# Patient Record
Sex: FEMALE | Race: WHITE | NOT HISPANIC OR LATINO | URBAN - METROPOLITAN AREA
[De-identification: names, ages, dates, MRNs, and addresses within clinical notes are randomized per-mention and may not be internally consistent; named-entity substitution may affect disease eponyms.]

---

## 2020-03-15 ENCOUNTER — INPATIENT (INPATIENT)
Facility: HOSPITAL | Age: 80
LOS: 9 days | Discharge: HOME HEALTH | DRG: 177 | End: 2020-03-25
Attending: HOSPITALIST | Admitting: HOSPITALIST
Payer: MEDICARE

## 2020-03-15 VITALS
DIASTOLIC BLOOD PRESSURE: 109 MMHG | TEMPERATURE: 98 F | HEIGHT: 61 IN | HEART RATE: 127 BPM | OXYGEN SATURATION: 95 % | RESPIRATION RATE: 19 BRPM | WEIGHT: 177.03 LBS | SYSTOLIC BLOOD PRESSURE: 134 MMHG

## 2020-03-15 DIAGNOSIS — Z11.59 ENCOUNTER FOR SCREENING FOR OTHER VIRAL DISEASES: ICD-10-CM

## 2020-03-15 DIAGNOSIS — J69.0 PNEUMONITIS DUE TO INHALATION OF FOOD AND VOMIT: ICD-10-CM

## 2020-03-15 DIAGNOSIS — I24.8 OTHER FORMS OF ACUTE ISCHEMIC HEART DISEASE: ICD-10-CM

## 2020-03-15 DIAGNOSIS — Z98.83 FILTERING (VITREOUS) BLEB AFTER GLAUCOMA SURGERY STATUS: ICD-10-CM

## 2020-03-15 DIAGNOSIS — J15.212 PNEUMONIA DUE TO METHICILLIN RESISTANT STAPHYLOCOCCUS AUREUS: ICD-10-CM

## 2020-03-15 DIAGNOSIS — Z98.83 FILTERING (VITREOUS) BLEB AFTER GLAUCOMA SURGERY STATUS: Chronic | ICD-10-CM

## 2020-03-15 LAB
ADD ON TEST-SPECIMEN IN LAB: SIGNIFICANT CHANGE UP
ALBUMIN SERPL ELPH-MCNC: 2.5 G/DL — LOW (ref 3.3–5)
ALP SERPL-CCNC: 228 U/L — HIGH (ref 40–120)
ALT FLD-CCNC: 29 U/L — SIGNIFICANT CHANGE UP (ref 12–78)
ANION GAP SERPL CALC-SCNC: 14 MMOL/L — SIGNIFICANT CHANGE UP (ref 5–17)
AST SERPL-CCNC: 23 U/L — SIGNIFICANT CHANGE UP (ref 15–37)
BASOPHILS # BLD AUTO: 0 K/UL — SIGNIFICANT CHANGE UP (ref 0–0.2)
BASOPHILS NFR BLD AUTO: 0 % — SIGNIFICANT CHANGE UP (ref 0–2)
BILIRUB SERPL-MCNC: 0.4 MG/DL — SIGNIFICANT CHANGE UP (ref 0.2–1.2)
BUN SERPL-MCNC: 22 MG/DL — SIGNIFICANT CHANGE UP (ref 7–23)
CALCIUM SERPL-MCNC: 8.5 MG/DL — SIGNIFICANT CHANGE UP (ref 8.5–10.1)
CHLORIDE SERPL-SCNC: 110 MMOL/L — HIGH (ref 96–108)
CO2 SERPL-SCNC: 15 MMOL/L — LOW (ref 22–31)
CREAT SERPL-MCNC: 2.87 MG/DL — HIGH (ref 0.5–1.3)
EOSINOPHIL # BLD AUTO: 7.51 K/UL — HIGH (ref 0–0.5)
EOSINOPHIL NFR BLD AUTO: 24 % — HIGH (ref 0–6)
GLUCOSE SERPL-MCNC: 153 MG/DL — HIGH (ref 70–99)
HCT VFR BLD CALC: 42.8 % — SIGNIFICANT CHANGE UP (ref 34.5–45)
HGB BLD-MCNC: 14 G/DL — SIGNIFICANT CHANGE UP (ref 11.5–15.5)
LACTATE SERPL-SCNC: 1.8 MMOL/L — SIGNIFICANT CHANGE UP (ref 0.7–2)
LYMPHOCYTES # BLD AUTO: 0.63 K/UL — LOW (ref 1–3.3)
LYMPHOCYTES # BLD AUTO: 2 % — LOW (ref 13–44)
MAGNESIUM SERPL-MCNC: 2.2 MG/DL — SIGNIFICANT CHANGE UP (ref 1.6–2.6)
MCHC RBC-ENTMCNC: 29.1 PG — SIGNIFICANT CHANGE UP (ref 27–34)
MCHC RBC-ENTMCNC: 32.7 GM/DL — SIGNIFICANT CHANGE UP (ref 32–36)
MCV RBC AUTO: 89 FL — SIGNIFICANT CHANGE UP (ref 80–100)
MONOCYTES # BLD AUTO: 0.31 K/UL — SIGNIFICANT CHANGE UP (ref 0–0.9)
MONOCYTES NFR BLD AUTO: 1 % — LOW (ref 2–14)
NEUTROPHILS # BLD AUTO: 22.83 K/UL — HIGH (ref 1.8–7.4)
NEUTROPHILS NFR BLD AUTO: 70 % — SIGNIFICANT CHANGE UP (ref 43–77)
NRBC # BLD: SIGNIFICANT CHANGE UP /100 WBCS (ref 0–0)
NT-PROBNP SERPL-SCNC: 4755 PG/ML — HIGH (ref 0–450)
PLATELET # BLD AUTO: 281 K/UL — SIGNIFICANT CHANGE UP (ref 150–400)
POTASSIUM SERPL-MCNC: 3.4 MMOL/L — LOW (ref 3.5–5.3)
POTASSIUM SERPL-SCNC: 3.4 MMOL/L — LOW (ref 3.5–5.3)
PROT SERPL-MCNC: 7.7 GM/DL — SIGNIFICANT CHANGE UP (ref 6–8.3)
RBC # BLD: 4.81 M/UL — SIGNIFICANT CHANGE UP (ref 3.8–5.2)
RBC # FLD: 13.7 % — SIGNIFICANT CHANGE UP (ref 10.3–14.5)
SODIUM SERPL-SCNC: 139 MMOL/L — SIGNIFICANT CHANGE UP (ref 135–145)
TROPONIN I SERPL-MCNC: 0.3 NG/ML — HIGH (ref 0.01–0.04)
TROPONIN I SERPL-MCNC: 0.46 NG/ML — HIGH (ref 0.01–0.04)
WBC # BLD: 31.28 K/UL — HIGH (ref 3.8–10.5)
WBC # FLD AUTO: 31.28 K/UL — HIGH (ref 3.8–10.5)

## 2020-03-15 PROCEDURE — 87798 DETECT AGENT NOS DNA AMP: CPT

## 2020-03-15 PROCEDURE — 87086 URINE CULTURE/COLONY COUNT: CPT

## 2020-03-15 PROCEDURE — 82977 ASSAY OF GGT: CPT

## 2020-03-15 PROCEDURE — 93010 ELECTROCARDIOGRAM REPORT: CPT

## 2020-03-15 PROCEDURE — 70450 CT HEAD/BRAIN W/O DYE: CPT

## 2020-03-15 PROCEDURE — 87070 CULTURE OTHR SPECIMN AEROBIC: CPT

## 2020-03-15 PROCEDURE — 99223 1ST HOSP IP/OBS HIGH 75: CPT

## 2020-03-15 PROCEDURE — 84134 ASSAY OF PREALBUMIN: CPT

## 2020-03-15 PROCEDURE — 80048 BASIC METABOLIC PNL TOTAL CA: CPT

## 2020-03-15 PROCEDURE — 97162 PT EVAL MOD COMPLEX 30 MIN: CPT | Mod: GP

## 2020-03-15 PROCEDURE — U0001: CPT

## 2020-03-15 PROCEDURE — 82378 CARCINOEMBRYONIC ANTIGEN: CPT

## 2020-03-15 PROCEDURE — 93306 TTE W/DOPPLER COMPLETE: CPT

## 2020-03-15 PROCEDURE — 94640 AIRWAY INHALATION TREATMENT: CPT

## 2020-03-15 PROCEDURE — 85610 PROTHROMBIN TIME: CPT

## 2020-03-15 PROCEDURE — 84484 ASSAY OF TROPONIN QUANT: CPT

## 2020-03-15 PROCEDURE — 71250 CT THORAX DX C-: CPT | Mod: 26

## 2020-03-15 PROCEDURE — 80202 ASSAY OF VANCOMYCIN: CPT

## 2020-03-15 PROCEDURE — 85027 COMPLETE CBC AUTOMATED: CPT

## 2020-03-15 PROCEDURE — 84100 ASSAY OF PHOSPHORUS: CPT

## 2020-03-15 PROCEDURE — 83605 ASSAY OF LACTIC ACID: CPT

## 2020-03-15 PROCEDURE — 87040 BLOOD CULTURE FOR BACTERIA: CPT

## 2020-03-15 PROCEDURE — 80053 COMPREHEN METABOLIC PANEL: CPT

## 2020-03-15 PROCEDURE — 85730 THROMBOPLASTIN TIME PARTIAL: CPT

## 2020-03-15 PROCEDURE — 71045 X-RAY EXAM CHEST 1 VIEW: CPT

## 2020-03-15 PROCEDURE — 86301 IMMUNOASSAY TUMOR CA 19-9: CPT

## 2020-03-15 PROCEDURE — 84443 ASSAY THYROID STIM HORMONE: CPT

## 2020-03-15 PROCEDURE — 71045 X-RAY EXAM CHEST 1 VIEW: CPT | Mod: 26

## 2020-03-15 PROCEDURE — 85025 COMPLETE CBC W/AUTO DIFF WBC: CPT

## 2020-03-15 PROCEDURE — 36415 COLL VENOUS BLD VENIPUNCTURE: CPT

## 2020-03-15 PROCEDURE — 83735 ASSAY OF MAGNESIUM: CPT

## 2020-03-15 PROCEDURE — 87507 IADNA-DNA/RNA PROBE TQ 12-25: CPT

## 2020-03-15 PROCEDURE — 87581 M.PNEUMON DNA AMP PROBE: CPT

## 2020-03-15 PROCEDURE — 97116 GAIT TRAINING THERAPY: CPT | Mod: GP

## 2020-03-15 PROCEDURE — 80061 LIPID PANEL: CPT

## 2020-03-15 PROCEDURE — 87486 CHLMYD PNEUM DNA AMP PROBE: CPT

## 2020-03-15 PROCEDURE — 87186 SC STD MICRODIL/AGAR DIL: CPT

## 2020-03-15 PROCEDURE — 74176 CT ABD & PELVIS W/O CONTRAST: CPT

## 2020-03-15 PROCEDURE — 84145 PROCALCITONIN (PCT): CPT

## 2020-03-15 PROCEDURE — 71250 CT THORAX DX C-: CPT

## 2020-03-15 PROCEDURE — 87633 RESP VIRUS 12-25 TARGETS: CPT

## 2020-03-15 PROCEDURE — 97530 THERAPEUTIC ACTIVITIES: CPT | Mod: GP

## 2020-03-15 PROCEDURE — 81001 URINALYSIS AUTO W/SCOPE: CPT

## 2020-03-15 PROCEDURE — 87493 C DIFF AMPLIFIED PROBE: CPT

## 2020-03-15 RX ORDER — PIPERACILLIN AND TAZOBACTAM 4; .5 G/20ML; G/20ML
3.38 INJECTION, POWDER, LYOPHILIZED, FOR SOLUTION INTRAVENOUS ONCE
Refills: 0 | Status: COMPLETED | OUTPATIENT
Start: 2020-03-15 | End: 2020-03-15

## 2020-03-15 RX ORDER — ATORVASTATIN CALCIUM 80 MG/1
20 TABLET, FILM COATED ORAL AT BEDTIME
Refills: 0 | Status: DISCONTINUED | OUTPATIENT
Start: 2020-03-15 | End: 2020-03-16

## 2020-03-15 RX ORDER — VANCOMYCIN HCL 1 G
1000 VIAL (EA) INTRAVENOUS ONCE
Refills: 0 | Status: DISCONTINUED | OUTPATIENT
Start: 2020-03-15 | End: 2020-03-16

## 2020-03-15 RX ORDER — ALBUTEROL 90 UG/1
1 AEROSOL, METERED ORAL EVERY 4 HOURS
Refills: 0 | Status: COMPLETED | OUTPATIENT
Start: 2020-03-15 | End: 2021-02-11

## 2020-03-15 RX ORDER — ATENOLOL 25 MG/1
50 TABLET ORAL DAILY
Refills: 0 | Status: DISCONTINUED | OUTPATIENT
Start: 2020-03-15 | End: 2020-03-25

## 2020-03-15 RX ORDER — ASPIRIN/CALCIUM CARB/MAGNESIUM 324 MG
325 TABLET ORAL ONCE
Refills: 0 | Status: COMPLETED | OUTPATIENT
Start: 2020-03-15 | End: 2020-03-15

## 2020-03-15 RX ORDER — POTASSIUM CHLORIDE 20 MEQ
40 PACKET (EA) ORAL ONCE
Refills: 0 | Status: COMPLETED | OUTPATIENT
Start: 2020-03-15 | End: 2020-03-16

## 2020-03-15 RX ORDER — ENOXAPARIN SODIUM 100 MG/ML
80 INJECTION SUBCUTANEOUS ONCE
Refills: 0 | Status: DISCONTINUED | OUTPATIENT
Start: 2020-03-15 | End: 2020-03-15

## 2020-03-15 RX ORDER — SODIUM CHLORIDE 9 MG/ML
1500 INJECTION INTRAMUSCULAR; INTRAVENOUS; SUBCUTANEOUS ONCE
Refills: 0 | Status: COMPLETED | OUTPATIENT
Start: 2020-03-15 | End: 2020-03-15

## 2020-03-15 RX ORDER — LACTOBACILLUS ACIDOPHILUS 100MM CELL
1 CAPSULE ORAL
Refills: 0 | Status: DISCONTINUED | OUTPATIENT
Start: 2020-03-15 | End: 2020-03-25

## 2020-03-15 RX ORDER — ASPIRIN/CALCIUM CARB/MAGNESIUM 324 MG
81 TABLET ORAL DAILY
Refills: 0 | Status: DISCONTINUED | OUTPATIENT
Start: 2020-03-15 | End: 2020-03-25

## 2020-03-15 RX ADMIN — Medication 325 MILLIGRAM(S): at 21:26

## 2020-03-15 RX ADMIN — SODIUM CHLORIDE 1500 MILLILITER(S): 9 INJECTION INTRAMUSCULAR; INTRAVENOUS; SUBCUTANEOUS at 21:21

## 2020-03-15 RX ADMIN — SODIUM CHLORIDE 1000 MILLILITER(S): 9 INJECTION INTRAMUSCULAR; INTRAVENOUS; SUBCUTANEOUS at 21:20

## 2020-03-15 NOTE — ED ADULT NURSE NOTE - OBJECTIVE STATEMENT
patient axox3, c/o cough for 1 week and shortness of breath with hypoxia. patient was found to be in rapid afib at 210. given 20mg cardizem IVP and 150mL NS bolus by EMS and now sinus tach at 130s. patient states her niece bought her dayquil and nyquil for recent cough. patient denies n/v/d, fever. chills, chest pain.

## 2020-03-15 NOTE — CHART NOTE - NSCHARTNOTEFT_GEN_A_CORE
Pt awaiting coags for heparin gtt given findings of concern for metastatic ca and CHADSVASC 4, pt now in NSR at 87. Pt CT head and abd pelvis ordered to r/o mets. Pt has a family h/o pancreatic cancer and colon cancer in siblings. Findings explained to patient who agrees to pan scan, plan for heme onc consult, AC pending coags, cardio evaluation in AM, and holding ARB due to renal Insufficiency. Attempted to contact niece-proxy, left message for call back .

## 2020-03-15 NOTE — ED ADULT TRIAGE NOTE - CHIEF COMPLAINT QUOTE
Patient brought in by EMS for cough for 1 week and shortness of breath with hypoxia. patient was found to be in rapid afib at 210. given 20mg cardizem and 150ml NS bolus and now sinus tach at 130s. denies sick contacts.

## 2020-03-15 NOTE — ED PROVIDER NOTE - CARE PLAN
Principal Discharge DX:	Atrial fibrillation/flutter Principal Discharge DX:	Atrial fibrillation/flutter  Secondary Diagnosis:	Elevated troponin  Secondary Diagnosis:	ARF (acute renal failure)  Secondary Diagnosis:	Leukocytosis  Secondary Diagnosis:	CHF (congestive heart failure)

## 2020-03-15 NOTE — ED PROVIDER NOTE - ATTENDING CONTRIBUTION TO CARE
I, Kiley Chapman MD,  performed the initial face to face bedside interview with this patient regarding history of present illness, review of symptoms and relevant past medical, social and family history.  I completed an independent physical examination.  I was the initial provider who evaluated this patient. I have signed out the follow up of any pending tests (i.e. labs, radiological studies) to the ACP.  I have communicated the patient’s plan of care and disposition with the ACP.  The history, relevant review of systems, past medical and surgical history, medical decision making, and physical examination was documented by the scribe in my presence and I attest to the accuracy of the documentation.

## 2020-03-15 NOTE — ED PROVIDER NOTE - ENMT, MLM
Airway patent, Nasal mucosa clear. Mouth with normal mucosa. Throat is clear, has no vesicles, no oropharyngeal exudates and uvula is midline. No nasal secretions. No obvious coryza.

## 2020-03-15 NOTE — ED PROVIDER NOTE - NS_HEARTSCTROPONIN_ED_A_ED
Patient was called with provider's message. She agrees with this plan and already started taking the Abx.    1-3x Normal Limit

## 2020-03-15 NOTE — ED PROVIDER NOTE - PROGRESS NOTE DETAILS
PA note: 79 year old female with PMHx of HTN, Glaucoma presents to [facility] with palpitations and SOB for 2 days, taking PHENYLEPHRINE HYDROCHLORIDE (Dayquil/Nyquil) for URI/congestion for 1 week. No fever. No COVID exposure. No sick contacts. No recent travel. Patient converted to sinus rhythm after 20mg of Cardizem IV by EMS. Mild Hypoxia improved by O2 NC 2L. Will get basic labs, CXR, IVF for hydration. Reassess. ~GLENN Martinez PA note: Patient with +montana Troponin, elevated BNP at 4755. Patient received 150cc NS by EMS, 400cc NS in ED, dc'd IVF. Will admit. ~GLENN Martinez PA note: WBC 31.28. Creatinine 2.87. Rectal temp 98.0 F. Ordered Giana Young, CT chest, urinalysis. ~GLENN Martinez PA note: Case discussed with Dr. Kumar. Will admit patient. ~GLENN Martinez Mookie MOLINA for ED attending, Dr. Chapman : 79 YOF w/ PMHx of HTN, Glaucoma presents to the ED BIBA c/o cough x1 week and SOB w/ hypoxia. Pt found to have a-flutter with HR in 210s. Per family at bedside, +SOB. +Diffuse wheezing, +pallor. Denies CP, fever. Pt likely dehydrated. Pt was given 20mg Cardizem and 150mL NS bolus and now sinus tach at 130s. Denies any sick contacts or recent travel. NKDA.

## 2020-03-15 NOTE — ED PROVIDER NOTE - CLINICAL SUMMARY MEDICAL DECISION MAKING FREE TEXT BOX
PA note: 79 year old female with PMHx of HTN, Glaucoma presents to [facility] with palpitations and SOB for 2 days, taking PHENYLEPHRINE HYDROCHLORIDE (Dayquil/Nyquil) for URI/congestion for 1 week. No fever. No COVID exposure. No sick contacts. No recent travel. Patient converted to sinus rhythm after 20mg of Cardizem IV by EMS. Mild Hypoxia improved by O2 NC 2L. Will get basic labs, CXR, IVF for hydration. Reassess. ~GLENN Martinez PA note: 79 year old female with PMHx of HTN, Glaucoma presents to [facility] with palpitations and SOB for 2 days, taking PHENYLEPHRINE HYDROCHLORIDE (Dayquil/Nyquil) for URI/congestion for 1 week. No fever. No COVID exposure. No sick contacts. No recent travel. Patient converted to sinus rhythm after 20mg of Cardizem IV by EMS. Mild Hypoxia improved by O2 NC 2L. Patient with +montana Troponin, elevated BNP at 4755. Patient received 150cc NS by EMS, 400cc NS in ED, dc'd IVF. ADmitted to Dr. Qureshi. ~GLENN Martinez

## 2020-03-15 NOTE — ED PROVIDER NOTE - CONSTITUTIONAL, MLM
normal... PA note: Well appearing, Afebrile, awake, alert, oriented to person, place, time/situation and in no apparent distress.

## 2020-03-15 NOTE — ED PROVIDER NOTE - OBJECTIVE STATEMENT
PA note: Patient is a 79 year old female with PMHx of HTN, Glaucoma, who presents to ED c/o palpitations and SOB x 2 days. Patient has had a slight cough, URI symptoms x 1 week and has been taking a lot of Dayquil and Nyquil (both containing  PHENYLEPHRINE HYDROCHLORIDE). Patient started to notice some SOB and palpitations x 2 days. Patient's niece went to check on her this evening and patient did not appear well to her. Niece called EMS to have patient brought to ED> Patient was found to be in A-flutter with HR 210s, hypoxia with O2sat in 91-92 RA by EMS. Patient received    Patient brought in by EMS for cough for 1 week and shortness of breath with hypoxia. Patient was given 20mg of cardizem, 2L of O2 (O2sat improved to 96%), and 150ml NS bolus and now sinus tach at 130s. DENIES chest pressure, chest pain, fever, sick contacts, COVID exposure. No recent travel. Patient visited her sister at [facility] 1 week ago who DOES NOT have COVID-19. ~GLENN Martinez PA note: Patient is a 79 year old female with PMHx of HTN, Glaucoma, who presents to ED c/o palpitations and SOB x 2 days. Patient has had a slight cough, URI symptoms x 1 week and has been taking a lot of Dayquil and Nyquil (both containing  PHENYLEPHRINE HYDROCHLORIDE). Patient started to notice some SOB and palpitations x 2 days. Patient's niece went to check on her this evening and patient did not appear well to her. Niece called EMS to have patient brought to ED> Patient was found to be in A-flutter with HR 210s, hypoxia with O2sat in 91-92 RA by EMS. Patient received 20mg of cardizem, 2L of O2 (O2sat improved to 96%), and 150ml NS bolus and now sinus tach at 130s. Patient feels dehydrated. DENIES chest pressure, chest pain, fever, sick contacts, COVID exposure. No recent travel. Patient visited her sister at [facility] 1 week ago who DOES NOT have COVID-19. ~GLENN Martinez

## 2020-03-15 NOTE — ED ADULT NURSE NOTE - NSIMPLEMENTINTERV_GEN_ALL_ED
Implemented All Fall Risk Interventions:  [place]bs to call system. Call bell, personal items and telephone within reach. Instruct patient to call for assistance. Room bathroom lighting operational. Non-slip footwear when patient is off stretcher. Physically safe environment: no spills, clutter or unnecessary equipment. Stretcher in lowest position, wheels locked, appropriate side rails in place. Provide visual cue, wrist band, yellow gown, etc. Monitor gait and stability. Monitor for mental status changes and reorient to person, place, and time. Review medications for side effects contributing to fall risk. Reinforce activity limits and safety measures with patient and family.

## 2020-03-15 NOTE — ED ADULT NURSE NOTE - NS ED NURSE TRANSPORT WITH
oxygen/Cardiac Monitor/Defib/ACLS/Rescue Kit/O2/BVM IV pump/hepain drip done with CCU RN/oxygen/Cardiac Monitor/Defib/ACLS/Rescue Kit/O2/BVM

## 2020-03-15 NOTE — ED PROVIDER NOTE - CHPI ED SYMPTOMS NEG
no nausea/no diaphoresis/no back pain/no chest pain/no cough/no dizziness/no fever/no syncope/no vomiting/no chills

## 2020-03-16 DIAGNOSIS — I48.91 UNSPECIFIED ATRIAL FIBRILLATION: ICD-10-CM

## 2020-03-16 DIAGNOSIS — D72.829 ELEVATED WHITE BLOOD CELL COUNT, UNSPECIFIED: ICD-10-CM

## 2020-03-16 DIAGNOSIS — C78.00 SECONDARY MALIGNANT NEOPLASM OF UNSPECIFIED LUNG: ICD-10-CM

## 2020-03-16 DIAGNOSIS — R79.89 OTHER SPECIFIED ABNORMAL FINDINGS OF BLOOD CHEMISTRY: ICD-10-CM

## 2020-03-16 DIAGNOSIS — R09.89 OTHER SPECIFIED SYMPTOMS AND SIGNS INVOLVING THE CIRCULATORY AND RESPIRATORY SYSTEMS: ICD-10-CM

## 2020-03-16 LAB
ALBUMIN SERPL ELPH-MCNC: 2.3 G/DL — LOW (ref 3.3–5)
ALP SERPL-CCNC: 222 U/L — HIGH (ref 40–120)
ALT FLD-CCNC: 35 U/L — SIGNIFICANT CHANGE UP (ref 12–78)
ANION GAP SERPL CALC-SCNC: 9 MMOL/L — SIGNIFICANT CHANGE UP (ref 5–17)
APTT BLD: 174.3 SEC — CRITICAL HIGH (ref 27.5–36.3)
APTT BLD: 30.5 SEC — SIGNIFICANT CHANGE UP (ref 27.5–36.3)
APTT BLD: 81.7 SEC — HIGH (ref 27.5–36.3)
AST SERPL-CCNC: 38 U/L — HIGH (ref 15–37)
BILIRUB SERPL-MCNC: 0.4 MG/DL — SIGNIFICANT CHANGE UP (ref 0.2–1.2)
BUN SERPL-MCNC: 23 MG/DL — SIGNIFICANT CHANGE UP (ref 7–23)
CALCIUM SERPL-MCNC: 8 MG/DL — LOW (ref 8.5–10.1)
CANCER AG19-9 SERPL-ACNC: 25 U/ML — SIGNIFICANT CHANGE UP
CEA SERPL-MCNC: 2.2 NG/ML — SIGNIFICANT CHANGE UP (ref 0–3.8)
CHLORIDE SERPL-SCNC: 110 MMOL/L — HIGH (ref 96–108)
CHOLEST SERPL-MCNC: 153 MG/DL — SIGNIFICANT CHANGE UP (ref 10–199)
CO2 SERPL-SCNC: 20 MMOL/L — LOW (ref 22–31)
CREAT SERPL-MCNC: 2.33 MG/DL — HIGH (ref 0.5–1.3)
GLUCOSE SERPL-MCNC: 85 MG/DL — SIGNIFICANT CHANGE UP (ref 70–99)
HCT VFR BLD CALC: 32.4 % — LOW (ref 34.5–45)
HCT VFR BLD CALC: 36.5 % — SIGNIFICANT CHANGE UP (ref 34.5–45)
HDLC SERPL-MCNC: 40 MG/DL — LOW
HGB BLD-MCNC: 10.9 G/DL — LOW (ref 11.5–15.5)
HGB BLD-MCNC: 12.1 G/DL — SIGNIFICANT CHANGE UP (ref 11.5–15.5)
INR BLD: 1.3 RATIO — HIGH (ref 0.88–1.16)
LIPID PNL WITH DIRECT LDL SERPL: 94 MG/DL — SIGNIFICANT CHANGE UP
MAGNESIUM SERPL-MCNC: 2.3 MG/DL — SIGNIFICANT CHANGE UP (ref 1.6–2.6)
MCHC RBC-ENTMCNC: 29.2 PG — SIGNIFICANT CHANGE UP (ref 27–34)
MCHC RBC-ENTMCNC: 29.9 PG — SIGNIFICANT CHANGE UP (ref 27–34)
MCHC RBC-ENTMCNC: 33.2 GM/DL — SIGNIFICANT CHANGE UP (ref 32–36)
MCHC RBC-ENTMCNC: 33.6 GM/DL — SIGNIFICANT CHANGE UP (ref 32–36)
MCV RBC AUTO: 88 FL — SIGNIFICANT CHANGE UP (ref 80–100)
MCV RBC AUTO: 89 FL — SIGNIFICANT CHANGE UP (ref 80–100)
PHOSPHATE SERPL-MCNC: 3.9 MG/DL — SIGNIFICANT CHANGE UP (ref 2.5–4.5)
PLATELET # BLD AUTO: 206 K/UL — SIGNIFICANT CHANGE UP (ref 150–400)
PLATELET # BLD AUTO: 274 K/UL — SIGNIFICANT CHANGE UP (ref 150–400)
POTASSIUM SERPL-MCNC: 4.1 MMOL/L — SIGNIFICANT CHANGE UP (ref 3.5–5.3)
POTASSIUM SERPL-SCNC: 4.1 MMOL/L — SIGNIFICANT CHANGE UP (ref 3.5–5.3)
PREALB SERPL-MCNC: 9 MG/DL — LOW (ref 20–40)
PROT SERPL-MCNC: 7.3 GM/DL — SIGNIFICANT CHANGE UP (ref 6–8.3)
PROTHROM AB SERPL-ACNC: 14.6 SEC — HIGH (ref 10–12.9)
RAPID RVP RESULT: DETECTED
RBC # BLD: 3.64 M/UL — LOW (ref 3.8–5.2)
RBC # BLD: 4.15 M/UL — SIGNIFICANT CHANGE UP (ref 3.8–5.2)
RBC # FLD: 13.8 % — SIGNIFICANT CHANGE UP (ref 10.3–14.5)
RBC # FLD: 13.9 % — SIGNIFICANT CHANGE UP (ref 10.3–14.5)
RV+EV RNA SPEC QL NAA+PROBE: DETECTED
SODIUM SERPL-SCNC: 139 MMOL/L — SIGNIFICANT CHANGE UP (ref 135–145)
TOTAL CHOLESTEROL/HDL RATIO MEASUREMENT: 3.8 RATIO — SIGNIFICANT CHANGE UP (ref 3.3–7.1)
TRIGL SERPL-MCNC: 92 MG/DL — SIGNIFICANT CHANGE UP (ref 10–149)
TROPONIN I SERPL-MCNC: 0.41 NG/ML — HIGH (ref 0.01–0.04)
TSH SERPL-MCNC: 0.68 UU/ML — SIGNIFICANT CHANGE UP (ref 0.34–4.82)
TSH SERPL-MCNC: 0.78 UU/ML — SIGNIFICANT CHANGE UP (ref 0.34–4.82)
WBC # BLD: 23.61 K/UL — HIGH (ref 3.8–10.5)
WBC # BLD: 32.23 K/UL — HIGH (ref 3.8–10.5)
WBC # FLD AUTO: 23.61 K/UL — HIGH (ref 3.8–10.5)
WBC # FLD AUTO: 32.23 K/UL — HIGH (ref 3.8–10.5)

## 2020-03-16 PROCEDURE — 99233 SBSQ HOSP IP/OBS HIGH 50: CPT

## 2020-03-16 PROCEDURE — 70450 CT HEAD/BRAIN W/O DYE: CPT | Mod: 26

## 2020-03-16 PROCEDURE — 99223 1ST HOSP IP/OBS HIGH 75: CPT

## 2020-03-16 PROCEDURE — 93306 TTE W/DOPPLER COMPLETE: CPT | Mod: 26

## 2020-03-16 PROCEDURE — 74176 CT ABD & PELVIS W/O CONTRAST: CPT | Mod: 26

## 2020-03-16 RX ORDER — HEPARIN SODIUM 5000 [USP'U]/ML
3000 INJECTION INTRAVENOUS; SUBCUTANEOUS EVERY 6 HOURS
Refills: 0 | Status: DISCONTINUED | OUTPATIENT
Start: 2020-03-16 | End: 2020-03-17

## 2020-03-16 RX ORDER — SODIUM CHLORIDE 9 MG/ML
1000 INJECTION INTRAMUSCULAR; INTRAVENOUS; SUBCUTANEOUS
Refills: 0 | Status: DISCONTINUED | OUTPATIENT
Start: 2020-03-16 | End: 2020-03-19

## 2020-03-16 RX ORDER — HEPARIN SODIUM 5000 [USP'U]/ML
6500 INJECTION INTRAVENOUS; SUBCUTANEOUS ONCE
Refills: 0 | Status: COMPLETED | OUTPATIENT
Start: 2020-03-16 | End: 2020-03-16

## 2020-03-16 RX ORDER — PIPERACILLIN AND TAZOBACTAM 4; .5 G/20ML; G/20ML
3.38 INJECTION, POWDER, LYOPHILIZED, FOR SOLUTION INTRAVENOUS EVERY 12 HOURS
Refills: 0 | Status: DISCONTINUED | OUTPATIENT
Start: 2020-03-16 | End: 2020-03-18

## 2020-03-16 RX ORDER — ALBUTEROL 90 UG/1
2 AEROSOL, METERED ORAL EVERY 4 HOURS
Refills: 0 | Status: DISCONTINUED | OUTPATIENT
Start: 2020-03-16 | End: 2020-03-25

## 2020-03-16 RX ORDER — AZITHROMYCIN 500 MG/1
500 TABLET, FILM COATED ORAL EVERY 24 HOURS
Refills: 0 | Status: COMPLETED | OUTPATIENT
Start: 2020-03-17 | End: 2020-03-19

## 2020-03-16 RX ORDER — ATORVASTATIN CALCIUM 80 MG/1
20 TABLET, FILM COATED ORAL AT BEDTIME
Refills: 0 | Status: DISCONTINUED | OUTPATIENT
Start: 2020-03-16 | End: 2020-03-25

## 2020-03-16 RX ORDER — VANCOMYCIN HCL 1 G
1000 VIAL (EA) INTRAVENOUS ONCE
Refills: 0 | Status: COMPLETED | OUTPATIENT
Start: 2020-03-16 | End: 2020-03-16

## 2020-03-16 RX ORDER — HEPARIN SODIUM 5000 [USP'U]/ML
1100 INJECTION INTRAVENOUS; SUBCUTANEOUS
Qty: 25000 | Refills: 0 | Status: DISCONTINUED | OUTPATIENT
Start: 2020-03-16 | End: 2020-03-16

## 2020-03-16 RX ORDER — HEPARIN SODIUM 5000 [USP'U]/ML
6500 INJECTION INTRAVENOUS; SUBCUTANEOUS EVERY 6 HOURS
Refills: 0 | Status: DISCONTINUED | OUTPATIENT
Start: 2020-03-16 | End: 2020-03-17

## 2020-03-16 RX ORDER — AZITHROMYCIN 500 MG/1
TABLET, FILM COATED ORAL
Refills: 0 | Status: COMPLETED | OUTPATIENT
Start: 2020-03-16 | End: 2020-03-19

## 2020-03-16 RX ORDER — HEPARIN SODIUM 5000 [USP'U]/ML
3000 INJECTION INTRAVENOUS; SUBCUTANEOUS EVERY 6 HOURS
Refills: 0 | Status: DISCONTINUED | OUTPATIENT
Start: 2020-03-16 | End: 2020-03-16

## 2020-03-16 RX ORDER — HEPARIN SODIUM 5000 [USP'U]/ML
6500 INJECTION INTRAVENOUS; SUBCUTANEOUS EVERY 6 HOURS
Refills: 0 | Status: DISCONTINUED | OUTPATIENT
Start: 2020-03-16 | End: 2020-03-16

## 2020-03-16 RX ORDER — HEPARIN SODIUM 5000 [USP'U]/ML
INJECTION INTRAVENOUS; SUBCUTANEOUS
Qty: 25000 | Refills: 0 | Status: DISCONTINUED | OUTPATIENT
Start: 2020-03-16 | End: 2020-03-16

## 2020-03-16 RX ORDER — AZITHROMYCIN 500 MG/1
500 TABLET, FILM COATED ORAL ONCE
Refills: 0 | Status: COMPLETED | OUTPATIENT
Start: 2020-03-16 | End: 2020-03-16

## 2020-03-16 RX ORDER — HEPARIN SODIUM 5000 [USP'U]/ML
INJECTION INTRAVENOUS; SUBCUTANEOUS
Qty: 25000 | Refills: 0 | Status: DISCONTINUED | OUTPATIENT
Start: 2020-03-16 | End: 2020-03-17

## 2020-03-16 RX ORDER — ALBUTEROL 90 UG/1
1 AEROSOL, METERED ORAL EVERY 4 HOURS
Refills: 0 | Status: DISCONTINUED | OUTPATIENT
Start: 2020-03-16 | End: 2020-03-16

## 2020-03-16 RX ADMIN — HEPARIN SODIUM 950 UNIT(S)/HR: 5000 INJECTION INTRAVENOUS; SUBCUTANEOUS at 20:33

## 2020-03-16 RX ADMIN — ALBUTEROL 1 PUFF(S): 90 AEROSOL, METERED ORAL at 13:04

## 2020-03-16 RX ADMIN — Medication 1 TABLET(S): at 17:51

## 2020-03-16 RX ADMIN — Medication 250 MILLIGRAM(S): at 02:28

## 2020-03-16 RX ADMIN — ALBUTEROL 2 PUFF(S): 90 AEROSOL, METERED ORAL at 13:05

## 2020-03-16 RX ADMIN — PIPERACILLIN AND TAZOBACTAM 25 GRAM(S): 4; .5 INJECTION, POWDER, LYOPHILIZED, FOR SOLUTION INTRAVENOUS at 09:48

## 2020-03-16 RX ADMIN — ALBUTEROL 1 PUFF(S): 90 AEROSOL, METERED ORAL at 01:38

## 2020-03-16 RX ADMIN — ALBUTEROL 2 PUFF(S): 90 AEROSOL, METERED ORAL at 20:59

## 2020-03-16 RX ADMIN — Medication 81 MILLIGRAM(S): at 12:29

## 2020-03-16 RX ADMIN — HEPARIN SODIUM 1400 UNIT(S)/HR: 5000 INJECTION INTRAVENOUS; SUBCUTANEOUS at 03:12

## 2020-03-16 RX ADMIN — Medication 40 MILLIEQUIVALENT(S): at 04:42

## 2020-03-16 RX ADMIN — AZITHROMYCIN 255 MILLIGRAM(S): 500 TABLET, FILM COATED ORAL at 14:20

## 2020-03-16 RX ADMIN — ATENOLOL 50 MILLIGRAM(S): 25 TABLET ORAL at 06:02

## 2020-03-16 RX ADMIN — PIPERACILLIN AND TAZOBACTAM 25 GRAM(S): 4; .5 INJECTION, POWDER, LYOPHILIZED, FOR SOLUTION INTRAVENOUS at 17:51

## 2020-03-16 RX ADMIN — ATORVASTATIN CALCIUM 20 MILLIGRAM(S): 80 TABLET, FILM COATED ORAL at 21:00

## 2020-03-16 RX ADMIN — HEPARIN SODIUM 6500 UNIT(S): 5000 INJECTION INTRAVENOUS; SUBCUTANEOUS at 03:11

## 2020-03-16 RX ADMIN — ALBUTEROL 2 PUFF(S): 90 AEROSOL, METERED ORAL at 17:46

## 2020-03-16 RX ADMIN — Medication 1 TABLET(S): at 06:02

## 2020-03-16 RX ADMIN — SODIUM CHLORIDE 75 MILLILITER(S): 9 INJECTION INTRAMUSCULAR; INTRAVENOUS; SUBCUTANEOUS at 12:30

## 2020-03-16 RX ADMIN — PIPERACILLIN AND TAZOBACTAM 200 GRAM(S): 4; .5 INJECTION, POWDER, LYOPHILIZED, FOR SOLUTION INTRAVENOUS at 01:24

## 2020-03-16 RX ADMIN — ALBUTEROL 1 PUFF(S): 90 AEROSOL, METERED ORAL at 09:44

## 2020-03-16 RX ADMIN — HEPARIN SODIUM 1100 UNIT(S)/HR: 5000 INJECTION INTRAVENOUS; SUBCUTANEOUS at 11:26

## 2020-03-16 NOTE — PROGRESS NOTE ADULT - ASSESSMENT
80 yo female with a pmh/o Glaucoma, cataracts, OA, HTN, who presents to the ED with niece due to her niece noticing pt having cough x 1 week, wheezing, SOB, decreased appetite, and weakness.  In ambulance, patient found to have -- received 20 IV cardizem and converted to NSR.  In ER, pt found to have ARF, positive trop, new onset afib with RVR and rhinovirus +.  CT chest/ abd initially concern for ? metastatic cancer; however, final read with bilateral airspace disease and ELIZABETH concerning for PNA/ viral etiology and acute diverticulitis.      #Acute Hypoxic Respiratory Failure:    Related to rhinovirus/ ?PNA on CT chest.    Also cannot r/o COVID with presenting sx.    Check COVID PCR.    Cont Zosyn IV for possible PNA.  Add Azithro for atypicals.    Follow o2 sats.    Start albuterol inhaler q4 hours.    Initially hypoxic 90% on RA.    No hx of lung disease in past.      #Bilateral Airspace disease/ PNA:    As above.    R/o COVID.    Cont ABX zosyn/ azithro.      #Diverticulitis:    Patient notes intermittent abd pain over last week.    F/u GI eval.    CT limited as no contrast but concerning for sigmoid diverticulitis.    Cont Zosyn for now.    Regular diet as no significant pain.      #New onset AFIB:    Likely related to underlying pulm process.    S/p cardizem IV-- NSR.    Cont tele.    Cont heparin gtt for AC for now.    Cannot use lovenox with ARF.    CHADSVASC:  4-- meets criteria for AC.    Cont home atenolol.    Check ECHO.      #ARF:    Cr 2.9 on admission.  Suspect prerenal related to anorexia along with NSAIDS/ ARB at home.    Hold NSAIDS/ ARB.    Start NSS @ 75 for 24 hours.    Improved from 2.9 to 2.3.    No obstruction on CT.      #Positive TROP:    Suspect more demand ischemia related to infection/ afib.    Will need ischemic work up.    Cardio f/u.    Cont asa/ statin.      #HTN:    Hold ARB with ARF.  Cont BB.      #Code Status:  full.      D/w niece Emy Michael in detail.

## 2020-03-16 NOTE — ED ADULT NURSE REASSESSMENT NOTE - NS ED NURSE REASSESS COMMENT FT1
pt wheezing noted. Dr. May made aware. Ventolin HFA 1 puff given as per md order. pt tolerated well. vss. pt resting comfortably in bed with no acute distress noted. Will cont to monitor for safety and comfort.

## 2020-03-16 NOTE — H&P ADULT - ATTENDING COMMENTS
Pt is a full code. Advance care planning discussed with patient and proxy, niece. Patient states that although no legal paperwork is in place, her niece is her next of kin as she does not have children and would like her to make medical decisions on her behalf should she become unable.

## 2020-03-16 NOTE — CONSULT NOTE ADULT - ASSESSMENT
Pt is a 80 yo female admitted with:    Afib RVR, likely paroxysmal given history, complicated by chest pain and demand ischemia, in setting of phenylepherine use    Pericardial effusion, rapid ventricular rate.  Cardiology consultation and follow-up appreciated.    Possible metastatic cancer, with await cardiovascular stabilization and improvement of viral infection.      Possible community-acquired pneumonia superimposed on viral infection.    Renal insufficiency, hydration.

## 2020-03-16 NOTE — CONSULT NOTE ADULT - SUBJECTIVE AND OBJECTIVE BOX
Pt is a 80 yo female with a pmh/o Glaucoma, cataracts, OA, HTN, who presents to the ED with niece due to her niece noticing pt having cough x 1 week, decreased to no appetite, and weakness. Niece called EMS from pt home after visiting and EMS noted pt HR to be 210 in aflutter. Pt given IVF and 20mg cardizem en route and HR improved, pt now in NSR at 80's. Pt is a very poor historian however upon interview, admits to having a pre op assessment (cataract) in January where her RN had mentioned to her that her HR was irregular. Pt then admits to intermittent chest pressure which is centralized across chest and radiates down right arm to fingertips. Pt states this chest pressure and wheezing has been treated at home with ASA, advil, and yesterday started OTC cold medicine with pseudoephinephrine ingredient. Pt states pressure had awoken her from her sleep and is sure that she only has been taking two advil and one additional aspirin to her daily in order to control sx. Pt denies fever, however states that she did have chills. Endorses productive cough with off white sputum, and wheezing. Denies calf pain/swelling, smoking hx, urinary sx, abd pain, constipation, diarrhea, recent travel, fevers, rashes. Pt states she visited sister in the [facility] at which time sister also had a cough. Collateral hx obtained from niece listed in Emergency contacts who confirms above.     Pt found to be in new onset afib rvr, complicated by demand ischemia and renal failure, treated for PNA, sepsis in ED, later showing probable metastatic cancer on chest CT.    As per niece, pt sister had pancreatic ca. Brother had colon ca. Niece states she suspected underlying neoplastic process given pt lack of appetite and fatigue. States pt does not have legal proxy however pt is full code and pt states niece may act on her behalf. (16 Mar 2020 01:17)      PAST MEDICAL & SURGICAL HISTORY:  OA (osteoarthritis)  Glaucoma  HTN (hypertension)  Status post glaucoma surgery: and cataract      Allergies    No Known Allergies    Intolerances    SOCIAL HISTORY: neg x 3    FAMILY HISTORY:  FH: CAD (coronary artery disease): in mother and father, both   FH: pancreatic cancer: in sister,   FHx: colon cancer: in brother,       MEDICATIONS:  MEDICATIONS  (STANDING):  ALBUTerol    90 MICROgram(s) HFA Inhaler 1 Puff(s) Inhalation every 4 hours  aspirin enteric coated 81 milliGRAM(s) Oral daily  ATENolol  Tablet 50 milliGRAM(s) Oral daily  heparin  Infusion.  Unit(s)/Hr (14 mL/Hr) IV Continuous <Continuous>  lactobacillus acidophilus 1 Tablet(s) Oral two times a day  piperacillin/tazobactam IVPB.. 3.375 Gram(s) IV Intermittent every 12 hours    MEDICATIONS  (PRN):  heparin  Injectable 6500 Unit(s) IV Push every 6 hours PRN For aPTT less than 40  heparin  Injectable 3000 Unit(s) IV Push every 6 hours PRN For aPTT between 40 - 57      REVIEW OF SYSTEMS:    CONSTITUTIONAL: No weakness, fevers or chills  EYES/ENT: No visual changes;  No vertigo or throat pain   NECK: No pain or stiffness  RESPIRATORY: No cough, wheezing, hemoptysis; No shortness of breath  CARDIOVASCULAR: No chest pain or palpitations  GASTROINTESTINAL: No abdominal or epigastric pain. No nausea, vomiting, or hematemesis; No diarrhea or constipation. No melena or hematochezia.  GENITOURINARY: No dysuria, frequency or hematuria  NEUROLOGICAL: No numbness or weakness  SKIN: No itching, burning, rashes, or lesions   All other review of systems is negative unless indicated above    Vital Signs Last 24 Hrs  T(C): 36.7 (16 Mar 2020 05:30), Max: 37.3 (16 Mar 2020 01:23)  T(F): 98 (16 Mar 2020 05:30), Max: 99.1 (16 Mar 2020 01:23)  HR: 67 (16 Mar 2020 08:00) (67 - 127)  BP: 120/62 (16 Mar 2020 08:00) (115/58 - 136/60)  BP(mean): 72 (16 Mar 2020 08:00) (64 - 75)  RR: 29 (16 Mar 2020 08:00) (19 - 29)  SpO2: 100% (16 Mar 2020 08:00) (94% - 100%)    I&O's Summary      PHYSICAL EXAM:    Constitutional: NAD, awake and alert, well-developed  HEENT: PERR, EOMI,  No oral cyananosis.  Neck:  supple,  No JVD  Respiratory: Breath sounds are clear bilaterally, No wheezing, rales or rhonchi  Cardiovascular: S1 and S2, regular rate and rhythm, no Murmurs, gallops or rubs  Gastrointestinal: Bowel Sounds present, soft, nontender.   Extremities: No peripheral edema. No clubbing or cyanosis.  Vascular: 2+ peripheral pulses  Neurological: A/O x 3, no focal deficits  Musculoskeletal: no calf tenderness.  Skin: No rashes.      LABS: All Labs Reviewed:                        14.0   31.28 )-----------( 281      ( 15 Mar 2020 19:55 )             42.8     15 Mar 2020 19:55    139    |  110    |  22     ----------------------------<  153    3.4     |  15     |  2.87     Ca    8.5        15 Mar 2020 19:  Mg     2.2       15 Mar 2020 19:55    TPro  7.7    /  Alb  2.5    /  TBili  0.4    /  DBili  x      /  AST  23     /  ALT  29     /  AlkPhos  228    15 Mar 2020 19:55    PT/INR - ( 16 Mar 2020 01:03 )   PT: 14.6 sec;   INR: 1.30 ratio         PTT - ( 16 Mar 2020 01:03 )  PTT:30.5 sec  CARDIAC MARKERS ( 16 Mar 2020 01:03 )  0.407 ng/mL / x     / x     / x     / x      CARDIAC MARKERS ( 15 Mar 2020 22:28 )  0.459 ng/mL / x     / x     / x     / x      CARDIAC MARKERS ( 15 Mar 2020 19:55 )  0.299 ng/mL / x     / x     / x     / x          Blood Culture:   03-15 @ 19:55  Pro Bnp [id number]        RADIOLOGY/EKG: Pt is a 78 yo female with a pmh/o Glaucoma, cataracts, OA, HTN, who presents to the ED with niece due to her niece noticing pt having cough x 1 week, decreased to no appetite, and weakness. Niece called EMS from pt home after visiting and EMS noted pt HR to be 210 in aflutter. Pt given IVF and 20mg cardizem en route and HR improved, pt now in NSR at 80's. Pt is a very poor historian however upon interview, admits to having a pre op assessment (cataract) in January where her RN had mentioned to her that her HR was irregular. Pt then admits to intermittent chest pressure which is centralized across chest and radiates down right arm to fingertips. Pt states this chest pressure and wheezing has been treated at home with ASA, advil, and yesterday started OTC cold medicine with pseudoephinephrine ingredient. Pt states pressure had awoken her from her sleep and is sure that she only has been taking two advil and one additional aspirin to her daily in order to control sx. Pt denies fever, however states that she did have chills. Endorses productive cough with off white sputum, and wheezing. Denies calf pain/swelling, smoking hx, urinary sx, abd pain, constipation, diarrhea, recent travel, fevers, rashes. Pt states she visited sister in the [facility] at which time sister also had a cough. Collateral hx obtained from niece listed in Emergency contacts who confirms above.     Pt found to be in new onset afib rvr, complicated by demand ischemia and renal failure, treated for PNA, sepsis in ED, later showing probable metastatic cancer on chest CT.    As per niece, pt sister had pancreatic ca. Brother had colon ca. Niece states she suspected underlying neoplastic process given pt lack of appetite and fatigue. States pt does not have legal proxy however pt is full code and pt states niece may act on her behalf. (16 Mar 2020 01:17)    Cardiology consulted for afib w/ RVR.  Reviewed chart.  ECG w/ sinus tachycardia in 130s, no tracings documenting afib described - per EMR notes, afib terminated by time pt came to ED.   Pt reports dyspnea for days prior to admit, no palpitations, syncope, lightheadness or other cardiac symptoms.  Tele currently w/ NSR.    PAST MEDICAL & SURGICAL HISTORY:  OA (osteoarthritis)  Glaucoma  HTN (hypertension)  Status post glaucoma surgery: and cataract    Allergies  No Known Allergies  Intolerances    SOCIAL HISTORY: neg x 3    FAMILY HISTORY:  FH: CAD (coronary artery disease): in mother and father, both   FH: pancreatic cancer: in sister,   FHx: colon cancer: in brother,       MEDICATIONS:  MEDICATIONS  (STANDING):  ALBUTerol    90 MICROgram(s) HFA Inhaler 1 Puff(s) Inhalation every 4 hours  aspirin enteric coated 81 milliGRAM(s) Oral daily  ATENolol  Tablet 50 milliGRAM(s) Oral daily  heparin  Infusion.  Unit(s)/Hr (14 mL/Hr) IV Continuous <Continuous>  lactobacillus acidophilus 1 Tablet(s) Oral two times a day  piperacillin/tazobactam IVPB.. 3.375 Gram(s) IV Intermittent every 12 hours    MEDICATIONS  (PRN):  heparin  Injectable 6500 Unit(s) IV Push every 6 hours PRN For aPTT less than 40  heparin  Injectable 3000 Unit(s) IV Push every 6 hours PRN For aPTT between 40 - 57      REVIEW OF SYSTEMS:    CONSTITUTIONAL: No weakness, fevers or chills  EYES/ENT: No visual changes;  No vertigo or throat pain   NECK: No pain or stiffness  RESPIRATORY: No cough, wheezing, hemoptysis; + shortness of breath  CARDIOVASCULAR: No chest pain or palpitations  GASTROINTESTINAL: No abdominal or epigastric pain. No nausea, vomiting, or hematemesis; No diarrhea or constipation. No melena or hematochezia.  GENITOURINARY: No dysuria, frequency or hematuria  NEUROLOGICAL: No numbness or weakness  SKIN: No itching, burning, rashes, or lesions   All other review of systems is negative unless indicated above    Vital Signs Last 24 Hrs  T(C): 36.7 (16 Mar 2020 05:30), Max: 37.3 (16 Mar 2020 01:23)  T(F): 98 (16 Mar 2020 05:30), Max: 99.1 (16 Mar 2020 01:23)  HR: 67 (16 Mar 2020 08:00) (67 - 127)  BP: 120/62 (16 Mar 2020 08:00) (115/58 - 136/60)  BP(mean): 72 (16 Mar 2020 08:00) (64 - 75)  RR: 29 (16 Mar 2020 08:00) (19 - 29)  SpO2: 100% (16 Mar 2020 08:00) (94% - 100%)    I&O's Summary      PHYSICAL EXAM:    Constitutional: NAD, awake and alert,   HEENT: PERR, EOMI,  No oral cyananosis.  Neck:  supple,  No JVD  Respiratory: Breath sounds are clear bilaterally, No wheezing, rales or rhonchi  Cardiovascular: S1 and S2, regular rate and rhythm, no Murmurs, gallops or rubs  Gastrointestinal: Bowel Sounds present, soft, nontender.   Extremities: No peripheral edema. No clubbing or cyanosis.  Vascular: 2+ peripheral pulses  Neurological: A/O x 3, no focal deficits        LABS: All Labs Reviewed:                        14.0   31.28 )-----------( 281      ( 15 Mar 2020 19:55 )             42.8     15 Mar 2020 19:55    139    |  110    |  22     ----------------------------<  153    3.4     |  15     |  2.87     Ca    8.5        15 Mar 2020 19:55  Mg     2.2       15 Mar 2020 19:55    TPro  7.7    /  Alb  2.5    /  TBili  0.4    /  DBili  x      /  AST  23     /  ALT  29     /  AlkPhos  228    15 Mar 2020 19:55    PT/INR - ( 16 Mar 2020 01:03 )   PT: 14.6 sec;   INR: 1.30 ratio         PTT - ( 16 Mar 2020 01:03 )  PTT:30.5 sec  CARDIAC MARKERS ( 16 Mar 2020 01:03 )  0.407 ng/mL / x     / x     / x     / x      CARDIAC MARKERS ( 15 Mar 2020 22:28 )  0.459 ng/mL / x     / x     / x     / x      CARDIAC MARKERS ( 15 Mar 2020 19:55 )  0.299 ng/mL / x     / x     / x     / x          Blood Culture:   -15 @ 19:55  Pro Bnp [id number]    EKG: sinus tachycardia in 130s    Tele currently NSR.

## 2020-03-16 NOTE — H&P ADULT - NSICDXFAMILYHX_GEN_ALL_CORE_FT
FAMILY HISTORY:  FH: CAD (coronary artery disease), in mother and father, both   FH: pancreatic cancer, in sister,   FHx: colon cancer, in brother,

## 2020-03-16 NOTE — H&P ADULT - ASSESSMENT
Pt is a 78 yo female admitted with:    Afib RVR, likely paroxysmal given history, complicated by chest pain and demand ischemia, in setting of phenylepherine use  -CHADSVASC: 4  -s/p full dose ASA in ED, cont 81mg po qd  -EKG: Rapid afib RVR, now resolved on monitor  -Trend troponins  -TTE ordered  -Cardiology consult  -monitor K keep >4, Mg>2  -cont BB, ASA, hold ACE due to EILEEN, hold statin due to concern for liver ca  -now rate controled s/p cardizem IVP  -will start heparin gtt given CHADSVASC and finding of IMPROVE 3 and in setting of EILEEN  -f/u hba1c, lipid panel, coags    Elevated Pro-BnP  -s/s hypervolemia: none  -chest xray: no consolidation present, f/u official report  -hold diuresis given pt w/o s/s fluid overload and with significant renal insufficiency  -CT chest ordered-showed likely metastatic cancer with lymphangetic spread    Metastatic cancer  -hold heparin gtt until coags  -head and abd/pelv ct ordered  -findings d/w patient and niece  -pain control, pt currently comfortable  -heme/onc consult placed    URI concerning for CAP, sepsis  -unlikely PNA unless post obstructive in setting of numerous nodules  -albuterol HFA   -tylenol prn  -supportive care  -IVF for gentle hydration  -s/p vancomycin and zosyn in ED, will hold further abx given pt afebrile and with findings on chest CT  -f/u blood cx  -f/u urinalysis  -f/u lactate  -f/u RVP    hypokalemia  -repleted, f/u AM K  -keep >4, Mg >2    Renal Insufficiency  -Order given for 1500 cc bolus in ED  -strict i/o's  -likely due to NSAID use, hold further nephrotoxic medications-losartan    Hyperglycemia  -f/u HbA1c    Protein calorie malnutrition  -prealbumin ordered  -consider nutrition supplement, ensure given pt with poor po intake     Transaminitis  -Elevated Alk Phos  -f/u lipid panel  -likely due to neoplastic process

## 2020-03-16 NOTE — CONSULT NOTE ADULT - PROBLEM SELECTOR RECOMMENDATION 3
markedly elevated WBC count, afebrile.  Enterovirus/rhinovirus & RVP positive.  Consider ID consult for further evaluation.

## 2020-03-16 NOTE — CONSULT NOTE ADULT - PROBLEM SELECTOR RECOMMENDATION 9
Currently NSR.  No tracings in chart documenting afib.  No afib over past 24 hrs.  CHADS2-VASc=3 (>75, HTN).  Recommend heparin bridge to therapeutic INR - could switch to lovenox.  Check Echo for structural heart disease.  check TSH.  Cont. atenolol for rate control in case afib recurs.  Once echo completed & if unremarkable & on lovenox bridge to therapeutic INR OK to d/c from cardiology standpoint w/ followup in cardiology clinic.  Will need event monitor as OP to evaluate burden of afib.

## 2020-03-16 NOTE — CONSULT NOTE ADULT - PROBLEM SELECTOR RECOMMENDATION 2
likely demand ischemia in the setting of afib w/ RVR episode now resolved & renal insufficiency. likely demand ischemia in the setting of afib w/ RVR episode now resolved & renal insufficiency.  Will consider stress testing as OP.

## 2020-03-16 NOTE — H&P ADULT - HISTORY OF PRESENT ILLNESS
Pt is a 80 yo female with a pmh/o Glaucoma, cataracts, OA, HTN, who presents to the ED with niece due to her niece noticing pt having cough x 1 week, decreased to no appetite, and weakness. Niece called EMS from pt home after visiting and EMS noted pt HR to be 210 in aflutter. Pt given IVF and 20mg cardizem en route and HR improved, pt now in NSR at 80's. Pt is a very poor historian however upon interview, admits to having a pre op assessment (cataract) in January where her RN had mentioned to her that her HR was irregular. Pt then admits to intermittent chest pressure which is centralized across chest and radiates down right arm to fingertips. Pt states this chest pressure and wheezing has been treated at home with ASA, advil, and yesterday started OTC cold medicine with pseudoephinephrine ingredient. Pt states pressure had awoken her from her sleep and is sure that she only has been taking two advil and one additional aspirin to her daily in order to control sx. Pt denies fever, however states that she did have chills. Endorses productive cough with off white sputum, and wheezing. Denies calf pain/swelling, smoking hx, urinary sx, abd pain, constipation, diarrhea, recent travel, fevers, rashes. Pt states she visited sister in the [facility] at which time sister also had a cough. Collateral hx obtained from niece listed in Emergency contacts who confirms above.     Pt found to be in new onset afib rvr, complicated by demand ischemia and renal failure, treated for PNA, sepsis in ED, later showing probable metastatic cancer on chest CT.    As per niece, pt sister had pancreatic ca. Brother had colon ca. Niece states she suspected underlying neoplastic process given pt lack of appetite and fatigue. States pt does not have legal proxy however pt is full code and pt states niece may act on her behalf.

## 2020-03-16 NOTE — CONSULT NOTE ADULT - SUBJECTIVE AND OBJECTIVE BOX
Patient is a 79y old  Female who presents with a chief complaint of afib rvr, new onset (16 Mar 2020 08:41)      HPI:  Pt is a 78 yo female with a pmh/o Glaucoma, cataracts, OA, HTN, who presents to the ED with niece due to her niece noticing pt having cough x 1 week, decreased to no appetite, and weakness. Niece called EMS from pt home after visiting and EMS noted pt HR to be 210 in aflutter. Pt given IVF and 20mg cardizem en route and HR improved, pt now in NSR at 80's. Pt is a very poor historian however upon interview, admits to having a pre op assessment (cataract) in January where her RN had mentioned to her that her HR was irregular. Pt then admits to intermittent chest pressure which is centralized across chest and radiates down right arm to fingertips. Pt states this chest pressure and wheezing has been treated at home with ASA, advil, and yesterday started OTC cold medicine with pseudoephinephrine ingredient. Pt states pressure had awoken her from her sleep and is sure that she only has been taking two advil and one additional aspirin to her daily in order to control sx. Pt denies fever, however states that she did have chills. Endorses productive cough with off white sputum, and wheezing. Denies calf pain/swelling, smoking hx, urinary sx, abd pain, constipation, diarrhea, recent travel, fevers, rashes. Pt states she visited sister in the [facility] at which time sister also had a cough. Collateral hx obtained from niece listed in Emergency contacts who confirms above.     Pt found to be in new onset afib rvr, complicated by demand ischemia and renal failure, treated for PNA, sepsis in ED, later showing probable metastatic cancer on chest CT.    As per niece, pt sister had pancreatic ca. Brother had colon ca. Niece states she suspected underlying neoplastic process given pt lack of appetite and fatigue. States pt does not have legal proxy however pt is full code and pt states niece may act on her behalf. (16 Mar 2020 01:17)      Patient complains of fatigue, negative nausea or vomiting at this time.  Continues with some cough.  Breathing is a bit better.  Chest pressure has also improved since yesterday.      PAST MEDICAL & SURGICAL HISTORY:  OA (osteoarthritis)  Glaucoma  HTN (hypertension)  Status post glaucoma surgery: and cataract      MEDICATIONS  (STANDING):  ALBUTerol    90 MICROgram(s) HFA Inhaler 2 Puff(s) Inhalation every 4 hours  aspirin enteric coated 81 milliGRAM(s) Oral daily  ATENolol  Tablet 50 milliGRAM(s) Oral daily  azithromycin  IVPB      heparin  Infusion. 1100 Unit(s)/Hr (11 mL/Hr) IV Continuous <Continuous>  lactobacillus acidophilus 1 Tablet(s) Oral two times a day  piperacillin/tazobactam IVPB.. 3.375 Gram(s) IV Intermittent every 12 hours  sodium chloride 0.9%. 1000 milliLiter(s) (75 mL/Hr) IV Continuous <Continuous>    MEDICATIONS  (PRN):  heparin  Injectable 6500 Unit(s) IV Push every 6 hours PRN For aPTT less than 40  heparin  Injectable 3000 Unit(s) IV Push every 6 hours PRN For aPTT between 40 - 57      Allergies    No Known Allergies    Intolerances        SOCIAL HISTORY:neg drugs    FAMILY HISTORY:  FH: CAD (coronary artery disease): in mother and father, both   FH: pancreatic cancer: in sister,   FHx: colon cancer: in brother,       REVIEW OF SYSTEMS:    CONSTITUTIONAL: No weakness, fevers or chills  EYES/ENT: No visual changes;  No vertigo or throat pain   NECK: No pain or stiffness  RESPIRATORY: pos cough, wheezing, hemoptysis; No shortness of breath  CARDIOVASCULAR: No chest pain or palpitations  GENITOURINARY: No dysuria, frequency or hematuria  NEUROLOGICAL: No numbness or weakness  SKIN: No itching, burning, rashes, or lesions   All other review of systems is negative unless indicated above.    Vital Signs Last 24 Hrs  T(C): 36.7 (16 Mar 2020 09:41), Max: 37.3 (16 Mar 2020 01:23)  T(F): 98.1 (16 Mar 2020 09:41), Max: 99.1 (16 Mar 2020 01:23)  HR: 67 (16 Mar 2020 08:00) (67 - 127)  BP: 120/62 (16 Mar 2020 08:00) (115/58 - 136/60)  BP(mean): 72 (16 Mar 2020 08:00) (64 - 75)  RR: 29 (16 Mar 2020 08:00) (19 - 29)  SpO2: 100% (16 Mar 2020 08:00) (94% - 100%)    PHYSICAL EXAM:    Constitutional: NAD, well-developed  HEENT: EOMI, throat clear  Neck: No LAD, supple  Respiratory: CTA and P  Cardiovascular: S1 and S2, RRR, no M  Gastrointestinal: BS+, soft, NT/ND, neg HSM,  Extremities: No peripheral edema, neg clubing, cyanosis  Vascular: 2+ peripheral pulses  Neurological: A/O x 3, no focal deficits  Psychiatric: Normal mood, normal affect  Skin: No rashes    LABS:  CBC Full  -  ( 16 Mar 2020 08:59 )  WBC Count : 32.23 K/uL  RBC Count : 4.15 M/uL  Hemoglobin : 12.1 g/dL  Hematocrit : 36.5 %  Platelet Count - Automated : 274 K/uL  Mean Cell Volume : 88.0 fl  Mean Cell Hemoglobin : 29.2 pg  Mean Cell Hemoglobin Concentration : 33.2 gm/dL  Auto Neutrophil # : x  Auto Lymphocyte # : x  Auto Monocyte # : x  Auto Eosinophil # : x  Auto Basophil # : x  Auto Neutrophil % : x  Auto Lymphocyte % : x  Auto Monocyte % : x  Auto Eosinophil % : x  Auto Basophil % : x        139  |  110<H>  |  23  ----------------------------<  85  4.1   |  20<L>  |  2.33<H>    Ca    8.0<L>      16 Mar 2020 08:54  Phos  3.9       Mg     2.3         TPro  7.3  /  Alb  2.3<L>  /  TBili  0.4  /  DBili  x   /  AST  38<H>  /  ALT  35  /  AlkPhos  222<H>  -16    PT/INR - ( 16 Mar 2020 01:03 )   PT: 14.6 sec;   INR: 1.30 ratio         PTT - ( 16 Mar 2020 08:54 )  PTT:174.3 sec        RADIOLOGY & ADDITIONAL STUDIES:  < from: CT Abdomen and Pelvis No Cont (20 @ 01:01) >  EXAM:  CT ABDOMEN AND PELVIS                            PROCEDURE DATE:  2020          INTERPRETATION:  CLINICAL INFORMATION: Evaluate for metastatic disease    COMPARISON: None.    PROCEDURE:   CT of the Abdomen and Pelvis was performed without intravenous contrast.   Intravenous contrast: None.  Oral contrast: None.  Sagittal and coronal reformats were performed.    FINDINGS:    LOWER CHEST: Trace left pleural effusion and small bibasilar atelectasis. Trace pericardial fluid.    LIVER: Left hepatic lobe cyst as well as additional subcentimeter hypodensities, too small to characterize. Mild hepatomegaly.  BILE DUCTS: Normal caliber.  GALLBLADDER: Within normal limits.  SPLEEN: Within normal limits.  PANCREAS: Fatty with infiltration of the pancreas.  ADRENALS: Within normal limits.  KIDNEYS/URETERS: Within normal limits.    BLADDER: Underdistended.  REPRODUCTIVE ORGANS: Status post hysterectomy.    BOWEL: Extensive colonic diverticulosis with surrounding inflammatory change in the mid sigmoid colon with adjacent free fluid. No bowel obstruction. Appendix is normal.  PERITONEUM: No ascites.  VESSELS:  Within normal limits.  RETROPERITONEUM: No lymphadenopathy.    ABDOMINAL WALL: Within normal limits.  BONES: Degenerative changes of the spine. Small chronic focus within the right iliac bone.    IMPRESSION:  Findings concerning for diverticulitis of the mid sigmoid colon with adjacent inflammation/phlegmonous change. No gross perforation or abscess. Evaluation is somewhat limited without oral or intravenous contrast. Correlate clinically.     Additional findings as above.                TASIA JASMINE   This document has been electronically signed. Mar 16 2020  9:37AM        < end of copied text >

## 2020-03-17 DIAGNOSIS — N17.9 ACUTE KIDNEY FAILURE, UNSPECIFIED: ICD-10-CM

## 2020-03-17 DIAGNOSIS — B34.2 CORONAVIRUS INFECTION, UNSPECIFIED: ICD-10-CM

## 2020-03-17 LAB
ANION GAP SERPL CALC-SCNC: 8 MMOL/L — SIGNIFICANT CHANGE UP (ref 5–17)
APTT BLD: 50.6 SEC — HIGH (ref 27.5–36.3)
APTT BLD: 65.5 SEC — HIGH (ref 27.5–36.3)
APTT BLD: 73.5 SEC — HIGH (ref 27.5–36.3)
BUN SERPL-MCNC: 17 MG/DL — SIGNIFICANT CHANGE UP (ref 7–23)
CALCIUM SERPL-MCNC: 7.8 MG/DL — LOW (ref 8.5–10.1)
CHLORIDE SERPL-SCNC: 113 MMOL/L — HIGH (ref 96–108)
CO2 SERPL-SCNC: 19 MMOL/L — LOW (ref 22–31)
CREAT SERPL-MCNC: 1.34 MG/DL — HIGH (ref 0.5–1.3)
GLUCOSE SERPL-MCNC: 97 MG/DL — SIGNIFICANT CHANGE UP (ref 70–99)
HCT VFR BLD CALC: 31.3 % — LOW (ref 34.5–45)
HCT VFR BLD CALC: 33 % — LOW (ref 34.5–45)
HGB BLD-MCNC: 10.3 G/DL — LOW (ref 11.5–15.5)
HGB BLD-MCNC: 10.6 G/DL — LOW (ref 11.5–15.5)
MCHC RBC-ENTMCNC: 28.6 PG — SIGNIFICANT CHANGE UP (ref 27–34)
MCHC RBC-ENTMCNC: 29.3 PG — SIGNIFICANT CHANGE UP (ref 27–34)
MCHC RBC-ENTMCNC: 32.1 GM/DL — SIGNIFICANT CHANGE UP (ref 32–36)
MCHC RBC-ENTMCNC: 32.9 GM/DL — SIGNIFICANT CHANGE UP (ref 32–36)
MCV RBC AUTO: 88.9 FL — SIGNIFICANT CHANGE UP (ref 80–100)
MCV RBC AUTO: 89.2 FL — SIGNIFICANT CHANGE UP (ref 80–100)
PLATELET # BLD AUTO: 178 K/UL — SIGNIFICANT CHANGE UP (ref 150–400)
PLATELET # BLD AUTO: 203 K/UL — SIGNIFICANT CHANGE UP (ref 150–400)
POTASSIUM SERPL-MCNC: 3.9 MMOL/L — SIGNIFICANT CHANGE UP (ref 3.5–5.3)
POTASSIUM SERPL-SCNC: 3.9 MMOL/L — SIGNIFICANT CHANGE UP (ref 3.5–5.3)
RBC # BLD: 3.52 M/UL — LOW (ref 3.8–5.2)
RBC # BLD: 3.7 M/UL — LOW (ref 3.8–5.2)
RBC # FLD: 14.1 % — SIGNIFICANT CHANGE UP (ref 10.3–14.5)
RBC # FLD: 14.2 % — SIGNIFICANT CHANGE UP (ref 10.3–14.5)
SARS-COV-2 RNA SPEC QL NAA+PROBE: SIGNIFICANT CHANGE UP
SODIUM SERPL-SCNC: 140 MMOL/L — SIGNIFICANT CHANGE UP (ref 135–145)
WBC # BLD: 20.38 K/UL — HIGH (ref 3.8–10.5)
WBC # BLD: 21.95 K/UL — HIGH (ref 3.8–10.5)
WBC # FLD AUTO: 20.38 K/UL — HIGH (ref 3.8–10.5)
WBC # FLD AUTO: 21.95 K/UL — HIGH (ref 3.8–10.5)

## 2020-03-17 PROCEDURE — 99233 SBSQ HOSP IP/OBS HIGH 50: CPT

## 2020-03-17 RX ORDER — WARFARIN SODIUM 2.5 MG/1
5 TABLET ORAL ONCE
Refills: 0 | Status: COMPLETED | OUTPATIENT
Start: 2020-03-17 | End: 2020-03-17

## 2020-03-17 RX ORDER — ENOXAPARIN SODIUM 100 MG/ML
80 INJECTION SUBCUTANEOUS EVERY 12 HOURS
Refills: 0 | Status: DISCONTINUED | OUTPATIENT
Start: 2020-03-17 | End: 2020-03-18

## 2020-03-17 RX ADMIN — ATENOLOL 50 MILLIGRAM(S): 25 TABLET ORAL at 05:05

## 2020-03-17 RX ADMIN — Medication 81 MILLIGRAM(S): at 14:24

## 2020-03-17 RX ADMIN — HEPARIN SODIUM 1100 UNIT(S)/HR: 5000 INJECTION INTRAVENOUS; SUBCUTANEOUS at 14:26

## 2020-03-17 RX ADMIN — ALBUTEROL 2 PUFF(S): 90 AEROSOL, METERED ORAL at 19:38

## 2020-03-17 RX ADMIN — ALBUTEROL 2 PUFF(S): 90 AEROSOL, METERED ORAL at 04:52

## 2020-03-17 RX ADMIN — SODIUM CHLORIDE 75 MILLILITER(S): 9 INJECTION INTRAMUSCULAR; INTRAVENOUS; SUBCUTANEOUS at 02:16

## 2020-03-17 RX ADMIN — ALBUTEROL 2 PUFF(S): 90 AEROSOL, METERED ORAL at 01:12

## 2020-03-17 RX ADMIN — Medication 1 TABLET(S): at 05:05

## 2020-03-17 RX ADMIN — SODIUM CHLORIDE 75 MILLILITER(S): 9 INJECTION INTRAMUSCULAR; INTRAVENOUS; SUBCUTANEOUS at 02:45

## 2020-03-17 RX ADMIN — PIPERACILLIN AND TAZOBACTAM 25 GRAM(S): 4; .5 INJECTION, POWDER, LYOPHILIZED, FOR SOLUTION INTRAVENOUS at 05:05

## 2020-03-17 RX ADMIN — PIPERACILLIN AND TAZOBACTAM 25 GRAM(S): 4; .5 INJECTION, POWDER, LYOPHILIZED, FOR SOLUTION INTRAVENOUS at 19:13

## 2020-03-17 RX ADMIN — ATORVASTATIN CALCIUM 20 MILLIGRAM(S): 80 TABLET, FILM COATED ORAL at 21:29

## 2020-03-17 RX ADMIN — AZITHROMYCIN 255 MILLIGRAM(S): 500 TABLET, FILM COATED ORAL at 14:24

## 2020-03-17 RX ADMIN — Medication 1 TABLET(S): at 19:14

## 2020-03-17 RX ADMIN — HEPARIN SODIUM 1000 UNIT(S)/HR: 5000 INJECTION INTRAVENOUS; SUBCUTANEOUS at 21:30

## 2020-03-17 RX ADMIN — Medication 200 MILLIGRAM(S): at 04:07

## 2020-03-17 RX ADMIN — ALBUTEROL 2 PUFF(S): 90 AEROSOL, METERED ORAL at 16:37

## 2020-03-17 RX ADMIN — HEPARIN SODIUM 950 UNIT(S)/HR: 5000 INJECTION INTRAVENOUS; SUBCUTANEOUS at 03:15

## 2020-03-17 RX ADMIN — ENOXAPARIN SODIUM 80 MILLIGRAM(S): 100 INJECTION SUBCUTANEOUS at 19:15

## 2020-03-17 RX ADMIN — ALBUTEROL 2 PUFF(S): 90 AEROSOL, METERED ORAL at 08:18

## 2020-03-17 RX ADMIN — Medication 200 MILLIGRAM(S): at 19:14

## 2020-03-17 NOTE — PROGRESS NOTE ADULT - SUBJECTIVE AND OBJECTIVE BOX
80 y/o w/ Glaucoma, cataracts, OA, HTN, admitted for cough, wheezing, dyspnea 7 weakness, afib w/ RVR on presentation w/ termination to NSR prior to arrival in ED.  Admitted for afib w/ RVR, borderline trop elevation, abnormal CT (initially read metastatic cancer now suspicious for pneumonia/viral etiology, and ARF.      3/17/20: atypical chest discomfort overnight worse w/ coughing.  Mild dyspnea, no palpitations.  Tele w/ no afib overnight.    MEDICATIONS:    MEDICATIONS  (STANDING):  ALBUTerol    90 MICROgram(s) HFA Inhaler 2 Puff(s) Inhalation every 4 hours  aspirin enteric coated 81 milliGRAM(s) Oral daily  ATENolol  Tablet 50 milliGRAM(s) Oral daily  atorvastatin 20 milliGRAM(s) Oral at bedtime  azithromycin  IVPB 500 milliGRAM(s) IV Intermittent every 24 hours  azithromycin  IVPB      heparin  Infusion.  Unit(s)/Hr (9.5 mL/Hr) IV Continuous <Continuous>  lactobacillus acidophilus 1 Tablet(s) Oral two times a day  piperacillin/tazobactam IVPB.. 3.375 Gram(s) IV Intermittent every 12 hours  sodium chloride 0.9%. 1000 milliLiter(s) (75 mL/Hr) IV Continuous <Continuous>    MEDICATIONS  (PRN):  guaiFENesin   Syrup  (Sugar-Free) 200 milliGRAM(s) Oral every 6 hours PRN Cough  heparin  Injectable 6500 Unit(s) IV Push every 6 hours PRN For aPTT less than 40  heparin  Injectable 3000 Unit(s) IV Push every 6 hours PRN For aPTT between 40 - 57      Vital Signs Last 24 Hrs  T(C): 36.2 (17 Mar 2020 06:04), Max: 36.7 (16 Mar 2020 09:41)  T(F): 97.1 (17 Mar 2020 06:04), Max: 98.1 (16 Mar 2020 09:41)  HR: 71 (17 Mar 2020 08:26) (71 - 105)  BP: 143/62 (17 Mar 2020 05:00) (85/56 - 155/73)  BP(mean): 80 (17 Mar 2020 05:00) (48 - 94)  RR: 29 (17 Mar 2020 05:00) (23 - 32)  SpO2: 99% (17 Mar 2020 08:26) (85% - 100%)Daily     Daily Weight in k.4 (17 Mar 2020 06:04)I&O's Summary    16 Mar 2020 07:01  -  17 Mar 2020 07:00  --------------------------------------------------------  IN: 2452 mL / OUT: 300 mL / NET: 2152 mL        PHYSICAL EXAM:    Constitutional: NAD, awake and alert, well-developed  HEENT: PERR, EOMI,   Neck:  supple,  No JVD  Respiratory: wheezing bilaterally, no rales or rhonchi  Cardiovascular: S1 and S2, regular rate and rhythm, no Murmurs, gallops or rubs  Gastrointestinal: Bowel Sounds present, soft, nontender.   Extremities: No peripheral edema. No clubbing or cyanosis.  Vascular: 2+ peripheral pulses  Neurological: A/O x 3, no focal deficits      LABS: All Labs Reviewed:                        10.6   21.95 )-----------( 203      ( 17 Mar 2020 06:38 )             33.0                         10.3   20.38 )-----------( 178      ( 17 Mar 2020 02:43 )             31.3                         10.9   23.61 )-----------( 206      ( 16 Mar 2020 17:17 )             32.4     17 Mar 2020 06:38    140    |  113    |  17     ----------------------------<  97     3.9     |  19     |  1.34   16 Mar 2020 08:54    139    |  110    |  23     ----------------------------<  85     4.1     |  20     |  2.33   15 Mar 2020 19:55    139    |  110    |  22     ----------------------------<  153    3.4     |  15     |  2.87     Ca    7.8        17 Mar 2020 06:38  Ca    8.0        16 Mar 2020 08:54  Ca    8.5        15 Mar 2020 19:55  Phos  3.9       16 Mar 2020 08:54  Mg     2.3       16 Mar 2020 08:54  Mg     2.2       15 Mar 2020 19:55    TPro  7.3    /  Alb  2.3    /  TBili  0.4    /  DBili  x      /  AST  38     /  ALT  35     /  AlkPhos  222    16 Mar 2020 08:54  TPro  7.7    /  Alb  2.5    /  TBili  0.4    /  DBili  x      /  AST  23     /  ALT  29     /  AlkPhos  228    15 Mar 2020 19:55    PT/INR - ( 16 Mar 2020 01:03 )   PT: 14.6 sec;   INR: 1.30 ratio         PTT - ( 17 Mar 2020 02:43 )  PTT:65.5 sec  CARDIAC MARKERS ( 16 Mar 2020 01:03 )  0.407 ng/mL / x     / x     / x     / x      CARDIAC MARKERS ( 15 Mar 2020 22:28 )  0.459 ng/mL / x     / x     / x     / x      CARDIAC MARKERS ( 15 Mar 2020 19:55 )  0.299 ng/mL / x     / x     / x     / x        Blood Culture:   -15 @ 19:55  Pro Bnp [id number]    - @ 17:17  TSH: 0.68  - @ 08:54  TSH: 0.78      EKG: sinus tachy    Tele: NSR, no afib      ECHO:    < from: TTE Echo Complete w/o contrast w/ Doppler (20 @ 11:37) >   Impression     Summary     The left ventricle is normal in size, wall thickness, wall motion and   contractility.   Estimated left ventricular ejection fraction is 65 %.   Normal appearing left atrium.   Trace aortic regurgitation is present.   Mild to moderate tricuspid valve regurgitationis present.   Normal pulmonary artery systolic pressures.  (reviewed images, no pericardial effusion)     Signature     ----------------------------------------------------------------   Electronically signed by Randal Kraus MD(Interpreting   physician) on 2020 08:08 AM   ----------------------------------------------------------------    < end of copied text >

## 2020-03-17 NOTE — PROGRESS NOTE ADULT - ASSESSMENT
80 yo female with a pmh/o Glaucoma, cataracts, OA, HTN, who presents to the ED with niece due to her niece noticing pt having cough x 1 week, wheezing, SOB, decreased appetite, and weakness.  In ambulance, patient found to have -- received 20 IV cardizem and converted to NSR.  In ER, pt found to have ARF, positive trop, new onset afib with RVR and rhinovirus +.  CT chest/ abd initially concern for ? metastatic cancer; however, final read with bilateral airspace disease and ELIZABETH concerning for PNA/ viral etiology and acute diverticulitis.      #Acute Hypoxic Respiratory Failure/URI with rhinovirus/ pneumonia    Related to rhinovirus/ ?PNA on CT chest.    COVID negative   ID eval appreciated  IV abx per ID   Start albuterol inhaler q4 hours.    Initially hypoxic 90% on RA.    No hx of lung disease in past.      #Diverticulitis:    Patient notes intermittent abd pain over last week.    F/u GI eval.    CT limited as no contrast but concerning for sigmoid diverticulitis.    Regular diet as no significant pain.      #New onset AFIB- in NSR   Likely related to underlying pulm process.    In sinus x48h  Switch from IV Heparin to Lovenox BID  Cont Coumadin till therapeutic    #ARF:    Cr 2.9 on admission.  Suspect prerenal related to anorexia along with NSAIDS/ ARB at home.    Improved   No obstruction on CT.      #Positive TROP:    Suspect more demand ischemia related to infection/ afib.    Will need ischemic work up.    Cardio f/u.    Cont asa/ statin.      #HTN:    Hold ARB with ARF.  Cont BB.      #Code Status:  full.      #Dispo- cont IV abx. D/w DR Souza

## 2020-03-17 NOTE — CONSULT NOTE ADULT - SUBJECTIVE AND OBJECTIVE BOX
Patient is a 79y old  Female who presents with a chief complaint of afib rvr, new onset    HPI:  78 y/o female with h/o Glaucoma, cataracts, OA, HTN was admitted on 3/15 for cough x 1 week, decreased appetite and weakness. Niece called EMS and EMS noted pt HR to be 210 in aflutter. Pt given IVF and 20mg cardizem en route and HR improved and converted to NSR at 80's. Pt is a poor historian. Pt then admits to intermittent chest pressure which is centralized across chest and radiates down right arm to fingertips. Pt states this chest pressure and wheezing has been treated at home with ASA, advil, and cold medicine with pseudoephinephrine ingredient. Pt states pressure had awoken her from her sleep and is sure that she only has been taking two advil and one additional aspirin to her daily in order to control sx. Pt denies fever, but had chills. Endorses productive cough with off white sputum and wheezing. In ER she was noted with pulmonary infiltrate and received zosyn and azithromycin.       PMH: as above  PSH: as above  Meds: per reconciliation sheet, noted below  MEDICATIONS  (STANDING):  ALBUTerol    90 MICROgram(s) HFA Inhaler 2 Puff(s) Inhalation every 4 hours  aspirin enteric coated 81 milliGRAM(s) Oral daily  ATENolol  Tablet 50 milliGRAM(s) Oral daily  atorvastatin 20 milliGRAM(s) Oral at bedtime  azithromycin  IVPB 500 milliGRAM(s) IV Intermittent every 24 hours  azithromycin  IVPB      heparin  Infusion.  Unit(s)/Hr (9.5 mL/Hr) IV Continuous <Continuous>  lactobacillus acidophilus 1 Tablet(s) Oral two times a day  piperacillin/tazobactam IVPB.. 3.375 Gram(s) IV Intermittent every 12 hours  sodium chloride 0.9%. 1000 milliLiter(s) (75 mL/Hr) IV Continuous <Continuous>  warfarin 5 milliGRAM(s) Oral once    MEDICATIONS  (PRN):  guaiFENesin   Syrup  (Sugar-Free) 200 milliGRAM(s) Oral every 6 hours PRN Cough  heparin  Injectable 6500 Unit(s) IV Push every 6 hours PRN For aPTT less than 40  heparin  Injectable 3000 Unit(s) IV Push every 6 hours PRN For aPTT between 40 - 57    Allergies    No Known Allergies    Intolerances      Social: no smoking, no alcohol, no illegal drugs; no recent travel, no exposure to TB  FAMILY HISTORY:  FH: CAD (coronary artery disease): in mother and father, both   FH: pancreatic cancer: in sister,   FHx: colon cancer: in brother,     no history of premature cardiovascular disease in first degree relatives    ROS: the patient denies fever, no chills, no HA, no seizures, no dizziness, no sore throat, no nasal congestion, no blurry vision, no CP, no palpitations, no SOB, has cough, no abdominal pain, no diarrhea, no N/V, no dysuria, no leg pain, no claudication, no rash, no joint aches, no rectal pain or bleeding, no night sweats; has increased weakness  All other systems reviewed and are negative    Vital Signs Last 24 Hrs  T(C): 36.6 (17 Mar 2020 11:03), Max: 36.7 (16 Mar 2020 18:01)  T(F): 97.8 (17 Mar 2020 11:03), Max: 98.1 (16 Mar 2020 18:01)  HR: 70 (17 Mar 2020 09:00) (60 - 105)  BP: 130/116 (17 Mar 2020 09:00) (101/54 - 155/73)  BP(mean): 119 (17 Mar 2020 09:00) (48 - 119)  RR: 22 (17 Mar 2020 09:00) (22 - 32)  SpO2: 95% (17 Mar 2020 09:00) (95% - 100%)  Daily     Daily Weight in k.2 (17 Mar 2020 11:03)    PE:    Constitutional:  No acute distress  HEENT: NC/AT, EOMI, PERRLA, conjunctivae clear; ears and nose atraumatic; pharynx benign  Neck: supple; thyroid not palpable  Back: no tenderness  Respiratory: respiratory effort normal; crackles at bases  Cardiovascular: S1S2 regular, no murmurs  Abdomen: soft, not tender, not distended, positive BS; no liver or spleen organomegaly  Genitourinary: no suprapubic tenderness  Lymphatic: no LN palpable  Musculoskeletal: no muscle tenderness, no joint swelling or tenderness  Extremities: no pedal edema  Neurological/ Psychiatric: AxOx3, judgement and insight normal; moving all extremities  Skin: no rashes; no palpable lesions    Labs: all available labs reviewed                        10.6   21.95 )-----------( 203      ( 17 Mar 2020 06:38 )             33.0     03-17    140  |  113<H>  |  17  ----------------------------<  97  3.9   |  19<L>  |  1.34<H>    Ca    7.8<L>      17 Mar 2020 06:38  Phos  3.9     03-16  Mg     2.3     03-16    TPro  7.3  /  Alb  2.3<L>  /  TBili  0.4  /  DBili  x   /  AST  38<H>  /  ALT  35  /  AlkPhos  222<H>  03-16     LIVER FUNCTIONS - ( 16 Mar 2020 08:54 )  Alb: 2.3 g/dL / Pro: 7.3 gm/dL / ALK PHOS: 222 U/L / ALT: 35 U/L / AST: 38 U/L / GGT: 60 U/L         Culture - Blood (collected 15 Mar 2020 22:28)  Source: .Blood None  Preliminary Report (17 Mar 2020 09:02):    No growth to date.    Culture - Blood (collected 15 Mar 2020 22:28)  Source: .Blood None  Preliminary Report (17 Mar 2020 09:02):    No growth to date.    Rapid Respiratory Viral Panel (20 @ 01:17)    Rapid RVP Result: Detected    Entero/Rhinovirus (RapRVP): Detected    Radiology: all available radiological tests reviewed    < from: CT Chest No Cont (03.15.20 @ 22:05) >  Bilateral airspace disease with predominant subpleural and upper lobe involvement along with mediastinal lymphadenopathy. In view of clinical history, airspace infection is favored over the possibility of malignancy.    < end of copied text >      Advanced directives addressed: full resuscitation Patient is a 79y old  Female who presents with a chief complaint of afib rvr, new onset    HPI:  80 y/o female with h/o Glaucoma, cataracts, OA, HTN was admitted on 3/15 for cough x 1 week, decreased appetite and weakness. Niece called EMS and EMS noted pt HR to be 210 in aflutter. Pt given IVF and 20mg cardizem en route and HR improved and converted to NSR at 80's. Pt is a poor historian. Pt then admits to intermittent chest pressure which is centralized across chest and radiates down right arm to fingertips. Pt states this chest pressure and wheezing has been treated at home with ASA, advil, and cold medicine with pseudoephinephrine ingredient. Pt states pressure had awoken her from her sleep and is sure that she only has been taking two advil and one additional aspirin to her daily in order to control sx. Pt denies fever, but had chills. Endorses productive cough with off white sputum and wheezing. In ER she was noted with pulmonary infiltrate and received zosyn and azithromycin.   Noted with loose stools    PMH: as above  PSH: as above  Meds: per reconciliation sheet, noted below  MEDICATIONS  (STANDING):  ALBUTerol    90 MICROgram(s) HFA Inhaler 2 Puff(s) Inhalation every 4 hours  aspirin enteric coated 81 milliGRAM(s) Oral daily  ATENolol  Tablet 50 milliGRAM(s) Oral daily  atorvastatin 20 milliGRAM(s) Oral at bedtime  azithromycin  IVPB 500 milliGRAM(s) IV Intermittent every 24 hours  azithromycin  IVPB      heparin  Infusion.  Unit(s)/Hr (9.5 mL/Hr) IV Continuous <Continuous>  lactobacillus acidophilus 1 Tablet(s) Oral two times a day  piperacillin/tazobactam IVPB.. 3.375 Gram(s) IV Intermittent every 12 hours  sodium chloride 0.9%. 1000 milliLiter(s) (75 mL/Hr) IV Continuous <Continuous>  warfarin 5 milliGRAM(s) Oral once    MEDICATIONS  (PRN):  guaiFENesin   Syrup  (Sugar-Free) 200 milliGRAM(s) Oral every 6 hours PRN Cough  heparin  Injectable 6500 Unit(s) IV Push every 6 hours PRN For aPTT less than 40  heparin  Injectable 3000 Unit(s) IV Push every 6 hours PRN For aPTT between 40 - 57    Allergies    No Known Allergies    Intolerances      Social: no smoking, no alcohol, no illegal drugs; no recent travel, no exposure to TB  FAMILY HISTORY:  FH: CAD (coronary artery disease): in mother and father, both   FH: pancreatic cancer: in sister,   FHx: colon cancer: in brother,     no history of premature cardiovascular disease in first degree relatives    ROS: the patient denies fever, no chills, no HA, no seizures, no dizziness, no sore throat, no nasal congestion, no blurry vision, no CP, no palpitations, no SOB, has cough, no abdominal pain, has diarrhea, no N/V, no dysuria, no leg pain, no claudication, no rash, no joint aches, no rectal pain or bleeding, no night sweats; has increased weakness  All other systems reviewed and are negative    Vital Signs Last 24 Hrs  T(C): 36.6 (17 Mar 2020 11:03), Max: 36.7 (16 Mar 2020 18:01)  T(F): 97.8 (17 Mar 2020 11:03), Max: 98.1 (16 Mar 2020 18:01)  HR: 70 (17 Mar 2020 09:00) (60 - 105)  BP: 130/116 (17 Mar 2020 09:00) (101/54 - 155/73)  BP(mean): 119 (17 Mar 2020 09:00) (48 - 119)  RR: 22 (17 Mar 2020 09:00) (22 - 32)  SpO2: 95% (17 Mar 2020 09:00) (95% - 100%)  Daily     Daily Weight in k.2 (17 Mar 2020 11:03)    PE:    Constitutional:  No acute distress  HEENT: NC/AT, EOMI, PERRLA, conjunctivae clear; ears and nose atraumatic; pharynx benign  Neck: supple; thyroid not palpable  Back: no tenderness  Respiratory: respiratory effort normal; crackles at bases  Cardiovascular: S1S2 regular, no murmurs  Abdomen: soft, not tender, not distended, positive BS; no liver or spleen organomegaly  Genitourinary: no suprapubic tenderness  Lymphatic: no LN palpable  Musculoskeletal: no muscle tenderness, no joint swelling or tenderness  Extremities: no pedal edema  Neurological/ Psychiatric: AxOx3, judgement and insight normal; moving all extremities  Skin: no rashes; no palpable lesions    Labs: all available labs reviewed                        10.6   21.95 )-----------( 203      ( 17 Mar 2020 06:38 )             33.0     03-17    140  |  113<H>  |  17  ----------------------------<  97  3.9   |  19<L>  |  1.34<H>    Ca    7.8<L>      17 Mar 2020 06:38  Phos  3.9     03-16  Mg     2.3     -    TPro  7.3  /  Alb  2.3<L>  /  TBili  0.4  /  DBili  x   /  AST  38<H>  /  ALT  35  /  AlkPhos  222<H>  -16     LIVER FUNCTIONS - ( 16 Mar 2020 08:54 )  Alb: 2.3 g/dL / Pro: 7.3 gm/dL / ALK PHOS: 222 U/L / ALT: 35 U/L / AST: 38 U/L / GGT: 60 U/L         Culture - Blood (collected 15 Mar 2020 22:28)  Source: .Blood None  Preliminary Report (17 Mar 2020 09:02):    No growth to date.    Culture - Blood (collected 15 Mar 2020 22:28)  Source: .Blood None  Preliminary Report (17 Mar 2020 09:02):    No growth to date.    Rapid Respiratory Viral Panel (20 @ 01:17)    Rapid RVP Result: Detected    Entero/Rhinovirus (RapRVP): Detected    Radiology: all available radiological tests reviewed    < from: CT Chest No Cont (03.15.20 @ 22:05) >  Bilateral airspace disease with predominant subpleural and upper lobe involvement along with mediastinal lymphadenopathy. In view of clinical history, airspace infection is favored over the possibility of malignancy.    < end of copied text >      Advanced directives addressed: full resuscitation

## 2020-03-17 NOTE — CONSULT NOTE ADULT - ASSESSMENT
78 y/o female with h/o Glaucoma, cataracts, OA, HTN was admitted on 3/15 for cough x 1 week, decreased appetite and weakness. Niece called EMS and EMS noted pt HR to be 210 in aflutter. Pt given IVF and 20mg cardizem en route and HR improved and converted to NSR at 80's. Pt is a poor historian. Pt then admits to intermittent chest pressure which is centralized across chest and radiates down right arm to fingertips. Pt states this chest pressure and wheezing has been treated at home with ASA, advil, and cold medicine with pseudoephinephrine ingredient. Pt states pressure had awoken her from her sleep and is sure that she only has been taking two advil and one additional aspirin to her daily in order to control sx. Pt denies fever, but had chills. Endorses productive cough with off white sputum and wheezing. In ER she was noted with pulmonary infiltrate and received zosyn and azithromycin.     1. Cough. URI with rhinovirus. B/l pneumonia. Possible aspiration pneumonia. Possible COVID-19 infection. CRF stage 3.  -leukocytosis  -obtain BC x 2  -obtain COVID-19 PCR  -start zosyn 3.375 gm IV q8h and azithromycin 500 mg IV qd  -reason for abx use and side effects reviewed with patient; monitor BMP  -droplet isolation  -respiratory care  -old chart reviewed to assess prior cultures  -monitor temps  -f/u CBC  -supportive care  2. Other issues:   -care per medicine 80 y/o female with h/o Glaucoma, cataracts, OA, HTN was admitted on 3/15 for cough x 1 week, decreased appetite and weakness. Niece called EMS and EMS noted pt HR to be 210 in aflutter. Pt given IVF and 20mg cardizem en route and HR improved and converted to NSR at 80's. Pt is a poor historian. Pt then admits to intermittent chest pressure which is centralized across chest and radiates down right arm to fingertips. Pt states this chest pressure and wheezing has been treated at home with ASA, advil, and cold medicine with pseudoephinephrine ingredient. Pt states pressure had awoken her from her sleep and is sure that she only has been taking two advil and one additional aspirin to her daily in order to control sx. Pt denies fever, but had chills. Endorses productive cough with off white sputum and wheezing. In ER she was noted with pulmonary infiltrate and received zosyn and azithromycin.     1. Cough. URI with rhinovirus. B/l pneumonia. Possible aspiration pneumonia. Possible COVID-19 infection. CRF stage 3.  -leukocytosis  -diarrhea; possible CDAD; obtain CDT and GI PCR  -obtain BC x 2  -obtain COVID-19 PCR  -start zosyn 3.375 gm IV q8h and azithromycin 500 mg IV qd  -reason for abx use and side effects reviewed with patient; monitor BMP  -droplet and contact isolation  -respiratory care  -old chart reviewed to assess prior cultures  -monitor temps  -f/u CBC  -supportive care  2. Other issues:   -care per medicine

## 2020-03-17 NOTE — PROVIDER CONTACT NOTE (OTHER) - SITUATION
Notified Dr. López office regarding consult spoke with Wendy from answering service.
left consult with answering service

## 2020-03-17 NOTE — PROGRESS NOTE ADULT - SUBJECTIVE AND OBJECTIVE BOX
78 yo female with a pmh/o Glaucoma, cataracts, OA, HTN, who presents to the ED with niece due to her niece noticing pt having cough x 1 week, decreased appetite, and weakness. Niece called EMS from pt home after visiting and EMS noted pt HR to be 210.  Pt given IVF and 20mg cardizem en route and HR improved, pt now in NSR at 80's. Pt denies hx of AFIB.  Pt notes chest pressure/ wheezing/ coughing/ SOB at home.  Loss of appetite.  She was taking ASA, advil, and yesterday started OTC cold medicine with pseudoephinephrine.  Pt denies fever, however states that she did have chills. Endorses productive cough with off white sputum, and wheezing.  Pt states she visited sister in the [facility] at which time sister also had a cough and a flu outbreak there.  In ER, pt found to have ARF, positive trop, afib with RVR.  CT chest/ abd initially concern for ? metastatic cancer; however, final read with bilateral airspace disease and ELIZABETH concerning for PNA/ viral etiology and diverticulitis.      3/16:  Pt seen.  C/o cough-- productive.  C/o wheezing and SOB.  Loss of appetite.  No fever.    3/17 +cough    Vital Signs Last 24 Hrs  T(C): 36.2 (17 Mar 2020 17:08), Max: 36.7 (16 Mar 2020 18:01)  T(F): 97.1 (17 Mar 2020 17:08), Max: 98.1 (16 Mar 2020 18:01)  HR: 70 (17 Mar 2020 15:00) (60 - 105)  BP: 130/116 (17 Mar 2020 09:00) (101/54 - 155/73)  BP(mean): 119 (17 Mar 2020 09:00) (54 - 119)  RR: 23 (17 Mar 2020 15:00) (22 - 30)  SpO2: 98% (17 Mar 2020 15:00) (95% - 99%)    PHYSICAL EXAM:  GENERAL: Comfortable, no acute distress  HEAD:  Atraumatic, Normocephalic  EYES: EOMI, PERRLA  HEENT: Moist mucous membranes  NECK: Supple, No JVD  NERVOUS SYSTEM:  Alert & Oriented X3, Motor Strength 5/5 B/L upper and lower extremities  CHEST/LUNG: diffuse rhonchi/ wheezing on exam.    HEART: Regular rate and rhythm; No murmurs, rubs, or gallops  ABDOMEN: Soft, Nontender, Nondistended; Bowel sounds present  GENITOURINARY- Voiding, no palpable bladder  EXTREMITIES:  No clubbing, cyanosis, or edema  MUSCULOSKELTAL- No muscle tenderness, No joint tenderness  SKIN-no rash    LABS:                                 10.6   21.95 )-----------( 203      ( 17 Mar 2020 06:38 )             33.0     17 Mar 2020 06:38    140    |  113    |  17     ----------------------------<  97     3.9     |  19     |  1.34     Ca    7.8        17 Mar 2020 06:38  Phos  3.9       16 Mar 2020 08:54  Mg     2.3       16 Mar 2020 08:54    TPro  7.3    /  Alb  2.3    /  TBili  0.4    /  DBili  x      /  AST  38     /  ALT  35     /  AlkPhos  222    16 Mar 2020 08:54    LIVER FUNCTIONS - ( 16 Mar 2020 08:54 )  Alb: 2.3 g/dL / Pro: 7.3 gm/dL / ALK PHOS: 222 U/L / ALT: 35 U/L / AST: 38 U/L / GGT: 60 U/L       PT/INR - ( 16 Mar 2020 01:03 )   PT: 14.6 sec;   INR: 1.30 ratio       PTT - ( 17 Mar 2020 09:17 )  PTT:50.6 sec    CARDIAC MARKERS ( 16 Mar 2020 01:03 )  0.407 ng/mL / x     / x     / x     / x      CARDIAC MARKERS ( 15 Mar 2020 22:28 )  0.459 ng/mL / x     / x     / x     / x      CARDIAC MARKERS ( 15 Mar 2020 19:55 )  0.299 ng/mL / x     / x     / x     / x        MEDICATIONS  (STANDING):  ALBUTerol    90 MICROgram(s) HFA Inhaler 2 Puff(s) Inhalation every 4 hours  aspirin enteric coated 81 milliGRAM(s) Oral daily  ATENolol  Tablet 50 milliGRAM(s) Oral daily  atorvastatin 20 milliGRAM(s) Oral at bedtime  azithromycin  IVPB 500 milliGRAM(s) IV Intermittent every 24 hours  azithromycin  IVPB      heparin  Infusion.  Unit(s)/Hr (9.5 mL/Hr) IV Continuous <Continuous>  lactobacillus acidophilus 1 Tablet(s) Oral two times a day  piperacillin/tazobactam IVPB.. 3.375 Gram(s) IV Intermittent every 12 hours  sodium chloride 0.9%. 1000 milliLiter(s) (75 mL/Hr) IV Continuous <Continuous>  warfarin 5 milliGRAM(s) Oral once    MEDICATIONS  (PRN):  guaiFENesin   Syrup  (Sugar-Free) 200 milliGRAM(s) Oral every 6 hours PRN Cough  heparin  Injectable 6500 Unit(s) IV Push every 6 hours PRN For aPTT less than 40  heparin  Injectable 3000 Unit(s) IV Push every 6 hours PRN For aPTT between 40 - 57

## 2020-03-18 LAB
ADD ON TEST-SPECIMEN IN LAB: SIGNIFICANT CHANGE UP
ADD ON TEST-SPECIMEN IN LAB: SIGNIFICANT CHANGE UP
ANION GAP SERPL CALC-SCNC: 6 MMOL/L — SIGNIFICANT CHANGE UP (ref 5–17)
APPEARANCE UR: CLEAR — SIGNIFICANT CHANGE UP
BILIRUB UR-MCNC: NEGATIVE — SIGNIFICANT CHANGE UP
BUN SERPL-MCNC: 13 MG/DL — SIGNIFICANT CHANGE UP (ref 7–23)
C DIFF BY PCR RESULT: SIGNIFICANT CHANGE UP
C DIFF TOX GENS STL QL NAA+PROBE: SIGNIFICANT CHANGE UP
CALCIUM SERPL-MCNC: 8.3 MG/DL — LOW (ref 8.5–10.1)
CHLORIDE SERPL-SCNC: 114 MMOL/L — HIGH (ref 96–108)
CO2 SERPL-SCNC: 21 MMOL/L — LOW (ref 22–31)
COLOR SPEC: YELLOW — SIGNIFICANT CHANGE UP
CREAT SERPL-MCNC: 1.09 MG/DL — SIGNIFICANT CHANGE UP (ref 0.5–1.3)
CULTURE RESULTS: SIGNIFICANT CHANGE UP
DIFF PNL FLD: ABNORMAL
GLUCOSE SERPL-MCNC: 95 MG/DL — SIGNIFICANT CHANGE UP (ref 70–99)
GLUCOSE UR QL: NEGATIVE MG/DL — SIGNIFICANT CHANGE UP
GRAM STN FLD: SIGNIFICANT CHANGE UP
HCT VFR BLD CALC: 33.1 % — LOW (ref 34.5–45)
HGB BLD-MCNC: 10.7 G/DL — LOW (ref 11.5–15.5)
INR BLD: 1.22 RATIO — HIGH (ref 0.88–1.16)
KETONES UR-MCNC: NEGATIVE — SIGNIFICANT CHANGE UP
LEUKOCYTE ESTERASE UR-ACNC: ABNORMAL
MCHC RBC-ENTMCNC: 28.8 PG — SIGNIFICANT CHANGE UP (ref 27–34)
MCHC RBC-ENTMCNC: 32.3 GM/DL — SIGNIFICANT CHANGE UP (ref 32–36)
MCV RBC AUTO: 89.2 FL — SIGNIFICANT CHANGE UP (ref 80–100)
NITRITE UR-MCNC: NEGATIVE — SIGNIFICANT CHANGE UP
PH UR: 5 — SIGNIFICANT CHANGE UP (ref 5–8)
PLATELET # BLD AUTO: 209 K/UL — SIGNIFICANT CHANGE UP (ref 150–400)
POTASSIUM SERPL-MCNC: 3.9 MMOL/L — SIGNIFICANT CHANGE UP (ref 3.5–5.3)
POTASSIUM SERPL-SCNC: 3.9 MMOL/L — SIGNIFICANT CHANGE UP (ref 3.5–5.3)
PROT UR-MCNC: 30 MG/DL
PROTHROM AB SERPL-ACNC: 13.6 SEC — HIGH (ref 10–12.9)
RBC # BLD: 3.71 M/UL — LOW (ref 3.8–5.2)
RBC # FLD: 14.2 % — SIGNIFICANT CHANGE UP (ref 10.3–14.5)
SODIUM SERPL-SCNC: 141 MMOL/L — SIGNIFICANT CHANGE UP (ref 135–145)
SP GR SPEC: 1.01 — SIGNIFICANT CHANGE UP (ref 1.01–1.02)
SPECIMEN SOURCE: SIGNIFICANT CHANGE UP
SPECIMEN SOURCE: SIGNIFICANT CHANGE UP
UROBILINOGEN FLD QL: NEGATIVE MG/DL — SIGNIFICANT CHANGE UP
WBC # BLD: 23.09 K/UL — HIGH (ref 3.8–10.5)
WBC # FLD AUTO: 23.09 K/UL — HIGH (ref 3.8–10.5)

## 2020-03-18 PROCEDURE — 99233 SBSQ HOSP IP/OBS HIGH 50: CPT

## 2020-03-18 PROCEDURE — 71045 X-RAY EXAM CHEST 1 VIEW: CPT | Mod: 26

## 2020-03-18 RX ORDER — PIPERACILLIN AND TAZOBACTAM 4; .5 G/20ML; G/20ML
3.38 INJECTION, POWDER, LYOPHILIZED, FOR SOLUTION INTRAVENOUS EVERY 8 HOURS
Refills: 0 | Status: DISCONTINUED | OUTPATIENT
Start: 2020-03-18 | End: 2020-03-25

## 2020-03-18 RX ORDER — WARFARIN SODIUM 2.5 MG/1
5 TABLET ORAL DAILY
Refills: 0 | Status: DISCONTINUED | OUTPATIENT
Start: 2020-03-18 | End: 2020-03-18

## 2020-03-18 RX ORDER — APIXABAN 2.5 MG/1
5 TABLET, FILM COATED ORAL
Refills: 0 | Status: DISCONTINUED | OUTPATIENT
Start: 2020-03-18 | End: 2020-03-25

## 2020-03-18 RX ORDER — BUDESONIDE AND FORMOTEROL FUMARATE DIHYDRATE 160; 4.5 UG/1; UG/1
2 AEROSOL RESPIRATORY (INHALATION)
Refills: 0 | Status: DISCONTINUED | OUTPATIENT
Start: 2020-03-18 | End: 2020-03-25

## 2020-03-18 RX ADMIN — WARFARIN SODIUM 5 MILLIGRAM(S): 2.5 TABLET ORAL at 01:20

## 2020-03-18 RX ADMIN — ALBUTEROL 2 PUFF(S): 90 AEROSOL, METERED ORAL at 11:35

## 2020-03-18 RX ADMIN — Medication 81 MILLIGRAM(S): at 11:16

## 2020-03-18 RX ADMIN — ALBUTEROL 2 PUFF(S): 90 AEROSOL, METERED ORAL at 00:05

## 2020-03-18 RX ADMIN — Medication 200 MILLIGRAM(S): at 11:16

## 2020-03-18 RX ADMIN — ALBUTEROL 2 PUFF(S): 90 AEROSOL, METERED ORAL at 16:56

## 2020-03-18 RX ADMIN — APIXABAN 5 MILLIGRAM(S): 2.5 TABLET, FILM COATED ORAL at 18:32

## 2020-03-18 RX ADMIN — ALBUTEROL 2 PUFF(S): 90 AEROSOL, METERED ORAL at 03:39

## 2020-03-18 RX ADMIN — BUDESONIDE AND FORMOTEROL FUMARATE DIHYDRATE 2 PUFF(S): 160; 4.5 AEROSOL RESPIRATORY (INHALATION) at 11:31

## 2020-03-18 RX ADMIN — Medication 1 TABLET(S): at 05:41

## 2020-03-18 RX ADMIN — AZITHROMYCIN 255 MILLIGRAM(S): 500 TABLET, FILM COATED ORAL at 11:20

## 2020-03-18 RX ADMIN — Medication 1 TABLET(S): at 18:32

## 2020-03-18 RX ADMIN — ALBUTEROL 2 PUFF(S): 90 AEROSOL, METERED ORAL at 20:44

## 2020-03-18 RX ADMIN — Medication 200 MILLIGRAM(S): at 03:04

## 2020-03-18 RX ADMIN — ALBUTEROL 2 PUFF(S): 90 AEROSOL, METERED ORAL at 09:04

## 2020-03-18 RX ADMIN — ATORVASTATIN CALCIUM 20 MILLIGRAM(S): 80 TABLET, FILM COATED ORAL at 21:12

## 2020-03-18 RX ADMIN — PIPERACILLIN AND TAZOBACTAM 25 GRAM(S): 4; .5 INJECTION, POWDER, LYOPHILIZED, FOR SOLUTION INTRAVENOUS at 05:41

## 2020-03-18 RX ADMIN — ENOXAPARIN SODIUM 80 MILLIGRAM(S): 100 INJECTION SUBCUTANEOUS at 05:41

## 2020-03-18 RX ADMIN — PIPERACILLIN AND TAZOBACTAM 25 GRAM(S): 4; .5 INJECTION, POWDER, LYOPHILIZED, FOR SOLUTION INTRAVENOUS at 21:12

## 2020-03-18 RX ADMIN — BUDESONIDE AND FORMOTEROL FUMARATE DIHYDRATE 2 PUFF(S): 160; 4.5 AEROSOL RESPIRATORY (INHALATION) at 20:42

## 2020-03-18 RX ADMIN — PIPERACILLIN AND TAZOBACTAM 25 GRAM(S): 4; .5 INJECTION, POWDER, LYOPHILIZED, FOR SOLUTION INTRAVENOUS at 14:44

## 2020-03-18 RX ADMIN — ATENOLOL 50 MILLIGRAM(S): 25 TABLET ORAL at 05:41

## 2020-03-18 NOTE — CONSULT NOTE ADULT - ASSESSMENT
1) Pneumonia   2) Abnormal CT Chest   3) Dyspnea  4) Cough  5) AF with RVR    78 yo female with a pmh/o Glaucoma, cataracts, OA, HTN, who presents to the ED with niece due to her niece noticing pt having cough x 1 week, decreased to no appetite, and weakness. Niece called EMS from pt home after visiting and EMS noted pt HR to be 210 in aflutter. Pt given IVF and 20mg cardizem en route and HR improved, pt now in NSR at 80's. Pt is a very poor historian however upon interview, admits to having a pre op assessment (cataract) in January where her RN had mentioned to her that her HR was irregular. Pt then admits to intermittent chest pressure which is centralized across chest and radiates down right arm to fingertips. Pt states this chest pressure and wheezing has been treated at home with ASA, advil, and yesterday started OTC cold medicine with pseudoephinephrine ingredient. Pt states pressure had awoken her from her sleep and is sure that she only has been taking two advil and one additional aspirin to her daily in order to control sx. Pt denies fever, however states that she did have chills. Endorses productive cough with off white sputum, and wheezing.  Reviewed CT Chest imaging, labs   COVID 19 negative   RVP negative   Findings are consistent with a pneumonia that could be bacterial/viral/inflammatory in nature.   In the interim, agree with IV Antibiotics   She is a non-smoker with no prior pulmonary history and she states she has not seen a pulmonologist in the past   Follow up procalcitonin, ESR/CRP, may order rheumatic work up because the CT Chest could represent ILD/Organizing pneumonia pattern as well  Start Symbicort with a spacer  Will continue to monitor

## 2020-03-18 NOTE — PHYSICAL THERAPY INITIAL EVALUATION ADULT - GENERAL OBSERVATIONS, REHAB EVAL
Patient received out of bed in chair on tele floor, +HM With O2 sensor, +O2@2L via nc, +contact isolation for C-diff. Patient requested trip to the bathroom.

## 2020-03-18 NOTE — PROGRESS NOTE ADULT - PROBLEM SELECTOR PLAN 1
May be related to underlying pneumonia/covid infection.  Sinus rhythm x 48 hrs.  Cont. hep gtt, Cr improving.  CHADS2-VASc= 3-4 (75, HTN, +/-CAD) Start coumadin 5 mg daily.  cont. atenolol for rate control in case afib recurs.  Event monitor as OP for afib burden.
May be due to pneumonia (COVID neg) Sinus rhythm x 72 hrs.  CHADS2-VASc= 3-4 (75, HTN, +/-CAD).  Cr normal x 2 days.  D/c lovenox, d/c coumadin.  Start eliquis 5 mg BID 1st dose this evening.  cont. atenolol for rate control in case afib recurs.  Event monitor as OP for afib burden.

## 2020-03-18 NOTE — PROGRESS NOTE ADULT - SUBJECTIVE AND OBJECTIVE BOX
78 yo female with a pmh/o Glaucoma, cataracts, OA, HTN, who presents to the ED with niece due to her niece noticing pt having cough x 1 week, decreased appetite, and weakness. Niece called EMS from pt home after visiting and EMS noted pt HR to be 210.  Pt given IVF and 20mg cardizem en route and HR improved, pt now in NSR at 80's. Pt denies hx of AFIB.  Pt notes chest pressure/ wheezing/ coughing/ SOB at home.  Loss of appetite.  She was taking ASA, advil, and yesterday started OTC cold medicine with pseudoephinephrine.  Pt denies fever, however states that she did have chills. Endorses productive cough with off white sputum, and wheezing.  Pt states she visited sister in the [facility] at which time sister also had a cough and a flu outbreak there.  In ER, pt found to have ARF, positive trop, afib with RVR.  CT chest/ abd initially concern for ? metastatic cancer; however, final read with bilateral airspace disease and ELIZABETH concerning for PNA/ viral etiology and diverticulitis.      3/16:  Pt seen.  C/o cough-- productive.  C/o wheezing and SOB.  Loss of appetite.  No fever.    3/17 +cough  3/18 +diarrhea    Vital Signs Last 24 Hrs  T(C): 36.8 (18 Mar 2020 16:09), Max: 37.2 (17 Mar 2020 23:00)  T(F): 98.2 (18 Mar 2020 16:09), Max: 98.9 (17 Mar 2020 23:00)  HR: 81 (18 Mar 2020 16:59) (70 - 95)  BP: 124/53 (18 Mar 2020 16:09) (122/63 - 148/57)  BP(mean): 102 (17 Mar 2020 23:00) (72 - 102)  RR: 18 (18 Mar 2020 10:57) (18 - 29)  SpO2: 99% (18 Mar 2020 16:09) (96% - 100%)    PHYSICAL EXAM:  GENERAL: Comfortable, no acute distress  HEAD:  Atraumatic, Normocephalic  EYES: EOMI, PERRLA  HEENT: Moist mucous membranes  NECK: Supple, No JVD  NERVOUS SYSTEM:  Alert & Oriented X3, Motor Strength 5/5 B/L upper and lower extremities  CHEST/LUNG: diffuse rhonchi/ wheezing on exam.    HEART: Regular rate and rhythm; No murmurs, rubs, or gallops  ABDOMEN: Soft, Nontender, Nondistended; Bowel sounds present  GENITOURINARY- Voiding, no palpable bladder  EXTREMITIES:  No clubbing, cyanosis, or edema  MUSCULOSKELTAL- No muscle tenderness, No joint tenderness  SKIN-no rash    LABS:                                          10.7   23.09 )-----------( 209      ( 18 Mar 2020 08:09 )             33.1     18 Mar 2020 08:09    141    |  114    |  13     ----------------------------<  95     3.9     |  21     |  1.09     Ca    8.3        18 Mar 2020 08:09    PT/INR - ( 18 Mar 2020 08:09 )   PT: 13.6 sec;   INR: 1.22 ratio      PTT - ( 17 Mar 2020 20:24 )  PTT:73.5 sec    Urinalysis Basic - ( 17 Mar 2020 23:55 )  Color: Yellow / Appearance: Clear / S.015 / pH: x  Gluc: x / Ketone: Negative  / Bili: Negative / Urobili: Negative mg/dL   Blood: x / Protein: 30 mg/dL / Nitrite: Negative   Leuk Esterase: Moderate / RBC: 3-5 /HPF / WBC 6-10   Sq Epi: x / Non Sq Epi: Few / Bacteria: Moderate    MEDICATIONS  (STANDING):  ALBUTerol    90 MICROgram(s) HFA Inhaler 2 Puff(s) Inhalation every 4 hours  apixaban 5 milliGRAM(s) Oral two times a day  aspirin enteric coated 81 milliGRAM(s) Oral daily  ATENolol  Tablet 50 milliGRAM(s) Oral daily  atorvastatin 20 milliGRAM(s) Oral at bedtime  azithromycin  IVPB 500 milliGRAM(s) IV Intermittent every 24 hours  azithromycin  IVPB      budesonide 160 MICROgram(s)/formoterol 4.5 MICROgram(s) Inhaler 2 Puff(s) Inhalation two times a day  lactobacillus acidophilus 1 Tablet(s) Oral two times a day  piperacillin/tazobactam IVPB.. 3.375 Gram(s) IV Intermittent every 8 hours  sodium chloride 0.9%. 1000 milliLiter(s) (75 mL/Hr) IV Continuous <Continuous>    MEDICATIONS  (PRN):  guaiFENesin   Syrup  (Sugar-Free) 200 milliGRAM(s) Oral every 6 hours PRN Cough

## 2020-03-18 NOTE — PHYSICAL THERAPY INITIAL EVALUATION ADULT - PLANNED THERAPY INTERVENTIONS, PT EVAL
today: gait training x15 min.  Patient refused therapeutic exercises/gait training/bed mobility training

## 2020-03-18 NOTE — PHYSICAL THERAPY INITIAL EVALUATION ADULT - GAIT DISTANCE, PT EVAL
x2 to bathroom and back.  Patient refused RW use stated "I don't need it, I'm weak from not being out of bed."/25 feet

## 2020-03-18 NOTE — PHYSICAL THERAPY INITIAL EVALUATION ADULT - MODALITIES TREATMENT COMMENTS
Patient  left in chair with CBIR and chair alarm active., O2 replaced. Patient asking for another diaper, as hes was soiled with stool.  CNA informed. CM, RN informed of session/status.

## 2020-03-18 NOTE — PROGRESS NOTE ADULT - PROBLEM SELECTOR PLAN 2
Likely demand ischemia r/t afib w/ RVR, COVID infection, ARF.  Echo w/ no regional wall motion abnormalities.  ECG w/ no ischemic changes.  Stress test as OP.
Likely demand ischemia r/t afib w/ RVR, COVID infection, ARF.  Echo w/ no regional wall motion abnormalities.  ECG w/ no ischemic changes.  Stress test as OP.
Home

## 2020-03-18 NOTE — PROGRESS NOTE ADULT - PROBLEM SELECTOR PLAN 4
isolation precautions in place.  management per 1ry team.
isolation precautions in place.  management per 1ry team.

## 2020-03-18 NOTE — CONSULT NOTE ADULT - SUBJECTIVE AND OBJECTIVE BOX
Patient is a 79y old  Female who presents with a chief complaint of afib rvr, new onset (17 Mar 2020 17:15)      HPI:  Pt is a 80 yo female with a pmh/o Glaucoma, cataracts, OA, HTN, who presents to the ED with niece due to her niece noticing pt having cough x 1 week, decreased to no appetite, and weakness. Niece called EMS from pt home after visiting and EMS noted pt HR to be 210 in aflutter. Pt given IVF and 20mg cardizem en route and HR improved, pt now in NSR at 80's. Pt is a very poor historian however upon interview, admits to having a pre op assessment (cataract) in January where her RN had mentioned to her that her HR was irregular. Pt then admits to intermittent chest pressure which is centralized across chest and radiates down right arm to fingertips. Pt states this chest pressure and wheezing has been treated at home with ASA, advil, and yesterday started OTC cold medicine with pseudoephinephrine ingredient. Pt states pressure had awoken her from her sleep and is sure that she only has been taking two advil and one additional aspirin to her daily in order to control sx. Pt denies fever, however states that she did have chills. Endorses productive cough with off white sputum, and wheezing. Denies calf pain/swelling, smoking hx, urinary sx, abd pain, constipation, diarrhea, recent travel, fevers, rashes. Pt states she visited sister in the [facility] at which time sister also had a cough. Collateral hx obtained from niece listed in Emergency contacts who confirms above.   Pt found to be in new onset afib rvr, complicated by demand ischemia and renal failure, treated for PNA, sepsis in ED, later showing probable metastatic cancer on chest CT.      PAST MEDICAL & SURGICAL HISTORY:  OA (osteoarthritis)  Glaucoma  HTN (hypertension)  Status post glaucoma surgery: and cataract      PREVIOUS DIAGNOSTIC TESTING:      MEDICATIONS  (STANDING):  ALBUTerol    90 MICROgram(s) HFA Inhaler 2 Puff(s) Inhalation every 4 hours  aspirin enteric coated 81 milliGRAM(s) Oral daily  ATENolol  Tablet 50 milliGRAM(s) Oral daily  atorvastatin 20 milliGRAM(s) Oral at bedtime  azithromycin  IVPB 500 milliGRAM(s) IV Intermittent every 24 hours  azithromycin  IVPB      enoxaparin Injectable 80 milliGRAM(s) SubCutaneous every 12 hours  lactobacillus acidophilus 1 Tablet(s) Oral two times a day  piperacillin/tazobactam IVPB.. 3.375 Gram(s) IV Intermittent every 12 hours  sodium chloride 0.9%. 1000 milliLiter(s) (75 mL/Hr) IV Continuous <Continuous>    MEDICATIONS  (PRN):  guaiFENesin   Syrup  (Sugar-Free) 200 milliGRAM(s) Oral every 6 hours PRN Cough      FAMILY HISTORY:  FH: CAD (coronary artery disease): in mother and father, both   FH: pancreatic cancer: in sister,   FHx: colon cancer: in brother,       SOCIAL HISTORY:  ***    REVIEW OF SYSTEM:  dyspnea, cough, fatigue, all other systems reviewed and are negative     Vital Signs Last 24 Hrs  T(C): 36.6 (18 Mar 2020 05:39), Max: 37.2 (17 Mar 2020 23:00)  T(F): 97.9 (18 Mar 2020 05:39), Max: 98.9 (17 Mar 2020 23:00)  HR: 79 (18 Mar 2020 05:39) (70 - 95)  BP: 128/59 (18 Mar 2020 05:39) (126/63 - 148/57)  BP(mean): 102 (17 Mar 2020 23:00) (72 - 102)  RR: 20 (18 Mar 2020 05:39) (20 - 29)  SpO2: 97% (18 Mar 2020 05:39) (95% - 100%)    I&O's Summary    17 Mar 2020 07:01  -  18 Mar 2020 07:00  --------------------------------------------------------  IN: 1225 mL / OUT: 300 mL / NET: 925 mL      PHYSICAL EXAM  General Appearance: cooperative, no acute distress,   HEENT: PERRL, conjunctiva clear, EOM's intact, non injected pharynx, no exudate, TM   normal  Neck: Supple, , no adenopathy, thyroid: not enlarged, no carotid bruit or JVD  Back: Symmetric, no  tenderness,no soft tissue tenderness  Lungs: Coarse bilaterally, inspiratory wheezing anterior chest wall bilaterally   Heart: Regular rate and rhythm, S1, S2 normal, no murmur, rub or gallop  Abdomen: Soft, non-tender, bowel sounds active , no hepatosplenomegaly  Extremities: no cyanosis or edema, no joint swelling  Skin: Skin color, texture normal, no rashes   Neurologic: Alert and oriented X3 , cranial nerves intact, sensory and motor normal,    ECG:    LABS:                          10.6   21.95 )-----------( 203      ( 17 Mar 2020 06:38 )             33.0     03-17    140  |  113<H>  |  17  ----------------------------<  97  3.9   |  19<L>  |  1.34<H>    Ca    7.8<L>      17 Mar 2020 06:38  Phos  3.9     -16  Mg     2.3     16    TPro  7.3  /  Alb  2.3<L>  /  TBili  0.4  /  DBili  x   /  AST  38<H>  /  ALT  35  /  AlkPhos  222<H>          Lipid Panel  153  40  94  92    Pro BNP  4755 15 @ 19:55  D Dimer  -- 03-15 @ 19:55    PT/INR - ( 17 Mar 2020 20:24 )   PT: 13.8 sec;   INR: 1.24 ratio         PTT - ( 17 Mar 2020 20:24 )  PTT:73.5 sec  Urinalysis Basic - ( 17 Mar 2020 23:55 )    Color: Yellow / Appearance: Clear / S.015 / pH: x  Gluc: x / Ketone: Negative  / Bili: Negative / Urobili: Negative mg/dL   Blood: x / Protein: 30 mg/dL / Nitrite: Negative   Leuk Esterase: Moderate / RBC: 3-5 /HPF / WBC 6-10   Sq Epi: x / Non Sq Epi: Few / Bacteria: Moderate            RADIOLOGY & ADDITIONAL STUDIES:

## 2020-03-18 NOTE — PHYSICAL THERAPY INITIAL EVALUATION ADULT - PERTINENT HX OF CURRENT PROBLEM, REHAB EVAL
78 yo F admitted c/o cough, weakness, decreased appetite x 1 week. Pt states she visited sister in the [facility] at which time sister also had a cough and a flu outbreak there. In ER, pt found to have ARF, positive trop, afib with RVR.  CT chest/ abd initially concern for ? metastatic cancer; however, final read with bilateral airspace disease and ELIZABETH concerning for PNA/ viral etiology and diverticulitis.  COVID (-).

## 2020-03-18 NOTE — PROGRESS NOTE ADULT - SUBJECTIVE AND OBJECTIVE BOX
80 y/o w/ Glaucoma, cataracts, OA, HTN, admitted for cough, wheezing, dyspnea 7 weakness, afib w/ RVR on presentation w/ termination to NSR prior to arrival in ED.  Admitted for afib w/ RVR, borderline trop elevation, abnormal CT (initially read metastatic cancer now suspicious for pneumonia/viral etiology), and ARF.  ruled out for COVID in CCU, now on tele on floor.    3/17/20: atypical chest discomfort overnight worse w/ coughing.  Mild dyspnea, no palpitations.  Tele w/ no afib overnight.  3/18/20: no chest pain, no dyspnea, no palps.  Tele w/ NSR.    MEDICATIONS:    MEDICATIONS  (STANDING):  ALBUTerol    90 MICROgram(s) HFA Inhaler 2 Puff(s) Inhalation every 4 hours  aspirin enteric coated 81 milliGRAM(s) Oral daily  ATENolol  Tablet 50 milliGRAM(s) Oral daily  atorvastatin 20 milliGRAM(s) Oral at bedtime  azithromycin  IVPB 500 milliGRAM(s) IV Intermittent every 24 hours  azithromycin  IVPB      budesonide 160 MICROgram(s)/formoterol 4.5 MICROgram(s) Inhaler 2 Puff(s) Inhalation two times a day  enoxaparin Injectable 80 milliGRAM(s) SubCutaneous every 12 hours  lactobacillus acidophilus 1 Tablet(s) Oral two times a day  piperacillin/tazobactam IVPB.. 3.375 Gram(s) IV Intermittent every 12 hours  sodium chloride 0.9%. 1000 milliLiter(s) (75 mL/Hr) IV Continuous <Continuous>    MEDICATIONS  (PRN):  guaiFENesin   Syrup  (Sugar-Free) 200 milliGRAM(s) Oral every 6 hours PRN Cough        Vital Signs Last 24 Hrs  T(C): 36.7 (18 Mar 2020 10:57), Max: 37.2 (17 Mar 2020 23:00)  T(F): 98.1 (18 Mar 2020 10:57), Max: 98.9 (17 Mar 2020 23:00)  HR: 89 (18 Mar 2020 10:57) (70 - 95)  BP: 122/63 (18 Mar 2020 10:57) (122/63 - 148/57)  BP(mean): 102 (17 Mar 2020 23:00) (72 - 102)  RR: 18 (18 Mar 2020 10:57) (18 - 29)  SpO2: 96% (18 Mar 2020 10:57) (96% - 100%)Daily     Daily Weight in k.5 (18 Mar 2020 10:00)I&O's Summary    17 Mar 2020 07:01  -  18 Mar 2020 07:00  --------------------------------------------------------  IN: 1225 mL / OUT: 300 mL / NET: 925 mL        PHYSICAL EXAM:    Constitutional: NAD, awake and alert, well-developed  HEENT: PERR, EOMI,   Neck:  supple,  No JVD  Respiratory: wheezing bilaterally, no rales or rhonchi  Cardiovascular: S1 and S2, regular rate and rhythm, no Murmurs, gallops or rubs  Gastrointestinal: Bowel Sounds present, soft, nontender.   Extremities: No peripheral edema. No clubbing or cyanosis.  Vascular: 2+ peripheral pulses  Neurological: A/O x 3, no focal deficits    LABS: All Labs Reviewed:                        10.7   23.09 )-----------( 209      ( 18 Mar 2020 08:09 )             33.1                         10.6   21.95 )-----------( 203      ( 17 Mar 2020 06:38 )             33.0                         10.3   20.38 )-----------( 178      ( 17 Mar 2020 02:43 )             31.3     18 Mar 2020 08:09    141    |  114    |  13     ----------------------------<  95     3.9     |  21     |  1.09   17 Mar 2020 06:38    140    |  113    |  17     ----------------------------<  97     3.9     |  19     |  1.34   16 Mar 2020 08:54    139    |  110    |  23     ----------------------------<  85     4.1     |  20     |  2.33     Ca    8.3        18 Mar 2020 08:09  Ca    7.8        17 Mar 2020 06:38  Ca    8.0        16 Mar 2020 08:54  Phos  3.9       16 Mar 2020 08:54  Mg     2.3       16 Mar 2020 08:54  Mg     2.2       15 Mar 2020 19:55    TPro  7.3    /  Alb  2.3    /  TBili  0.4    /  DBili  x      /  AST  38     /  ALT  35     /  AlkPhos  222    16 Mar 2020 08:54  TPro  7.7    /  Alb  2.5    /  TBili  0.4    /  DBili  x      /  AST  23     /  ALT  29     /  AlkPhos  228    15 Mar 2020 19:55    PT/INR - ( 18 Mar 2020 08:09 )   PT: 13.6 sec;   INR: 1.22 ratio         PTT - ( 17 Mar 2020 20:24 )  PTT:73.5 sec      03-15 @ 19:55  Pro Bnp 4755     @ 17:17  TSH: 0.68  16 @ 08:54  TSH: 0.78      RADIOLOGY/EKG:      ECHO:    < from: TTE Echo Complete w/o contrast w/ Doppler (20 @ 11:37) >   Impression     Summary     The left ventricle is normal in size, wall thickness, wall motion and   contractility.   Estimated left ventricular ejection fraction is 65 %.   Normal appearing left atrium.   Trace aortic regurgitation is present.   Mild to moderate tricuspid valve regurgitationis present.   Normal pulmonary artery systolic pressures.     Signature     ----------------------------------------------------------------   Electronically signed by Randal Kraus MD(Interpreting   physician) on 2020 08:08 AM   ----------------------------------------------------------------    < end of copied text > 80 y/o w/ Glaucoma, cataracts, OA, HTN, admitted for cough, wheezing, dyspnea 7 weakness, afib w/ RVR on presentation w/ termination to NSR prior to arrival in ED.  Admitted for afib w/ RVR, borderline trop elevation, abnormal CT (initially read metastatic cancer now suspicious for pneumonia/viral etiology), and ARF.  ruled out for COVID in CCU, now on tele on floor.    3/17/20: atypical chest discomfort overnight worse w/ coughing.  Mild dyspnea, no palpitations.  Tele w/ no afib overnight.  3/18/20: no chest pain, mild dyspnea, occasional cough w/ whitish sputum, no palps.  Tele w/ NSR.    MEDICATIONS:    MEDICATIONS  (STANDING):  ALBUTerol    90 MICROgram(s) HFA Inhaler 2 Puff(s) Inhalation every 4 hours  aspirin enteric coated 81 milliGRAM(s) Oral daily  ATENolol  Tablet 50 milliGRAM(s) Oral daily  atorvastatin 20 milliGRAM(s) Oral at bedtime  azithromycin  IVPB 500 milliGRAM(s) IV Intermittent every 24 hours  azithromycin  IVPB      budesonide 160 MICROgram(s)/formoterol 4.5 MICROgram(s) Inhaler 2 Puff(s) Inhalation two times a day  enoxaparin Injectable 80 milliGRAM(s) SubCutaneous every 12 hours  lactobacillus acidophilus 1 Tablet(s) Oral two times a day  piperacillin/tazobactam IVPB.. 3.375 Gram(s) IV Intermittent every 12 hours  sodium chloride 0.9%. 1000 milliLiter(s) (75 mL/Hr) IV Continuous <Continuous>    MEDICATIONS  (PRN):  guaiFENesin   Syrup  (Sugar-Free) 200 milliGRAM(s) Oral every 6 hours PRN Cough        Vital Signs Last 24 Hrs  T(C): 36.7 (18 Mar 2020 10:57), Max: 37.2 (17 Mar 2020 23:00)  T(F): 98.1 (18 Mar 2020 10:57), Max: 98.9 (17 Mar 2020 23:00)  HR: 89 (18 Mar 2020 10:57) (70 - 95)  BP: 122/63 (18 Mar 2020 10:57) (122/63 - 148/57)  BP(mean): 102 (17 Mar 2020 23:00) (72 - 102)  RR: 18 (18 Mar 2020 10:57) (18 - 29)  SpO2: 96% (18 Mar 2020 10:57) (96% - 100%)Daily     Daily Weight in k.5 (18 Mar 2020 10:00)I&O's Summary    17 Mar 2020 07:01  -  18 Mar 2020 07:00  --------------------------------------------------------  IN: 1225 mL / OUT: 300 mL / NET: 925 mL        PHYSICAL EXAM:    Constitutional: NAD, awake and alert, well-developed  HEENT: PERR, EOMI,   Neck:  supple,  No JVD  Respiratory: wheezing bilaterally, no rales or rhonchi  Cardiovascular: S1 and S2, regular rate and rhythm, no Murmurs, gallops or rubs  Gastrointestinal: Bowel Sounds present, soft, nontender.   Extremities: No peripheral edema. No clubbing or cyanosis.  Vascular: 2+ peripheral pulses  Neurological: A/O x 3, no focal deficits    LABS: All Labs Reviewed:                        10.7   23.09 )-----------( 209      ( 18 Mar 2020 08:09 )             33.1                         10.6   21.95 )-----------( 203      ( 17 Mar 2020 06:38 )             33.0                         10.3   20.38 )-----------( 178      ( 17 Mar 2020 02:43 )             31.3     18 Mar 2020 08:09    141    |  114    |  13     ----------------------------<  95     3.9     |  21     |  1.09   17 Mar 2020 06:38    140    |  113    |  17     ----------------------------<  97     3.9     |  19     |  1.34   16 Mar 2020 08:54    139    |  110    |  23     ----------------------------<  85     4.1     |  20     |  2.33     Ca    8.3        18 Mar 2020 08:09  Ca    7.8        17 Mar 2020 06:38  Ca    8.0        16 Mar 2020 08:54  Phos  3.9       16 Mar 2020 08:54  Mg     2.3       16 Mar 2020 08:54  Mg     2.2       15 Mar 2020 19:55    TPro  7.3    /  Alb  2.3    /  TBili  0.4    /  DBili  x      /  AST  38     /  ALT  35     /  AlkPhos  222    16 Mar 2020 08:54  TPro  7.7    /  Alb  2.5    /  TBili  0.4    /  DBili  x      /  AST  23     /  ALT  29     /  AlkPhos  228    15 Mar 2020 19:55    PT/INR - ( 18 Mar 2020 08:09 )   PT: 13.6 sec;   INR: 1.22 ratio         PTT - ( 17 Mar 2020 20:24 )  PTT:73.5 sec      03-15 @ 19:55  Pro Bnp 4755    - @ 17:17  TSH: 0.68  16 @ 08:54  TSH: 0.78      RADIOLOGY/EKG:      ECHO:    < from: TTE Echo Complete w/o contrast w/ Doppler (20 @ 11:37) >   Impression     Summary     The left ventricle is normal in size, wall thickness, wall motion and   contractility.   Estimated left ventricular ejection fraction is 65 %.   Normal appearing left atrium.   Trace aortic regurgitation is present.   Mild to moderate tricuspid valve regurgitationis present.   Normal pulmonary artery systolic pressures.     Signature     ----------------------------------------------------------------   Electronically signed by Randal Kraus MD(Interpreting   physician) on 2020 08:08 AM   ----------------------------------------------------------------    < end of copied text >

## 2020-03-18 NOTE — PROGRESS NOTE ADULT - ASSESSMENT
80 yo female with a pmh/o Glaucoma, cataracts, OA, HTN, who presents to the ED with niece due to her niece noticing pt having cough x 1 week, wheezing, SOB, decreased appetite, and weakness.  In ambulance, patient found to have -- received 20 IV cardizem and converted to NSR.  In ER, pt found to have ARF, positive trop, new onset afib with RVR and rhinovirus +.  CT chest/ abd initially concern for ? metastatic cancer; however, final read with bilateral airspace disease and ELIZABETH concerning for PNA/ viral etiology and acute diverticulitis.      #Acute Hypoxic Respiratory Failure/URI with rhinovirus/ pneumonia    Related to rhinovirus/ ?PNA on CT chest.    COVID negative   ID eval appreciated  IV abx per ID   Start albuterol inhaler q4 hours.    Initially hypoxic 90% on RA but now improved   No hx of lung disease in past.      #Diverticulitis:    Patient notes intermittent abd pain over last week.    F/u GI eval.    CT limited as no contrast but concerning for sigmoid diverticulitis.    Regular diet as no significant pain.     #Diarrhea likely 2 to enterovirus  Gi PCR neg, Cdiff neg  GI f/u     #New onset AFIB- in NSR   Likely related to underlying pulm process.    In sinus x48h  AC by Eliquis    #ARF:    Cr 2.9 on admission.  Suspect prerenal related to anorexia along with NSAIDS/ ARB at home.    Improved   No obstruction on CT.      #Positive TROP:    Suspect more demand ischemia related to infection/ afib.    Will need ischemic work up.    Cardio f/u.    Cont asa/ statin.      #HTN:    Hold ARB with ARF.  Cont BB.      #Code Status:  full.      #Dispo- cont IV abx. OOb to ambulate

## 2020-03-19 LAB
ANION GAP SERPL CALC-SCNC: 6 MMOL/L — SIGNIFICANT CHANGE UP (ref 5–17)
BUN SERPL-MCNC: 11 MG/DL — SIGNIFICANT CHANGE UP (ref 7–23)
CALCIUM SERPL-MCNC: 8.3 MG/DL — LOW (ref 8.5–10.1)
CHLORIDE SERPL-SCNC: 113 MMOL/L — HIGH (ref 96–108)
CO2 SERPL-SCNC: 21 MMOL/L — LOW (ref 22–31)
CREAT SERPL-MCNC: 1 MG/DL — SIGNIFICANT CHANGE UP (ref 0.5–1.3)
CULTURE RESULTS: SIGNIFICANT CHANGE UP
GLUCOSE SERPL-MCNC: 106 MG/DL — HIGH (ref 70–99)
HCT VFR BLD CALC: 30.2 % — LOW (ref 34.5–45)
HGB BLD-MCNC: 10 G/DL — LOW (ref 11.5–15.5)
MAGNESIUM SERPL-MCNC: 1.9 MG/DL — SIGNIFICANT CHANGE UP (ref 1.6–2.6)
MCHC RBC-ENTMCNC: 29.1 PG — SIGNIFICANT CHANGE UP (ref 27–34)
MCHC RBC-ENTMCNC: 33.1 GM/DL — SIGNIFICANT CHANGE UP (ref 32–36)
MCV RBC AUTO: 87.8 FL — SIGNIFICANT CHANGE UP (ref 80–100)
PHOSPHATE SERPL-MCNC: 3.1 MG/DL — SIGNIFICANT CHANGE UP (ref 2.5–4.5)
PLATELET # BLD AUTO: 179 K/UL — SIGNIFICANT CHANGE UP (ref 150–400)
POTASSIUM SERPL-MCNC: 3.7 MMOL/L — SIGNIFICANT CHANGE UP (ref 3.5–5.3)
POTASSIUM SERPL-SCNC: 3.7 MMOL/L — SIGNIFICANT CHANGE UP (ref 3.5–5.3)
RBC # BLD: 3.44 M/UL — LOW (ref 3.8–5.2)
RBC # FLD: 14.1 % — SIGNIFICANT CHANGE UP (ref 10.3–14.5)
SODIUM SERPL-SCNC: 140 MMOL/L — SIGNIFICANT CHANGE UP (ref 135–145)
SPECIMEN SOURCE: SIGNIFICANT CHANGE UP
WBC # BLD: 21.49 K/UL — HIGH (ref 3.8–10.5)
WBC # FLD AUTO: 21.49 K/UL — HIGH (ref 3.8–10.5)

## 2020-03-19 PROCEDURE — 99232 SBSQ HOSP IP/OBS MODERATE 35: CPT

## 2020-03-19 RX ORDER — VANCOMYCIN HCL 1 G
750 VIAL (EA) INTRAVENOUS EVERY 12 HOURS
Refills: 0 | Status: DISCONTINUED | OUTPATIENT
Start: 2020-03-19 | End: 2020-03-25

## 2020-03-19 RX ADMIN — ALBUTEROL 2 PUFF(S): 90 AEROSOL, METERED ORAL at 04:52

## 2020-03-19 RX ADMIN — APIXABAN 5 MILLIGRAM(S): 2.5 TABLET, FILM COATED ORAL at 18:29

## 2020-03-19 RX ADMIN — PIPERACILLIN AND TAZOBACTAM 25 GRAM(S): 4; .5 INJECTION, POWDER, LYOPHILIZED, FOR SOLUTION INTRAVENOUS at 14:57

## 2020-03-19 RX ADMIN — Medication 250 MILLIGRAM(S): at 18:30

## 2020-03-19 RX ADMIN — BUDESONIDE AND FORMOTEROL FUMARATE DIHYDRATE 2 PUFF(S): 160; 4.5 AEROSOL RESPIRATORY (INHALATION) at 08:27

## 2020-03-19 RX ADMIN — APIXABAN 5 MILLIGRAM(S): 2.5 TABLET, FILM COATED ORAL at 05:08

## 2020-03-19 RX ADMIN — ATENOLOL 50 MILLIGRAM(S): 25 TABLET ORAL at 05:08

## 2020-03-19 RX ADMIN — Medication 200 MILLIGRAM(S): at 01:24

## 2020-03-19 RX ADMIN — AZITHROMYCIN 255 MILLIGRAM(S): 500 TABLET, FILM COATED ORAL at 12:42

## 2020-03-19 RX ADMIN — Medication 81 MILLIGRAM(S): at 12:42

## 2020-03-19 RX ADMIN — Medication 200 MILLIGRAM(S): at 18:44

## 2020-03-19 RX ADMIN — PIPERACILLIN AND TAZOBACTAM 25 GRAM(S): 4; .5 INJECTION, POWDER, LYOPHILIZED, FOR SOLUTION INTRAVENOUS at 21:44

## 2020-03-19 RX ADMIN — ALBUTEROL 2 PUFF(S): 90 AEROSOL, METERED ORAL at 12:07

## 2020-03-19 RX ADMIN — Medication 1 TABLET(S): at 05:08

## 2020-03-19 RX ADMIN — ALBUTEROL 2 PUFF(S): 90 AEROSOL, METERED ORAL at 08:26

## 2020-03-19 RX ADMIN — BUDESONIDE AND FORMOTEROL FUMARATE DIHYDRATE 2 PUFF(S): 160; 4.5 AEROSOL RESPIRATORY (INHALATION) at 20:40

## 2020-03-19 RX ADMIN — ALBUTEROL 2 PUFF(S): 90 AEROSOL, METERED ORAL at 16:08

## 2020-03-19 RX ADMIN — ATORVASTATIN CALCIUM 20 MILLIGRAM(S): 80 TABLET, FILM COATED ORAL at 21:42

## 2020-03-19 RX ADMIN — Medication 1 TABLET(S): at 18:30

## 2020-03-19 RX ADMIN — PIPERACILLIN AND TAZOBACTAM 25 GRAM(S): 4; .5 INJECTION, POWDER, LYOPHILIZED, FOR SOLUTION INTRAVENOUS at 05:08

## 2020-03-19 RX ADMIN — ALBUTEROL 2 PUFF(S): 90 AEROSOL, METERED ORAL at 20:41

## 2020-03-19 NOTE — PROGRESS NOTE ADULT - SUBJECTIVE AND OBJECTIVE BOX
Date of service: 03-19-20 @ 14:07    Sitting in bed in NAD  Weak looking  No SOB at rest  Has cough    ROS: no fever or chills; denies dizziness, no HA, no abdominal pain, no diarrhea or constipation; no dysuria, no legs pain, no rashes    MEDICATIONS  (STANDING):  ALBUTerol    90 MICROgram(s) HFA Inhaler 2 Puff(s) Inhalation every 4 hours  apixaban 5 milliGRAM(s) Oral two times a day  aspirin enteric coated 81 milliGRAM(s) Oral daily  ATENolol  Tablet 50 milliGRAM(s) Oral daily  atorvastatin 20 milliGRAM(s) Oral at bedtime  azithromycin  IVPB 500 milliGRAM(s) IV Intermittent every 24 hours  azithromycin  IVPB      budesonide 160 MICROgram(s)/formoterol 4.5 MICROgram(s) Inhaler 2 Puff(s) Inhalation two times a day  lactobacillus acidophilus 1 Tablet(s) Oral two times a day  piperacillin/tazobactam IVPB.. 3.375 Gram(s) IV Intermittent every 8 hours      Vital Signs Last 24 Hrs  T(C): 37.1 (19 Mar 2020 11:19), Max: 37.1 (19 Mar 2020 11:19)  T(F): 98.7 (19 Mar 2020 11:19), Max: 98.7 (19 Mar 2020 11:19)  HR: 67 (19 Mar 2020 11:19) (67 - 81)  BP: 113/94 (19 Mar 2020 11:19) (113/94 - 147/62)  BP(mean): --  RR: 18 (19 Mar 2020 11:19) (18 - 18)  SpO2: 99% (19 Mar 2020 11:19) (98% - 99%)    Physical Exam:      Constitutional:  No acute distress  HEENT: NC/AT, EOMI, PERRLA, conjunctivae clear  Neck: supple; thyroid not palpable  Back: no tenderness  Respiratory: respiratory effort normal; crackles at bases  Cardiovascular: S1S2 regular, no murmurs  Abdomen: soft, not tender, not distended, positive BS  Genitourinary: no suprapubic tenderness  Lymphatic: no LN palpable  Musculoskeletal: no muscle tenderness, no joint swelling or tenderness  Extremities: no pedal edema  Neurological/ Psychiatric: AxOx3, moving all extremities  Skin: no rashes; no palpable lesions    Labs: reviewed                        10.0   21.49 )-----------( 179      ( 19 Mar 2020 08:24 )             30.2     03-19    140  |  113<H>  |  11  ----------------------------<  106<H>  3.7   |  21<L>  |  1.00    Ca    8.3<L>      19 Mar 2020 08:24  Phos  3.1     03-19  Mg     1.9     03-19                        10.6   21.95 )-----------( 203      ( 17 Mar 2020 06:38 )             33.0     03-17    140  |  113<H>  |  17  ----------------------------<  97  3.9   |  19<L>  |  1.34<H>    Ca    7.8<L>      17 Mar 2020 06:38  Phos  3.9     03-16  Mg     2.3     03-16    TPro  7.3  /  Alb  2.3<L>  /  TBili  0.4  /  DBili  x   /  AST  38<H>  /  ALT  35  /  AlkPhos  222<H>  03-16     LIVER FUNCTIONS - ( 16 Mar 2020 08:54 )  Alb: 2.3 g/dL / Pro: 7.3 gm/dL / ALK PHOS: 222 U/L / ALT: 35 U/L / AST: 38 U/L / GGT: 60 U/L         Culture - Blood (collected 15 Mar 2020 22:28)  Source: .Blood None  Preliminary Report (17 Mar 2020 09:02):    No growth to date.    Culture - Blood (collected 15 Mar 2020 22:28)  Source: .Blood None  Preliminary Report (17 Mar 2020 09:02):    No growth to date.    Rapid Respiratory Viral Panel (03.16.20 @ 01:17)    Rapid RVP Result: Detected    Entero/Rhinovirus (RapRVP): Detected    COVID-19 PCR . (03.16.20 @ 10:10)    COVID-19 PCR: NotDetec    Culture - Sputum . (03.17.20 @ 23:55)    Gram Stain:   Rare polymorphonuclear leukocytes per low power field  Rare Squamous epithelial cells per low power field  Few Yeast like cells per oil power field  Rare Gram positive cocci in pairs per oil power field  Rare Gram Negative Rods per oil power field  Rare Gram Positive Rods per oil power field    Specimen Source: .Sputum Sputum    Culture Results:   Moderate Staphylococcus aureus  Normal Respiratory Ivelisse present    Radiology: all available radiological tests reviewed    < from: CT Chest No Cont (03.15.20 @ 22:05) >  Bilateral airspace disease with predominant subpleural and upper lobe involvement along with mediastinal lymphadenopathy. In view of clinical history, airspace infection is favored over the possibility of malignancy.    < end of copied text >      Advanced directives addressed: full resuscitation

## 2020-03-19 NOTE — PROGRESS NOTE ADULT - ASSESSMENT
78 yo female with a pmh/o Glaucoma, cataracts, OA, HTN, who presents to the ED with niece due to her niece noticing pt having cough x 1 week, wheezing, SOB, decreased appetite, and weakness.  In ambulance, patient found to have -- received 20 IV cardizem and converted to NSR.  In ER, pt found to have ARF, positive trop, new onset afib with RVR and rhinovirus +.  CT chest/ abd initially concern for ? metastatic cancer; however, final read with bilateral airspace disease and ELIZABETH concerning for PNA/ viral etiology and acute diverticulitis.        Diverticulitis, patient had noted some intermittent abdominal pain last week, but no significant abdominal pain at this time.    Diarrhea, likely secondary to multiple issues however, appears to be improving and with advance diet and encourage p.o. intake.    Acute renal insufficiency on admission.  Possibly related to medications as well as anorexia and dehydration.    Eventual colonoscopic evaluation discussed with patient.  She would like to hold off at this time.  Secondary comorbid disorders, this is reasonable.    Liver lesions are too small to evaluate at this time I would need follow-up.  Message left for family

## 2020-03-19 NOTE — PROGRESS NOTE ADULT - ASSESSMENT
78 yo female with a pmh/o Glaucoma, cataracts, OA, HTN, who presents to the ED with niece due to her niece noticing pt having cough x 1 week, wheezing, SOB, decreased appetite, and weakness.  In ambulance, patient found to have -- received 20 IV cardizem and converted to NSR.  In ER, pt found to have ARF, positive trop, new onset afib with RVR and rhinovirus +.  CT chest/ abd initially concern for ? metastatic cancer; however, final read with bilateral airspace disease and ELIZABETH concerning for PNA/ viral etiology and acute diverticulitis.      #Acute Hypoxic Respiratory Failure/URI with rhinovirus/ pneumonia    Related to rhinovirus/ ?PNA on CT chest.    COVID negative   ID eval appreciated  IV abx per ID   Start albuterol inhaler q4 hours.    Initially hypoxic 90% on RA but now improved   No hx of lung disease in past.      #Diverticulitis:    Patient notes intermittent abd pain over last week.    F/u GI eval.    CT limited as no contrast but concerning for sigmoid diverticulitis.    Regular diet as no significant pain.     #Diarrhea likely 2 to enterovirus  Gi PCR neg, Cdiff neg  GI f/u     #New onset AFIB- in NSR   Likely related to underlying pulm process.    In sinus   AC by Eliquis  Can DC telemetry    #ARF:    Cr 2.9 on admission.  Suspect prerenal related to anorexia along with NSAIDS/ ARB at home.    Improved   No obstruction on CT.      #Positive TROP:    Suspect more demand ischemia related to infection/ afib.    Will need ischemic work up as outpatient    Cardio f/u.    Cont plavix, statin    #HTN:    Hold ARB with ARF.  Cont BB.      #Code Status:  full.      #Dispo- can transfer to med-surg. OOB to ambulate 78 yo female with a pmh/o Glaucoma, cataracts, OA, HTN, who presents to the ED with niece due to her niece noticing pt having cough x 1 week, wheezing, SOB, decreased appetite, and weakness.  In ambulance, patient found to have -- received 20 IV cardizem and converted to NSR.  In ER, pt found to have ARF, positive trop, new onset afib with RVR and rhinovirus +.  CT chest/ abd initially concern for ? metastatic cancer; however, final read with bilateral airspace disease and ELIZABETH concerning for PNA/ viral etiology and acute diverticulitis.      #Acute Hypoxic Respiratory Failure/URI with rhinovirus/ pneumonia    Related to rhinovirus/ ?PNA on CT chest.    COVID negative   ID eval appreciated  IV abx per ID   Start albuterol inhaler q4 hours.    Initially hypoxic 90% on RA but now improved   No hx of lung disease in past.      #Diverticulitis:    Patient notes intermittent abd pain over last week.    F/u GI eval.    CT limited as no contrast but concerning for sigmoid diverticulitis.    Regular diet as no significant pain.     #Diarrhea likely 2 to enterovirus  Gi PCR neg, Cdiff neg  GI f/u     #New onset AFIB- in NSR   Likely related to underlying pulm process.    In sinus   AC by Eliquis  Can DC telemetry    #ARF:    Cr 2.9 on admission.  Suspect prerenal related to anorexia along with NSAIDS/ ARB at home.    Improved   No obstruction on CT.      #Positive TROP:    Suspect more demand ischemia related to infection/ afib.    Will need ischemic work up as outpatient    Cardio f/u.    Cont ASA/BB/statin    #HTN:    Hold ARB with ARF.  Cont BB.      #Code Status:  full.      #Dispo- can transfer to med-surg. PT eval. OOB to ambulate

## 2020-03-19 NOTE — PROGRESS NOTE ADULT - SUBJECTIVE AND OBJECTIVE BOX
78 yo female with a pmh/o Glaucoma, cataracts, OA, HTN, who presents to the ED with niece due to her niece noticing pt having cough x 1 week, decreased appetite, and weakness. Niece called EMS from pt home after visiting and EMS noted pt HR to be 210.  Pt given IVF and 20mg cardizem en route and HR improved, pt now in NSR at 80's. Pt denies hx of AFIB.  Pt notes chest pressure/ wheezing/ coughing/ SOB at home.  Loss of appetite.  She was taking ASA, advil, and yesterday started OTC cold medicine with pseudoephinephrine.  Pt denies fever, however states that she did have chills. Endorses productive cough with off white sputum, and wheezing.  Pt states she visited sister in the [facility] at which time sister also had a cough and a flu outbreak there.  In ER, pt found to have ARF, positive trop, afib with RVR.  CT chest/ abd initially concern for ? metastatic cancer; however, final read with bilateral airspace disease and ELIZABETH concerning for PNA/ viral etiology and diverticulitis.      3/16:  Pt seen.  C/o cough-- productive.  C/o wheezing and SOB.  Loss of appetite.  No fever.    3/17 +cough  3/18 +diarrhea  3/19 diarrhea is better    Vital Signs Last 24 Hrs  T(C): 37.1 (19 Mar 2020 11:19), Max: 37.1 (19 Mar 2020 11:19)  T(F): 98.7 (19 Mar 2020 11:19), Max: 98.7 (19 Mar 2020 11:19)  HR: 67 (19 Mar 2020 11:19) (67 - 81)  BP: 113/94 (19 Mar 2020 11:19) (113/94 - 147/62)  BP(mean): --  RR: 18 (19 Mar 2020 11:19) (18 - 18)  SpO2: 99% (19 Mar 2020 11:19) (98% - 99%)    PHYSICAL EXAM:  GENERAL: Comfortable, no acute distress  HEAD:  Atraumatic, Normocephalic  EYES: EOMI, PERRLA  HEENT: Moist mucous membranes  NECK: Supple, No JVD  NERVOUS SYSTEM:  Alert & Oriented X3, Motor Strength 5/5 B/L upper and lower extremities  CHEST/LUNG: diffuse rhonchi/ wheezing on exam.    HEART: Regular rate and rhythm; No murmurs, rubs, or gallops  ABDOMEN: Soft, Nontender, Nondistended; Bowel sounds present  GENITOURINARY- Voiding, no palpable bladder  EXTREMITIES:  No clubbing, cyanosis, or edema  MUSCULOSKELTAL- No muscle tenderness, No joint tenderness  SKIN-no rash    LABS:                                                   10.0   21.49 )-----------( 179      ( 19 Mar 2020 08:24 )             30.2     19 Mar 2020 08:24    140    |  113    |  11     ----------------------------<  106    3.7     |  21     |  1.00     Ca    8.3        19 Mar 2020 08:24  Phos  3.1       19 Mar 2020 08:24  Mg     1.9       19 Mar 2020 08:24    PT/INR - ( 18 Mar 2020 08:09 )   PT: 13.6 sec;   INR: 1.22 ratio       PTT - ( 17 Mar 2020 20:24 )  PTT:73.5 sec    Urinalysis Basic - ( 17 Mar 2020 23:55 )  Color: Yellow / Appearance: Clear / S.015 / pH: x  Gluc: x / Ketone: Negative  / Bili: Negative / Urobili: Negative mg/dL   Blood: x / Protein: 30 mg/dL / Nitrite: Negative   Leuk Esterase: Moderate / RBC: 3-5 /HPF / WBC 6-10   Sq Epi: x / Non Sq Epi: Few / Bacteria: Moderate    MEDICATIONS  (STANDING):  ALBUTerol    90 MICROgram(s) HFA Inhaler 2 Puff(s) Inhalation every 4 hours  apixaban 5 milliGRAM(s) Oral two times a day  aspirin enteric coated 81 milliGRAM(s) Oral daily  ATENolol  Tablet 50 milliGRAM(s) Oral daily  atorvastatin 20 milliGRAM(s) Oral at bedtime  azithromycin  IVPB 500 milliGRAM(s) IV Intermittent every 24 hours  azithromycin  IVPB      budesonide 160 MICROgram(s)/formoterol 4.5 MICROgram(s) Inhaler 2 Puff(s) Inhalation two times a day  lactobacillus acidophilus 1 Tablet(s) Oral two times a day  piperacillin/tazobactam IVPB.. 3.375 Gram(s) IV Intermittent every 8 hours  sodium chloride 0.9%. 1000 milliLiter(s) (75 mL/Hr) IV Continuous <Continuous>    MEDICATIONS  (PRN):  guaiFENesin   Syrup  (Sugar-Free) 200 milliGRAM(s) Oral every 6 hours PRN Cough

## 2020-03-19 NOTE — PROGRESS NOTE ADULT - SUBJECTIVE AND OBJECTIVE BOX
Patient is a 79y old  Female who presents with a chief complaint of afib rvr, new onset (17 Mar 2020 17:15)      HPI:  Pt is a 80 yo female with a pmh/o Glaucoma, cataracts, OA, HTN, who presents to the ED with niece due to her niece noticing pt having cough x 1 week, decreased to no appetite, and weakness. Niece called EMS from pt home after visiting and EMS noted pt HR to be 210 in aflutter. Pt given IVF and 20mg cardizem en route and HR improved, pt now in NSR at 80's. Pt is a very poor historian however upon interview, admits to having a pre op assessment (cataract) in January where her RN had mentioned to her that her HR was irregular. Pt then admits to intermittent chest pressure which is centralized across chest and radiates down right arm to fingertips. Pt states this chest pressure and wheezing has been treated at home with ASA, advil, and yesterday started OTC cold medicine with pseudoephinephrine ingredient. Pt states pressure had awoken her from her sleep and is sure that she only has been taking two advil and one additional aspirin to her daily in order to control sx. Pt denies fever, however states that she did have chills. Endorses productive cough with off white sputum, and wheezing. Denies calf pain/swelling, smoking hx, urinary sx, abd pain, constipation, diarrhea, recent travel, fevers, rashes. Pt states she visited sister in the [facility] at which time sister also had a cough. Collateral hx obtained from niece listed in Emergency contacts who confirms above.   Pt found to be in new onset afib rvr, complicated by demand ischemia and renal failure, treated for PNA, sepsis in ED, later showing probable metastatic cancer on chest CT.      PAST MEDICAL & SURGICAL HISTORY:  OA (osteoarthritis)  Glaucoma  HTN (hypertension)  Status post glaucoma surgery: and cataract      PREVIOUS DIAGNOSTIC TESTING:      MEDICATIONS  (STANDING):  ALBUTerol    90 MICROgram(s) HFA Inhaler 2 Puff(s) Inhalation every 4 hours  apixaban 5 milliGRAM(s) Oral two times a day  aspirin enteric coated 81 milliGRAM(s) Oral daily  ATENolol  Tablet 50 milliGRAM(s) Oral daily  atorvastatin 20 milliGRAM(s) Oral at bedtime  budesonide 160 MICROgram(s)/formoterol 4.5 MICROgram(s) Inhaler 2 Puff(s) Inhalation two times a day  lactobacillus acidophilus 1 Tablet(s) Oral two times a day  piperacillin/tazobactam IVPB.. 3.375 Gram(s) IV Intermittent every 8 hours  vancomycin  IVPB 750 milliGRAM(s) IV Intermittent every 12 hours    MEDICATIONS  (PRN):        FAMILY HISTORY:  FH: CAD (coronary artery disease): in mother and father, both   FH: pancreatic cancer: in sister,   FHx: colon cancer: in brother,       SOCIAL HISTORY:  ***    REVIEW OF SYSTEM:  dyspnea, cough, fatigue, all other systems reviewed and are negative     Vital Signs Last 24 Hrs  T(C): 36.6 (18 Mar 2020 05:39), Max: 37.2 (17 Mar 2020 23:00)  T(F): 97.9 (18 Mar 2020 05:39), Max: 98.9 (17 Mar 2020 23:00)  HR: 79 (18 Mar 2020 05:39) (70 - 95)  BP: 128/59 (18 Mar 2020 05:39) (126/63 - 148/57)  BP(mean): 102 (17 Mar 2020 23:00) (72 - 102)  RR: 20 (18 Mar 2020 05:39) (20 - 29)  SpO2: 97% (18 Mar 2020 05:39) (95% - 100%)    I&O's Summary    17 Mar 2020 07:01  -  18 Mar 2020 07:00  --------------------------------------------------------  IN: 1225 mL / OUT: 300 mL / NET: 925 mL      PHYSICAL EXAM  General Appearance: cooperative, no acute distress,   HEENT: PERRL, conjunctiva clear, EOM's intact, non injected pharynx, no exudate, TM   normal  Neck: Supple, , no adenopathy, thyroid: not enlarged, no carotid bruit or JVD  Back: Symmetric, no  tenderness,no soft tissue tenderness  Lungs: Coarse bilaterally, inspiratory wheezing anterior chest wall bilaterally   Heart: Regular rate and rhythm, S1, S2 normal, no murmur, rub or gallop  Abdomen: Soft, non-tender, bowel sounds active , no hepatosplenomegaly  Extremities: no cyanosis or edema, no joint swelling  Skin: Skin color, texture normal, no rashes   Neurologic: Alert and oriented X3 , cranial nerves intact, sensory and motor normal,    ECG:    LABS:                          10.6   21.95 )-----------( 203      ( 17 Mar 2020 06:38 )             33.0     03-17    140  |  113<H>  |  17  ----------------------------<  97  3.9   |  19<L>  |  1.34<H>    Ca    7.8<L>      17 Mar 2020 06:38  Phos  3.9     03-16  Mg     2.3     03-16    TPro  7.3  /  Alb  2.3<L>  /  TBili  0.4  /  DBili  x   /  AST  38<H>  /  ALT  35  /  AlkPhos  222<H>  03-16        Lipid Panel  153  40  94  92    Pro BNP  4755 15 @ 19:55  D Dimer  -- 03-15 @ 19:55    PT/INR - ( 17 Mar 2020 20:24 )   PT: 13.8 sec;   INR: 1.24 ratio         PTT - ( 17 Mar 2020 20:24 )  PTT:73.5 sec  Urinalysis Basic - ( 17 Mar 2020 23:55 )    Color: Yellow / Appearance: Clear / S.015 / pH: x  Gluc: x / Ketone: Negative  / Bili: Negative / Urobili: Negative mg/dL   Blood: x / Protein: 30 mg/dL / Nitrite: Negative   Leuk Esterase: Moderate / RBC: 3-5 /HPF / WBC 6-10   Sq Epi: x / Non Sq Epi: Few / Bacteria: Moderate            RADIOLOGY & ADDITIONAL STUDIES:

## 2020-03-19 NOTE — PROGRESS NOTE ADULT - ASSESSMENT
1) Pneumonia   2) Abnormal CT Chest   3) Dyspnea  4) Cough  5) AF with RVR    80 yo female with a pmh/o Glaucoma, cataracts, OA, HTN, who presents to the ED with niece due to her niece noticing pt having cough x 1 week, decreased to no appetite, and weakness. Niece called EMS from pt home after visiting and EMS noted pt HR to be 210 in aflutter. Pt given IVF and 20mg cardizem en route and HR improved, pt now in NSR at 80's. Pt is a very poor historian however upon interview, admits to having a pre op assessment (cataract) in January where her RN had mentioned to her that her HR was irregular. Pt then admits to intermittent chest pressure which is centralized across chest and radiates down right arm to fingertips. Pt states this chest pressure and wheezing has been treated at home with ASA, advil, and yesterday started OTC cold medicine with pseudoephinephrine ingredient. Pt states pressure had awoken her from her sleep and is sure that she only has been taking two advil and one additional aspirin to her daily in order to control sx. Pt denies fever, however states that she did have chills. Endorses productive cough with off white sputum, and wheezing.  Reviewed CT Chest imaging, labs   COVID 19 negative   RVP + for enterovirus noted   Findings are consistent with a pneumonia secondary to viral etiology  She is a non-smoker with no prior pulmonary history and she states she has not seen a pulmonologist in the past   Procalcitonin reviewed, 2.08 suggesting underlying infectious process   Continue Symbicort   Will continue to monitor

## 2020-03-19 NOTE — PROGRESS NOTE ADULT - ASSESSMENT
80 y/o female with h/o Glaucoma, cataracts, OA, HTN was admitted on 3/15 for cough x 1 week, decreased appetite and weakness. Niece called EMS and EMS noted pt HR to be 210 in aflutter. Pt given IVF and 20mg cardizem en route and HR improved and converted to NSR at 80's. Pt is a poor historian. Pt then admits to intermittent chest pressure which is centralized across chest and radiates down right arm to fingertips. Pt states this chest pressure and wheezing has been treated at home with ASA, advil, and cold medicine with pseudoephinephrine ingredient. Pt states pressure had awoken her from her sleep and is sure that she only has been taking two advil and one additional aspirin to her daily in order to control sx. Pt denies fever, but had chills. Endorses productive cough with off white sputum and wheezing. In ER she was noted with pulmonary infiltrate and received zosyn and azithromycin.     1. Cough. URI with rhinovirus. B/l pneumonia with STAU. Possible aspiration pneumonia. CRF stage 3.  -leukocytosis is persistent  -respiratory function remains frail  -diarrhea; possible CDAD; obtain CDT and GI PCR  -obtain BC x 2  -COVID-19 PCR is negative  -on zosyn 3.375 gm IV q8h and azithromycin 500 mg IV qd # 3  -tolerating abx well so far; no side effects noted  -respiratory care  -noted with STAU; add vancomycin 750 mg IV q12h  -d/c azithromycin after today's dose  -monitor temps  -f/u CBC  -supportive care  2. Other issues:   -care per medicine

## 2020-03-19 NOTE — PROGRESS NOTE ADULT - SUBJECTIVE AND OBJECTIVE BOX
Patient is a 79y old  Female who presents with a chief complaint of afib rvr, new onset (19 Mar 2020 14:05)      HPI:  Pt is a 80 yo female with a pmh/o Glaucoma, cataracts, OA, HTN, who presents to the ED with niece due to her niece noticing pt having cough x 1 week, decreased to no appetite, and weakness. Niece called EMS from pt home after visiting and EMS noted pt HR to be 210 in aflutter. Pt given IVF and 20mg cardizem en route and HR improved, pt now in NSR at 80's. Pt is a very poor historian however upon interview, admits to having a pre op assessment (cataract) in January where her RN had mentioned to her that her HR was irregular. Pt then admits to intermittent chest pressure which is centralized across chest and radiates down right arm to fingertips. Pt states this chest pressure and wheezing has been treated at home with ASA, advil, and yesterday started OTC cold medicine with pseudoephinephrine ingredient. Pt states pressure had awoken her from her sleep and is sure that she only has been taking two advil and one additional aspirin to her daily in order to control sx. Pt denies fever, however states that she did have chills. Endorses productive cough with off white sputum, and wheezing. Denies calf pain/swelling, smoking hx, urinary sx, abd pain, constipation, diarrhea, recent travel, fevers, rashes. Pt states she visited sister in the [facility] at which time sister also had a cough. Collateral hx obtained from niece listed in Emergency contacts who confirms above.     Pt found to be in new onset afib rvr, complicated by demand ischemia and renal failure, treated for PNA, sepsis in ED, later showing probable metastatic cancer on chest CT.    As per niece, pt sister had pancreatic ca. Brother had colon ca. Niece states she suspected underlying neoplastic process given pt lack of appetite and fatigue. States pt does not have legal proxy however pt is full code and pt states niece may act on her behalf. (16 Mar 2020 01:17)    co mild fatigue, cough and N  neg vomit and zoraida diet better and diarrhea is improving  she is a poor historian  pos cough      PAST MEDICAL & SURGICAL HISTORY:  OA (osteoarthritis)  Glaucoma  HTN (hypertension)  Status post glaucoma surgery: and cataract      MEDICATIONS  (STANDING):  ALBUTerol    90 MICROgram(s) HFA Inhaler 2 Puff(s) Inhalation every 4 hours  apixaban 5 milliGRAM(s) Oral two times a day  aspirin enteric coated 81 milliGRAM(s) Oral daily  ATENolol  Tablet 50 milliGRAM(s) Oral daily  atorvastatin 20 milliGRAM(s) Oral at bedtime  budesonide 160 MICROgram(s)/formoterol 4.5 MICROgram(s) Inhaler 2 Puff(s) Inhalation two times a day  lactobacillus acidophilus 1 Tablet(s) Oral two times a day  piperacillin/tazobactam IVPB.. 3.375 Gram(s) IV Intermittent every 8 hours  vancomycin  IVPB 750 milliGRAM(s) IV Intermittent every 12 hours    MEDICATIONS  (PRN):  guaiFENesin   Syrup  (Sugar-Free) 200 milliGRAM(s) Oral every 6 hours PRN Cough      Allergies    No Known Allergies    Intolerances        SOCIAL HISTORY:NC    FAMILY HISTORY:  FH: CAD (coronary artery disease): in mother and father, both   FH: pancreatic cancer: in sister,   FHx: colon cancer: in brother,       REVIEW OF SYSTEMS:    CONSTITUTIONAL: No weakness, fevers or chills  EYES/ENT: No visual changes;  No vertigo or throat pain   NECK: No pain or stiffness  RESPIRATORY: No  wheezing, hemoptysis; No shortness of breath  CARDIOVASCULAR: No chest pain or palpitations  GENITOURINARY: No dysuria, frequency or hematuria  NEUROLOGICAL: No numbness or weakness  SKIN: No itching, burning, rashes, or lesions   All other review of systems is negative unless indicated above.    Vital Signs Last 24 Hrs  T(C): 37.1 (19 Mar 2020 11:19), Max: 37.1 (19 Mar 2020 11:19)  T(F): 98.7 (19 Mar 2020 11:19), Max: 98.7 (19 Mar 2020 11:19)  HR: 82 (19 Mar 2020 16:08) (67 - 82)  BP: 113/94 (19 Mar 2020 11:19) (113/94 - 147/62)  BP(mean): --  RR: 18 (19 Mar 2020 11:19) (18 - 18)  SpO2: 99% (19 Mar 2020 11:19) (98% - 99%)    PHYSICAL EXAM:    Constitutional: NAD, well-developed  HEENT: EOMI, throat clear  Neck: No LAD, supple  Respiratory: CTA and P  Cardiovascular: S1 and S2, RRR, no M  Gastrointestinal: BS+, soft, NT/ND, neg HSM,  Extremities: No peripheral edema, neg clubing, cyanosis  Vascular: 2+ peripheral pulses  Neurological: A/O x 3, no focal deficits  Psychiatric: Normal mood, normal affect  Skin: No rashes    LABS:  CBC Full  -  ( 19 Mar 2020 08:24 )  WBC Count : 21.49 K/uL  RBC Count : 3.44 M/uL  Hemoglobin : 10.0 g/dL  Hematocrit : 30.2 %  Platelet Count - Automated : 179 K/uL  Mean Cell Volume : 87.8 fl  Mean Cell Hemoglobin : 29.1 pg  Mean Cell Hemoglobin Concentration : 33.1 gm/dL  Auto Neutrophil # : x  Auto Lymphocyte # : x  Auto Monocyte # : x  Auto Eosinophil # : x  Auto Basophil # : x  Auto Neutrophil % : x  Auto Lymphocyte % : x  Auto Monocyte % : x  Auto Eosinophil % : x  Auto Basophil % : x    03-19    140  |  113<H>  |  11  ----------------------------<  106<H>  3.7   |  21<L>  |  1.00    Ca    8.3<L>      19 Mar 2020 08:24  Phos  3.1     -19  Mg     1.9     -19      PT/INR - ( 18 Mar 2020 08:09 )   PT: 13.6 sec;   INR: 1.22 ratio         PTT - ( 17 Mar 2020 20:24 )  PTT:73.5 sec        RADIOLOGY & ADDITIONAL STUDIES:  RADIOLOGY & ADDITIONAL STUDIES:  < from: CT Abdomen and Pelvis No Cont (. @ 01:01) >  EXAM:  CT ABDOMEN AND PELVIS                            PROCEDURE DATE:  2020          INTERPRETATION:  CLINICAL INFORMATION: Evaluate for metastatic disease    COMPARISON: None.    PROCEDURE:   CT of the Abdomen and Pelvis was performed without intravenous contrast.   Intravenous contrast: None.  Oral contrast: None.  Sagittal and coronal reformats were performed.    FINDINGS:    LOWER CHEST: Trace left pleural effusion and small bibasilar atelectasis. Trace pericardial fluid.    LIVER: Left hepatic lobe cyst as well as additional subcentimeter hypodensities, too small to characterize. Mild hepatomegaly.  BILE DUCTS: Normal caliber.  GALLBLADDER: Within normal limits.  SPLEEN: Within normal limits.  PANCREAS: Fatty with infiltration of the pancreas.  ADRENALS: Within normal limits.  KIDNEYS/URETERS: Within normal limits.    BLADDER: Underdistended.  REPRODUCTIVE ORGANS: Status post hysterectomy.    BOWEL: Extensive colonic diverticulosis with surrounding inflammatory change in the mid sigmoid colon with adjacent free fluid. No bowel obstruction. Appendix is normal.  PERITONEUM: No ascites.  VESSELS:  Within normal limits.  RETROPERITONEUM: No lymphadenopathy.    ABDOMINAL WALL: Within normal limits.  BONES: Degenerative changes of the spine. Small chronic focus within the right iliac bone.    IMPRESSION:  Findings concerning for diverticulitis of the mid sigmoid colon with adjacent inflammation/phlegmonous change. No gross perforation or abscess. Evaluation is somewhat limited without oral or intravenous contrast. Correlate clinically.     Additional findings as above.                TASIA JASMINE   This document has been electronically signed. Mar 16 2020  9:37AM

## 2020-03-20 ENCOUNTER — TRANSCRIPTION ENCOUNTER (OUTPATIENT)
Age: 80
End: 2020-03-20

## 2020-03-20 LAB
-  AMPICILLIN/SULBACTAM: SIGNIFICANT CHANGE UP
-  CEFAZOLIN: SIGNIFICANT CHANGE UP
-  CLINDAMYCIN: SIGNIFICANT CHANGE UP
-  ERYTHROMYCIN: SIGNIFICANT CHANGE UP
-  GENTAMICIN: SIGNIFICANT CHANGE UP
-  LINEZOLID: SIGNIFICANT CHANGE UP
-  OXACILLIN: SIGNIFICANT CHANGE UP
-  PENICILLIN: SIGNIFICANT CHANGE UP
-  RIFAMPIN: SIGNIFICANT CHANGE UP
-  TETRACYCLINE: SIGNIFICANT CHANGE UP
-  TRIMETHOPRIM/SULFAMETHOXAZOLE: SIGNIFICANT CHANGE UP
-  VANCOMYCIN: SIGNIFICANT CHANGE UP
METHOD TYPE: SIGNIFICANT CHANGE UP

## 2020-03-20 PROCEDURE — 99232 SBSQ HOSP IP/OBS MODERATE 35: CPT

## 2020-03-20 RX ADMIN — ALBUTEROL 2 PUFF(S): 90 AEROSOL, METERED ORAL at 08:20

## 2020-03-20 RX ADMIN — BUDESONIDE AND FORMOTEROL FUMARATE DIHYDRATE 2 PUFF(S): 160; 4.5 AEROSOL RESPIRATORY (INHALATION) at 20:12

## 2020-03-20 RX ADMIN — ATENOLOL 50 MILLIGRAM(S): 25 TABLET ORAL at 05:05

## 2020-03-20 RX ADMIN — Medication 200 MILLIGRAM(S): at 01:12

## 2020-03-20 RX ADMIN — Medication 200 MILLIGRAM(S): at 22:04

## 2020-03-20 RX ADMIN — ATORVASTATIN CALCIUM 20 MILLIGRAM(S): 80 TABLET, FILM COATED ORAL at 22:09

## 2020-03-20 RX ADMIN — APIXABAN 5 MILLIGRAM(S): 2.5 TABLET, FILM COATED ORAL at 05:04

## 2020-03-20 RX ADMIN — APIXABAN 5 MILLIGRAM(S): 2.5 TABLET, FILM COATED ORAL at 17:48

## 2020-03-20 RX ADMIN — PIPERACILLIN AND TAZOBACTAM 25 GRAM(S): 4; .5 INJECTION, POWDER, LYOPHILIZED, FOR SOLUTION INTRAVENOUS at 15:09

## 2020-03-20 RX ADMIN — ALBUTEROL 2 PUFF(S): 90 AEROSOL, METERED ORAL at 06:39

## 2020-03-20 RX ADMIN — ALBUTEROL 2 PUFF(S): 90 AEROSOL, METERED ORAL at 20:14

## 2020-03-20 RX ADMIN — Medication 1 TABLET(S): at 05:04

## 2020-03-20 RX ADMIN — Medication 250 MILLIGRAM(S): at 17:48

## 2020-03-20 RX ADMIN — BUDESONIDE AND FORMOTEROL FUMARATE DIHYDRATE 2 PUFF(S): 160; 4.5 AEROSOL RESPIRATORY (INHALATION) at 08:21

## 2020-03-20 RX ADMIN — PIPERACILLIN AND TAZOBACTAM 25 GRAM(S): 4; .5 INJECTION, POWDER, LYOPHILIZED, FOR SOLUTION INTRAVENOUS at 22:09

## 2020-03-20 RX ADMIN — ALBUTEROL 2 PUFF(S): 90 AEROSOL, METERED ORAL at 11:50

## 2020-03-20 RX ADMIN — Medication 81 MILLIGRAM(S): at 12:00

## 2020-03-20 RX ADMIN — ALBUTEROL 2 PUFF(S): 90 AEROSOL, METERED ORAL at 01:20

## 2020-03-20 RX ADMIN — Medication 250 MILLIGRAM(S): at 05:03

## 2020-03-20 RX ADMIN — PIPERACILLIN AND TAZOBACTAM 25 GRAM(S): 4; .5 INJECTION, POWDER, LYOPHILIZED, FOR SOLUTION INTRAVENOUS at 05:04

## 2020-03-20 RX ADMIN — Medication 1 TABLET(S): at 17:48

## 2020-03-20 NOTE — PROGRESS NOTE ADULT - SUBJECTIVE AND OBJECTIVE BOX
80 yo female with a pmh/o Glaucoma, cataracts, OA, HTN, who presents to the ED with niece due to her niece noticing pt having cough x 1 week, decreased appetite, and weakness. Niece called EMS from pt home after visiting and EMS noted pt HR to be 210.  Pt given IVF and 20mg cardizem en route and HR improved, pt now in NSR at 80's. Pt denies hx of AFIB.  Pt notes chest pressure/ wheezing/ coughing/ SOB at home.  Loss of appetite.  She was taking ASA, advil, and yesterday started OTC cold medicine with pseudoephinephrine.  Pt denies fever, however states that she did have chills. Endorses productive cough with off white sputum, and wheezing.  Pt states she visited sister in the [facility] at which time sister also had a cough and a flu outbreak there.  In ER, pt found to have ARF, positive trop, afib with RVR.  CT chest/ abd initially concern for ? metastatic cancer; however, final read with bilateral airspace disease and ELIZABETH concerning for PNA/ viral etiology and diverticulitis.      3/16:  Pt seen.  C/o cough-- productive.  C/o wheezing and SOB.  Loss of appetite.  No fever.    3/17 +cough  3/18 +diarrhea  3/19 diarrhea is better  3/20- diarrhea better, still feeling weak, with mild SOB     PHYSICAL EXAM:  GENERAL: Comfortable, no acute distress  HEAD:  Atraumatic, Normocephalic  EYES: EOMI, PERRLA  HEENT: Moist mucous membranes  NECK: Supple, No JVD  NERVOUS SYSTEM:  Alert & Oriented X3, Motor Strength 5/5 B/L upper and lower extremities  CHEST/LUNG: diffuse rhonchi/ wheezing on exam.    HEART: Regular rate and rhythm; No murmurs, rubs, or gallops  ABDOMEN: Soft, Nontender, Nondistended; Bowel sounds present  GENITOURINARY- Voiding, no palpable bladder  EXTREMITIES:  No clubbing, cyanosis, or edema  MUSCULOSKELTAL- No muscle tenderness, No joint tenderness  SKIN-no rash      PHYSICAL EXAM:    Daily     Daily Weight in k.6 (20 Mar 2020 13:00)    ICU Vital Signs Last 24 Hrs  T(C): 37.1 (20 Mar 2020 11:34), Max: 37.1 (20 Mar 2020 11:34)  T(F): 98.7 (20 Mar 2020 11:34), Max: 98.7 (20 Mar 2020 11:34)  HR: 80 (20 Mar 2020 11:51) (67 - 87)  BP: 127/59 (20 Mar 2020 11:34) (115/72 - 144/82)  BP(mean): --  ABP: --  ABP(mean): --  RR: 18 (20 Mar 2020 11:34) (18 - 18)  SpO2: 100% (20 Mar 2020 11:34) (95% - 100%)                              10.0   21.49 )-----------( 179      ( 19 Mar 2020 08:24 )             30.2       CBC Full  -  ( 19 Mar 2020 08:24 )  WBC Count : 21.49 K/uL  RBC Count : 3.44 M/uL  Hemoglobin : 10.0 g/dL  Hematocrit : 30.2 %  Platelet Count - Automated : 179 K/uL  Mean Cell Volume : 87.8 fl  Mean Cell Hemoglobin : 29.1 pg  Mean Cell Hemoglobin Concentration : 33.1 gm/dL  Auto Neutrophil # : x  Auto Lymphocyte # : x  Auto Monocyte # : x  Auto Eosinophil # : x  Auto Basophil # : x  Auto Neutrophil % : x  Auto Lymphocyte % : x  Auto Monocyte % : x  Auto Eosinophil % : x  Auto Basophil % : x      03-19    140  |  113<H>  |  11  ----------------------------<  106<H>  3.7   |  21<L>  |  1.00    Ca    8.3<L>      19 Mar 2020 08:24  Phos  3.1     03-19  Mg     1.9     03-19                              MEDICATIONS  (STANDING):  ALBUTerol    90 MICROgram(s) HFA Inhaler 2 Puff(s) Inhalation every 4 hours  apixaban 5 milliGRAM(s) Oral two times a day  aspirin enteric coated 81 milliGRAM(s) Oral daily  ATENolol  Tablet 50 milliGRAM(s) Oral daily  atorvastatin 20 milliGRAM(s) Oral at bedtime  budesonide 160 MICROgram(s)/formoterol 4.5 MICROgram(s) Inhaler 2 Puff(s) Inhalation two times a day  lactobacillus acidophilus 1 Tablet(s) Oral two times a day  piperacillin/tazobactam IVPB.. 3.375 Gram(s) IV Intermittent every 8 hours  vancomycin  IVPB 750 milliGRAM(s) IV Intermittent every 12 hours

## 2020-03-20 NOTE — PROGRESS NOTE ADULT - SUBJECTIVE AND OBJECTIVE BOX
Date of service: 03-20-20 @ 13:07    Reported with new MRSA in sputum  Lying in bed in NAD  Weak looking  Mild SOB at rest  Has dry cough  Diarrhea improving    ROS: no fever or chills; denies dizziness, no HA, no abdominal pain, no dysuria, no legs pain, no rashes    MEDICATIONS  (STANDING):  ALBUTerol    90 MICROgram(s) HFA Inhaler 2 Puff(s) Inhalation every 4 hours  apixaban 5 milliGRAM(s) Oral two times a day  aspirin enteric coated 81 milliGRAM(s) Oral daily  ATENolol  Tablet 50 milliGRAM(s) Oral daily  atorvastatin 20 milliGRAM(s) Oral at bedtime  budesonide 160 MICROgram(s)/formoterol 4.5 MICROgram(s) Inhaler 2 Puff(s) Inhalation two times a day  lactobacillus acidophilus 1 Tablet(s) Oral two times a day  piperacillin/tazobactam IVPB.. 3.375 Gram(s) IV Intermittent every 8 hours  vancomycin  IVPB 750 milliGRAM(s) IV Intermittent every 12 hours      Vital Signs Last 24 Hrs  T(C): 37.1 (20 Mar 2020 11:34), Max: 37.1 (20 Mar 2020 11:34)  T(F): 98.7 (20 Mar 2020 11:34), Max: 98.7 (20 Mar 2020 11:34)  HR: 80 (20 Mar 2020 11:51) (67 - 87)  BP: 127/59 (20 Mar 2020 11:34) (115/72 - 144/82)  BP(mean): --  RR: 18 (20 Mar 2020 11:34) (18 - 18)  SpO2: 100% (20 Mar 2020 11:34) (95% - 100%)    Physical Exam:      Constitutional:  No acute distress  HEENT: NC/AT, EOMI, PERRLA, conjunctivae clear  Neck: supple; thyroid not palpable  Back: no tenderness  Respiratory: respiratory effort normal; crackles at bases  Cardiovascular: S1S2 regular, no murmurs  Abdomen: soft, not tender, not distended, positive BS  Genitourinary: no suprapubic tenderness  Lymphatic: no LN palpable  Musculoskeletal: no muscle tenderness, no joint swelling or tenderness  Extremities: no pedal edema  Neurological/ Psychiatric: AxOx3, moving all extremities  Skin: no rashes; no palpable lesions    Labs: reviewed                        10.0   21.49 )-----------( 179      ( 19 Mar 2020 08:24 )             30.2     03-19    140  |  113<H>  |  11  ----------------------------<  106<H>  3.7   |  21<L>  |  1.00    Ca    8.3<L>      19 Mar 2020 08:24  Phos  3.1     03-19  Mg     1.9     03-19                        10.6   21.95 )-----------( 203      ( 17 Mar 2020 06:38 )             33.0     03-17    140  |  113<H>  |  17  ----------------------------<  97  3.9   |  19<L>  |  1.34<H>    Ca    7.8<L>      17 Mar 2020 06:38  Phos  3.9     03-16  Mg     2.3     03-16    TPro  7.3  /  Alb  2.3<L>  /  TBili  0.4  /  DBili  x   /  AST  38<H>  /  ALT  35  /  AlkPhos  222<H>  03-16     LIVER FUNCTIONS - ( 16 Mar 2020 08:54 )  Alb: 2.3 g/dL / Pro: 7.3 gm/dL / ALK PHOS: 222 U/L / ALT: 35 U/L / AST: 38 U/L / GGT: 60 U/L         Culture - Blood (collected 15 Mar 2020 22:28)  Source: .Blood None  Preliminary Report (17 Mar 2020 09:02):    No growth to date.    Culture - Blood (collected 15 Mar 2020 22:28)  Source: .Blood None  Preliminary Report (17 Mar 2020 09:02):    No growth to date.    Rapid Respiratory Viral Panel (03.16.20 @ 01:17)    Rapid RVP Result: Detected    Entero/Rhinovirus (RapRVP): Detected    COVID-19 PCR . (03.16.20 @ 10:10)    COVID-19 PCR: NotDetec    Culture - Sputum . (03.17.20 @ 23:55)    Gram Stain:   Rare polymorphonuclear leukocytes per low power field  Rare Squamous epithelial cells per low power field  Few Yeast like cells per oil power field  Rare Gram positive cocci in pairs per oil power field  Rare Gram Negative Rods per oil power field  Rare Gram Positive Rods per oil power field    -  Ampicillin/Sulbactam: R 16/8    -  Cefazolin: R 16    -  Clindamycin: S <=0.25    -  Erythromycin: R >4    -  Gentamicin: S 2 Should not be used as monotherapy    -  Linezolid: S 2    -  Oxacillin: R >2    -  Penicillin: R >8    -  RIF- Rifampin: S <=1 Should not be used as monotherapy    -  Tetra/Doxy: S <=1    -  Trimethoprim/Sulfamethoxazole: S <=0.5/9.5    -  Vancomycin: S 1    Specimen Source: .Sputum Sputum    Culture Results:   Moderate Staphylococcus aureus  Normal Respiratory Ivelisse present    Organism Identification: Methicillin resistant Staphylococcus aureus    Organism: Methicillin resistant Staphylococcus aureus    Method Type: BRANDEN    Clostridium difficile Toxin by PCR (03.17.20 @ 20:20)    Clostridium difficile Toxin by PCR:     [facility]    GI PCR Panel, Stool (03.17.20 @ 20:20)    Specimen Source: .Stool Feces    Culture Results:   GI PCR Results: NOT detected  *******Please Note:*******  GI panel PCR evaluates for:  Campylobacter, Plesiomonas shigelloides, Salmonella,  Vibrio, Yersinia enterocolitica, Enteroaggregative  Escherichia coli (EAEC), Enteropathogenic E.coli (EPEC),  Enterotoxigenic E. coli (ETEC) lt/st, Shiga-like  toxin-producing E. coli (STEC) stx1/stx2,  Shigella/ Enteroinvasive E. coli (EIEC), Cryptosporidium,  Cyclospora cayetanensis, Entamoeba histolytica,  Giardia lamblia, Adenovirus F 40/41, Astrovirus,  Norovirus GI/GII, Rotavirus A, Sapovirus        Radiology: all available radiological tests reviewed    < from: CT Chest No Cont (03.15.20 @ 22:05) >  Bilateral airspace disease with predominant subpleural and upper lobe involvement along with mediastinal lymphadenopathy. In view of clinical history, airspace infection is favored over the possibility of malignancy.    < end of copied text >      Advanced directives addressed: full resuscitation

## 2020-03-20 NOTE — DISCHARGE NOTE NURSING/CASE MANAGEMENT/SOCIAL WORK - PATIENT PORTAL LINK FT
You can access the FollowMyHealth Patient Portal offered by Health system by registering at the following website: [email or web address]. By joining [facility]’s FollowMyHealth portal, you will also be able to view your health information using other applications (apps) compatible with our system.

## 2020-03-20 NOTE — PROGRESS NOTE ADULT - ASSESSMENT
80 y/o female with h/o Glaucoma, cataracts, OA, HTN was admitted on 3/15 for cough x 1 week, decreased appetite and weakness. Niece called EMS and EMS noted pt HR to be 210 in aflutter. Pt given IVF and 20mg cardizem en route and HR improved and converted to NSR at 80's. Pt is a poor historian. Pt then admits to intermittent chest pressure which is centralized across chest and radiates down right arm to fingertips. Pt states this chest pressure and wheezing has been treated at home with ASA, advil, and cold medicine with pseudoephinephrine ingredient. Pt states pressure had awoken her from her sleep and is sure that she only has been taking two advil and one additional aspirin to her daily in order to control sx. Pt denies fever, but had chills. Endorses productive cough with off white sputum and wheezing. In ER she was noted with pulmonary infiltrate and received zosyn and azithromycin.     1. Cough. URI with rhinovirus. B/l pneumonia with MRSA. Possible aspiration pneumonia. CRF stage 3.  -leukocytosis is persistent  -respiratory function remains frail  -stool CDT and GI PCR reviewed  -COVID-19 PCR is negative  -on zosyn 3.375 gm IV q8h # 4 and vanocmycin IV # 1-2  -tolerating abx well so far; no side effects noted  -respiratory care  -obtain vancomycin torugh level in AM  -continue IV abx coverage  -monitor temps  -f/u CBC  -supportive care  2. Other issues:   -care per medicine

## 2020-03-20 NOTE — PROGRESS NOTE ADULT - ASSESSMENT
78 yo female with a pmh/o Glaucoma, cataracts, OA, HTN, who presents to the ED with niece due to her niece noticing pt having cough x 1 week, wheezing, SOB, decreased appetite, and weakness.  In ambulance, patient found to have -- received 20 IV cardizem and converted to NSR.  In ER, pt found to have ARF, positive trop, new onset afib with RVR and rhinovirus +.  CT chest/ abd initially concern for ? metastatic cancer; however, final read with bilateral airspace disease and ELIZABETH concerning for PNA/ viral etiology and acute diverticulitis.      #Acute Hypoxic Respiratory Failure/Viral pneumonia  with superimposed MRSA PNA  URI rhinovirus  COVID negative   ID eval appreciated  IV abx per ID    albuterol inhaler q4 hours.      No hx of lung disease in past.      #Diverticulitis:  diarrhea improving  Patient notes intermittent abd pain over last week.     plan for eventual colonoscopy as outpatient per GI   CT limited as no contrast but concerning for sigmoid diverticulitis.    Regular diet as no significant pain.     #Diarrhea improving   Gi PCR neg, Cdiff neg  GI f/u     #New onset AFIB- in NSR   Likely related to underlying pulm process.    In sinus   AC by Eliquis  Can DC telemetry    #ARF:    Cr 2.9 on admission.  Suspect prerenal related to anorexia along with NSAIDS/ ARB at home.    Improved   No obstruction on CT.      #Positive TROP:    Suspect more demand ischemia related to infection/ afib.    Will need ischemic work up as outpatient    Cardio f/u.    Cont ASA/BB/statin    #HTN:    Hold ARB with ARF.  Cont BB.      #Code Status:  full.      #Dispo- can transfer to med-surg. PT eval. OOB to ambulate 78 yo female with a pmh/o Glaucoma, cataracts, OA, HTN, who presents to the ED with niece due to her niece noticing pt having cough x 1 week, wheezing, SOB, decreased appetite, and weakness.  In ambulance, patient found to have -- received 20 IV cardizem and converted to NSR.  In ER, pt found to have ARF, positive trop, new onset afib with RVR and rhinovirus +.  CT chest/ abd initially concern for ? metastatic cancer; however, final read with bilateral airspace disease and ELIZABETH concerning for PNA/ viral etiology and acute diverticulitis.      #Acute Hypoxic Respiratory Failure/Viral pneumonia  with superimposed MRSA PNA/possible aspiration  URI rhinovirus  COVID negative   on zosyn and vanco  ID eval appreciated  IV abx per ID    albuterol inhaler q4 hours.      No hx of lung disease in past.      #Diverticulitis:  diarrhea improving  Patient notes intermittent abd pain over last week.     plan for eventual colonoscopy as outpatient per GI   CT limited as no contrast but concerning for sigmoid diverticulitis.    Regular diet as no significant pain.     #Diarrhea improving   Gi PCR neg, Cdiff neg  GI f/u     #New onset AFIB- in NSR   Likely related to underlying pulm process.    In sinus   AC by Eliquis  Can DC telemetry    #ARF:    Cr 2.9 on admission.  Suspect prerenal related to anorexia along with NSAIDS/ ARB at home.    Improved   No obstruction on CT.      #Positive TROP:    Suspect more demand ischemia related to infection/ afib.    Will need ischemic work up as outpatient    Cardio f/u.    Cont ASA/BB/statin    #HTN:    Hold ARB with ARF.  Cont BB.      #Code Status:  full.      #Dispo- can transfer to med-surg. PT eval. OOB to ambulate

## 2020-03-20 NOTE — PROGRESS NOTE ADULT - SUBJECTIVE AND OBJECTIVE BOX
Patient is a 79y old  Female who presents with a chief complaint of afib rvr, new onset (20 Mar 2020 13:07)      HPI:  Pt is a 80 yo female with a pmh/o Glaucoma, cataracts, OA, HTN, who presents to the ED with niece due to her niece noticing pt having cough x 1 week, decreased to no appetite, and weakness. Niece called EMS from pt home after visiting and EMS noted pt HR to be 210 in aflutter. Pt given IVF and 20mg cardizem en route and HR improved, pt now in NSR at 80's. Pt is a very poor historian however upon interview, admits to having a pre op assessment (cataract) in January where her RN had mentioned to her that her HR was irregular. Pt then admits to intermittent chest pressure which is centralized across chest and radiates down right arm to fingertips. Pt states this chest pressure and wheezing has been treated at home with ASA, advil, and yesterday started OTC cold medicine with pseudoephinephrine ingredient. Pt states pressure had awoken her from her sleep and is sure that she only has been taking two advil and one additional aspirin to her daily in order to control sx. Pt denies fever, however states that she did have chills. Endorses productive cough with off white sputum, and wheezing. Denies calf pain/swelling, smoking hx, urinary sx, abd pain, constipation, diarrhea, recent travel, fevers, rashes. Pt states she visited sister in the [facility] at which time sister also had a cough. Collateral hx obtained from niece listed in Emergency contacts who confirms above.     Pt found to be in new onset afib rvr, complicated by demand ischemia and renal failure, treated for PNA, sepsis in ED, later showing probable metastatic cancer on chest CT.    As per niece, pt sister had pancreatic ca. Brother had colon ca. Niece states she suspected underlying neoplastic process given pt lack of appetite and fatigue. States pt does not have legal proxy however pt is full code and pt states niece may act on her behalf. (16 Mar 2020 01:17)    poor historian and states that diarrhea is much better  neg abd pain and is on oxygen      PAST MEDICAL & SURGICAL HISTORY:  OA (osteoarthritis)  Glaucoma  HTN (hypertension)  Status post glaucoma surgery: and cataract      MEDICATIONS  (STANDING):  ALBUTerol    90 MICROgram(s) HFA Inhaler 2 Puff(s) Inhalation every 4 hours  apixaban 5 milliGRAM(s) Oral two times a day  aspirin enteric coated 81 milliGRAM(s) Oral daily  ATENolol  Tablet 50 milliGRAM(s) Oral daily  atorvastatin 20 milliGRAM(s) Oral at bedtime  budesonide 160 MICROgram(s)/formoterol 4.5 MICROgram(s) Inhaler 2 Puff(s) Inhalation two times a day  lactobacillus acidophilus 1 Tablet(s) Oral two times a day  piperacillin/tazobactam IVPB.. 3.375 Gram(s) IV Intermittent every 8 hours  vancomycin  IVPB 750 milliGRAM(s) IV Intermittent every 12 hours    MEDICATIONS  (PRN):      Allergies    No Known Allergies    Intolerances        SOCIAL HISTORY:    FAMILY HISTORY:  FH: CAD (coronary artery disease): in mother and father, both   FH: pancreatic cancer: in sister,   FHx: colon cancer: in brother,       REVIEW OF SYSTEMS:    CONSTITUTIONAL: No weakness, fevers or chills  EYES/ENT: No visual changes;  No vertigo or throat pain   NECK: No pain or stiffness  RESPIRATORY: No cough, wheezing, hemoptysis; No shortness of breath  CARDIOVASCULAR: No chest pain or palpitations  GENITOURINARY: No dysuria, frequency or hematuria  NEUROLOGICAL: No numbness or weakness  SKIN: No itching, burning, rashes, or lesions   All other review of systems is negative unless indicated above.    Vital Signs Last 24 Hrs  T(C): 37.1 (20 Mar 2020 11:34), Max: 37.1 (20 Mar 2020 11:34)  T(F): 98.7 (20 Mar 2020 11:34), Max: 98.7 (20 Mar 2020 11:34)  HR: 80 (20 Mar 2020 11:51) (67 - 87)  BP: 127/59 (20 Mar 2020 11:34) (115/72 - 144/82)  BP(mean): --  RR: 18 (20 Mar 2020 11:34) (18 - 18)  SpO2: 100% (20 Mar 2020 11:34) (95% - 100%)    PHYSICAL EXAM:    Constitutional: NAD, well-developed  HEENT: EOMI, throat clear  Neck: No LAD, supple  Respiratory: CTA and P  Cardiovascular: S1 and S2, RRR, no M  Gastrointestinal: BS+, soft, NT/ND, neg HSM,  Extremities: No peripheral edema, neg clubing, cyanosis  Vascular: 2+ peripheral pulses  Neurological: A/O x 3, no focal deficits  Psychiatric: Normal mood, normal affect  Skin: No rashes    LABS:  CBC Full  -  ( 19 Mar 2020 08:24 )  WBC Count : 21.49 K/uL  RBC Count : 3.44 M/uL  Hemoglobin : 10.0 g/dL  Hematocrit : 30.2 %  Platelet Count - Automated : 179 K/uL  Mean Cell Volume : 87.8 fl  Mean Cell Hemoglobin : 29.1 pg  Mean Cell Hemoglobin Concentration : 33.1 gm/dL  Auto Neutrophil # : x  Auto Lymphocyte # : x  Auto Monocyte # : x  Auto Eosinophil # : x  Auto Basophil # : x  Auto Neutrophil % : x  Auto Lymphocyte % : x  Auto Monocyte % : x  Auto Eosinophil % : x  Auto Basophil % : x    03-19    140  |  113<H>  |  11  ----------------------------<  106<H>  3.7   |  21<L>  |  1.00    Ca    8.3<L>      19 Mar 2020 08:24  Phos  3.1     03-19  Mg     1.9     -19              RADIOLOGY & ADDITIONAL STUDIES:

## 2020-03-21 LAB
ANION GAP SERPL CALC-SCNC: 5 MMOL/L — SIGNIFICANT CHANGE UP (ref 5–17)
BASOPHILS # BLD AUTO: 0.09 K/UL — SIGNIFICANT CHANGE UP (ref 0–0.2)
BASOPHILS NFR BLD AUTO: 0.4 % — SIGNIFICANT CHANGE UP (ref 0–2)
BUN SERPL-MCNC: 14 MG/DL — SIGNIFICANT CHANGE UP (ref 7–23)
CALCIUM SERPL-MCNC: 8.5 MG/DL — SIGNIFICANT CHANGE UP (ref 8.5–10.1)
CHLORIDE SERPL-SCNC: 112 MMOL/L — HIGH (ref 96–108)
CO2 SERPL-SCNC: 25 MMOL/L — SIGNIFICANT CHANGE UP (ref 22–31)
CREAT SERPL-MCNC: 1.04 MG/DL — SIGNIFICANT CHANGE UP (ref 0.5–1.3)
CULTURE RESULTS: SIGNIFICANT CHANGE UP
EOSINOPHIL # BLD AUTO: 10.59 K/UL — HIGH (ref 0–0.5)
EOSINOPHIL NFR BLD AUTO: 43.3 % — HIGH (ref 0–6)
GLUCOSE SERPL-MCNC: 100 MG/DL — HIGH (ref 70–99)
HCT VFR BLD CALC: 29.6 % — LOW (ref 34.5–45)
HGB BLD-MCNC: 9.8 G/DL — LOW (ref 11.5–15.5)
IMM GRANULOCYTES NFR BLD AUTO: 2.5 % — HIGH (ref 0–1.5)
LYMPHOCYTES # BLD AUTO: 1.74 K/UL — SIGNIFICANT CHANGE UP (ref 1–3.3)
LYMPHOCYTES # BLD AUTO: 7.1 % — LOW (ref 13–44)
MCHC RBC-ENTMCNC: 29 PG — SIGNIFICANT CHANGE UP (ref 27–34)
MCHC RBC-ENTMCNC: 33.1 GM/DL — SIGNIFICANT CHANGE UP (ref 32–36)
MCV RBC AUTO: 87.6 FL — SIGNIFICANT CHANGE UP (ref 80–100)
MONOCYTES # BLD AUTO: 1.04 K/UL — HIGH (ref 0–0.9)
MONOCYTES NFR BLD AUTO: 4.3 % — SIGNIFICANT CHANGE UP (ref 2–14)
NEUTROPHILS # BLD AUTO: 10.38 K/UL — HIGH (ref 1.8–7.4)
NEUTROPHILS NFR BLD AUTO: 42.4 % — LOW (ref 43–77)
ORGANISM # SPEC MICROSCOPIC CNT: SIGNIFICANT CHANGE UP
ORGANISM # SPEC MICROSCOPIC CNT: SIGNIFICANT CHANGE UP
PLATELET # BLD AUTO: 158 K/UL — SIGNIFICANT CHANGE UP (ref 150–400)
POTASSIUM SERPL-MCNC: 3.6 MMOL/L — SIGNIFICANT CHANGE UP (ref 3.5–5.3)
POTASSIUM SERPL-SCNC: 3.6 MMOL/L — SIGNIFICANT CHANGE UP (ref 3.5–5.3)
RBC # BLD: 3.38 M/UL — LOW (ref 3.8–5.2)
RBC # FLD: 14.2 % — SIGNIFICANT CHANGE UP (ref 10.3–14.5)
SODIUM SERPL-SCNC: 142 MMOL/L — SIGNIFICANT CHANGE UP (ref 135–145)
SPECIMEN SOURCE: SIGNIFICANT CHANGE UP
VANCOMYCIN TROUGH SERPL-MCNC: 12.7 UG/ML — SIGNIFICANT CHANGE UP (ref 10–20)
WBC # BLD: 24.44 K/UL — HIGH (ref 3.8–10.5)
WBC # FLD AUTO: 24.44 K/UL — HIGH (ref 3.8–10.5)

## 2020-03-21 PROCEDURE — 99232 SBSQ HOSP IP/OBS MODERATE 35: CPT

## 2020-03-21 RX ADMIN — PIPERACILLIN AND TAZOBACTAM 25 GRAM(S): 4; .5 INJECTION, POWDER, LYOPHILIZED, FOR SOLUTION INTRAVENOUS at 22:43

## 2020-03-21 RX ADMIN — ALBUTEROL 2 PUFF(S): 90 AEROSOL, METERED ORAL at 11:40

## 2020-03-21 RX ADMIN — PIPERACILLIN AND TAZOBACTAM 25 GRAM(S): 4; .5 INJECTION, POWDER, LYOPHILIZED, FOR SOLUTION INTRAVENOUS at 13:14

## 2020-03-21 RX ADMIN — ATENOLOL 50 MILLIGRAM(S): 25 TABLET ORAL at 05:40

## 2020-03-21 RX ADMIN — ATORVASTATIN CALCIUM 20 MILLIGRAM(S): 80 TABLET, FILM COATED ORAL at 22:43

## 2020-03-21 RX ADMIN — ALBUTEROL 2 PUFF(S): 90 AEROSOL, METERED ORAL at 09:05

## 2020-03-21 RX ADMIN — Medication 200 MILLIGRAM(S): at 22:43

## 2020-03-21 RX ADMIN — APIXABAN 5 MILLIGRAM(S): 2.5 TABLET, FILM COATED ORAL at 05:40

## 2020-03-21 RX ADMIN — APIXABAN 5 MILLIGRAM(S): 2.5 TABLET, FILM COATED ORAL at 17:27

## 2020-03-21 RX ADMIN — Medication 250 MILLIGRAM(S): at 05:39

## 2020-03-21 RX ADMIN — Medication 250 MILLIGRAM(S): at 17:27

## 2020-03-21 RX ADMIN — ALBUTEROL 2 PUFF(S): 90 AEROSOL, METERED ORAL at 20:11

## 2020-03-21 RX ADMIN — BUDESONIDE AND FORMOTEROL FUMARATE DIHYDRATE 2 PUFF(S): 160; 4.5 AEROSOL RESPIRATORY (INHALATION) at 09:05

## 2020-03-21 RX ADMIN — PIPERACILLIN AND TAZOBACTAM 25 GRAM(S): 4; .5 INJECTION, POWDER, LYOPHILIZED, FOR SOLUTION INTRAVENOUS at 05:39

## 2020-03-21 RX ADMIN — Medication 1 TABLET(S): at 05:40

## 2020-03-21 RX ADMIN — BUDESONIDE AND FORMOTEROL FUMARATE DIHYDRATE 2 PUFF(S): 160; 4.5 AEROSOL RESPIRATORY (INHALATION) at 20:12

## 2020-03-21 RX ADMIN — Medication 1 TABLET(S): at 17:27

## 2020-03-21 RX ADMIN — Medication 81 MILLIGRAM(S): at 10:58

## 2020-03-21 NOTE — PROGRESS NOTE ADULT - SUBJECTIVE AND OBJECTIVE BOX
Date of service: 03-21-20 @ 13:00    Sitting in chair in NAD  Weak looking  Has cough  No SOB at rest    ROS: denies dizziness, no HA, no abdominal pain, no diarrhea or constipation; no dysuria, no legs pain, no rashes    MEDICATIONS  (STANDING):  ALBUTerol    90 MICROgram(s) HFA Inhaler 2 Puff(s) Inhalation every 4 hours  apixaban 5 milliGRAM(s) Oral two times a day  aspirin enteric coated 81 milliGRAM(s) Oral daily  ATENolol  Tablet 50 milliGRAM(s) Oral daily  atorvastatin 20 milliGRAM(s) Oral at bedtime  budesonide 160 MICROgram(s)/formoterol 4.5 MICROgram(s) Inhaler 2 Puff(s) Inhalation two times a day  lactobacillus acidophilus 1 Tablet(s) Oral two times a day  piperacillin/tazobactam IVPB.. 3.375 Gram(s) IV Intermittent every 8 hours  vancomycin  IVPB 750 milliGRAM(s) IV Intermittent every 12 hours      Vital Signs Last 24 Hrs  T(C): 36.9 (21 Mar 2020 11:19), Max: 36.9 (21 Mar 2020 11:19)  T(F): 98.5 (21 Mar 2020 11:19), Max: 98.5 (21 Mar 2020 11:19)  HR: 80 (21 Mar 2020 11:42) (68 - 82)  BP: 125/66 (21 Mar 2020 11:19) (110/96 - 140/68)  BP(mean): --  RR: 18 (21 Mar 2020 11:19) (16 - 18)  SpO2: 95% (21 Mar 2020 11:42) (95% - 100%)    Physical Exam:      Constitutional:  No acute distress  HEENT: NC/AT, EOMI, PERRLA, conjunctivae clear  Neck: supple; thyroid not palpable  Back: no tenderness  Respiratory: respiratory effort normal; crackles at bases  Cardiovascular: S1S2 regular, no murmurs  Abdomen: soft, not tender, not distended, positive BS  Genitourinary: no suprapubic tenderness  Lymphatic: no LN palpable  Musculoskeletal: no muscle tenderness, no joint swelling or tenderness  Extremities: no pedal edema  Neurological/ Psychiatric: AxOx3, moving all extremities  Skin: no rashes; no palpable lesions    Labs: reviewed                        9.8    24.44 )-----------( 158      ( 21 Mar 2020 04:59 )             29.6     03-21    142  |  112<H>  |  14  ----------------------------<  100<H>  3.6   |  25  |  1.04    Ca    8.5      21 Mar 2020 04:59    Vancomycin Level, Trough: 12.7 ug/mL (03-21 @ 04:59)                                   10.6   21.95 )-----------( 203      ( 17 Mar 2020 06:38 )             33.0     03-17    140  |  113<H>  |  17  ----------------------------<  97  3.9   |  19<L>  |  1.34<H>    Ca    7.8<L>      17 Mar 2020 06:38  Phos  3.9     03-16  Mg     2.3     03-16    TPro  7.3  /  Alb  2.3<L>  /  TBili  0.4  /  DBili  x   /  AST  38<H>  /  ALT  35  /  AlkPhos  222<H>  03-16     LIVER FUNCTIONS - ( 16 Mar 2020 08:54 )  Alb: 2.3 g/dL / Pro: 7.3 gm/dL / ALK PHOS: 222 U/L / ALT: 35 U/L / AST: 38 U/L / GGT: 60 U/L         Culture - Blood (collected 15 Mar 2020 22:28)  Source: .Blood None  Preliminary Report (17 Mar 2020 09:02):    No growth to date.    Culture - Blood (collected 15 Mar 2020 22:28)  Source: .Blood None  Preliminary Report (17 Mar 2020 09:02):    No growth to date.    Rapid Respiratory Viral Panel (03.16.20 @ 01:17)    Rapid RVP Result: Detected    Entero/Rhinovirus (RapRVP): Detected    COVID-19 PCR . (03.16.20 @ 10:10)    COVID-19 PCR: NotDetec    Culture - Sputum . (03.17.20 @ 23:55)    Gram Stain:   Rare polymorphonuclear leukocytes per low power field  Rare Squamous epithelial cells per low power field  Few Yeast like cells per oil power field  Rare Gram positive cocci in pairs per oil power field  Rare Gram Negative Rods per oil power field  Rare Gram Positive Rods per oil power field    -  Ampicillin/Sulbactam: R 16/8    -  Cefazolin: R 16    -  Clindamycin: S <=0.25    -  Erythromycin: R >4    -  Gentamicin: S 2 Should not be used as monotherapy    -  Linezolid: S 2    -  Oxacillin: R >2    -  Penicillin: R >8    -  RIF- Rifampin: S <=1 Should not be used as monotherapy    -  Tetra/Doxy: S <=1    -  Trimethoprim/Sulfamethoxazole: S <=0.5/9.5    -  Vancomycin: S 1    Specimen Source: .Sputum Sputum    Culture Results:   Moderate Staphylococcus aureus  Normal Respiratory Ivelisse present    Organism Identification: Methicillin resistant Staphylococcus aureus    Organism: Methicillin resistant Staphylococcus aureus    Method Type: BRANDEN    Clostridium difficile Toxin by PCR (03.17.20 @ 20:20)    Clostridium difficile Toxin by PCR:     C Diff by PCR Result: [facility]    GI PCR Panel, Stool (03.17.20 @ 20:20)    Specimen Source: .Stool Feces    Culture Results:   GI PCR Results: NOT detected  *******Please Note:*******  GI panel PCR evaluates for:  Campylobacter, Plesiomonas shigelloides, Salmonella,  Vibrio, Yersinia enterocolitica, Enteroaggregative  Escherichia coli (EAEC), Enteropathogenic E.coli (EPEC),  Enterotoxigenic E. coli (ETEC) lt/st, Shiga-like  toxin-producing E. coli (STEC) stx1/stx2,  Shigella/ Enteroinvasive E. coli (EIEC), Cryptosporidium,  Cyclospora cayetanensis, Entamoeba histolytica,  Giardia lamblia, Adenovirus F 40/41, Astrovirus,  Norovirus GI/GII, Rotavirus A, Sapovirus        Radiology: all available radiological tests reviewed    < from: CT Chest No Cont (03.15.20 @ 22:05) >  Bilateral airspace disease with predominant subpleural and upper lobe involvement along with mediastinal lymphadenopathy. In view of clinical history, airspace infection is favored over the possibility of malignancy.    < end of copied text >      Advanced directives addressed: full resuscitation

## 2020-03-21 NOTE — PROGRESS NOTE ADULT - ASSESSMENT
80 y/o female with h/o Glaucoma, cataracts, OA, HTN was admitted on 3/15 for cough x 1 week, decreased appetite and weakness. Niece called EMS and EMS noted pt HR to be 210 in aflutter. Pt given IVF and 20mg cardizem en route and HR improved and converted to NSR at 80's. Pt is a poor historian. Pt then admits to intermittent chest pressure which is centralized across chest and radiates down right arm to fingertips. Pt states this chest pressure and wheezing has been treated at home with ASA, advil, and cold medicine with pseudoephinephrine ingredient. Pt states pressure had awoken her from her sleep and is sure that she only has been taking two advil and one additional aspirin to her daily in order to control sx. Pt denies fever, but had chills. Endorses productive cough with off white sputum and wheezing. In ER she was noted with pulmonary infiltrate and received zosyn and azithromycin.     1. Cough. URI with rhinovirus. B/l pneumonia with MRSA. Possible aspiration pneumonia. CRF stage 3.  -leukocytosis is persistent  -respiratory function remains frail  -stool CDT and GI PCR reviewed  -COVID-19 PCR is negative  -on zosyn 3.375 gm IV q8h # 5 and vanocmycin IV # 2  -tolerating abx well so far; no side effects noted  -respiratory care  -vancomycin torugh level is therapeutic  -continue IV abx coverage  -monitor temps  -f/u CBC  -supportive care  2. Other issues:   -care per medicine

## 2020-03-21 NOTE — PROGRESS NOTE ADULT - SUBJECTIVE AND OBJECTIVE BOX
78 yo female with a pmh/o Glaucoma, cataracts, OA, HTN, who presents to the ED with niece due to her niece noticing pt having cough x 1 week, decreased appetite, and weakness. Niece called EMS from pt home after visiting and EMS noted pt HR to be 210.  Pt given IVF and 20mg cardizem en route and HR improved, pt now in NSR at 80's. Pt denies hx of AFIB.  Pt notes chest pressure/ wheezing/ coughing/ SOB at home.  Loss of appetite.  She was taking ASA, advil, and yesterday started OTC cold medicine with pseudoephinephrine.  Pt denies fever, however states that she did have chills. Endorses productive cough with off white sputum, and wheezing.  Pt states she visited sister in the [facility] at which time sister also had a cough and a flu outbreak there.  In ER, pt found to have ARF, positive trop, afib with RVR.  CT chest/ abd initially concern for ? metastatic cancer; however, final read with bilateral airspace disease and ELIZABETH concerning for PNA/ viral etiology and diverticulitis.      3/16:  Pt seen.  C/o cough-- productive.  C/o wheezing and SOB.  Loss of appetite.  No fever.    3/17 +cough  3/18 +diarrhea  3/19 diarrhea is better  3/20- diarrhea better, still feeling weak, with mild SOB     3/21- still feeling weak, afebrile , has persistent leukocytosis, no SOB at rest , no CP ,   PHYSICAL EXAM:  GENERAL: Comfortable, no acute distress  HEAD:  Atraumatic, Normocephalic  EYES: EOMI, PERRLA  HEENT: Moist mucous membranes  NECK: Supple, No JVD  NERVOUS SYSTEM:  Alert & Oriented X3, Motor Strength 5/5 B/L upper and lower extremities  CHEST/LUNG: diffuse rhonchi/ wheezing on exam.    HEART: Regular rate and rhythm; No murmurs, rubs, or gallops  ABDOMEN: Soft, Nontender, Nondistended; Bowel sounds present  GENITOURINARY- Voiding, no palpable bladder  EXTREMITIES:  No clubbing, cyanosis, or edema  MUSCULOSKELTAL- No muscle tenderness, No joint tenderness  SKIN-no rash        PHYSICAL EXAM:    Daily     Daily Weight in k.8 (21 Mar 2020 11:19)    ICU Vital Signs Last 24 Hrs  T(C): 36.9 (21 Mar 2020 11:19), Max: 36.9 (21 Mar 2020 11:19)  T(F): 98.5 (21 Mar 2020 11:19), Max: 98.5 (21 Mar 2020 11:19)  HR: 80 (21 Mar 2020 11:42) (68 - 82)  BP: 125/66 (21 Mar 2020 11:19) (110/96 - 140/68)  BP(mean): --  ABP: --  ABP(mean): --  RR: 18 (21 Mar 2020 11:19) (16 - 18)  SpO2: 95% (21 Mar 2020 11:42) (95% - 100%)                              9.8    24.44 )-----------( 158      ( 21 Mar 2020 04:59 )             29.6       CBC Full  -  ( 21 Mar 2020 04:59 )  WBC Count : 24.44 K/uL  RBC Count : 3.38 M/uL  Hemoglobin : 9.8 g/dL  Hematocrit : 29.6 %  Platelet Count - Automated : 158 K/uL  Mean Cell Volume : 87.6 fl  Mean Cell Hemoglobin : 29.0 pg  Mean Cell Hemoglobin Concentration : 33.1 gm/dL  Auto Neutrophil # : 10.38 K/uL  Auto Lymphocyte # : 1.74 K/uL  Auto Monocyte # : 1.04 K/uL  Auto Eosinophil # : 10.59 K/uL  Auto Basophil # : 0.09 K/uL  Auto Neutrophil % : 42.4 %  Auto Lymphocyte % : 7.1 %  Auto Monocyte % : 4.3 %  Auto Eosinophil % : 43.3 %  Auto Basophil % : 0.4 %      21    142  |  112<H>  |  14  ----------------------------<  100<H>  3.6   |  25  |  1.04    Ca    8.5      21 Mar 2020 04:59                              MEDICATIONS  (STANDING):  ALBUTerol    90 MICROgram(s) HFA Inhaler 2 Puff(s) Inhalation every 4 hours  apixaban 5 milliGRAM(s) Oral two times a day  aspirin enteric coated 81 milliGRAM(s) Oral daily  ATENolol  Tablet 50 milliGRAM(s) Oral daily  atorvastatin 20 milliGRAM(s) Oral at bedtime  budesonide 160 MICROgram(s)/formoterol 4.5 MICROgram(s) Inhaler 2 Puff(s) Inhalation two times a day  lactobacillus acidophilus 1 Tablet(s) Oral two times a day  piperacillin/tazobactam IVPB.. 3.375 Gram(s) IV Intermittent every 8 hours  vancomycin  IVPB 750 milliGRAM(s) IV Intermittent every 12 hours

## 2020-03-21 NOTE — PROGRESS NOTE ADULT - ASSESSMENT
80 yo female with a pmh/o Glaucoma, cataracts, OA, HTN, who presents to the ED with niece due to her niece noticing pt having cough x 1 week, wheezing, SOB, decreased appetite, and weakness.  In ambulance, patient found to have -- received 20 IV cardizem and converted to NSR.  In ER, pt found to have ARF, positive trop, new onset afib with RVR and rhinovirus +.  CT chest/ abd initially concern for ? metastatic cancer; however, final read with bilateral airspace disease and ELIZABETH concerning for PNA/ viral etiology and acute diverticulitis.      #Acute Hypoxic Respiratory Failure/Viral pneumonia  with superimposed MRSA PNA/possible aspiration  persistent leukocytosis  URI rhinovirus  COVID negative   on zosyn and vanco  ID eval appreciated  IV abx per ID    albuterol inhaler q4 hours.      No hx of lung disease in past.      #Diverticulitis:  diarrhea improving  Patient notes intermittent abd pain over last week.     plan for eventual colonoscopy as outpatient per GI   CT limited as no contrast but concerning for sigmoid diverticulitis.    Regular diet as no significant pain.     #Diarrhea improving   Gi PCR neg, Cdiff neg  GI f/u     #New onset AFIB- in NSR   Likely related to underlying pulm process.    In sinus   AC by Eliquis  Can DC telemetry    #ARF:    Cr 2.9 on admission.  Suspect prerenal related to anorexia along with NSAIDS/ ARB at home.    Improved   No obstruction on CT.      #Positive TROP:    Suspect more demand ischemia related to infection/ afib.    Will need ischemic work up as outpatient    Cardio f/u.    Cont ASA/BB/statin    #HTN:    Hold ARB with ARF.  Cont BB.      #Code Status:  full.      #Dispo- can transfer to med-surg. PT eval. OOB to ambulate

## 2020-03-22 PROCEDURE — 99232 SBSQ HOSP IP/OBS MODERATE 35: CPT

## 2020-03-22 RX ADMIN — BUDESONIDE AND FORMOTEROL FUMARATE DIHYDRATE 2 PUFF(S): 160; 4.5 AEROSOL RESPIRATORY (INHALATION) at 08:44

## 2020-03-22 RX ADMIN — Medication 1 TABLET(S): at 05:03

## 2020-03-22 RX ADMIN — ALBUTEROL 2 PUFF(S): 90 AEROSOL, METERED ORAL at 11:56

## 2020-03-22 RX ADMIN — ALBUTEROL 2 PUFF(S): 90 AEROSOL, METERED ORAL at 08:45

## 2020-03-22 RX ADMIN — APIXABAN 5 MILLIGRAM(S): 2.5 TABLET, FILM COATED ORAL at 17:21

## 2020-03-22 RX ADMIN — PIPERACILLIN AND TAZOBACTAM 25 GRAM(S): 4; .5 INJECTION, POWDER, LYOPHILIZED, FOR SOLUTION INTRAVENOUS at 06:48

## 2020-03-22 RX ADMIN — ALBUTEROL 2 PUFF(S): 90 AEROSOL, METERED ORAL at 16:29

## 2020-03-22 RX ADMIN — Medication 1 TABLET(S): at 17:21

## 2020-03-22 RX ADMIN — ALBUTEROL 2 PUFF(S): 90 AEROSOL, METERED ORAL at 01:37

## 2020-03-22 RX ADMIN — Medication 200 MILLIGRAM(S): at 22:12

## 2020-03-22 RX ADMIN — Medication 250 MILLIGRAM(S): at 17:21

## 2020-03-22 RX ADMIN — Medication 250 MILLIGRAM(S): at 05:02

## 2020-03-22 RX ADMIN — APIXABAN 5 MILLIGRAM(S): 2.5 TABLET, FILM COATED ORAL at 05:03

## 2020-03-22 RX ADMIN — Medication 81 MILLIGRAM(S): at 17:21

## 2020-03-22 RX ADMIN — ALBUTEROL 2 PUFF(S): 90 AEROSOL, METERED ORAL at 19:52

## 2020-03-22 RX ADMIN — ATORVASTATIN CALCIUM 20 MILLIGRAM(S): 80 TABLET, FILM COATED ORAL at 21:26

## 2020-03-22 RX ADMIN — ATENOLOL 50 MILLIGRAM(S): 25 TABLET ORAL at 05:03

## 2020-03-22 RX ADMIN — PIPERACILLIN AND TAZOBACTAM 25 GRAM(S): 4; .5 INJECTION, POWDER, LYOPHILIZED, FOR SOLUTION INTRAVENOUS at 13:39

## 2020-03-22 RX ADMIN — BUDESONIDE AND FORMOTEROL FUMARATE DIHYDRATE 2 PUFF(S): 160; 4.5 AEROSOL RESPIRATORY (INHALATION) at 19:49

## 2020-03-22 RX ADMIN — PIPERACILLIN AND TAZOBACTAM 25 GRAM(S): 4; .5 INJECTION, POWDER, LYOPHILIZED, FOR SOLUTION INTRAVENOUS at 21:26

## 2020-03-22 NOTE — PROGRESS NOTE ADULT - ASSESSMENT
80 y/o female with h/o Glaucoma, cataracts, OA, HTN was admitted on 3/15 for cough x 1 week, decreased appetite and weakness. Niece called EMS and EMS noted pt HR to be 210 in aflutter. Pt given IVF and 20mg cardizem en route and HR improved and converted to NSR at 80's. Pt is a poor historian. Pt then admits to intermittent chest pressure which is centralized across chest and radiates down right arm to fingertips. Pt states this chest pressure and wheezing has been treated at home with ASA, advil, and cold medicine with pseudoephinephrine ingredient. Pt states pressure had awoken her from her sleep and is sure that she only has been taking two advil and one additional aspirin to her daily in order to control sx. Pt denies fever, but had chills. Endorses productive cough with off white sputum and wheezing. In ER she was noted with pulmonary infiltrate and received zosyn and azithromycin.     1. Cough. URI with rhinovirus. B/l pneumonia with MRSA. Possible aspiration pneumonia. CRF stage 3.  -leukocytosis is persistent  -respiratory function remains frail  -stool CDT and GI PCR reviewed  -COVID-19 PCR is negative  -on zosyn 3.375 gm IV q8h # 6 and vanocmycin IV # 3  -tolerating abx well so far; no side effects noted  -respiratory care  -vancomycin torugh level is therapeutic  -continue IV abx coverage  -monitor temps  -f/u CBC  -supportive care  2. Other issues:   -care per medicine

## 2020-03-22 NOTE — PROGRESS NOTE ADULT - SUBJECTIVE AND OBJECTIVE BOX
78 yo female with a pmh/o Glaucoma, cataracts, OA, HTN, who presents to the ED with niece due to her niece noticing pt having cough x 1 week, decreased appetite, and weakness. Niece called EMS from pt home after visiting and EMS noted pt HR to be 210.  Pt given IVF and 20mg cardizem en route and HR improved, pt now in NSR at 80's. Pt denies hx of AFIB.  Pt notes chest pressure/ wheezing/ coughing/ SOB at home.  Loss of appetite.  She was taking ASA, advil, and yesterday started OTC cold medicine with pseudoephinephrine.  Pt denies fever, however states that she did have chills. Endorses productive cough with off white sputum, and wheezing.  Pt states she visited sister in the [facility] at which time sister also had a cough and a flu outbreak there.  In ER, pt found to have ARF, positive trop, afib with RVR.  CT chest/ abd initially concern for ? metastatic cancer; however, final read with bilateral airspace disease and ELIZABETH concerning for PNA/ viral etiology and diverticulitis.      3/16:  Pt seen.  C/o cough-- productive.  C/o wheezing and SOB.  Loss of appetite.  No fever.    3/17 +cough  3/18 +diarrhea  3/19 diarrhea is better  3/20- diarrhea better, still feeling weak, with mild SOB   3/21- still feeling weak, afebrile , has persistent leukocytosis, no SOB at rest , no CP ,   3/22- still some mild diarrhea, no SOB, has some cough, no abd pain , afebrile  PHYSICAL EXAM:  GENERAL: Comfortable, no acute distress  HEAD:  Atraumatic, Normocephalic  EYES: EOMI, PERRLA  HEENT: Moist mucous membranes  NECK: Supple, No JVD  NERVOUS SYSTEM:  Alert & Oriented X3, Motor Strength 5/5 B/L upper and lower extremities  CHEST/LUNG: diffuse rhonchi/ wheezing on exam.    HEART: Regular rate and rhythm; No murmurs, rubs, or gallops  ABDOMEN: Soft, Nontender, Nondistended; Bowel sounds present  GENITOURINARY- Voiding, no palpable bladder  EXTREMITIES:  No clubbing, cyanosis, or edema  MUSCULOSKELTAL- No muscle tenderness, No joint tenderness  SKIN-no rash        PHYSICAL EXAM:    Daily     Daily     ICU Vital Signs Last 24 Hrs  T(C): 37.1 (22 Mar 2020 11:19), Max: 37.3 (21 Mar 2020 22:49)  T(F): 98.7 (22 Mar 2020 11:19), Max: 99.2 (21 Mar 2020 22:49)  HR: 66 (22 Mar 2020 11:56) (66 - 80)  BP: 136/49 (22 Mar 2020 11:19) (127/75 - 145/70)  BP(mean): 70 (22 Mar 2020 11:19) (70 - 70)  ABP: --  ABP(mean): --  RR: 18 (22 Mar 2020 11:19) (16 - 18)  SpO2: 97% (22 Mar 2020 11:56) (97% - 100%)                              9.8    24.44 )-----------( 158      ( 21 Mar 2020 04:59 )             29.6       CBC Full  -  ( 21 Mar 2020 04:59 )  WBC Count : 24.44 K/uL  RBC Count : 3.38 M/uL  Hemoglobin : 9.8 g/dL  Hematocrit : 29.6 %  Platelet Count - Automated : 158 K/uL  Mean Cell Volume : 87.6 fl  Mean Cell Hemoglobin : 29.0 pg  Mean Cell Hemoglobin Concentration : 33.1 gm/dL  Auto Neutrophil # : 10.38 K/uL  Auto Lymphocyte # : 1.74 K/uL  Auto Monocyte # : 1.04 K/uL  Auto Eosinophil # : 10.59 K/uL  Auto Basophil # : 0.09 K/uL  Auto Neutrophil % : 42.4 %  Auto Lymphocyte % : 7.1 %  Auto Monocyte % : 4.3 %  Auto Eosinophil % : 43.3 %  Auto Basophil % : 0.4 %      03-21    142  |  112<H>  |  14  ----------------------------<  100<H>  3.6   |  25  |  1.04    Ca    8.5      21 Mar 2020 04:59                              MEDICATIONS  (STANDING):  ALBUTerol    90 MICROgram(s) HFA Inhaler 2 Puff(s) Inhalation every 4 hours  apixaban 5 milliGRAM(s) Oral two times a day  aspirin enteric coated 81 milliGRAM(s) Oral daily  ATENolol  Tablet 50 milliGRAM(s) Oral daily  atorvastatin 20 milliGRAM(s) Oral at bedtime  budesonide 160 MICROgram(s)/formoterol 4.5 MICROgram(s) Inhaler 2 Puff(s) Inhalation two times a day  lactobacillus acidophilus 1 Tablet(s) Oral two times a day  piperacillin/tazobactam IVPB.. 3.375 Gram(s) IV Intermittent every 8 hours  vancomycin  IVPB 750 milliGRAM(s) IV Intermittent every 12 hours

## 2020-03-22 NOTE — PROGRESS NOTE ADULT - ASSESSMENT
78 yo female with a pmh/o Glaucoma, cataracts, OA, HTN, who presents to the ED with niece due to her niece noticing pt having cough x 1 week, wheezing, SOB, decreased appetite, and weakness.  In ambulance, patient found to have -- received 20 IV cardizem and converted to NSR.  In ER, pt found to have ARF, positive trop, new onset afib with RVR and rhinovirus +.  CT chest/ abd initially concern for ? metastatic cancer; however, final read with bilateral airspace disease and ELIZABETH concerning for PNA/ viral etiology and acute diverticulitis.      #Acute Hypoxic Respiratory Failure/Viral pneumonia  with superimposed MRSA PNA/possible aspiration  persistent leukocytosis  URI rhinovirus  COVID negative   on zosyn and vanco  ID eval appreciated  IV abx per ID    albuterol inhaler q4 hours.  No hx of lung disease in past.      #Diverticulitis:  diarrhea improving  Patient notes intermittent abd pain over last week.     plan for eventual colonoscopy as outpatient per GI   CT limited as no contrast but concerning for sigmoid diverticulitis.    Regular diet as no significant pain.     #Diarrhea improving   Gi PCR neg, Cdiff neg  GI f/u     #New onset AFIB- in NSR   Likely related to underlying pulm process.    In sinus   AC by Eliquis  Can DC telemetry    #ARF:    Cr 2.9 on admission.  Suspect prerenal related to anorexia along with NSAIDS/ ARB at home.    Improved   No obstruction on CT.      #Positive TROP:    Suspect more demand ischemia related to infection/ afib.    Will need ischemic work up as outpatient    Cardio f/u.    Cont ASA/BB/statin    #HTN:    Hold ARB with ARF.  Cont BB.      #Code Status:  full.      #Dispo- can transfer to med-surg. PT eval. OOB to ambulate

## 2020-03-22 NOTE — PROGRESS NOTE ADULT - SUBJECTIVE AND OBJECTIVE BOX
Date of service: 03-22-20 @ 14:36    Weak looking  Sitting in bed in NAD  Has loose stools  Has low grade fever    ROS: denies dizziness, no HA, no SOB; has cough, no abdominal pain, no dysuria, no legs pain, no rashes    MEDICATIONS  (STANDING):  ALBUTerol    90 MICROgram(s) HFA Inhaler 2 Puff(s) Inhalation every 4 hours  apixaban 5 milliGRAM(s) Oral two times a day  aspirin enteric coated 81 milliGRAM(s) Oral daily  ATENolol  Tablet 50 milliGRAM(s) Oral daily  atorvastatin 20 milliGRAM(s) Oral at bedtime  budesonide 160 MICROgram(s)/formoterol 4.5 MICROgram(s) Inhaler 2 Puff(s) Inhalation two times a day  lactobacillus acidophilus 1 Tablet(s) Oral two times a day  piperacillin/tazobactam IVPB.. 3.375 Gram(s) IV Intermittent every 8 hours  vancomycin  IVPB 750 milliGRAM(s) IV Intermittent every 12 hours      Vital Signs Last 24 Hrs  T(C): 37.1 (22 Mar 2020 11:19), Max: 37.3 (21 Mar 2020 22:49)  T(F): 98.7 (22 Mar 2020 11:19), Max: 99.2 (21 Mar 2020 22:49)  HR: 66 (22 Mar 2020 11:56) (66 - 80)  BP: 136/49 (22 Mar 2020 11:19) (127/75 - 145/70)  BP(mean): 70 (22 Mar 2020 11:19) (70 - 70)  RR: 18 (22 Mar 2020 11:19) (16 - 18)  SpO2: 97% (22 Mar 2020 11:56) (97% - 100%)    Physical Exam:      Constitutional:  No acute distress  HEENT: NC/AT, EOMI, PERRLA, conjunctivae clear  Neck: supple; thyroid not palpable  Back: no tenderness  Respiratory: respiratory effort normal; few crackles at bases  Cardiovascular: S1S2 regular, no murmurs  Abdomen: soft, not tender, not distended, positive BS  Genitourinary: no suprapubic tenderness  Lymphatic: no LN palpable  Musculoskeletal: no muscle tenderness, no joint swelling or tenderness  Extremities: no pedal edema  Neurological/ Psychiatric: AxOx3, moving all extremities  Skin: no rashes; no palpable lesions    Labs: reviewed                        9.8    24.44 )-----------( 158      ( 21 Mar 2020 04:59 )             29.6     03-21    142  |  112<H>  |  14  ----------------------------<  100<H>  3.6   |  25  |  1.04    Ca    8.5      21 Mar 2020 04:59    Vancomycin Level, Trough: 12.7 ug/mL (03-21 @ 04:59)                                   10.6   21.95 )-----------( 203      ( 17 Mar 2020 06:38 )             33.0     03-17    140  |  113<H>  |  17  ----------------------------<  97  3.9   |  19<L>  |  1.34<H>    Ca    7.8<L>      17 Mar 2020 06:38  Phos  3.9     03-16  Mg     2.3     03-16    TPro  7.3  /  Alb  2.3<L>  /  TBili  0.4  /  DBili  x   /  AST  38<H>  /  ALT  35  /  AlkPhos  222<H>  03-16     LIVER FUNCTIONS - ( 16 Mar 2020 08:54 )  Alb: 2.3 g/dL / Pro: 7.3 gm/dL / ALK PHOS: 222 U/L / ALT: 35 U/L / AST: 38 U/L / GGT: 60 U/L         Culture - Blood (collected 15 Mar 2020 22:28)  Source: .Blood None  Preliminary Report (17 Mar 2020 09:02):    No growth to date.    Culture - Blood (collected 15 Mar 2020 22:28)  Source: .Blood None  Preliminary Report (17 Mar 2020 09:02):    No growth to date.    Rapid Respiratory Viral Panel (03.16.20 @ 01:17)    Rapid RVP Result: Detected    Entero/Rhinovirus (RapRVP): Detected    COVID-19 PCR . (03.16.20 @ 10:10)    COVID-19 PCR: NotDete    Culture - Sputum . (03.17.20 @ 23:55)    Gram Stain:   Rare polymorphonuclear leukocytes per low power field  Rare Squamous epithelial cells per low power field  Few Yeast like cells per oil power field  Rare Gram positive cocci in pairs per oil power field  Rare Gram Negative Rods per oil power field  Rare Gram Positive Rods per oil power field    -  Ampicillin/Sulbactam: R 16/8    -  Cefazolin: R 16    -  Clindamycin: S <=0.25    -  Erythromycin: R >4    -  Gentamicin: S 2 Should not be used as monotherapy    -  Linezolid: S 2    -  Oxacillin: R >2    -  Penicillin: R >8    -  RIF- Rifampin: S <=1 Should not be used as monotherapy    -  Tetra/Doxy: S <=1    -  Trimethoprim/Sulfamethoxazole: S <=0.5/9.5    -  Vancomycin: S 1    Specimen Source: .Sputum Sputum    Culture Results:   Moderate Staphylococcus aureus  Normal Respiratory Ivelisse present    Organism Identification: Methicillin resistant Staphylococcus aureus    Organism: Methicillin resistant Staphylococcus aureus    Method Type: BRANDEN    Clostridium difficile Toxin by PCR (03.17.20 @ 20:20)    Clostridium difficile Toxin by PCR:     C Diff by PCR Result: NotDete    GI PCR Panel, Stool (03.17.20 @ 20:20)    Specimen Source: .Stool Feces    Culture Results:   GI PCR Results: NOT detected  *******Please Note:*******  GI panel PCR evaluates for:  Campylobacter, Plesiomonas shigelloides, Salmonella,  Vibrio, Yersinia enterocolitica, Enteroaggregative  Escherichia coli (EAEC), Enteropathogenic E.coli (EPEC),  Enterotoxigenic E. coli (ETEC) lt/st, Shiga-like  toxin-producing E. coli (STEC) stx1/stx2,  Shigella/ Enteroinvasive E. coli (EIEC), Cryptosporidium,  Cyclospora cayetanensis, Entamoeba histolytica,  Giardia lamblia, Adenovirus F 40/41, Astrovirus,  Norovirus GI/GII, Rotavirus A, Sapovirus        Radiology: all available radiological tests reviewed    < from: CT Chest No Cont (03.15.20 @ 22:05) >  Bilateral airspace disease with predominant subpleural and upper lobe involvement along with mediastinal lymphadenopathy. In view of clinical history, airspace infection is favored over the possibility of malignancy.    < end of copied text >      Advanced directives addressed: full resuscitation

## 2020-03-23 LAB
ANION GAP SERPL CALC-SCNC: 9 MMOL/L — SIGNIFICANT CHANGE UP (ref 5–17)
BASOPHILS # BLD AUTO: 0 K/UL — SIGNIFICANT CHANGE UP (ref 0–0.2)
BASOPHILS NFR BLD AUTO: 0 % — SIGNIFICANT CHANGE UP (ref 0–2)
BUN SERPL-MCNC: 21 MG/DL — SIGNIFICANT CHANGE UP (ref 7–23)
CALCIUM SERPL-MCNC: 8.7 MG/DL — SIGNIFICANT CHANGE UP (ref 8.5–10.1)
CHLORIDE SERPL-SCNC: 107 MMOL/L — SIGNIFICANT CHANGE UP (ref 96–108)
CO2 SERPL-SCNC: 24 MMOL/L — SIGNIFICANT CHANGE UP (ref 22–31)
CREAT SERPL-MCNC: 1 MG/DL — SIGNIFICANT CHANGE UP (ref 0.5–1.3)
EOSINOPHIL # BLD AUTO: 11.58 K/UL — HIGH (ref 0–0.5)
EOSINOPHIL NFR BLD AUTO: 46 % — HIGH (ref 0–6)
GLUCOSE SERPL-MCNC: 80 MG/DL — SIGNIFICANT CHANGE UP (ref 70–99)
HCT VFR BLD CALC: 31.7 % — LOW (ref 34.5–45)
HGB BLD-MCNC: 10.4 G/DL — LOW (ref 11.5–15.5)
LYMPHOCYTES # BLD AUTO: 1.51 K/UL — SIGNIFICANT CHANGE UP (ref 1–3.3)
LYMPHOCYTES # BLD AUTO: 6 % — LOW (ref 13–44)
MCHC RBC-ENTMCNC: 28.8 PG — SIGNIFICANT CHANGE UP (ref 27–34)
MCHC RBC-ENTMCNC: 32.8 GM/DL — SIGNIFICANT CHANGE UP (ref 32–36)
MCV RBC AUTO: 87.8 FL — SIGNIFICANT CHANGE UP (ref 80–100)
MONOCYTES # BLD AUTO: 1.01 K/UL — HIGH (ref 0–0.9)
MONOCYTES NFR BLD AUTO: 4 % — SIGNIFICANT CHANGE UP (ref 2–14)
NEUTROPHILS # BLD AUTO: 11.07 K/UL — HIGH (ref 1.8–7.4)
NEUTROPHILS NFR BLD AUTO: 44 % — SIGNIFICANT CHANGE UP (ref 43–77)
NRBC # BLD: SIGNIFICANT CHANGE UP /100 WBCS (ref 0–0)
PLATELET # BLD AUTO: 188 K/UL — SIGNIFICANT CHANGE UP (ref 150–400)
POTASSIUM SERPL-MCNC: 3.9 MMOL/L — SIGNIFICANT CHANGE UP (ref 3.5–5.3)
POTASSIUM SERPL-SCNC: 3.9 MMOL/L — SIGNIFICANT CHANGE UP (ref 3.5–5.3)
RBC # BLD: 3.61 M/UL — LOW (ref 3.8–5.2)
RBC # FLD: 14.6 % — HIGH (ref 10.3–14.5)
SODIUM SERPL-SCNC: 140 MMOL/L — SIGNIFICANT CHANGE UP (ref 135–145)
WBC # BLD: 25.17 K/UL — HIGH (ref 3.8–10.5)
WBC # FLD AUTO: 25.17 K/UL — HIGH (ref 3.8–10.5)

## 2020-03-23 PROCEDURE — 99232 SBSQ HOSP IP/OBS MODERATE 35: CPT

## 2020-03-23 RX ADMIN — ALBUTEROL 2 PUFF(S): 90 AEROSOL, METERED ORAL at 07:22

## 2020-03-23 RX ADMIN — PIPERACILLIN AND TAZOBACTAM 25 GRAM(S): 4; .5 INJECTION, POWDER, LYOPHILIZED, FOR SOLUTION INTRAVENOUS at 06:56

## 2020-03-23 RX ADMIN — ATENOLOL 50 MILLIGRAM(S): 25 TABLET ORAL at 05:21

## 2020-03-23 RX ADMIN — Medication 200 MILLIGRAM(S): at 22:21

## 2020-03-23 RX ADMIN — PIPERACILLIN AND TAZOBACTAM 25 GRAM(S): 4; .5 INJECTION, POWDER, LYOPHILIZED, FOR SOLUTION INTRAVENOUS at 14:42

## 2020-03-23 RX ADMIN — APIXABAN 5 MILLIGRAM(S): 2.5 TABLET, FILM COATED ORAL at 18:33

## 2020-03-23 RX ADMIN — APIXABAN 5 MILLIGRAM(S): 2.5 TABLET, FILM COATED ORAL at 05:21

## 2020-03-23 RX ADMIN — ALBUTEROL 2 PUFF(S): 90 AEROSOL, METERED ORAL at 11:24

## 2020-03-23 RX ADMIN — Medication 250 MILLIGRAM(S): at 05:21

## 2020-03-23 RX ADMIN — BUDESONIDE AND FORMOTEROL FUMARATE DIHYDRATE 2 PUFF(S): 160; 4.5 AEROSOL RESPIRATORY (INHALATION) at 07:22

## 2020-03-23 RX ADMIN — ALBUTEROL 2 PUFF(S): 90 AEROSOL, METERED ORAL at 23:30

## 2020-03-23 RX ADMIN — ALBUTEROL 2 PUFF(S): 90 AEROSOL, METERED ORAL at 19:50

## 2020-03-23 RX ADMIN — ALBUTEROL 2 PUFF(S): 90 AEROSOL, METERED ORAL at 00:19

## 2020-03-23 RX ADMIN — ATORVASTATIN CALCIUM 20 MILLIGRAM(S): 80 TABLET, FILM COATED ORAL at 22:18

## 2020-03-23 RX ADMIN — Medication 1 TABLET(S): at 05:21

## 2020-03-23 RX ADMIN — Medication 1 TABLET(S): at 18:33

## 2020-03-23 RX ADMIN — Medication 81 MILLIGRAM(S): at 12:49

## 2020-03-23 RX ADMIN — Medication 250 MILLIGRAM(S): at 18:33

## 2020-03-23 RX ADMIN — PIPERACILLIN AND TAZOBACTAM 25 GRAM(S): 4; .5 INJECTION, POWDER, LYOPHILIZED, FOR SOLUTION INTRAVENOUS at 22:22

## 2020-03-23 RX ADMIN — BUDESONIDE AND FORMOTEROL FUMARATE DIHYDRATE 2 PUFF(S): 160; 4.5 AEROSOL RESPIRATORY (INHALATION) at 19:50

## 2020-03-23 NOTE — PROGRESS NOTE ADULT - ASSESSMENT
80 yo female with a pmh/o Glaucoma, cataracts, OA, HTN, who presents to the ED with niece due to her niece noticing pt having cough x 1 week, wheezing, SOB, decreased appetite, and weakness.  In ambulance, patient found to have -- received 20 IV cardizem and converted to NSR.  In ER, pt found to have ARF, positive trop, new onset afib with RVR and rhinovirus +.  CT chest/ abd initially concern for ? metastatic cancer; however, final read with bilateral airspace disease and ELIZABETH concerning for PNA/ viral etiology and acute diverticulitis.      #Acute Hypoxic Respiratory Failure/Viral pneumonia  with superimposed MRSA PNA/possible aspiration  Persistent leukocytosis increasing   URI rhinovirus  COVID negative   3/23- repeat CXR, pulm f/u  on zosyn and vanco  ID eval appreciated  IV abx per ID    albuterol inhaler q4 hours.  No hx of lung disease in past.      #Diverticulitis:  diarrhea improving  Patient notes intermittent abd pain over last week.     plan for eventual colonoscopy as outpatient per GI   CT limited as no contrast but concerning for sigmoid diverticulitis.    Regular diet as no significant pain.     #Diarrhea improving   Gi PCR neg, Cdiff neg  GI f/u     #New onset AFIB- in NSR   Likely related to underlying pulm process.    In sinus   AC by Eliquis  Can DC telemetry    #ARF:    Cr 2.9 on admission.  Suspect prerenal related to anorexia along with NSAIDS/ ARB at home.    Improved   No obstruction on CT.      #Positive TROP:    Suspect more demand ischemia related to infection/ afib.    Will need ischemic work up as outpatient    Cardio f/u.    Cont ASA/BB/statin    #HTN:    Hold ARB with ARF.  Cont BB.      #Code Status:  full.    #Dispo-  med surg,  repeat CXR    PT eval. OOB to ambulate

## 2020-03-23 NOTE — PROGRESS NOTE ADULT - SUBJECTIVE AND OBJECTIVE BOX
78 yo female with a pmh/o Glaucoma, cataracts, OA, HTN, who presents to the ED with niece due to her niece noticing pt having cough x 1 week, decreased appetite, and weakness. Niece called EMS from pt home after visiting and EMS noted pt HR to be 210.  Pt given IVF and 20mg cardizem en route and HR improved, pt now in NSR at 80's. Pt denies hx of AFIB.  Pt notes chest pressure/ wheezing/ coughing/ SOB at home.  Loss of appetite.  She was taking ASA, advil, and yesterday started OTC cold medicine with pseudoephinephrine.  Pt denies fever, however states that she did have chills. Endorses productive cough with off white sputum, and wheezing.  Pt states she visited sister in the [facility] at which time sister also had a cough and a flu outbreak there.  In ER, pt found to have ARF, positive trop, afib with RVR.  CT chest/ abd initially concern for ? metastatic cancer; however, final read with bilateral airspace disease and ELIZABETH concerning for PNA/ viral etiology and diverticulitis.      3/16:  Pt seen.  C/o cough-- productive.  C/o wheezing and SOB.  Loss of appetite.  No fever.    3/17 +cough  3/18 +diarrhea  3/19 diarrhea is better  3/20- diarrhea better, still feeling weak, with mild SOB   3/21- still feeling weak, afebrile , has persistent leukocytosis, no SOB at rest , no CP ,   3/22- still some mild diarrhea, no SOB, has some cough, no abd pain , afebrile  3/23-persistent leukocytosis, still with WINTERS  PHYSICAL EXAM:  GENERAL: Comfortable, no acute distress  HEAD:  Atraumatic, Normocephalic  EYES: EOMI, PERRLAHEENT: Moist mucous membranes  NECK: Supple, No JVD  NERVOUS SYSTEM:  Alert & Oriented X3, Motor Strength 5/5 B/L upper and lower extremities  CHEST/LUNG: diffuse rhonchi/ wheezing on exam.    HEART: Regular rate and rhythm; No murmurs, rubs, or gallops  ABDOMEN: Soft, Nontender, Nondistended; Bowel sounds present  GENITOURINARY- Voiding, no palpable bladder  EXTREMITIES:  No clubbing, cyanosis, or edema  MUSCULOSKELTAL- No muscle tenderness, No joint tenderness  SKIN-no rash        PHYSICAL EXAM:    Daily     Daily     ICU Vital Signs Last 24 Hrs  T(C): 36.8 (23 Mar 2020 17:32), Max: 37 (23 Mar 2020 11:49)  T(F): 98.2 (23 Mar 2020 17:32), Max: 98.6 (23 Mar 2020 11:49)  HR: 74 (23 Mar 2020 17:32) (66 - 76)  BP: 148/60 (23 Mar 2020 17:32) (104/57 - 159/65)  BP(mean): --  ABP: --  ABP(mean): --  RR: 16 (23 Mar 2020 17:32) (16 - 18)  SpO2: 96% (23 Mar 2020 17:32) (94% - 99%)                                10.4   25.17 )-----------( 188      ( 23 Mar 2020 07:40 )             31.7       CBC Full  -  ( 23 Mar 2020 07:40 )  WBC Count : 25.17 K/uL  RBC Count : 3.61 M/uL  Hemoglobin : 10.4 g/dL  Hematocrit : 31.7 %  Platelet Count - Automated : 188 K/uL  Mean Cell Volume : 87.8 fl  Mean Cell Hemoglobin : 28.8 pg  Mean Cell Hemoglobin Concentration : 32.8 gm/dL  Auto Neutrophil # : 11.07 K/uL  Auto Lymphocyte # : 1.51 K/uL  Auto Monocyte # : 1.01 K/uL  Auto Eosinophil # : 11.58 K/uL  Auto Basophil # : 0.00 K/uL  Auto Neutrophil % : 44.0 %  Auto Lymphocyte % : 6.0 %  Auto Monocyte % : 4.0 %  Auto Eosinophil % : 46.0 %  Auto Basophil % : 0.0 %      03-23    140  |  107  |  21  ----------------------------<  80  3.9   |  24  |  1.00    Ca    8.7      23 Mar 2020 10:35                              MEDICATIONS  (STANDING):  ALBUTerol    90 MICROgram(s) HFA Inhaler 2 Puff(s) Inhalation every 4 hours  apixaban 5 milliGRAM(s) Oral two times a day  aspirin enteric coated 81 milliGRAM(s) Oral daily  ATENolol  Tablet 50 milliGRAM(s) Oral daily  atorvastatin 20 milliGRAM(s) Oral at bedtime  budesonide 160 MICROgram(s)/formoterol 4.5 MICROgram(s) Inhaler 2 Puff(s) Inhalation two times a day  lactobacillus acidophilus 1 Tablet(s) Oral two times a day  piperacillin/tazobactam IVPB.. 3.375 Gram(s) IV Intermittent every 8 hours  vancomycin  IVPB 750 milliGRAM(s) IV Intermittent every 12 hours

## 2020-03-23 NOTE — PROGRESS NOTE ADULT - SUBJECTIVE AND OBJECTIVE BOX
Date of service: 03-23-20 @ 11:44    OOB to chair in NAD  Weak looking  Has dry cough    ROS: no fever or chills; denies dizziness, no HA, no abdominal pain, no diarrhea or constipation; no dysuria, no legs pain, no rashes    MEDICATIONS  (STANDING):  ALBUTerol    90 MICROgram(s) HFA Inhaler 2 Puff(s) Inhalation every 4 hours  apixaban 5 milliGRAM(s) Oral two times a day  aspirin enteric coated 81 milliGRAM(s) Oral daily  ATENolol  Tablet 50 milliGRAM(s) Oral daily  atorvastatin 20 milliGRAM(s) Oral at bedtime  budesonide 160 MICROgram(s)/formoterol 4.5 MICROgram(s) Inhaler 2 Puff(s) Inhalation two times a day  lactobacillus acidophilus 1 Tablet(s) Oral two times a day  piperacillin/tazobactam IVPB.. 3.375 Gram(s) IV Intermittent every 8 hours  vancomycin  IVPB 750 milliGRAM(s) IV Intermittent every 12 hours      Vital Signs Last 24 Hrs  T(C): 36.9 (23 Mar 2020 05:20), Max: 37 (22 Mar 2020 16:23)  T(F): 98.5 (23 Mar 2020 05:20), Max: 98.6 (22 Mar 2020 16:23)  HR: 70 (23 Mar 2020 11:25) (66 - 76)  BP: 155/68 (23 Mar 2020 05:20) (133/41 - 159/65)  BP(mean): --  RR: 18 (23 Mar 2020 05:20) (18 - 18)  SpO2: 99% (23 Mar 2020 05:20) (94% - 99%)    Physical Exam:      Constitutional:  No acute distress  HEENT: NC/AT, EOMI, PERRLA, conjunctivae clear  Neck: supple; thyroid not palpable  Back: no tenderness  Respiratory: respiratory effort normal; few crackles at bases  Cardiovascular: S1S2 regular, no murmurs  Abdomen: soft, not tender, not distended, positive BS  Genitourinary: no suprapubic tenderness  Lymphatic: no LN palpable  Musculoskeletal: no muscle tenderness, no joint swelling or tenderness  Extremities: no pedal edema  Neurological/ Psychiatric: AxOx3, moving all extremities  Skin: no rashes; no palpable lesions    Labs: reviewed                        10.4   25.17 )-----------( 188      ( 23 Mar 2020 07:40 )             31.7     03-23    140  |  107  |  21  ----------------------------<  80  3.9   |  24  |  1.00    Ca    8.7      23 Mar 2020 10:35    Vancomycin Level, Trough: 12.7 ug/mL (03-21 @ 04:59)                                   10.6   21.95 )-----------( 203      ( 17 Mar 2020 06:38 )             33.0     03-17    140  |  113<H>  |  17  ----------------------------<  97  3.9   |  19<L>  |  1.34<H>    Ca    7.8<L>      17 Mar 2020 06:38  Phos  3.9     03-16  Mg     2.3     03-16    TPro  7.3  /  Alb  2.3<L>  /  TBili  0.4  /  DBili  x   /  AST  38<H>  /  ALT  35  /  AlkPhos  222<H>  03-16     LIVER FUNCTIONS - ( 16 Mar 2020 08:54 )  Alb: 2.3 g/dL / Pro: 7.3 gm/dL / ALK PHOS: 222 U/L / ALT: 35 U/L / AST: 38 U/L / GGT: 60 U/L         Culture - Blood (collected 15 Mar 2020 22:28)  Source: .Blood None  Preliminary Report (17 Mar 2020 09:02):    No growth to date.    Culture - Blood (collected 15 Mar 2020 22:28)  Source: .Blood None  Preliminary Report (17 Mar 2020 09:02):    No growth to date.    Rapid Respiratory Viral Panel (03.16.20 @ 01:17)    Rapid RVP Result: Detected    Entero/Rhinovirus (RapRVP): Detected    COVID-19 PCR . (03.16.20 @ 10:10)    COVID-19 PCR: NotDetec    Culture - Sputum . (03.17.20 @ 23:55)    Gram Stain:   Rare polymorphonuclear leukocytes per low power field  Rare Squamous epithelial cells per low power field  Few Yeast like cells per oil power field  Rare Gram positive cocci in pairs per oil power field  Rare Gram Negative Rods per oil power field  Rare Gram Positive Rods per oil power field    -  Ampicillin/Sulbactam: R 16/8    -  Cefazolin: R 16    -  Clindamycin: S <=0.25    -  Erythromycin: R >4    -  Gentamicin: S 2 Should not be used as monotherapy    -  Linezolid: S 2    -  Oxacillin: R >2    -  Penicillin: R >8    -  RIF- Rifampin: S <=1 Should not be used as monotherapy    -  Tetra/Doxy: S <=1    -  Trimethoprim/Sulfamethoxazole: S <=0.5/9.5    -  Vancomycin: S 1    Specimen Source: .Sputum Sputum    Culture Results:   Moderate Staphylococcus aureus  Normal Respiratory Ivelisse present    Organism Identification: Methicillin resistant Staphylococcus aureus    Organism: Methicillin resistant Staphylococcus aureus    Method Type: BRANDEN    Clostridium difficile Toxin by PCR (03.17.20 @ 20:20)    Clostridium difficile Toxin by PCR:     C Diff by PCR Result: [facility]    GI PCR Panel, Stool (03.17.20 @ 20:20)    Specimen Source: .Stool Feces    Culture Results:   GI PCR Results: NOT detected  *******Please Note:*******  GI panel PCR evaluates for:  Campylobacter, Plesiomonas shigelloides, Salmonella,  Vibrio, Yersinia enterocolitica, Enteroaggregative  Escherichia coli (EAEC), Enteropathogenic E.coli (EPEC),  Enterotoxigenic E. coli (ETEC) lt/st, Shiga-like  toxin-producing E. coli (STEC) stx1/stx2,  Shigella/ Enteroinvasive E. coli (EIEC), Cryptosporidium,  Cyclospora cayetanensis, Entamoeba histolytica,  Giardia lamblia, Adenovirus F 40/41, Astrovirus,  Norovirus GI/GII, Rotavirus A, Sapovirus        Radiology: all available radiological tests reviewed    < from: CT Chest No Cont (03.15.20 @ 22:05) >  Bilateral airspace disease with predominant subpleural and upper lobe involvement along with mediastinal lymphadenopathy. In view of clinical history, airspace infection is favored over the possibility of malignancy.    < end of copied text >      Advanced directives addressed: full resuscitation

## 2020-03-23 NOTE — PROGRESS NOTE ADULT - ASSESSMENT
78 y/o female with h/o Glaucoma, cataracts, OA, HTN was admitted on 3/15 for cough x 1 week, decreased appetite and weakness. Niece called EMS and EMS noted pt HR to be 210 in aflutter. Pt given IVF and 20mg cardizem en route and HR improved and converted to NSR at 80's. Pt is a poor historian. Pt then admits to intermittent chest pressure which is centralized across chest and radiates down right arm to fingertips. Pt states this chest pressure and wheezing has been treated at home with ASA, advil, and cold medicine with pseudoephinephrine ingredient. Pt states pressure had awoken her from her sleep and is sure that she only has been taking two advil and one additional aspirin to her daily in order to control sx. Pt denies fever, but had chills. Endorses productive cough with off white sputum and wheezing. In ER she was noted with pulmonary infiltrate and received zosyn and azithromycin.     1. Cough. URI with rhinovirus. B/l pneumonia with MRSA. Possible aspiration pneumonia. CRF stage 3.  -leukocytosis is persistent  -respiratory function slightly improved  -stool CDT and GI PCR reviewed  -COVID-19 PCR is negative  -on zosyn 3.375 gm IV q8h # 6 and vanocmycin IV # 4  -tolerating abx well so far; no side effects noted  -respiratory care  -vancomycin torugh level is therapeutic  -continue IV abx coverage  -monitor temps  -f/u CBC  -supportive care  2. Other issues:   -care per medicine

## 2020-03-24 LAB
BASOPHILS # BLD AUTO: 0.08 K/UL — SIGNIFICANT CHANGE UP (ref 0–0.2)
BASOPHILS NFR BLD AUTO: 0.3 % — SIGNIFICANT CHANGE UP (ref 0–2)
EOSINOPHIL # BLD AUTO: 10.36 K/UL — HIGH (ref 0–0.5)
EOSINOPHIL NFR BLD AUTO: 43.3 % — HIGH (ref 0–6)
HCT VFR BLD CALC: 32.9 % — LOW (ref 34.5–45)
HGB BLD-MCNC: 10.7 G/DL — LOW (ref 11.5–15.5)
IMM GRANULOCYTES NFR BLD AUTO: 0.9 % — SIGNIFICANT CHANGE UP (ref 0–1.5)
LYMPHOCYTES # BLD AUTO: 1.53 K/UL — SIGNIFICANT CHANGE UP (ref 1–3.3)
LYMPHOCYTES # BLD AUTO: 6.4 % — LOW (ref 13–44)
MCHC RBC-ENTMCNC: 28.9 PG — SIGNIFICANT CHANGE UP (ref 27–34)
MCHC RBC-ENTMCNC: 32.5 GM/DL — SIGNIFICANT CHANGE UP (ref 32–36)
MCV RBC AUTO: 88.9 FL — SIGNIFICANT CHANGE UP (ref 80–100)
MONOCYTES # BLD AUTO: 1.19 K/UL — HIGH (ref 0–0.9)
MONOCYTES NFR BLD AUTO: 5 % — SIGNIFICANT CHANGE UP (ref 2–14)
NEUTROPHILS # BLD AUTO: 10.55 K/UL — HIGH (ref 1.8–7.4)
NEUTROPHILS NFR BLD AUTO: 44.1 % — SIGNIFICANT CHANGE UP (ref 43–77)
PLATELET # BLD AUTO: 210 K/UL — SIGNIFICANT CHANGE UP (ref 150–400)
RBC # BLD: 3.7 M/UL — LOW (ref 3.8–5.2)
RBC # FLD: 14.9 % — HIGH (ref 10.3–14.5)
WBC # BLD: 23.92 K/UL — HIGH (ref 3.8–10.5)
WBC # FLD AUTO: 23.92 K/UL — HIGH (ref 3.8–10.5)

## 2020-03-24 PROCEDURE — 71045 X-RAY EXAM CHEST 1 VIEW: CPT | Mod: 26

## 2020-03-24 PROCEDURE — 99232 SBSQ HOSP IP/OBS MODERATE 35: CPT

## 2020-03-24 RX ADMIN — Medication 250 MILLIGRAM(S): at 06:18

## 2020-03-24 RX ADMIN — PIPERACILLIN AND TAZOBACTAM 25 GRAM(S): 4; .5 INJECTION, POWDER, LYOPHILIZED, FOR SOLUTION INTRAVENOUS at 07:08

## 2020-03-24 RX ADMIN — Medication 81 MILLIGRAM(S): at 13:28

## 2020-03-24 RX ADMIN — Medication 1 TABLET(S): at 17:47

## 2020-03-24 RX ADMIN — APIXABAN 5 MILLIGRAM(S): 2.5 TABLET, FILM COATED ORAL at 06:23

## 2020-03-24 RX ADMIN — Medication 1 TABLET(S): at 06:23

## 2020-03-24 RX ADMIN — APIXABAN 5 MILLIGRAM(S): 2.5 TABLET, FILM COATED ORAL at 17:47

## 2020-03-24 RX ADMIN — BUDESONIDE AND FORMOTEROL FUMARATE DIHYDRATE 2 PUFF(S): 160; 4.5 AEROSOL RESPIRATORY (INHALATION) at 21:35

## 2020-03-24 RX ADMIN — PIPERACILLIN AND TAZOBACTAM 25 GRAM(S): 4; .5 INJECTION, POWDER, LYOPHILIZED, FOR SOLUTION INTRAVENOUS at 13:28

## 2020-03-24 RX ADMIN — ATENOLOL 50 MILLIGRAM(S): 25 TABLET ORAL at 06:23

## 2020-03-24 RX ADMIN — Medication 250 MILLIGRAM(S): at 20:34

## 2020-03-24 RX ADMIN — ALBUTEROL 2 PUFF(S): 90 AEROSOL, METERED ORAL at 21:35

## 2020-03-24 NOTE — PROGRESS NOTE ADULT - SUBJECTIVE AND OBJECTIVE BOX
80 yo female with a pmh/o Glaucoma, cataracts, OA, HTN, who presents to the ED with niece due to her niece noticing pt having cough x 1 week, decreased appetite, and weakness. Niece called EMS from pt home after visiting and EMS noted pt HR to be 210.  Pt given IVF and 20mg cardizem en route and HR improved, pt now in NSR at 80's. Pt denies hx of AFIB.  Pt notes chest pressure/ wheezing/ coughing/ SOB at home.  Loss of appetite.  She was taking ASA, advil, and yesterday started OTC cold medicine with pseudoephinephrine.  Pt denies fever, however states that she did have chills. Endorses productive cough with off white sputum, and wheezing.  Pt states she visited sister in the [facility] at which time sister also had a cough and a flu outbreak there.  In ER, pt found to have ARF, positive trop, afib with RVR.  CT chest/ abd initially concern for ? metastatic cancer; however, final read with bilateral airspace disease and ELIZABETH concerning for PNA/ viral etiology and diverticulitis.      3/16:  Pt seen.  C/o cough-- productive.  C/o wheezing and SOB.  Loss of appetite.  No fever.    3/17 +cough  3/18 +diarrhea  3/19 diarrhea is better  3/20- diarrhea better, still feeling weak, with mild SOB   3/21- still feeling weak, afebrile , has persistent leukocytosis, no SOB at rest , no CP ,   3/22- still some mild diarrhea, no SOB, has some cough, no abd pain , afebrile  3/23-persistent leukocytosis, still with WINTERS  3/24- now leukocytosis plateauing    PHYSICAL EXAM:  GENERAL: Comfortable, no acute distress  HEAD:  Atraumatic, Normocephalic  EYES: EOMI, PERRLAHEENT: Moist mucous membranes  NECK: Supple, No JVD  NERVOUS SYSTEM:  Alert & Oriented X3, Motor Strength 5/5 B/L upper and lower extremities  CHEST/LUNG: diffuse rhonchi/ wheezing on exam.    HEART: Regular rate and rhythm; No murmurs, rubs, or gallops  ABDOMEN: Soft, Nontender, Nondistended; Bowel sounds present  GENITOURINARY- Voiding, no palpable bladder  EXTREMITIES:  No clubbing, cyanosis, or edema  MUSCULOSKELTAL- No muscle tenderness, No joint tenderness  SKIN-no rash        PHYSICAL EXAM:    Daily     Daily     ICU Vital Signs Last 24 Hrs  T(C): 36.9 (24 Mar 2020 17:28), Max: 37.4 (24 Mar 2020 11:16)  T(F): 98.5 (24 Mar 2020 17:28), Max: 99.3 (24 Mar 2020 11:16)  HR: 65 (24 Mar 2020 17:28) (65 - 80)  BP: 137/39 (24 Mar 2020 17:28) (119/54 - 150/49)  BP(mean): 71 (24 Mar 2020 11:16) (71 - 71)  ABP: --  ABP(mean): --  RR: 16 (24 Mar 2020 17:28) (16 - 17)  SpO2: 96% (24 Mar 2020 17:28) (95% - 100%)                              10.7   23.92 )-----------( 210      ( 24 Mar 2020 06:28 )             32.9       CBC Full  -  ( 24 Mar 2020 06:28 )  WBC Count : 23.92 K/uL  RBC Count : 3.70 M/uL  Hemoglobin : 10.7 g/dL  Hematocrit : 32.9 %  Platelet Count - Automated : 210 K/uL  Mean Cell Volume : 88.9 fl  Mean Cell Hemoglobin : 28.9 pg  Mean Cell Hemoglobin Concentration : 32.5 gm/dL  Auto Neutrophil # : 10.55 K/uL  Auto Lymphocyte # : 1.53 K/uL  Auto Monocyte # : 1.19 K/uL  Auto Eosinophil # : 10.36 K/uL  Auto Basophil # : 0.08 K/uL  Auto Neutrophil % : 44.1 %  Auto Lymphocyte % : 6.4 %  Auto Monocyte % : 5.0 %  Auto Eosinophil % : 43.3 %  Auto Basophil % : 0.3 %      03-23    140  |  107  |  21  ----------------------------<  80  3.9   |  24  |  1.00    Ca    8.7      23 Mar 2020 10:35                              MEDICATIONS  (STANDING):  ALBUTerol    90 MICROgram(s) HFA Inhaler 2 Puff(s) Inhalation every 4 hours  apixaban 5 milliGRAM(s) Oral two times a day  aspirin enteric coated 81 milliGRAM(s) Oral daily  ATENolol  Tablet 50 milliGRAM(s) Oral daily  atorvastatin 20 milliGRAM(s) Oral at bedtime  budesonide 160 MICROgram(s)/formoterol 4.5 MICROgram(s) Inhaler 2 Puff(s) Inhalation two times a day  lactobacillus acidophilus 1 Tablet(s) Oral two times a day  piperacillin/tazobactam IVPB.. 3.375 Gram(s) IV Intermittent every 8 hours  vancomycin  IVPB 750 milliGRAM(s) IV Intermittent every 12 hours

## 2020-03-24 NOTE — PROGRESS NOTE ADULT - ASSESSMENT
78 yo female with a pmh/o Glaucoma, cataracts, OA, HTN, who presents to the ED with niece due to her niece noticing pt having cough x 1 week, wheezing, SOB, decreased appetite, and weakness.  In ambulance, patient found to have -- received 20 IV cardizem and converted to NSR.  In ER, pt found to have ARF, positive trop, new onset afib with RVR and rhinovirus +.  CT chest/ abd initially concern for ? metastatic cancer; however, final read with bilateral airspace disease and ELIZABETH concerning for PNA/ viral etiology and acute diverticulitis.      #Acute Hypoxic Respiratory Failure/Viral pneumonia  with superimposed MRSA PNA/possible aspiration  Persistent leukocytosis increasing   URI rhinovirus  COVID negative   3/23- repeat CXR no new infiltrates , pulm f/u  on zosyn and vanco  ID eval appreciated  IV abx per ID albuterol inhaler q4 hours.  No hx of lung disease in past.      #Diverticulitis:  diarrhea resolved   Patient notes intermittent abd pain over last week.     plan for eventual colonoscopy as outpatient per GI   CT limited as no contrast but concerning for sigmoid diverticulitis.    Regular diet as no significant pain.     #Diarrhea resolved   Gi PCR neg, Cdiff neg  GI appreciated     #New onset AFIB- in NSR   Likely related to underlying pulm process.    In sinus   AC by Eliquis  Can DC telemetry    #ARF:    Cr 2.9 on admission.  Suspect prerenal related to anorexia along with NSAIDS/ ARB at home.    Improved   No obstruction on CT.      #Positive TROP:  Suspect more demand ischemia related to infection/ afib.    Will need ischemic work up as outpatient    Cardio f/u.    Cont ASA/BB/statin    #HTN:    Hold ARB with ARF.  Cont BB.      #Code Status:  full.    #Dispo-  med surg,  repeat CXR    PT eval. OOB to ambulate

## 2020-03-25 ENCOUNTER — TRANSCRIPTION ENCOUNTER (OUTPATIENT)
Age: 80
End: 2020-03-25

## 2020-03-25 VITALS
DIASTOLIC BLOOD PRESSURE: 57 MMHG | TEMPERATURE: 98 F | SYSTOLIC BLOOD PRESSURE: 117 MMHG | RESPIRATION RATE: 16 BRPM | OXYGEN SATURATION: 98 % | HEART RATE: 81 BPM

## 2020-03-25 LAB
ANION GAP SERPL CALC-SCNC: 6 MMOL/L — SIGNIFICANT CHANGE UP (ref 5–17)
BASOPHILS # BLD AUTO: 0.05 K/UL — SIGNIFICANT CHANGE UP (ref 0–0.2)
BASOPHILS NFR BLD AUTO: 0.3 % — SIGNIFICANT CHANGE UP (ref 0–2)
BUN SERPL-MCNC: 25 MG/DL — HIGH (ref 7–23)
CALCIUM SERPL-MCNC: 8.9 MG/DL — SIGNIFICANT CHANGE UP (ref 8.5–10.1)
CHLORIDE SERPL-SCNC: 106 MMOL/L — SIGNIFICANT CHANGE UP (ref 96–108)
CO2 SERPL-SCNC: 23 MMOL/L — SIGNIFICANT CHANGE UP (ref 22–31)
CREAT SERPL-MCNC: 1.02 MG/DL — SIGNIFICANT CHANGE UP (ref 0.5–1.3)
EOSINOPHIL # BLD AUTO: 8.04 K/UL — HIGH (ref 0–0.5)
EOSINOPHIL NFR BLD AUTO: 42.3 % — HIGH (ref 0–6)
GLUCOSE SERPL-MCNC: 93 MG/DL — SIGNIFICANT CHANGE UP (ref 70–99)
HCT VFR BLD CALC: 34.6 % — SIGNIFICANT CHANGE UP (ref 34.5–45)
HGB BLD-MCNC: 11.3 G/DL — LOW (ref 11.5–15.5)
IMM GRANULOCYTES NFR BLD AUTO: 0.7 % — SIGNIFICANT CHANGE UP (ref 0–1.5)
LYMPHOCYTES # BLD AUTO: 1.35 K/UL — SIGNIFICANT CHANGE UP (ref 1–3.3)
LYMPHOCYTES # BLD AUTO: 7.1 % — LOW (ref 13–44)
MCHC RBC-ENTMCNC: 28.9 PG — SIGNIFICANT CHANGE UP (ref 27–34)
MCHC RBC-ENTMCNC: 32.7 GM/DL — SIGNIFICANT CHANGE UP (ref 32–36)
MCV RBC AUTO: 88.5 FL — SIGNIFICANT CHANGE UP (ref 80–100)
MONOCYTES # BLD AUTO: 0.87 K/UL — SIGNIFICANT CHANGE UP (ref 0–0.9)
MONOCYTES NFR BLD AUTO: 4.6 % — SIGNIFICANT CHANGE UP (ref 2–14)
NEUTROPHILS # BLD AUTO: 8.58 K/UL — HIGH (ref 1.8–7.4)
NEUTROPHILS NFR BLD AUTO: 45 % — SIGNIFICANT CHANGE UP (ref 43–77)
PLATELET # BLD AUTO: 246 K/UL — SIGNIFICANT CHANGE UP (ref 150–400)
POTASSIUM SERPL-MCNC: 4.2 MMOL/L — SIGNIFICANT CHANGE UP (ref 3.5–5.3)
POTASSIUM SERPL-SCNC: 4.2 MMOL/L — SIGNIFICANT CHANGE UP (ref 3.5–5.3)
RBC # BLD: 3.91 M/UL — SIGNIFICANT CHANGE UP (ref 3.8–5.2)
RBC # FLD: 15.2 % — HIGH (ref 10.3–14.5)
SODIUM SERPL-SCNC: 135 MMOL/L — SIGNIFICANT CHANGE UP (ref 135–145)
WBC # BLD: 19.02 K/UL — HIGH (ref 3.8–10.5)
WBC # FLD AUTO: 19.02 K/UL — HIGH (ref 3.8–10.5)

## 2020-03-25 PROCEDURE — 99239 HOSP IP/OBS DSCHRG MGMT >30: CPT

## 2020-03-25 RX ORDER — ATORVASTATIN CALCIUM 80 MG/1
1 TABLET, FILM COATED ORAL
Qty: 30 | Refills: 0
Start: 2020-03-25 | End: 2020-04-23

## 2020-03-25 RX ORDER — APIXABAN 2.5 MG/1
1 TABLET, FILM COATED ORAL
Qty: 60 | Refills: 0
Start: 2020-03-25 | End: 2020-04-23

## 2020-03-25 RX ORDER — DIPHENHYDRAMINE HCL 50 MG
50 CAPSULE ORAL EVERY 4 HOURS
Refills: 0 | Status: DISCONTINUED | OUTPATIENT
Start: 2020-03-25 | End: 2020-03-25

## 2020-03-25 RX ORDER — LACTOBACILLUS ACIDOPHILUS 100MM CELL
1 CAPSULE ORAL
Qty: 60 | Refills: 0
Start: 2020-03-25 | End: 2020-04-23

## 2020-03-25 RX ORDER — BUDESONIDE AND FORMOTEROL FUMARATE DIHYDRATE 160; 4.5 UG/1; UG/1
2 AEROSOL RESPIRATORY (INHALATION)
Qty: 10.2 | Refills: 0
Start: 2020-03-25 | End: 2020-04-23

## 2020-03-25 RX ORDER — IBUPROFEN 200 MG
1 TABLET ORAL
Qty: 0 | Refills: 0 | DISCHARGE

## 2020-03-25 RX ORDER — LOSARTAN POTASSIUM 100 MG/1
1 TABLET, FILM COATED ORAL
Qty: 0 | Refills: 0 | DISCHARGE

## 2020-03-25 RX ORDER — ALBUTEROL 90 UG/1
2 AEROSOL, METERED ORAL
Qty: 8.4 | Refills: 0
Start: 2020-03-25 | End: 2020-04-23

## 2020-03-25 RX ADMIN — ATORVASTATIN CALCIUM 20 MILLIGRAM(S): 80 TABLET, FILM COATED ORAL at 00:28

## 2020-03-25 RX ADMIN — Medication 100 MILLIGRAM(S): at 16:43

## 2020-03-25 RX ADMIN — Medication 250 MILLIGRAM(S): at 08:25

## 2020-03-25 RX ADMIN — APIXABAN 5 MILLIGRAM(S): 2.5 TABLET, FILM COATED ORAL at 16:43

## 2020-03-25 RX ADMIN — Medication 1 TABLET(S): at 16:43

## 2020-03-25 RX ADMIN — Medication 81 MILLIGRAM(S): at 14:45

## 2020-03-25 RX ADMIN — ALBUTEROL 2 PUFF(S): 90 AEROSOL, METERED ORAL at 00:52

## 2020-03-25 RX ADMIN — PIPERACILLIN AND TAZOBACTAM 25 GRAM(S): 4; .5 INJECTION, POWDER, LYOPHILIZED, FOR SOLUTION INTRAVENOUS at 00:30

## 2020-03-25 RX ADMIN — Medication 200 MILLIGRAM(S): at 00:29

## 2020-03-25 RX ADMIN — Medication 50 MILLIGRAM(S): at 11:53

## 2020-03-25 RX ADMIN — APIXABAN 5 MILLIGRAM(S): 2.5 TABLET, FILM COATED ORAL at 05:14

## 2020-03-25 RX ADMIN — Medication 1 TABLET(S): at 05:14

## 2020-03-25 NOTE — DISCHARGE NOTE PROVIDER - NSDCCPCAREPLAN_GEN_ALL_CORE_FT
PRINCIPAL DISCHARGE DIAGNOSIS  Diagnosis: Pneumonia  Assessment and Plan of Treatment: follow up with Dr dover in 1 week, outpatient follow up for mediastinal lymphadenopathy to check for resolution after completion of antibiotics  follow up with cardiology for paroxysmal atrial fibrillation and for outpatient cardiac stress test  follow up with gastroenetrology for outpatient colonoscopy      SECONDARY DISCHARGE DIAGNOSES  Diagnosis: CHF (congestive heart failure)  Assessment and Plan of Treatment:     Diagnosis: Leukocytosis  Assessment and Plan of Treatment:     Diagnosis: ARF (acute renal failure)  Assessment and Plan of Treatment:     Diagnosis: Elevated troponin  Assessment and Plan of Treatment:

## 2020-03-25 NOTE — DISCHARGE NOTE PROVIDER - NSDCMRMEDTOKEN_GEN_ALL_CORE_FT
Advil 200 mg oral tablet: 1 tab(s) orally every 6 hours, As Needed  aspirin 81 mg oral tablet: 1 tab(s) orally once a day  atenolol 50 mg oral tablet: 1 tab(s) orally once a day  losartan 100 mg oral tablet: 1 tab(s) orally once a day albuterol 90 mcg/inh inhalation aerosol: 2 puff(s) inhaled every 4 hours, As Needed wheezing  apixaban 5 mg oral tablet: 1 tab(s) orally 2 times a day  aspirin 81 mg oral tablet: 1 tab(s) orally once a day  atenolol 50 mg oral tablet: 1 tab(s) orally once a day  atorvastatin 20 mg oral tablet: 1 tab(s) orally once a day (at bedtime)  budesonide-formoterol 160 mcg-4.5 mcg/inh inhalation aerosol: 2 puff(s) inhaled 2 times a day   doxycycline monohydrate 100 mg oral capsule: 1 cap(s) orally every 12 hours  lactobacillus acidophilus oral capsule: 1 cap(s) orally 2 times a day

## 2020-03-25 NOTE — DISCHARGE NOTE PROVIDER - CARE PROVIDER_API CALL
Roscoe Read)  Internal Medicine  [address] [place] [address]  Phone: [phone]  Fax: [phone]  Follow Up Time:     Randal Kraus)  Cardiology  [address] [place] [address]  Phone: [phone]  Fax: [phone]  Follow Up Time:     Smith Lira)  Gastroenterology  [address] [place] [address]  Phone: [phone]  Fax: [phone]  Follow Up Time:

## 2020-03-25 NOTE — PROGRESS NOTE ADULT - ASSESSMENT
1) Pneumonia   2) Abnormal CT Chest   3) Dyspnea  4) Cough  5) AF with RVR    78 yo female with a pmh/o Glaucoma, cataracts, OA, HTN, who presents to the ED with niece due to her niece noticing pt having cough x 1 week, decreased to no appetite, and weakness. Niece called EMS from pt home after visiting and EMS noted pt HR to be 210 in aflutter. Pt given IVF and 20mg cardizem en route and HR improved, pt now in NSR at 80's. Pt is a very poor historian however upon interview, admits to having a pre op assessment (cataract) in January where her RN had mentioned to her that her HR was irregular. Pt then admits to intermittent chest pressure which is centralized across chest and radiates down right arm to fingertips. Pt states this chest pressure and wheezing has been treated at home with ASA, advil, and yesterday started OTC cold medicine with pseudoephinephrine ingredient. Pt states pressure had awoken her from her sleep and is sure that she only has been taking two advil and one additional aspirin to her daily in order to control sx. Pt denies fever, however states that she did have chills. Endorses productive cough with off white sputum, and wheezing.  Reviewed CT Chest imaging, labs   COVID 19 negative   RVP + for enterovirus noted   Findings are consistent with a pneumonia secondary to viral etiology  She is a non-smoker with no prior pulmonary history and she states she has not seen a pulmonologist in the past   Procalcitonin reviewed, 2.08 suggesting underlying infectious process   Continue Symbicort   Will continue to monitor

## 2020-03-25 NOTE — DISCHARGE NOTE PROVIDER - HOSPITAL COURSE
80 yo female with a pmh/o Glaucoma, cataracts, OA, HTN,  admitted for elevated heart rate and shortness of breath , pt found to have new onset afib with RVR secondary to multifocal pneumonia MRSA grew in sputum superimposed on URI enterovirus  and possible aspiration PNA  treated with vanco and zosyn ,Found to have mediastinal lymphadenopathy  on CT chest ,Also found diverticulitis on CT scan completed treatment  with zosyn     Now heart rhythm is sinus , started on eliquis , clinically symptoms significantly improved , pulse ox 98%, leukocytosis trending down. medically stable for discharge and outpatient follow up with pulm, cardio, GI             #Acute Hypoxic Respiratory Failure resolved /Viral pneumonia  with superimposed MRSA PNA/and possible aspiration PNA     leukocytosis  now improved from 25 to 19     URI rhinovirus    COVID negative     switched to doxyclycine 100BID for 7 more days by ID    continue symbicort and albuterol inhalers per pulm     f/u with pulm as outpatient to follow resolution of PNA and mediastinal lymphadenopathy    3/23- repeat CXR no new infiltrates , pulm f/u    s/p zosyn # 8 days and vanco# 6 days            #Diverticulitis:  diarrhea resolved     plan for eventual colonoscopy as outpatient per GI     CT limited as no contrast but concerning for sigmoid diverticulitis.      Regular diet as no significant pain.     Gi PCR neg, Cdiff neg    GI appreciated         #New onset AFIB-  Now in NSR     Likely related to underlying pulm process.      In sinus     AC by Eliquis    BB for rate control        #ARF:  resolved -likely from prerenal azotemia from  NSAIDS/ ARB at home.      Cr 2.9 on admission    Improved     No obstruction on CT.      stopped ARB         #Positive TROP:  Suspect more demand ischemia related to infection/ afib.      Will need ischemic work up as outpatient      Cardio f/u.      Cont ASA/BB/statin        #HTN: controlled      stopped ARB .  Cont BB.          #Code Status:  full.                       PHYSICAL EXAM:    GENERAL: Comfortable, no acute distress    HEAD:  Atraumatic, Normocephalic    EYES: EOMI, PERRLAHEENT: Moist mucous membranes    NECK: Supple, No JVD    NERVOUS SYSTEM:  Alert & Oriented X3, Motor Strength 5/5 B/L upper and lower extremities    CHEST/LUNG:CTA B/L       HEART: Regular rate and rhythm;     ABDOMEN: Soft, Nontender, Nondistended; Bowel sounds present    GENITOURINARY- Voiding, no palpable bladder    EXTREMITIES:  no edema    MUSCULOSKELTAL- No muscle tenderness, No joint tenderness         medically stable for discharge      discharge time 47mins      I  discussed with patient and RN team and SW

## 2020-03-25 NOTE — PROGRESS NOTE ADULT - ASSESSMENT
80 y/o female with h/o Glaucoma, cataracts, OA, HTN was admitted on 3/15 for cough x 1 week, decreased appetite and weakness. Niece called EMS and EMS noted pt HR to be 210 in aflutter. Pt given IVF and 20mg cardizem en route and HR improved and converted to NSR at 80's. Pt is a poor historian. Pt then admits to intermittent chest pressure which is centralized across chest and radiates down right arm to fingertips. Pt states this chest pressure and wheezing has been treated at home with ASA, advil, and cold medicine with pseudoephinephrine ingredient. Pt states pressure had awoken her from her sleep and is sure that she only has been taking two advil and one additional aspirin to her daily in order to control sx. Pt denies fever, but had chills. Endorses productive cough with off white sputum and wheezing. In ER she was noted with pulmonary infiltrate and received zosyn and azithromycin.     1. B/l pneumonia with MRSA resolving. Possible aspiration pneumonia. CRF stage 3. s/p URI  -leukocytosis is persistent  -respiratory function slightly improved  -stool CDT and GI PCR reviewed  -COVID-19 PCR is negative  -on zosyn 3.375 gm IV q8h # 8 and vancomycin IV # 6  -tolerating abx well so far; no side effects noted  -respiratory care  -vancomycin torugh level is therapeutic  -change abx to doxycycline 100 mg PO q12h for 7 more days  -monitor temps  -f/u CBC  -supportive care  2. Other issues:   -care per medicine

## 2020-03-25 NOTE — DISCHARGE NOTE PROVIDER - PROVIDER TOKENS
PROVIDER:[TOKEN:[[id number]]],PROVIDER:[TOKEN:[[id number]]],PROVIDER:[TOKEN:[[id number]]]

## 2020-03-25 NOTE — PROGRESS NOTE ADULT - SUBJECTIVE AND OBJECTIVE BOX
Date of service: 03-25-20 @ 09:27    Sitting in chair in NAD  No SOB at rest  Has cough  Has low grade fever    ROS: denies dizziness, no HA, no abdominal pain, no diarrhea or constipation; no dysuria, no legs pain, no rashes    MEDICATIONS  (STANDING):  ALBUTerol    90 MICROgram(s) HFA Inhaler 2 Puff(s) Inhalation every 4 hours  apixaban 5 milliGRAM(s) Oral two times a day  aspirin enteric coated 81 milliGRAM(s) Oral daily  ATENolol  Tablet 50 milliGRAM(s) Oral daily  atorvastatin 20 milliGRAM(s) Oral at bedtime  budesonide 160 MICROgram(s)/formoterol 4.5 MICROgram(s) Inhaler 2 Puff(s) Inhalation two times a day  lactobacillus acidophilus 1 Tablet(s) Oral two times a day  piperacillin/tazobactam IVPB.. 3.375 Gram(s) IV Intermittent every 8 hours  vancomycin  IVPB 750 milliGRAM(s) IV Intermittent every 12 hours      Vital Signs Last 24 Hrs  T(C): 36.9 (25 Mar 2020 05:20), Max: 37.4 (24 Mar 2020 11:16)  T(F): 98.4 (25 Mar 2020 05:20), Max: 99.3 (24 Mar 2020 11:16)  HR: 72 (25 Mar 2020 05:20) (65 - 72)  BP: 93/59 (25 Mar 2020 05:20) (93/59 - 137/39)  BP(mean): 71 (24 Mar 2020 11:16) (71 - 71)  RR: 16 (25 Mar 2020 05:20) (16 - 18)  SpO2: 99% (25 Mar 2020 05:20) (96% - 99%)    Physical Exam:      Constitutional:  No acute distress  HEENT: NC/AT, EOMI, PERRLA, conjunctivae clear  Neck: supple; thyroid not palpable  Back: no tenderness  Respiratory: respiratory effort normal; few crackles at bases  Cardiovascular: S1S2 regular, no murmurs  Abdomen: soft, not tender, not distended, positive BS  Genitourinary: no suprapubic tenderness  Lymphatic: no LN palpable  Musculoskeletal: no muscle tenderness, no joint swelling or tenderness  Extremities: no pedal edema  Neurological/ Psychiatric: AxOx3, moving all extremities  Skin: no rashes; no palpable lesions    Labs: reviewed                        11.3   19.02 )-----------( 246      ( 25 Mar 2020 07:45 )             34.6     03-25    135  |  106  |  25<H>  ----------------------------<  93  4.2   |  23  |  1.02    Ca    8.9      25 Mar 2020 07:45                        10.4   25.17 )-----------( 188      ( 23 Mar 2020 07:40 )             31.7     03-23    140  |  107  |  21  ----------------------------<  80  3.9   |  24  |  1.00    Ca    8.7      23 Mar 2020 10:35    Vancomycin Level, Trough: 12.7 ug/mL (03-21 @ 04:59)                                   10.6   21.95 )-----------( 203      ( 17 Mar 2020 06:38 )             33.0     03-17    140  |  113<H>  |  17  ----------------------------<  97  3.9   |  19<L>  |  1.34<H>    Ca    7.8<L>      17 Mar 2020 06:38  Phos  3.9     03-16  Mg     2.3     03-16    TPro  7.3  /  Alb  2.3<L>  /  TBili  0.4  /  DBili  x   /  AST  38<H>  /  ALT  35  /  AlkPhos  222<H>  03-16     LIVER FUNCTIONS - ( 16 Mar 2020 08:54 )  Alb: 2.3 g/dL / Pro: 7.3 gm/dL / ALK PHOS: 222 U/L / ALT: 35 U/L / AST: 38 U/L / GGT: 60 U/L         Culture - Blood (collected 15 Mar 2020 22:28)  Source: .Blood None  Preliminary Report (17 Mar 2020 09:02):    No growth to date.    Culture - Blood (collected 15 Mar 2020 22:28)  Source: .Blood None  Preliminary Report (17 Mar 2020 09:02):    No growth to date.    Rapid Respiratory Viral Panel (03.16.20 @ 01:17)    Rapid RVP Result: Detected    Entero/Rhinovirus (RapRVP): Detected    COVID-19 PCR . (03.16.20 @ 10:10)    COVID-19 PCR: NotDete    Culture - Sputum . (03.17.20 @ 23:55)    Gram Stain:   Rare polymorphonuclear leukocytes per low power field  Rare Squamous epithelial cells per low power field  Few Yeast like cells per oil power field  Rare Gram positive cocci in pairs per oil power field  Rare Gram Negative Rods per oil power field  Rare Gram Positive Rods per oil power field    -  Ampicillin/Sulbactam: R 16/8    -  Cefazolin: R 16    -  Clindamycin: S <=0.25    -  Erythromycin: R >4    -  Gentamicin: S 2 Should not be used as monotherapy    -  Linezolid: S 2    -  Oxacillin: R >2    -  Penicillin: R >8    -  RIF- Rifampin: S <=1 Should not be used as monotherapy    -  Tetra/Doxy: S <=1    -  Trimethoprim/Sulfamethoxazole: S <=0.5/9.5    -  Vancomycin: S 1    Specimen Source: .Sputum Sputum    Culture Results:   Moderate Staphylococcus aureus  Normal Respiratory Ivelisse present    Organism Identification: Methicillin resistant Staphylococcus aureus    Organism: Methicillin resistant Staphylococcus aureus    Method Type: BRANDEN    Clostridium difficile Toxin by PCR (03.17.20 @ 20:20)    Clostridium difficile Toxin by PCR:     C Diff by PCR Result: [facility]    GI PCR Panel, Stool (03.17.20 @ 20:20)    Specimen Source: .Stool Feces    Culture Results:   GI PCR Results: NOT detected  *******Please Note:*******  GI panel PCR evaluates for:  Campylobacter, Plesiomonas shigelloides, Salmonella,  Vibrio, Yersinia enterocolitica, Enteroaggregative  Escherichia coli (EAEC), Enteropathogenic E.coli (EPEC),  Enterotoxigenic E. coli (ETEC) lt/st, Shiga-like  toxin-producing E. coli (STEC) stx1/stx2,  Shigella/ Enteroinvasive E. coli (EIEC), Cryptosporidium,  Cyclospora cayetanensis, Entamoeba histolytica,  Giardia lamblia, Adenovirus F 40/41, Astrovirus,  Norovirus GI/GII, Rotavirus A, Sapovirus        Radiology: all available radiological tests reviewed    < from: CT Chest No Cont (03.15.20 @ 22:05) >  Bilateral airspace disease with predominant subpleural and upper lobe involvement along with mediastinal lymphadenopathy. In view of clinical history, airspace infection is favored over the possibility of malignancy.    < end of copied text >      Advanced directives addressed: full resuscitation

## 2020-03-25 NOTE — PROGRESS NOTE ADULT - REASON FOR ADMISSION
afib rvr, new onset

## 2020-03-25 NOTE — PROGRESS NOTE ADULT - SUBJECTIVE AND OBJECTIVE BOX
Patient is a 79y old  Female who presents with a chief complaint of afib rvr, new onset (17 Mar 2020 17:15)      HPI:  Pt is a 78 yo female with a pmh/o Glaucoma, cataracts, OA, HTN, who presents to the ED with niece due to her niece noticing pt having cough x 1 week, decreased to no appetite, and weakness. Niece called EMS from pt home after visiting and EMS noted pt HR to be 210 in aflutter. Pt given IVF and 20mg cardizem en route and HR improved, pt now in NSR at 80's. Pt is a very poor historian however upon interview, admits to having a pre op assessment (cataract) in January where her RN had mentioned to her that her HR was irregular. Pt then admits to intermittent chest pressure which is centralized across chest and radiates down right arm to fingertips. Pt states this chest pressure and wheezing has been treated at home with ASA, advil, and yesterday started OTC cold medicine with pseudoephinephrine ingredient. Pt states pressure had awoken her from her sleep and is sure that she only has been taking two advil and one additional aspirin to her daily in order to control sx. Pt denies fever, however states that she did have chills. Endorses productive cough with off white sputum, and wheezing. Denies calf pain/swelling, smoking hx, urinary sx, abd pain, constipation, diarrhea, recent travel, fevers, rashes. Pt states she visited sister in the [facility] at which time sister also had a cough. Collateral hx obtained from niece listed in Emergency contacts who confirms above.   Pt found to be in new onset afib rvr, complicated by demand ischemia and renal failure, treated for PNA, sepsis in ED, later showing probable metastatic cancer on chest CT.      PAST MEDICAL & SURGICAL HISTORY:  OA (osteoarthritis)  Glaucoma  HTN (hypertension)  Status post glaucoma surgery: and cataract      MEDICATIONS  (STANDING):  ALBUTerol    90 MICROgram(s) HFA Inhaler 2 Puff(s) Inhalation every 4 hours  apixaban 5 milliGRAM(s) Oral two times a day  aspirin enteric coated 81 milliGRAM(s) Oral daily  ATENolol  Tablet 50 milliGRAM(s) Oral daily  atorvastatin 20 milliGRAM(s) Oral at bedtime  budesonide 160 MICROgram(s)/formoterol 4.5 MICROgram(s) Inhaler 2 Puff(s) Inhalation two times a day  lactobacillus acidophilus 1 Tablet(s) Oral two times a day  piperacillin/tazobactam IVPB.. 3.375 Gram(s) IV Intermittent every 8 hours  vancomycin  IVPB 750 milliGRAM(s) IV Intermittent every 12 hours    MEDICATIONS  (PRN):    Vital Signs Last 24 Hrs  T(C): 36.9 (25 Mar 2020 05:20), Max: 37.4 (24 Mar 2020 11:16)  T(F): 98.4 (25 Mar 2020 05:20), Max: 99.3 (24 Mar 2020 11:16)  HR: 72 (25 Mar 2020 05:20) (65 - 72)  BP: 93/59 (25 Mar 2020 05:20) (93/59 - 137/39)  BP(mean): 71 (24 Mar 2020 11:16) (71 - 71)  RR: 16 (25 Mar 2020 05:20) (16 - 18)  SpO2: 99% (25 Mar 2020 05:20) (96% - 99%)        17 Mar 2020 07:01  -  18 Mar 2020 07:00  --------------------------------------------------------  IN: 1225 mL / OUT: 300 mL / NET: 925 mL      PHYSICAL EXAM  General Appearance: cooperative, no acute distress,   HEENT: PERRL, conjunctiva clear, EOM's intact, non injected pharynx, no exudate, TM   normal  Neck: Supple, , no adenopathy, thyroid: not enlarged, no carotid bruit or JVD  Back: Symmetric, no  tenderness,no soft tissue tenderness  Lungs: Coarse bilaterally, inspiratory wheezing anterior chest wall bilaterally   Heart: Regular rate and rhythm, S1, S2 normal, no murmur, rub or gallop  Abdomen: Soft, non-tender, bowel sounds active , no hepatosplenomegaly  Extremities: no cyanosis or edema, no joint swelling  Skin: Skin color, texture normal, no rashes   Neurologic: Alert and oriented X3 , cranial nerves intact, sensory and motor normal,    ECG:    LABS:                          10.6   21.95 )-----------( 203      ( 17 Mar 2020 06:38 )             33.0     03-17    140  |  113<H>  |  17  ----------------------------<  97  3.9   |  19<L>  |  1.34<H>    Ca    7.8<L>      17 Mar 2020 06:38  Phos  3.9     03-16  Mg     2.3     03-16    TPro  7.3  /  Alb  2.3<L>  /  TBili  0.4  /  DBili  x   /  AST  38<H>  /  ALT  35  /  AlkPhos  222<H>  03-16        Lipid Panel  153  40  94  92    Pro BNP  4755 03-15 @ 19:55  D Dimer  -- 03-15 @ 19:55    PT/INR - ( 17 Mar 2020 20:24 )   PT: 13.8 sec;   INR: 1.24 ratio         PTT - ( 17 Mar 2020 20:24 )  PTT:73.5 sec  Urinalysis Basic - ( 17 Mar 2020 23:55 )    Color: Yellow / Appearance: Clear / S.015 / pH: x  Gluc: x / Ketone: Negative  / Bili: Negative / Urobili: Negative mg/dL   Blood: x / Protein: 30 mg/dL / Nitrite: Negative   Leuk Esterase: Moderate / RBC: 3-5 /HPF / WBC 6-10   Sq Epi: x / Non Sq Epi: Few / Bacteria: Moderate            RADIOLOGY & ADDITIONAL STUDIES:

## 2020-03-26 ENCOUNTER — INPATIENT (INPATIENT)
Facility: HOSPITAL | Age: 80
LOS: 6 days | Discharge: HOME | DRG: 177 | End: 2020-04-02
Attending: FAMILY MEDICINE | Admitting: INTERNAL MEDICINE
Payer: MEDICARE

## 2020-03-26 VITALS — HEIGHT: 64 IN | WEIGHT: 160.06 LBS

## 2020-03-26 DIAGNOSIS — Z98.83 FILTERING (VITREOUS) BLEB AFTER GLAUCOMA SURGERY STATUS: Chronic | ICD-10-CM

## 2020-03-26 LAB
ALBUMIN SERPL ELPH-MCNC: 2.7 G/DL — LOW (ref 3.3–5)
ALP SERPL-CCNC: 234 U/L — HIGH (ref 40–120)
ALT FLD-CCNC: 29 U/L — SIGNIFICANT CHANGE UP (ref 12–78)
ANION GAP SERPL CALC-SCNC: 11 MMOL/L — SIGNIFICANT CHANGE UP (ref 5–17)
APTT BLD: 31.5 SEC — SIGNIFICANT CHANGE UP (ref 27.5–36.3)
APTT BLD: 32.2 SEC — SIGNIFICANT CHANGE UP (ref 27.5–36.3)
AST SERPL-CCNC: 35 U/L — SIGNIFICANT CHANGE UP (ref 15–37)
BASOPHILS # BLD AUTO: 0.07 K/UL — SIGNIFICANT CHANGE UP (ref 0–0.2)
BASOPHILS NFR BLD AUTO: 0.3 % — SIGNIFICANT CHANGE UP (ref 0–2)
BILIRUB SERPL-MCNC: 0.3 MG/DL — SIGNIFICANT CHANGE UP (ref 0.2–1.2)
BUN SERPL-MCNC: 30 MG/DL — HIGH (ref 7–23)
CALCIUM SERPL-MCNC: 8.8 MG/DL — SIGNIFICANT CHANGE UP (ref 8.5–10.1)
CHLORIDE SERPL-SCNC: 105 MMOL/L — SIGNIFICANT CHANGE UP (ref 96–108)
CO2 SERPL-SCNC: 17 MMOL/L — LOW (ref 22–31)
CREAT SERPL-MCNC: 1.69 MG/DL — HIGH (ref 0.5–1.3)
EOSINOPHIL # BLD AUTO: 2.93 K/UL — HIGH (ref 0–0.5)
EOSINOPHIL NFR BLD AUTO: 10.6 % — HIGH (ref 0–6)
GLUCOSE SERPL-MCNC: 184 MG/DL — HIGH (ref 70–99)
HCT VFR BLD CALC: 36 % — SIGNIFICANT CHANGE UP (ref 34.5–45)
HGB BLD-MCNC: 11.7 G/DL — SIGNIFICANT CHANGE UP (ref 11.5–15.5)
IMM GRANULOCYTES NFR BLD AUTO: 0.8 % — SIGNIFICANT CHANGE UP (ref 0–1.5)
INR BLD: 1.32 RATIO — HIGH (ref 0.88–1.16)
INR BLD: 1.43 RATIO — HIGH (ref 0.88–1.16)
LYMPHOCYTES # BLD AUTO: 0.47 K/UL — LOW (ref 1–3.3)
LYMPHOCYTES # BLD AUTO: 1.7 % — LOW (ref 13–44)
MCHC RBC-ENTMCNC: 28.7 PG — SIGNIFICANT CHANGE UP (ref 27–34)
MCHC RBC-ENTMCNC: 32.5 GM/DL — SIGNIFICANT CHANGE UP (ref 32–36)
MCV RBC AUTO: 88.2 FL — SIGNIFICANT CHANGE UP (ref 80–100)
MONOCYTES # BLD AUTO: 1.29 K/UL — HIGH (ref 0–0.9)
MONOCYTES NFR BLD AUTO: 4.7 % — SIGNIFICANT CHANGE UP (ref 2–14)
NEUTROPHILS # BLD AUTO: 22.73 K/UL — HIGH (ref 1.8–7.4)
NEUTROPHILS NFR BLD AUTO: 81.9 % — HIGH (ref 43–77)
PLATELET # BLD AUTO: 288 K/UL — SIGNIFICANT CHANGE UP (ref 150–400)
POTASSIUM SERPL-MCNC: 3.6 MMOL/L — SIGNIFICANT CHANGE UP (ref 3.5–5.3)
POTASSIUM SERPL-SCNC: 3.6 MMOL/L — SIGNIFICANT CHANGE UP (ref 3.5–5.3)
PROT SERPL-MCNC: 7.8 GM/DL — SIGNIFICANT CHANGE UP (ref 6–8.3)
PROTHROM AB SERPL-ACNC: 14.8 SEC — HIGH (ref 10–12.9)
PROTHROM AB SERPL-ACNC: 16 SEC — HIGH (ref 10–12.9)
RBC # BLD: 4.08 M/UL — SIGNIFICANT CHANGE UP (ref 3.8–5.2)
RBC # FLD: 14.8 % — HIGH (ref 10.3–14.5)
SODIUM SERPL-SCNC: 133 MMOL/L — LOW (ref 135–145)
TROPONIN I SERPL-MCNC: 1.42 NG/ML — HIGH (ref 0.01–0.04)
TROPONIN I SERPL-MCNC: 3.92 NG/ML — HIGH (ref 0.01–0.04)
WBC # BLD: 27.72 K/UL — HIGH (ref 3.8–10.5)
WBC # FLD AUTO: 27.72 K/UL — HIGH (ref 3.8–10.5)

## 2020-03-26 PROCEDURE — 93010 ELECTROCARDIOGRAM REPORT: CPT

## 2020-03-26 PROCEDURE — 71250 CT THORAX DX C-: CPT | Mod: 26

## 2020-03-26 PROCEDURE — 74176 CT ABD & PELVIS W/O CONTRAST: CPT | Mod: 26

## 2020-03-26 RX ORDER — SODIUM CHLORIDE 9 MG/ML
1000 INJECTION INTRAMUSCULAR; INTRAVENOUS; SUBCUTANEOUS ONCE
Refills: 0 | Status: COMPLETED | OUTPATIENT
Start: 2020-03-26 | End: 2020-03-26

## 2020-03-26 RX ORDER — VANCOMYCIN HCL 1 G
750 VIAL (EA) INTRAVENOUS ONCE
Refills: 0 | Status: COMPLETED | OUTPATIENT
Start: 2020-03-26 | End: 2020-03-26

## 2020-03-26 RX ORDER — SODIUM CHLORIDE 9 MG/ML
2300 INJECTION, SOLUTION INTRAVENOUS ONCE
Refills: 0 | Status: DISCONTINUED | OUTPATIENT
Start: 2020-03-26 | End: 2020-03-26

## 2020-03-26 RX ORDER — AZITHROMYCIN 500 MG/1
500 TABLET, FILM COATED ORAL ONCE
Refills: 0 | Status: COMPLETED | OUTPATIENT
Start: 2020-03-26 | End: 2020-03-26

## 2020-03-26 RX ORDER — HEPARIN SODIUM 5000 [USP'U]/ML
4400 INJECTION INTRAVENOUS; SUBCUTANEOUS ONCE
Refills: 0 | Status: COMPLETED | OUTPATIENT
Start: 2020-03-26 | End: 2020-03-26

## 2020-03-26 RX ORDER — ACETAMINOPHEN 500 MG
650 TABLET ORAL ONCE
Refills: 0 | Status: COMPLETED | OUTPATIENT
Start: 2020-03-26 | End: 2020-03-26

## 2020-03-26 RX ORDER — HEPARIN SODIUM 5000 [USP'U]/ML
4400 INJECTION INTRAVENOUS; SUBCUTANEOUS EVERY 6 HOURS
Refills: 0 | Status: DISCONTINUED | OUTPATIENT
Start: 2020-03-26 | End: 2020-03-27

## 2020-03-26 RX ORDER — ATENOLOL 25 MG/1
50 TABLET ORAL ONCE
Refills: 0 | Status: DISCONTINUED | OUTPATIENT
Start: 2020-03-26 | End: 2020-03-26

## 2020-03-26 RX ORDER — HEPARIN SODIUM 5000 [USP'U]/ML
INJECTION INTRAVENOUS; SUBCUTANEOUS
Qty: 25000 | Refills: 0 | Status: DISCONTINUED | OUTPATIENT
Start: 2020-03-26 | End: 2020-03-27

## 2020-03-26 RX ORDER — ASPIRIN/CALCIUM CARB/MAGNESIUM 324 MG
325 TABLET ORAL DAILY
Refills: 0 | Status: DISCONTINUED | OUTPATIENT
Start: 2020-03-26 | End: 2020-03-27

## 2020-03-26 RX ADMIN — Medication 250 MILLIGRAM(S): at 22:01

## 2020-03-26 RX ADMIN — HEPARIN SODIUM 4400 UNIT(S): 5000 INJECTION INTRAVENOUS; SUBCUTANEOUS at 23:30

## 2020-03-26 RX ADMIN — Medication 650 MILLIGRAM(S): at 23:03

## 2020-03-26 RX ADMIN — AZITHROMYCIN 255 MILLIGRAM(S): 500 TABLET, FILM COATED ORAL at 20:45

## 2020-03-26 RX ADMIN — SODIUM CHLORIDE 1000 MILLILITER(S): 9 INJECTION INTRAMUSCULAR; INTRAVENOUS; SUBCUTANEOUS at 22:01

## 2020-03-26 RX ADMIN — HEPARIN SODIUM 900 UNIT(S)/HR: 5000 INJECTION INTRAVENOUS; SUBCUTANEOUS at 23:31

## 2020-03-26 RX ADMIN — SODIUM CHLORIDE 1000 MILLILITER(S): 9 INJECTION INTRAMUSCULAR; INTRAVENOUS; SUBCUTANEOUS at 20:32

## 2020-03-26 RX ADMIN — AZITHROMYCIN 500 MILLIGRAM(S): 500 TABLET, FILM COATED ORAL at 22:01

## 2020-03-26 RX ADMIN — Medication 650 MILLIGRAM(S): at 22:01

## 2020-03-26 RX ADMIN — Medication 325 MILLIGRAM(S): at 22:01

## 2020-03-26 NOTE — ED PROVIDER NOTE - RESPIRATORY, MLM
Right posterior lung fields with coarse breath sounds. Left lung clear.  Breathing comfortably on RA.

## 2020-03-26 NOTE — ED PROVIDER NOTE - CLINICAL SUMMARY MEDICAL DECISION MAKING FREE TEXT BOX
Possibility of PE, however pt was d/c on Apixaban. If embolism present, indicates failure of oral anticoagulants. Plan: CTA, labs, EKG, trop.  ACS less likely given sinus tach however still must r/o.

## 2020-03-26 NOTE — ED PROVIDER NOTE - OBJECTIVE STATEMENT
80 y/o female with a PMHx of glaucoma,  HTN, OA, presents to the ED c/o CP and SOB. Pt recently admitted for MRSA PNA and was d/c yesterday. Pt was recently admitted for hypoxia and respiratory failure and was found to have MRSA PNA. Pt was 8 days on Zosyn and 6 days of Vanco. Pt was d/c yesterday. Pt took nap today and woke up with CP and SOB. Denies n/v/d, calf pain/swelling. No other complaints at this time.

## 2020-03-26 NOTE — ED PROVIDER NOTE - MUSCULOSKELETAL, MLM
Spine appears normal, range of motion is not limited, no muscle or joint tenderness. Calves nontender.

## 2020-03-26 NOTE — ED ADULT NURSE NOTE - OBJECTIVE STATEMENT
pt presents to the ED with SOB with exertion. as per pt, she was recently here and treated for bacterial PNA. denies fevers, chills.

## 2020-03-27 DIAGNOSIS — N17.9 ACUTE KIDNEY FAILURE, UNSPECIFIED: ICD-10-CM

## 2020-03-27 DIAGNOSIS — Z80.8 FAMILY HISTORY OF MALIGNANT NEOPLASM OF OTHER ORGANS OR SYSTEMS: ICD-10-CM

## 2020-03-27 DIAGNOSIS — K76.89 OTHER SPECIFIED DISEASES OF LIVER: ICD-10-CM

## 2020-03-27 DIAGNOSIS — I48.91 UNSPECIFIED ATRIAL FIBRILLATION: ICD-10-CM

## 2020-03-27 DIAGNOSIS — E87.6 HYPOKALEMIA: ICD-10-CM

## 2020-03-27 DIAGNOSIS — I12.9 HYPERTENSIVE CHRONIC KIDNEY DISEASE WITH STAGE 1 THROUGH STAGE 4 CHRONIC KIDNEY DISEASE, OR UNSPECIFIED CHRONIC KIDNEY DISEASE: ICD-10-CM

## 2020-03-27 DIAGNOSIS — R63.0 ANOREXIA: ICD-10-CM

## 2020-03-27 DIAGNOSIS — B95.62 METHICILLIN RESISTANT STAPHYLOCOCCUS AUREUS INFECTION AS THE CAUSE OF DISEASES CLASSIFIED ELSEWHERE: ICD-10-CM

## 2020-03-27 DIAGNOSIS — I48.92 UNSPECIFIED ATRIAL FLUTTER: ICD-10-CM

## 2020-03-27 DIAGNOSIS — N18.3 CHRONIC KIDNEY DISEASE, STAGE 3 (MODERATE): ICD-10-CM

## 2020-03-27 DIAGNOSIS — I48.0 PAROXYSMAL ATRIAL FIBRILLATION: ICD-10-CM

## 2020-03-27 DIAGNOSIS — A08.39 OTHER VIRAL ENTERITIS: ICD-10-CM

## 2020-03-27 DIAGNOSIS — J96.01 ACUTE RESPIRATORY FAILURE WITH HYPOXIA: ICD-10-CM

## 2020-03-27 DIAGNOSIS — I21.4 NON-ST ELEVATION (NSTEMI) MYOCARDIAL INFARCTION: ICD-10-CM

## 2020-03-27 DIAGNOSIS — J12.9 VIRAL PNEUMONIA, UNSPECIFIED: ICD-10-CM

## 2020-03-27 DIAGNOSIS — K57.32 DIVERTICULITIS OF LARGE INTESTINE WITHOUT PERFORATION OR ABSCESS WITHOUT BLEEDING: ICD-10-CM

## 2020-03-27 DIAGNOSIS — B34.8 OTHER VIRAL INFECTIONS OF UNSPECIFIED SITE: ICD-10-CM

## 2020-03-27 DIAGNOSIS — R73.9 HYPERGLYCEMIA, UNSPECIFIED: ICD-10-CM

## 2020-03-27 PROBLEM — M19.90 UNSPECIFIED OSTEOARTHRITIS, UNSPECIFIED SITE: Chronic | Status: ACTIVE | Noted: 2020-03-16

## 2020-03-27 PROBLEM — H40.9 UNSPECIFIED GLAUCOMA: Chronic | Status: ACTIVE | Noted: 2020-03-15

## 2020-03-27 PROBLEM — I10 ESSENTIAL (PRIMARY) HYPERTENSION: Chronic | Status: ACTIVE | Noted: 2020-03-15

## 2020-03-27 LAB
APTT BLD: 123.1 SEC — CRITICAL HIGH (ref 27.5–36.3)
APTT BLD: 49.6 SEC — HIGH (ref 27.5–36.3)
APTT BLD: 51.3 SEC — HIGH (ref 27.5–36.3)
APTT BLD: 68.1 SEC — HIGH (ref 27.5–36.3)
HCT VFR BLD CALC: 33.1 % — LOW (ref 34.5–45)
HCT VFR BLD CALC: 35.4 % — SIGNIFICANT CHANGE UP (ref 34.5–45)
HGB BLD-MCNC: 11 G/DL — LOW (ref 11.5–15.5)
HGB BLD-MCNC: 11.8 G/DL — SIGNIFICANT CHANGE UP (ref 11.5–15.5)
MCHC RBC-ENTMCNC: 29.2 PG — SIGNIFICANT CHANGE UP (ref 27–34)
MCHC RBC-ENTMCNC: 29.3 PG — SIGNIFICANT CHANGE UP (ref 27–34)
MCHC RBC-ENTMCNC: 33.2 GM/DL — SIGNIFICANT CHANGE UP (ref 32–36)
MCHC RBC-ENTMCNC: 33.3 GM/DL — SIGNIFICANT CHANGE UP (ref 32–36)
MCV RBC AUTO: 87.8 FL — SIGNIFICANT CHANGE UP (ref 80–100)
MCV RBC AUTO: 87.8 FL — SIGNIFICANT CHANGE UP (ref 80–100)
PLATELET # BLD AUTO: 280 K/UL — SIGNIFICANT CHANGE UP (ref 150–400)
PLATELET # BLD AUTO: 285 K/UL — SIGNIFICANT CHANGE UP (ref 150–400)
RAPID RVP RESULT: SIGNIFICANT CHANGE UP
RBC # BLD: 3.77 M/UL — LOW (ref 3.8–5.2)
RBC # BLD: 4.03 M/UL — SIGNIFICANT CHANGE UP (ref 3.8–5.2)
RBC # FLD: 14.8 % — HIGH (ref 10.3–14.5)
RBC # FLD: 14.8 % — HIGH (ref 10.3–14.5)
SARS-COV-2 RNA SPEC QL NAA+PROBE: SIGNIFICANT CHANGE UP
TROPONIN I SERPL-MCNC: 4.38 NG/ML — HIGH (ref 0.01–0.04)
TROPONIN I SERPL-MCNC: 9.73 NG/ML — HIGH (ref 0.01–0.04)
WBC # BLD: 30.04 K/UL — HIGH (ref 3.8–10.5)
WBC # BLD: 31.27 K/UL — HIGH (ref 3.8–10.5)
WBC # FLD AUTO: 30.04 K/UL — HIGH (ref 3.8–10.5)
WBC # FLD AUTO: 31.27 K/UL — HIGH (ref 3.8–10.5)

## 2020-03-27 PROCEDURE — C1887: CPT

## 2020-03-27 PROCEDURE — 97162 PT EVAL MOD COMPLEX 30 MIN: CPT | Mod: GP

## 2020-03-27 PROCEDURE — 94640 AIRWAY INHALATION TREATMENT: CPT

## 2020-03-27 PROCEDURE — 87798 DETECT AGENT NOS DNA AMP: CPT

## 2020-03-27 PROCEDURE — 93005 ELECTROCARDIOGRAM TRACING: CPT

## 2020-03-27 PROCEDURE — 87086 URINE CULTURE/COLONY COUNT: CPT

## 2020-03-27 PROCEDURE — 84145 PROCALCITONIN (PCT): CPT

## 2020-03-27 PROCEDURE — 83880 ASSAY OF NATRIURETIC PEPTIDE: CPT

## 2020-03-27 PROCEDURE — 87486 CHLMYD PNEUM DNA AMP PROBE: CPT

## 2020-03-27 PROCEDURE — 99223 1ST HOSP IP/OBS HIGH 75: CPT

## 2020-03-27 PROCEDURE — 97530 THERAPEUTIC ACTIVITIES: CPT | Mod: GP

## 2020-03-27 PROCEDURE — 81001 URINALYSIS AUTO W/SCOPE: CPT

## 2020-03-27 PROCEDURE — 80053 COMPREHEN METABOLIC PANEL: CPT

## 2020-03-27 PROCEDURE — 85027 COMPLETE CBC AUTOMATED: CPT

## 2020-03-27 PROCEDURE — 87633 RESP VIRUS 12-25 TARGETS: CPT

## 2020-03-27 PROCEDURE — 83735 ASSAY OF MAGNESIUM: CPT

## 2020-03-27 PROCEDURE — 84100 ASSAY OF PHOSPHORUS: CPT

## 2020-03-27 PROCEDURE — C1769: CPT

## 2020-03-27 PROCEDURE — 85730 THROMBOPLASTIN TIME PARTIAL: CPT

## 2020-03-27 PROCEDURE — 87635 SARS-COV-2 COVID-19 AMP PRB: CPT

## 2020-03-27 PROCEDURE — 87581 M.PNEUMON DNA AMP PROBE: CPT

## 2020-03-27 PROCEDURE — 97116 GAIT TRAINING THERAPY: CPT | Mod: GP

## 2020-03-27 PROCEDURE — C1894: CPT

## 2020-03-27 PROCEDURE — 36415 COLL VENOUS BLD VENIPUNCTURE: CPT

## 2020-03-27 PROCEDURE — 80202 ASSAY OF VANCOMYCIN: CPT

## 2020-03-27 PROCEDURE — 80048 BASIC METABOLIC PNL TOTAL CA: CPT

## 2020-03-27 PROCEDURE — C1760: CPT

## 2020-03-27 PROCEDURE — 84484 ASSAY OF TROPONIN QUANT: CPT

## 2020-03-27 PROCEDURE — 93306 TTE W/DOPPLER COMPLETE: CPT

## 2020-03-27 RX ORDER — CEFEPIME 1 G/1
1000 INJECTION, POWDER, FOR SOLUTION INTRAMUSCULAR; INTRAVENOUS EVERY 12 HOURS
Refills: 0 | Status: DISCONTINUED | OUTPATIENT
Start: 2020-03-27 | End: 2020-04-02

## 2020-03-27 RX ORDER — SODIUM CHLORIDE 9 MG/ML
1000 INJECTION INTRAMUSCULAR; INTRAVENOUS; SUBCUTANEOUS
Refills: 0 | Status: DISCONTINUED | OUTPATIENT
Start: 2020-03-27 | End: 2020-03-29

## 2020-03-27 RX ORDER — ATORVASTATIN CALCIUM 80 MG/1
80 TABLET, FILM COATED ORAL AT BEDTIME
Refills: 0 | Status: DISCONTINUED | OUTPATIENT
Start: 2020-03-27 | End: 2020-03-27

## 2020-03-27 RX ORDER — ATORVASTATIN CALCIUM 80 MG/1
40 TABLET, FILM COATED ORAL AT BEDTIME
Refills: 0 | Status: DISCONTINUED | OUTPATIENT
Start: 2020-03-27 | End: 2020-04-02

## 2020-03-27 RX ORDER — ATENOLOL 25 MG/1
1 TABLET ORAL
Qty: 0 | Refills: 0 | DISCHARGE

## 2020-03-27 RX ORDER — ATORVASTATIN CALCIUM 80 MG/1
1 TABLET, FILM COATED ORAL
Qty: 0 | Refills: 0 | DISCHARGE

## 2020-03-27 RX ORDER — HEPARIN SODIUM 5000 [USP'U]/ML
4700 INJECTION INTRAVENOUS; SUBCUTANEOUS EVERY 6 HOURS
Refills: 0 | Status: DISCONTINUED | OUTPATIENT
Start: 2020-03-27 | End: 2020-03-30

## 2020-03-27 RX ORDER — CLOPIDOGREL BISULFATE 75 MG/1
300 TABLET, FILM COATED ORAL ONCE
Refills: 0 | Status: COMPLETED | OUTPATIENT
Start: 2020-03-27 | End: 2020-03-27

## 2020-03-27 RX ORDER — LACTOBACILLUS ACIDOPHILUS 100MM CELL
1 CAPSULE ORAL
Refills: 0 | Status: DISCONTINUED | OUTPATIENT
Start: 2020-03-27 | End: 2020-04-02

## 2020-03-27 RX ORDER — SODIUM CHLORIDE 9 MG/ML
1000 INJECTION INTRAMUSCULAR; INTRAVENOUS; SUBCUTANEOUS ONCE
Refills: 0 | Status: COMPLETED | OUTPATIENT
Start: 2020-03-27 | End: 2020-03-27

## 2020-03-27 RX ORDER — SODIUM CHLORIDE 9 MG/ML
1000 INJECTION INTRAMUSCULAR; INTRAVENOUS; SUBCUTANEOUS ONCE
Refills: 0 | Status: DISCONTINUED | OUTPATIENT
Start: 2020-03-27 | End: 2020-04-02

## 2020-03-27 RX ORDER — ASPIRIN/CALCIUM CARB/MAGNESIUM 324 MG
81 TABLET ORAL DAILY
Refills: 0 | Status: DISCONTINUED | OUTPATIENT
Start: 2020-03-27 | End: 2020-03-30

## 2020-03-27 RX ORDER — DIPHENHYDRAMINE HCL 50 MG
25 CAPSULE ORAL ONCE
Refills: 0 | Status: COMPLETED | OUTPATIENT
Start: 2020-03-27 | End: 2020-03-27

## 2020-03-27 RX ORDER — CEFEPIME 1 G/1
1000 INJECTION, POWDER, FOR SOLUTION INTRAMUSCULAR; INTRAVENOUS EVERY 12 HOURS
Refills: 0 | Status: DISCONTINUED | OUTPATIENT
Start: 2020-03-27 | End: 2020-03-27

## 2020-03-27 RX ORDER — CLOPIDOGREL BISULFATE 75 MG/1
75 TABLET, FILM COATED ORAL DAILY
Refills: 0 | Status: DISCONTINUED | OUTPATIENT
Start: 2020-03-27 | End: 2020-04-02

## 2020-03-27 RX ORDER — METOPROLOL TARTRATE 50 MG
25 TABLET ORAL
Refills: 0 | Status: DISCONTINUED | OUTPATIENT
Start: 2020-03-27 | End: 2020-03-27

## 2020-03-27 RX ORDER — HEPARIN SODIUM 5000 [USP'U]/ML
INJECTION INTRAVENOUS; SUBCUTANEOUS
Qty: 25000 | Refills: 0 | Status: DISCONTINUED | OUTPATIENT
Start: 2020-03-27 | End: 2020-03-30

## 2020-03-27 RX ORDER — VANCOMYCIN HCL 1 G
1000 VIAL (EA) INTRAVENOUS DAILY
Refills: 0 | Status: DISCONTINUED | OUTPATIENT
Start: 2020-03-27 | End: 2020-04-02

## 2020-03-27 RX ADMIN — CLOPIDOGREL BISULFATE 75 MILLIGRAM(S): 75 TABLET, FILM COATED ORAL at 14:53

## 2020-03-27 RX ADMIN — Medication 750 MILLIGRAM(S): at 01:45

## 2020-03-27 RX ADMIN — SODIUM CHLORIDE 1000 MILLILITER(S): 9 INJECTION INTRAMUSCULAR; INTRAVENOUS; SUBCUTANEOUS at 09:58

## 2020-03-27 RX ADMIN — HEPARIN SODIUM 1100 UNIT(S)/HR: 5000 INJECTION INTRAVENOUS; SUBCUTANEOUS at 18:21

## 2020-03-27 RX ADMIN — ATORVASTATIN CALCIUM 80 MILLIGRAM(S): 80 TABLET, FILM COATED ORAL at 06:32

## 2020-03-27 RX ADMIN — Medication 81 MILLIGRAM(S): at 14:53

## 2020-03-27 RX ADMIN — ATORVASTATIN CALCIUM 40 MILLIGRAM(S): 80 TABLET, FILM COATED ORAL at 22:32

## 2020-03-27 RX ADMIN — CEFEPIME 1000 MILLIGRAM(S): 1 INJECTION, POWDER, FOR SOLUTION INTRAMUSCULAR; INTRAVENOUS at 22:32

## 2020-03-27 RX ADMIN — Medication 1 TABLET(S): at 18:22

## 2020-03-27 RX ADMIN — Medication 25 MILLIGRAM(S): at 01:45

## 2020-03-27 RX ADMIN — Medication 250 MILLIGRAM(S): at 22:32

## 2020-03-27 RX ADMIN — CEFEPIME 1000 MILLIGRAM(S): 1 INJECTION, POWDER, FOR SOLUTION INTRAMUSCULAR; INTRAVENOUS at 09:58

## 2020-03-27 RX ADMIN — CLOPIDOGREL BISULFATE 300 MILLIGRAM(S): 75 TABLET, FILM COATED ORAL at 06:32

## 2020-03-27 RX ADMIN — HEPARIN SODIUM 950 UNIT(S)/HR: 5000 INJECTION INTRAVENOUS; SUBCUTANEOUS at 09:52

## 2020-03-27 RX ADMIN — Medication 25 MILLIGRAM(S): at 09:06

## 2020-03-27 RX ADMIN — HEPARIN SODIUM 950 UNIT(S)/HR: 5000 INJECTION INTRAVENOUS; SUBCUTANEOUS at 06:28

## 2020-03-27 NOTE — ED ADULT NURSE REASSESSMENT NOTE - NS ED NURSE REASSESS COMMENT FT1
report received from Harry Allison. heparin drip rate verified with HARRY allison. pt taken off bedpan, unable to obtain sufficient sample for urinalysis. upon cleaning patient, pt torso appears to have rash and rash appears around groin. As per outgoing RN, pt has had rash throughout shift and issue already communicated to hospitalist. pt denies SOB/difficulty swallowing. pt on the monitor, in no acute distress. will ctm

## 2020-03-27 NOTE — H&P ADULT - HISTORY OF PRESENT ILLNESS
Patient is 80yo female with PMhx of glaucoma, HTN, OA, discharged 2 days ago for MRSA PNA on Doxy PO, new onset Afib, (COVID19 negative during hospitalization), represents to the hospital complaining of SOB and CP. Pt notes she took a nap, and woke up with SOB and CP. Denies fever, chills, palpitations, HA, dizziness. No other constitutional symptoms.

## 2020-03-27 NOTE — H&P ADULT - NSHPPHYSICALEXAM_GEN_ALL_CORE
Gen: AAOx3, NAD  HEENT: NCAT, EOMI  Neck: Supple  CV: nml S1S2, RRR  Lungs: decreased breaths sounds at base  Abd: Soft, NT, ND, BS+  Ext: No edema  Neuro: Non focal  Skin: normal  Psych: normal affect

## 2020-03-27 NOTE — CONSULT NOTE ADULT - SUBJECTIVE AND OBJECTIVE BOX
Patient is a 79y old  Female who presents with a chief complaint of SOB, CP (27 Mar 2020 09:48)    HPI:  Patient is 80yo female with PMhx of glaucoma, HTN, OA, discharged 2 days ago for MRSA PNA on Doxy PO, new onset Afib, (COVID19 negative during hospitalization), admitted on 3/27 for evaluation of shortness of breath and chest pain; she feels that she was discharged too early. Patient is poor historian and history per medical record.         PMH: as above  PSH: as above  Meds: per reconciliation sheet, noted below  MEDICATIONS  (STANDING):  aspirin  chewable 81 milliGRAM(s) Oral daily  atorvastatin 40 milliGRAM(s) Oral at bedtime  cefepime  Injectable. 1000 milliGRAM(s) IV Push every 12 hours  clopidogrel Tablet 75 milliGRAM(s) Oral daily  heparin  Infusion.  Unit(s)/Hr (9.5 mL/Hr) IV Continuous <Continuous>  lactobacillus acidophilus 1 Tablet(s) Oral two times a day  sodium chloride 0.9% Bolus 1000 milliLiter(s) IV Bolus once  sodium chloride 0.9%. 1000 milliLiter(s) (75 mL/Hr) IV Continuous <Continuous>  vancomycin  IVPB 1000 milliGRAM(s) IV Intermittent daily    MEDICATIONS  (PRN):  heparin  Injectable 4700 Unit(s) IV Push every 6 hours PRN For aPTT less than 40    Allergies    No Known Allergies    Intolerances      Social: no smoking, no alcohol, no illegal drugs; no recent travel, no exposure to TB  FAMILY HISTORY:  FH: CAD (coronary artery disease): in mother and father, both   FH: pancreatic cancer: in sister,   FHx: colon cancer: in brother,      ROS unable to obtain secondary to patient medical condition     Vital Signs Last 24 Hrs  T(C): 36.8 (27 Mar 2020 11:38), Max: 38 (26 Mar 2020 19:31)  T(F): 98.2 (27 Mar 2020 11:38), Max: 100.4 (26 Mar 2020 19:31)  HR: 87 (27 Mar 2020 11:38) (84 - 115)  BP: 107/38 (27 Mar 2020 11:38) (98/72 - 136/63)  BP(mean): 82 (27 Mar 2020 10:00) (73 - 82)  RR: 18 (27 Mar 2020 11:38) (18 - 27)  SpO2: 100% (27 Mar 2020 11:38) (97% - 100%)  Daily Height in cm: 162.56 (26 Mar 2020 18:26)    Daily Weight in k.8 (27 Mar 2020 11:38)    PE:    Constitutional: frail looking  HEENT: NC/AT, EOMI, PERRLA, conjunctivae clear; ears and nose atraumatic; pharynx clear  Neck: supple; thyroid not palpable  Back: no tenderness  Respiratory: respiratory effort normal; clear to auscultation  Cardiovascular: S1S2 regular, no murmurs  Abdomen: soft, not tender, not distended, positive BS; no liver or spleen organomegaly  Genitourinary: no suprapubic tenderness  Musculoskeletal: no muscle tenderness, no joint swelling or tenderness  Neurological/ Psychiatric: moving all extremities  Skin: no rashes; no palpable lesions    Labs: all available labs reviewed                        11.0   31 )-----------( 280      ( 27 Mar 2020 13:19 )             33.1         133<L>  |  105  |  30<H>  ----------------------------<  184<H>  3.6   |  17<L>  |  1.69<H>    Ca    8.8      26 Mar 2020 19:24    TPro  7.8  /  Alb  2.7<L>  /  TBili  0.3  /  DBili  x   /  AST  35  /  ALT  29  /  AlkPhos  234<H>       LIVER FUNCTIONS - ( 26 Mar 2020 19:24 )  Alb: 2.7 g/dL / Pro: 7.8 gm/dL / ALK PHOS: 234 U/L / ALT: 29 U/L / AST: 35 U/L / GGT: x           < from: CT Chest No Cont (20 @ 22:28) >    EXAM:  CT ABDOMEN AND PELVIS                          EXAM:  CT CHEST                            PROCEDURE DATE:  2020          INTERPRETATION:  CT of the chest, abdomen and pelvis    Indication: worsening pneumonia    Technique: Axial imageswere obtained from the thoracic inlet through pubic symphysis without oral or IV contrast. Reformatted coronal and sagittal images were submitted.    Comparison: CT of 3/15/2020    FINDINGS:    Evaluation of the solid abdominal organs is limited without contrast.    The visualized neck, axilla and subcutaneous tissues are unremarkable.    The tracheobronchial tree is patent centrally.  There is no mediastinal hematoma.  The great vessels are not enlarged. There are nonspecific mediastinal lymph nodes measuring up to 1.3 x 1.7 cm in the right paratracheal region. Coronary atherosclerosis. The heart is not enlarged.  There is no pericardial effusion.    Lungs: Compared to the recent CT of March 15, 2020, there are increased patchy groundglass and airspace opacities at the lung apices with septal thickening indicative of multifocal infection. Left base streaky atelectasis.    Pleura: Unremarkable.  There is no free air or focal collection.  No free fluid.    Liver: 2.2 cm bilobed cyst in theleft lobe.  Spleen: Normal.  Gallbladder: Unremarkable.  Biliary tree: Unremarkable.  Adrenal glands: Normal.  Pancreas: Normal.  Kidneys: No perinephric stranding, renal edema, hydronephrosis, obstructing stone, or intrarenal stone. There is an air and entirely    Bowel:  There is no small bowel obstruction.  The appendix is unremarkable. Diverticulosis of the left and sigmoid colon. No significant fecal load.    Bladder: Incompletely distended.  Pelvic organs: No free fluid.    There is no significant adenopathy.  Vasculature: The aorta is not dilated. Mild atherosclerotic vascular calcification. There is    Retroperitoneum: There is no mass.  Bones: Chronic degenerative changes of the spine.  Subcutaneous tissues: Unremarkable.    IMPRESSION:    Be compared to the recent CT of March 15, 2020, there are increased bilateral upper lobe opacities indicative of worsening multifocal pneumonia.  No explanation for the abdominal pain on this CT.  Diverticulosis.    < end of copied text >        Radiology: all available radiological tests reviewed    Advanced directives addressed: full resuscitation

## 2020-03-27 NOTE — H&P ADULT - ASSESSMENT
80yo female with PMhx of glaucoma, HTN, OA, recent admission for new onset afib, and MRSA PNA, readmitted for PNA, sob, CP, NSTEMI      PNA  Rule out COVID19 (sent by ER)  CP  NSTEMI  SOB  Cough  CP  Afib    - currently afebrile, BP , in NAD, comfortable  - Labs, imaging, chart reviewed  - CT chest with worsening opacities in upper lobes    PLAN:  - supp o2 as needed, MDI as needed  - ABx, Cefepime, Vanco  - check Procal, BCx  - ID eval  - Pulm Eval  - Heparin drip  - HOLD eliquis  - ASA, Plavix, Statin  - HOLD BB, BP borderline  - GI ppx  - DVT ppx  - Contact/Droplet  - Admit ,tele

## 2020-03-27 NOTE — ED ADULT NURSE REASSESSMENT NOTE - NS ED NURSE REASSESS COMMENT FT1
MD Gallegos made aware of aPTT 123.1 at 04:58 with heparin gtt infusing at 9.5 mL/hr. Heparin gtt stopped, stat aPTT ordered; phlebotomist bedside collecting specimen. Charge RN made aware. Patient A&Ox4, resting comfortably in bed.  VSS, denies pain/discomfort. No overt s/s of bleeding present. Safety & comfort measures in place, will continue to monitor.

## 2020-03-27 NOTE — H&P ADULT - NSHPREVIEWOFSYSTEMS_GEN_ALL_CORE
(-)Fever, chills, headache, dizziness, palpitations, abd pain, n/v/d, leg swelling  (+) cough, chest pain, sob

## 2020-03-27 NOTE — ED ADULT NURSE REASSESSMENT NOTE - NS ED NURSE REASSESS COMMENT FT1
received pt alert and awake, able to make needs known, no acute respiratory distress, respirations even and unlabored, c/o mid back pain, repositioned in bed for comfort with positive relief, hypotension noted, MD lebron made aware, initiated NS bolus as ordered, resting in bed comfortably, will continue to monitor.

## 2020-03-27 NOTE — CONSULT NOTE ADULT - ASSESSMENT
Patient is 80yo female with PMhx of glaucoma, HTN, OA, discharged 2 days ago for MRSA PNA on Doxy PO, new onset Afib, (COVID19 negative during hospitalization), admitted on 3/27 for evaluation of shortness of breath and chest pain; she feels that she was discharged too early. Patient is poor historian and history per medical record.     1. Patient admitted with worsening multifocal pneumonia; has history of MRSA pneumonia; possible viral syndrome secondary to COVID-19   - follow up cultures   - serial cbc and monitor temperature   - iv hydration and supportive care   - reviewed prior medical records to evaluate for resistant or atypical pathogens   - oxygen and nebs as needed   - COVID-19 PCR sent and pending  - agree with cefepime as ordered  - will add vancomycin to treat resistant bacteria   - check vancomycin trough prior to fourth dose   - continue isolation  2. other issues; per medicine

## 2020-03-28 DIAGNOSIS — I48.91 UNSPECIFIED ATRIAL FIBRILLATION: ICD-10-CM

## 2020-03-28 DIAGNOSIS — J18.9 PNEUMONIA, UNSPECIFIED ORGANISM: ICD-10-CM

## 2020-03-28 DIAGNOSIS — I10 ESSENTIAL (PRIMARY) HYPERTENSION: ICD-10-CM

## 2020-03-28 DIAGNOSIS — I21.4 NON-ST ELEVATION (NSTEMI) MYOCARDIAL INFARCTION: ICD-10-CM

## 2020-03-28 LAB
ALBUMIN SERPL ELPH-MCNC: 2.2 G/DL — LOW (ref 3.3–5)
ALP SERPL-CCNC: 183 U/L — HIGH (ref 40–120)
ALT FLD-CCNC: 21 U/L — SIGNIFICANT CHANGE UP (ref 12–78)
ANION GAP SERPL CALC-SCNC: 9 MMOL/L — SIGNIFICANT CHANGE UP (ref 5–17)
APPEARANCE UR: CLEAR — SIGNIFICANT CHANGE UP
APTT BLD: 65.6 SEC — HIGH (ref 27.5–36.3)
APTT BLD: 66.3 SEC — HIGH (ref 27.5–36.3)
AST SERPL-CCNC: 21 U/L — SIGNIFICANT CHANGE UP (ref 15–37)
BILIRUB SERPL-MCNC: 0.4 MG/DL — SIGNIFICANT CHANGE UP (ref 0.2–1.2)
BILIRUB UR-MCNC: NEGATIVE — SIGNIFICANT CHANGE UP
BUN SERPL-MCNC: 16 MG/DL — SIGNIFICANT CHANGE UP (ref 7–23)
CALCIUM SERPL-MCNC: 8 MG/DL — LOW (ref 8.5–10.1)
CHLORIDE SERPL-SCNC: 112 MMOL/L — HIGH (ref 96–108)
CO2 SERPL-SCNC: 20 MMOL/L — LOW (ref 22–31)
COLOR SPEC: YELLOW — SIGNIFICANT CHANGE UP
CREAT SERPL-MCNC: 0.94 MG/DL — SIGNIFICANT CHANGE UP (ref 0.5–1.3)
DIFF PNL FLD: ABNORMAL
GLUCOSE SERPL-MCNC: 95 MG/DL — SIGNIFICANT CHANGE UP (ref 70–99)
GLUCOSE UR QL: NEGATIVE MG/DL — SIGNIFICANT CHANGE UP
HCT VFR BLD CALC: 29.8 % — LOW (ref 34.5–45)
HGB BLD-MCNC: 9.9 G/DL — LOW (ref 11.5–15.5)
KETONES UR-MCNC: NEGATIVE — SIGNIFICANT CHANGE UP
LEUKOCYTE ESTERASE UR-ACNC: ABNORMAL
MCHC RBC-ENTMCNC: 29.7 PG — SIGNIFICANT CHANGE UP (ref 27–34)
MCHC RBC-ENTMCNC: 33.2 GM/DL — SIGNIFICANT CHANGE UP (ref 32–36)
MCV RBC AUTO: 89.5 FL — SIGNIFICANT CHANGE UP (ref 80–100)
NITRITE UR-MCNC: NEGATIVE — SIGNIFICANT CHANGE UP
PH UR: 5 — SIGNIFICANT CHANGE UP (ref 5–8)
PLATELET # BLD AUTO: 268 K/UL — SIGNIFICANT CHANGE UP (ref 150–400)
POTASSIUM SERPL-MCNC: 3.5 MMOL/L — SIGNIFICANT CHANGE UP (ref 3.5–5.3)
POTASSIUM SERPL-SCNC: 3.5 MMOL/L — SIGNIFICANT CHANGE UP (ref 3.5–5.3)
PROCALCITONIN SERPL-MCNC: 1.34 NG/ML — HIGH (ref 0.02–0.1)
PROT SERPL-MCNC: 6.6 GM/DL — SIGNIFICANT CHANGE UP (ref 6–8.3)
PROT UR-MCNC: 100 MG/DL
RBC # BLD: 3.33 M/UL — LOW (ref 3.8–5.2)
RBC # FLD: 15 % — HIGH (ref 10.3–14.5)
SODIUM SERPL-SCNC: 141 MMOL/L — SIGNIFICANT CHANGE UP (ref 135–145)
SP GR SPEC: 1.02 — SIGNIFICANT CHANGE UP (ref 1.01–1.02)
UROBILINOGEN FLD QL: NEGATIVE MG/DL — SIGNIFICANT CHANGE UP
WBC # BLD: 21.01 K/UL — HIGH (ref 3.8–10.5)
WBC # FLD AUTO: 21.01 K/UL — HIGH (ref 3.8–10.5)

## 2020-03-28 PROCEDURE — 99223 1ST HOSP IP/OBS HIGH 75: CPT

## 2020-03-28 PROCEDURE — 93010 ELECTROCARDIOGRAM REPORT: CPT

## 2020-03-28 PROCEDURE — 99233 SBSQ HOSP IP/OBS HIGH 50: CPT

## 2020-03-28 RX ORDER — PANTOPRAZOLE SODIUM 20 MG/1
40 TABLET, DELAYED RELEASE ORAL
Refills: 0 | Status: DISCONTINUED | OUTPATIENT
Start: 2020-03-28 | End: 2020-04-02

## 2020-03-28 RX ORDER — METOPROLOL TARTRATE 50 MG
12.5 TABLET ORAL EVERY 12 HOURS
Refills: 0 | Status: DISCONTINUED | OUTPATIENT
Start: 2020-03-28 | End: 2020-03-29

## 2020-03-28 RX ORDER — NYSTATIN CREAM 100000 [USP'U]/G
1 CREAM TOPICAL THREE TIMES A DAY
Refills: 0 | Status: DISCONTINUED | OUTPATIENT
Start: 2020-03-28 | End: 2020-04-02

## 2020-03-28 RX ADMIN — CEFEPIME 1000 MILLIGRAM(S): 1 INJECTION, POWDER, FOR SOLUTION INTRAMUSCULAR; INTRAVENOUS at 09:11

## 2020-03-28 RX ADMIN — CEFEPIME 1000 MILLIGRAM(S): 1 INJECTION, POWDER, FOR SOLUTION INTRAMUSCULAR; INTRAVENOUS at 20:47

## 2020-03-28 RX ADMIN — NYSTATIN CREAM 1 APPLICATION(S): 100000 CREAM TOPICAL at 20:52

## 2020-03-28 RX ADMIN — ATORVASTATIN CALCIUM 40 MILLIGRAM(S): 80 TABLET, FILM COATED ORAL at 20:48

## 2020-03-28 RX ADMIN — HEPARIN SODIUM 1100 UNIT(S)/HR: 5000 INJECTION INTRAVENOUS; SUBCUTANEOUS at 01:59

## 2020-03-28 RX ADMIN — PANTOPRAZOLE SODIUM 40 MILLIGRAM(S): 20 TABLET, DELAYED RELEASE ORAL at 21:17

## 2020-03-28 RX ADMIN — Medication 81 MILLIGRAM(S): at 11:32

## 2020-03-28 RX ADMIN — HEPARIN SODIUM 1100 UNIT(S)/HR: 5000 INJECTION INTRAVENOUS; SUBCUTANEOUS at 10:49

## 2020-03-28 RX ADMIN — CLOPIDOGREL BISULFATE 75 MILLIGRAM(S): 75 TABLET, FILM COATED ORAL at 11:32

## 2020-03-28 RX ADMIN — Medication 1 TABLET(S): at 06:01

## 2020-03-28 RX ADMIN — Medication 12.5 MILLIGRAM(S): at 17:22

## 2020-03-28 RX ADMIN — Medication 1 TABLET(S): at 17:22

## 2020-03-28 RX ADMIN — Medication 250 MILLIGRAM(S): at 14:00

## 2020-03-28 NOTE — PROGRESS NOTE ADULT - ASSESSMENT
78yo female with PMhx of glaucoma, HTN, OA, recent admission for new onset afib, and MRSA PNA, readmitted for PNA, sob, CP, NSTEMI      PNA  Rule out COVID19 (sent by ER)  CP  NSTEMI  SOB  Cough  CP  Afib    - currently afebrile, BP , in NAD, comfortable  - Labs, imaging, chart reviewed  - CT chest with worsening opacities in upper lobes    PLAN:  - supp o2 as needed, MDI as needed  - ABx, Cefepime, Vanco  - check Procal, BCx  - ID eval  - Pulm Eval  - Heparin drip  - HOLD eliquis  - ASA, Plavix, Statin  - HOLD BB, BP borderline  - GI ppx  - DVT ppx  - Contact/Droplet  - Admit ,tele 80yo female with PMhx of glaucoma, HTN, OA, recent admission for new onset afib, and MRSA PNA admitted for:     1. NSTEMI  Trop peaked at 9, last ~4  EKG: no ischemic changes  ECHO done last admission 3/18L EF 60%  C/w Heparin Drip  C/w ASA, Plavix, Lipitor, started on metoprolol   CArdio eval, D/w DR Kraus, plan for LHC on Monday     2. Recurrent PNA, suspect GNR  and resistant bacteria  COVID PCR neg   F/u BCX and UCX  C/w IV Abxs: cefepime and VAnco  Mucinex  Albuterol   ID eval     3. Afib  In SR   Off Eliquis due to NSTEMI, on heparin drip  Monitor on tele      4. GI PPX      5. DVT PPX: heparin

## 2020-03-28 NOTE — CONSULT NOTE ADULT - SUBJECTIVE AND OBJECTIVE BOX
Patient is a 79y old  Female who presents with a chief complaint of SOB, CP (27 Mar 2020 18:04)      HPI:  Patient is 78yo female with PMhx of glaucoma, HTN, OA, discharged 2 days ago for MRSA PNA on Doxy PO, new onset Afib, (COVID19 negative during hospitalization), represents to the hospital complaining of SOB and CP. Pt notes she took a nap, and woke up with SOB and CP. Denies fever, chills, palpitations, HA, dizziness. No other constitutional symptoms. (27 Mar 2020 09:48)      PAST MEDICAL & SURGICAL HISTORY:  OA (osteoarthritis)  Glaucoma  HTN (hypertension)  Status post glaucoma surgery: and cataract      PREVIOUS DIAGNOSTIC TESTING:      MEDICATIONS  (STANDING):  aspirin  chewable 81 milliGRAM(s) Oral daily  atorvastatin 40 milliGRAM(s) Oral at bedtime  cefepime  Injectable. 1000 milliGRAM(s) IV Push every 12 hours  clopidogrel Tablet 75 milliGRAM(s) Oral daily  heparin  Infusion.  Unit(s)/Hr (9.5 mL/Hr) IV Continuous <Continuous>  lactobacillus acidophilus 1 Tablet(s) Oral two times a day  sodium chloride 0.9% Bolus 1000 milliLiter(s) IV Bolus once  sodium chloride 0.9%. 1000 milliLiter(s) (75 mL/Hr) IV Continuous <Continuous>  vancomycin  IVPB 1000 milliGRAM(s) IV Intermittent daily    MEDICATIONS  (PRN):  heparin  Injectable 4700 Unit(s) IV Push every 6 hours PRN For aPTT less than 40      FAMILY HISTORY:  FH: CAD (coronary artery disease): in mother and father, both   FH: pancreatic cancer: in sister,   FHx: colon cancer: in brother,       SOCIAL HISTORY:  ***    REVIEW OF SYSTEM:  dyspnea, fatigue, all other systems reviewed and are negative   Vital Signs Last 24 Hrs  T(C): 36.8 (28 Mar 2020 09:57), Max: 37.3 (27 Mar 2020 21:49)  T(F): 98.3 (28 Mar 2020 09:57), Max: 99.1 (27 Mar 2020 21:49)  HR: 100 (28 Mar 2020 09:57) (87 - 103)  BP: 144/69 (28 Mar 2020 09:57) (107/38 - 144/69)  BP(mean): --  RR: 20 (28 Mar 2020 09:57) (18 - 20)  SpO2: 96% (28 Mar 2020 09:57) (93% - 100%)    I&O's Summary    27 Mar 2020 07:01  -  28 Mar 2020 07:00  --------------------------------------------------------  IN: 457 mL / OUT: 0 mL / NET: 457 mL    28 Mar 2020 07:01  -  28 Mar 2020 10:20  --------------------------------------------------------  IN: 0 mL / OUT: 1 mL / NET: -1 mL      PHYSICAL EXAM  General Appearance: cooperative, no acute distress,   HEENT: PERRL, conjunctiva clear, EOM's intact, non injected pharynx, no exudate, TM   normal  Neck: Supple, , no adenopathy, thyroid: not enlarged, no carotid bruit or JVD  Back: Symmetric, no  tenderness,no soft tissue tenderness  Lungs: Diminished in the upper lobes, coarse at the bases  Heart: Regular rate and rhythm, S1, S2 normal, no murmur, rub or gallop  Abdomen: Soft, non-tender, bowel sounds active , no hepatosplenomegaly  Extremities: no cyanosis or edema, no joint swelling  Skin: Skin color, texture normal, no rashes   Neurologic: Alert and oriented X3 , cranial nerves intact, sensory and motor normal,    ECG:    LABS:                          9.9    21.01 )-----------( 268      ( 28 Mar 2020 08:12 )             29.8     03-28    141  |  112<H>  |  16  ----------------------------<  95  3.5   |  20<L>  |  0.94    Ca    8.0<L>      28 Mar 2020 08:12    TPro  6.6  /  Alb  2.2<L>  /  TBili  0.4  /  DBili  x   /  AST  21  /  ALT  21  /  AlkPhos  183<H>  03-28    CARDIAC MARKERS ( 27 Mar 2020 13:19 )  4.380 ng/mL / x     / x     / x     / x      CARDIAC MARKERS ( 27 Mar 2020 04:58 )  9.730 ng/mL / x     / x     / x     / x      CARDIAC MARKERS ( 26 Mar 2020 21:54 )  3.920 ng/mL / x     / x     / x     / x      CARDIAC MARKERS ( 26 Mar 2020 19:24 )  1.420 ng/mL / x     / x     / x     / x              PT/INR - ( 26 Mar 2020 21:54 )   PT: 16.0 sec;   INR: 1.43 ratio         PTT - ( 28 Mar 2020 08:12 )  PTT:65.6 sec  Urinalysis Basic - ( 28 Mar 2020 01:50 )    Color: Yellow / Appearance: Clear / S.020 / pH: x  Gluc: x / Ketone: Negative  / Bili: Negative / Urobili: Negative mg/dL   Blood: x / Protein: 100 mg/dL / Nitrite: Negative   Leuk Esterase: Moderate / RBC: 11-25 /HPF / WBC 26-50   Sq Epi: x / Non Sq Epi: Moderate / Bacteria: Moderate            RADIOLOGY & ADDITIONAL STUDIES:

## 2020-03-28 NOTE — CONSULT NOTE ADULT - PROBLEM SELECTOR RECOMMENDATION 9
Typical chest pain.  Peak trop 9.  ECG w/ no ischemic changes.  Plan for cardiac cath on mon.  Cont. ASA, statin, start metoprolol tartrate 12.5 BID, hep gtt, hold eliquis.

## 2020-03-28 NOTE — PROGRESS NOTE ADULT - ASSESSMENT
Patient is 80yo female with PMhx of glaucoma, HTN, OA, discharged 2 days ago for MRSA PNA on Doxy PO, new onset Afib, (COVID19 negative during hospitalization), admitted on 3/27 for evaluation of shortness of breath and chest pain; she feels that she was discharged too early. Patient is poor historian and history per medical record.     1. Patient admitted with worsening multifocal pneumonia; has history of MRSA pneumonia; possible viral syndrome secondary to COVID-19   - follow up cultures   - serial cbc and monitor temperature   - iv hydration and supportive care   - reviewed prior medical records to evaluate for resistant or atypical pathogens   - oxygen and nebs as needed   - COVID-19 PCR is not detected  - day #2 cefepime and vancomycin   - tolerating antibiotics without rashes or side effects   - check vancomycin trough prior to fourth dose   - continue isolation but use contact and droplet, no further airborne isolation  2. other issues; per medicine

## 2020-03-28 NOTE — CONSULT NOTE ADULT - PROBLEM SELECTOR RECOMMENDATION 3
Persistent since Mar 17th despite Abx therapy w/ elevated WBC count.  On cefepime & vanc.  BC neg to date  ID consulted await comments on cath in setting of persistent infection. Persistent since Mar 17th despite Abx therapy w/ elevated WBC count.  On cefepime & vanc.  BC neg to date.  Discussed w/ ID, pt is COVID neg suspect persistent pneumonia, WBC count decreasing ID to confirm OK for cardiac cath in setting of persistent infection.

## 2020-03-28 NOTE — CONSULT NOTE ADULT - ASSESSMENT
1) Pneumonia   2) Abnormal CT Chest   3) Dyspnea  4) Cough  5) AF     80 yo female with a pmh/o Glaucoma, cataracts, OA, HTN, who presents to the ED with niece due to her niece noticing pt having cough x 1 week, decreased to no appetite, and weakness. Niece called EMS from pt home after visiting and EMS noted pt HR to be 210 in aflutter. Pt given IVF and 20mg cardizem en route and HR improved, pt now in NSR at 80's. Pt is a very poor historian however upon interview, admits to having a pre op assessment (cataract) in January where her RN had mentioned to her that her HR was irregular. Pt then admits to intermittent chest pressure which is centralized across chest and radiates down right arm to fingertips. Pt states this chest pressure and wheezing has been treated at home with ASA, advil, and yesterday started OTC cold medicine with pseudoephinephrine ingredient. Pt states pressure had awoken her from her sleep and is sure that she only has been taking two advil and one additional aspirin to her daily in order to control sx. Pt denies fever, however states that she did have chills. Endorses productive cough with off white sputum, and wheezing.  Reviewed CT Chest imaging, labs   COVID 19 negative, repeat negative   RVP + for enterovirus noted   Findings are consistent with a pneumonia secondary to viral etiology  She is a non-smoker with no prior pulmonary history and she states she has not seen a pulmonologist in the past   Procalcitonin reviewed, 2.08 suggesting underlying infectious process   Continue Symbicort   Will continue to monitor  Appreciate ID recommendations

## 2020-03-28 NOTE — PROGRESS NOTE ADULT - SUBJECTIVE AND OBJECTIVE BOX
Date of service: 03-28-20 @ 13:46    Patient sitting in bed; wants more blankets other wise no specific complaints  afebrile        ROS: no fever or chills; denies dizziness, no HA, no SOB or cough, no abdominal pain, no diarrhea or constipation; no dysuria, no urinary frequency, no legs pain, no rashes    MEDICATIONS  (STANDING):  aspirin  chewable 81 milliGRAM(s) Oral daily  atorvastatin 40 milliGRAM(s) Oral at bedtime  cefepime  Injectable. 1000 milliGRAM(s) IV Push every 12 hours  clopidogrel Tablet 75 milliGRAM(s) Oral daily  heparin  Infusion.  Unit(s)/Hr (9.5 mL/Hr) IV Continuous <Continuous>  lactobacillus acidophilus 1 Tablet(s) Oral two times a day  sodium chloride 0.9% Bolus 1000 milliLiter(s) IV Bolus once  sodium chloride 0.9%. 1000 milliLiter(s) (75 mL/Hr) IV Continuous <Continuous>  vancomycin  IVPB 1000 milliGRAM(s) IV Intermittent daily    MEDICATIONS  (PRN):  heparin  Injectable 4700 Unit(s) IV Push every 6 hours PRN For aPTT less than 40      Vital Signs Last 24 Hrs  T(C): 36.8 (28 Mar 2020 09:57), Max: 37.3 (27 Mar 2020 21:49)  T(F): 98.3 (28 Mar 2020 09:57), Max: 99.1 (27 Mar 2020 21:49)  HR: 100 (28 Mar 2020 09:57) (100 - 103)  BP: 144/69 (28 Mar 2020 09:57) (114/85 - 144/69)  BP(mean): --  RR: 20 (28 Mar 2020 09:57) (18 - 20)  SpO2: 96% (28 Mar 2020 09:57) (93% - 98%)    Physical Exam:          Constitutional: frail looking  HEENT: NC/AT, EOMI, PERRLA, conjunctivae clear; ears and nose atraumatic; pharynx clear  Neck: supple; thyroid not palpable  Back: no tenderness  Respiratory: respiratory effort normal; clear to auscultation  Cardiovascular: S1S2 regular, no murmurs  Abdomen: soft, not tender, not distended, positive BS; no liver or spleen organomegaly  Genitourinary: no suprapubic tenderness  Musculoskeletal: no muscle tenderness, no joint swelling or tenderness  Neurological/ Psychiatric: moving all extremities  Skin: no rashes; no palpable lesions    Labs: all available labs reviewed                     Labs:                        9.9    21.01 )-----------( 268      ( 28 Mar 2020 08:12 )             29.8     03-28    141  |  112<H>  |  16  ----------------------------<  95  3.5   |  20<L>  |  0.94    Ca    8.0<L>      28 Mar 2020 08:12    TPro  6.6  /  Alb  2.2<L>  /  TBili  0.4  /  DBili  x   /  AST  21  /  ALT  21  /  AlkPhos  183<H>  03-28       COVID-19 PCR . (03.27.20 @ 00:37)    COVID-19 PCR: NotDetec: As with all clinical testing, results should be correlated with clinical  findings.  This test has been validated by [employer] to be accurate;  though it has not been FDA cleared/approved by the usual pathway.  As with all laboratory tests, results should be correlated with clinical  findings.        Cultures:       Culture - Blood (collected 03-26-20 @ 21:52)  Source: .Blood None  Preliminary Report (03-28-20 @ 10:02):    No growth to date.    Culture - Blood (collected 03-26-20 @ 21:52)  Source: .Blood None  Preliminary Report (03-28-20 @ 10:02):    No growth to date.              < from: CT Chest No Cont (03.26.20 @ 22:28) >    EXAM:  CT ABDOMEN AND PELVIS                          EXAM:  CT CHEST                            PROCEDURE DATE:  03/26/2020          INTERPRETATION:  CT of the chest, abdomen and pelvis    Indication: worsening pneumonia    Technique: Axial imageswere obtained from the thoracic inlet through pubic symphysis without oral or IV contrast. Reformatted coronal and sagittal images were submitted.    Comparison: CT of 3/15/2020    FINDINGS:    Evaluation of the solid abdominal organs is limited without contrast.    The visualized neck, axilla and subcutaneous tissues are unremarkable.    The tracheobronchial tree is patent centrally.  There is no mediastinal hematoma.  The great vessels are not enlarged. There are nonspecific mediastinal lymph nodes measuring up to 1.3 x 1.7 cm in the right paratracheal region. Coronary atherosclerosis. The heart is not enlarged.  There is no pericardial effusion.    Lungs: Compared to the recent CT of March 15, 2020, there are increased patchy groundglass and airspace opacities at the lung apices with septal thickening indicative of multifocal infection. Left base streaky atelectasis.    Pleura: Unremarkable.  There is no free air or focal collection.  No free fluid.    Liver: 2.2 cm bilobed cyst in theleft lobe.  Spleen: Normal.  Gallbladder: Unremarkable.  Biliary tree: Unremarkable.  Adrenal glands: Normal.  Pancreas: Normal.  Kidneys: No perinephric stranding, renal edema, hydronephrosis, obstructing stone, or intrarenal stone. There is an air and entirely    Bowel:  There is no small bowel obstruction.  The appendix is unremarkable. Diverticulosis of the left and sigmoid colon. No significant fecal load.    Bladder: Incompletely distended.  Pelvic organs: No free fluid.    There is no significant adenopathy.  Vasculature: The aorta is not dilated. Mild atherosclerotic vascular calcification. There is    Retroperitoneum: There is no mass.  Bones: Chronic degenerative changes of the spine.  Subcutaneous tissues: Unremarkable.    IMPRESSION:    Be compared to the recent CT of March 15, 2020, there are increased bilateral upper lobe opacities indicative of worsening multifocal pneumonia.  No explanation for the abdominal pain on this CT.  Diverticulosis.    < end of copied text >        Radiology: all available radiological tests reviewed    Advanced directives addressed: full resuscitation no

## 2020-03-28 NOTE — CONSULT NOTE ADULT - PROBLEM SELECTOR RECOMMENDATION 2
paroxysmal, currently NSR.  CHADS2-VASc=4 (DM, HTN, >75).  Cont. hep gtt for ACS. Start metoprolol tartrate 12.5 po BID If PCI needed will likely need P2Y12 inhibitor & eliquis w/o ASA.

## 2020-03-28 NOTE — CONSULT NOTE ADULT - SUBJECTIVE AND OBJECTIVE BOX
80yo female w/ [id number] diagnosed , HTN, glaucoma, MRSA pna discharged 2 days ago presented to hospital 2 days ago w/ dyspnea & chest pain.  At home, woke up w/ pressure type chest pain L sided lasting hours, worse w/ activity, relieved w/ rest, no atypical features (not worse w/ palpation, inspiration, position).  Called her relative & pt brought to ED.      ECG w/ no ischemic changes, initial trop 1.42, peaked at 9.73 most recent 4.38.  In ED CT chest performed w/ bilateral opacities upper lobes - COVID test sent & was neg.  Antibiotics started.      Currently pt denies chest pain, palpitations.  Reports mild dyspnea & productive cough.    PAST MEDICAL & SURGICAL HISTORY:  OA (osteoarthritis)  Glaucoma  HTN (hypertension)  Status post glaucoma surgery: and cataract      Allergies  No Known Allergies  Intolerances    SOCIAL HISTORY: denies smoking, EtOH, drug use    FAMILY HISTORY:  FH: CAD (coronary artery disease): in mother and father, both   FH: pancreatic cancer: in sister,   FHx: colon cancer: in brother,       MEDICATIONS:  MEDICATIONS  (STANDING):  aspirin  chewable 81 milliGRAM(s) Oral daily  atorvastatin 40 milliGRAM(s) Oral at bedtime  cefepime  Injectable. 1000 milliGRAM(s) IV Push every 12 hours  clopidogrel Tablet 75 milliGRAM(s) Oral daily  heparin  Infusion.  Unit(s)/Hr (9.5 mL/Hr) IV Continuous <Continuous>  lactobacillus acidophilus 1 Tablet(s) Oral two times a day  sodium chloride 0.9% Bolus 1000 milliLiter(s) IV Bolus once  sodium chloride 0.9%. 1000 milliLiter(s) (75 mL/Hr) IV Continuous <Continuous>  vancomycin  IVPB 1000 milliGRAM(s) IV Intermittent daily    MEDICATIONS  (PRN):  heparin  Injectable 4700 Unit(s) IV Push every 6 hours PRN For aPTT less than 40      REVIEW OF SYSTEMS:    CONSTITUTIONAL: No weakness, fevers or chills  EYES/ENT: No visual changes;  No vertigo or throat pain   NECK: No pain or stiffness  RESPIRATORY: + cough, wheezing, hemoptysis; +shortness of breath  CARDIOVASCULAR: + chest pain or palpitations  GASTROINTESTINAL: No abdominal or epigastric pain. No nausea, vomiting, or hematemesis; No diarrhea or constipation. No melena or hematochezia.  GENITOURINARY: No dysuria, frequency or hematuria  NEUROLOGICAL: No numbness or weakness  SKIN: No itching, burning, rashes, or lesions   All other review of systems is negative unless indicated above    Vital Signs Last 24 Hrs  T(C): 36.8 (28 Mar 2020 09:57), Max: 37.3 (27 Mar 2020 21:49)  T(F): 98.3 (28 Mar 2020 09:57), Max: 99.1 (27 Mar 2020 21:49)  HR: 100 (28 Mar 2020 09:57) (100 - 103)  BP: 144/69 (28 Mar 2020 09:57) (114/85 - 144/69)  BP(mean): --  RR: 20 (28 Mar 2020 09:57) (18 - 20)  SpO2: 96% (28 Mar 2020 09:57) (93% - 98%)    I&O's Summary    27 Mar 2020 07:01  -  28 Mar 2020 07:00  --------------------------------------------------------  IN: 457 mL / OUT: 0 mL / NET: 457 mL    28 Mar 2020 07:01  -  28 Mar 2020 13:43  --------------------------------------------------------  IN: 0 mL / OUT: 1 mL / NET: -1 mL        PHYSICAL EXAM:    Constitutional: NAD, awake and alert,  HEENT: PERR, EOMI,    Neck:  supple,  No JVD  Respiratory: crackles & wheezing bilaterally posteriorly, no rhonchi  Cardiovascular: S1 and S2, regular rate and rhythm, no Murmurs, gallops or rubs  Gastrointestinal: Bowel Sounds present, soft, nontender.   Extremities: No peripheral edema. No clubbing or cyanosis.  Vascular: 2+ peripheral pulses  Neurological: A/O x 3, no focal deficits      LABS: All Labs Reviewed:                        9.9    21.01 )-----------( 268      ( 28 Mar 2020 08:12 )             29.8                         11.0   31.27 )-----------( 280      ( 27 Mar 2020 13:19 )             33.1                         11.8   30.04 )-----------( 285      ( 27 Mar 2020 04:58 )             35.4     28 Mar 2020 08:12    141    |  112    |  16     ----------------------------<  95     3.5     |  20     |  0.94   26 Mar 2020 19:24    133    |  105    |  30     ----------------------------<  184    3.6     |  17     |  1.69     Ca    8.0        28 Mar 2020 08:12  Ca    8.8        26 Mar 2020 19:24    TPro  6.6    /  Alb  2.2    /  TBili  0.4    /  DBili  x      /  AST  21     /  ALT  21     /  AlkPhos  183    28 Mar 2020 08:12  TPro  7.8    /  Alb  2.7    /  TBili  0.3    /  DBili  x      /  AST  35     /  ALT  29     /  AlkPhos  234    26 Mar 2020 19:24    PT/INR - ( 26 Mar 2020 21:54 )   PT: 16.0 sec;   INR: 1.43 ratio         PTT - ( 28 Mar 2020 08:12 )  PTT:65.6 sec  CARDIAC MARKERS ( 27 Mar 2020 13:19 )  4.380 ng/mL / x     / x     / x     / x      CARDIAC MARKERS ( 27 Mar 2020 04:58 )  9.730 ng/mL / x     / x     / x     / x      CARDIAC MARKERS ( 26 Mar 2020 21:54 )  3.920 ng/mL / x     / x     / x     / x      CARDIAC MARKERS ( 26 Mar 2020 19:24 )  1.420 ng/mL / x     / x     / x     / x          < from: TTE Echo Complete w/o contrast w/ Doppler (20 @ 11:37) >   Impression     Summary     The left ventricle is normal in size, wall thickness, wall motion and   contractility.   Estimated left ventricular ejection fraction is 65 %.   Normal appearing left atrium.   Trace aortic regurgitation is present.   Mild to moderate tricuspid valve regurgitationis present.   Normal pulmonary artery systolic pressures.     Signature     ----------------------------------------------------------------   Electronically signed by Randal Kraus MD(Interpreting   physician) on 2020 08:08 AM   ----------------------------------------------------------------    < end of copied text >    < from: CT Chest No Cont (20 @ 22:28) >  IMPRESSION:    Be compared to the recent CT of March 15, 2020, there are increased bilateral upper lobe opacities indicative of worsening multifocal pneumonia.  No explanation for the abdominal pain on this CT.  Diverticulosis.                DEANDRE PUTNAM   This document has been electronically signed. Mar 26 2020 10:40PM    < end of copied text >      EKG:  Today's ECG: sinus tachycardia, nonspecific ST-T changes.

## 2020-03-28 NOTE — PROGRESS NOTE ADULT - SUBJECTIVE AND OBJECTIVE BOX
CC: SOB, CP (28 Mar 2020 10:19)    HPI: 78yo female with PMhx of glaucoma, HTN, OA, discharged 2 days ago for MRSA PNA on Doxy PO, new onset Afib, (COVID19 negative during hospitalization), represents to the hospital complaining of SOB and CP. Pt notes she took a nap, and woke up with SOB and CP. Denies fever, chills, palpitations, HA, dizziness. No other constitutional symptoms. (27 Mar 2020 09:48)    INTERVAL HPI/ OVERNIGHT EVENTS:     Vital Signs Last 24 Hrs  T(C): 36.8 (28 Mar 2020 09:57), Max: 37.3 (27 Mar 2020 21:49)  T(F): 98.3 (28 Mar 2020 09:57), Max: 99.1 (27 Mar 2020 21:49)  HR: 100 (28 Mar 2020 09:57) (100 - 103)  BP: 144/69 (28 Mar 2020 09:57) (114/85 - 144/69)  RR: 20 (28 Mar 2020 09:57) (18 - 20)  SpO2: 96% (28 Mar 2020 09:57) (93% - 98%)      REVIEW OF SYSTEMS:  All other review of systems is negative unless indicated above.        PHYSICAL EXAM:  General: Well developed; well nourished; in no acute distress  Eyes: PERRLA, EOMI; conjunctiva and sclera clear  Head: Normocephalic; atraumatic  ENMT: No nasal discharge; airway clear  Neck: Supple; non tender; no masses  Respiratory: No wheezes, rales or rhonchi  Cardiovascular: Regular rate and rhythm. S1 and S2 Normal; No murmurs, gallops or rubs  Gastrointestinal: Soft non-tender non-distended; Normal bowel sounds  Genitourinary: No  suprapubic  tenderness  Extremities: Normal range of motion, No clubbing, cyanosis or edema  Vascular: Peripheral pulses palpable 2+ bilaterally  Neurological: Alert and oriented x4  Skin: Warm and dry. No acute rash  Lymph Nodes: No acute cervical adenopathy  Musculoskeletal: Normal muscle tone, without deformities  Psychiatric: Cooperative and appropriate    LABS:     CARDIAC MARKERS ( 27 Mar 2020 13:19 )  4.380 ng/mL / x     / x     / x     / x      CARDIAC MARKERS ( 27 Mar 2020 04:58 )  9.730 ng/mL / x     / x     / x     / x      CARDIAC MARKERS ( 26 Mar 2020 21:54 )  3.920 ng/mL / x     / x     / x     / x      CARDIAC MARKERS ( 26 Mar 2020 19:24 )  1.420 ng/mL / x     / x     / x     / x                                9.9    21.01 )-----------( 268      ( 28 Mar 2020 08:12 )             29.8     28 Mar 2020 08:12    141    |  112    |  16     ----------------------------<  95     3.5     |  20     |  0.94     Ca    8.0        28 Mar 2020 08:12    TPro  6.6    /  Alb  2.2    /  TBili  0.4    /  DBili  x      /  AST  21     /  ALT  21     /  AlkPhos  183    28 Mar 2020 08:12  PT/INR - ( 26 Mar 2020 21:54 )   PT: 16.0 sec;   INR: 1.43 ratio         PTT - ( 28 Mar 2020 08:12 )  PTT:65.6 sec  CAPILLARY BLOOD GLUCOSE  LIVER FUNCTIONS - ( 28 Mar 2020 08:12 )  Alb: 2.2 g/dL / Pro: 6.6 gm/dL / ALK PHOS: 183 U/L / ALT: 21 U/L / AST: 21 U/L / GGT: x           Urinalysis Basic - ( 28 Mar 2020 01:50 )  Color: Yellow / Appearance: Clear / S.020 / pH: x  Gluc: x / Ketone: Negative  / Bili: Negative / Urobili: Negative mg/dL   Blood: x / Protein: 100 mg/dL / Nitrite: Negative   Leuk Esterase: Moderate / RBC: 11-25 /HPF / WBC 26-50   Sq Epi: x / Non Sq Epi: Moderate / Bacteria: Moderate        MEDICATIONS  (STANDING):  aspirin  chewable 81 milliGRAM(s) Oral daily  atorvastatin 40 milliGRAM(s) Oral at bedtime  cefepime  Injectable. 1000 milliGRAM(s) IV Push every 12 hours  clopidogrel Tablet 75 milliGRAM(s) Oral daily  heparin  Infusion.  Unit(s)/Hr (9.5 mL/Hr) IV Continuous <Continuous>  lactobacillus acidophilus 1 Tablet(s) Oral two times a day  sodium chloride 0.9% Bolus 1000 milliLiter(s) IV Bolus once  sodium chloride 0.9%. 1000 milliLiter(s) (75 mL/Hr) IV Continuous <Continuous>  vancomycin  IVPB 1000 milliGRAM(s) IV Intermittent daily    MEDICATIONS  (PRN):  heparin  Injectable 4700 Unit(s) IV Push every 6 hours PRN For aPTT less than 40      RADIOLOGY & ADDITIONAL TESTS:  < from: CT Abdomen and Pelvis No Cont (20 @ 22:29) >  EXAM:  CT ABDOMEN AND PELVIS                          EXAM:  CT CHEST                            PROCEDURE DATE:  2020          INTERPRETATION:  CT of the chest, abdomen and pelvis    Indication: worsening pneumonia    Technique: Axial imageswere obtained from the thoracic inlet through pubic symphysis without oral or IV contrast. Reformatted coronal and sagittal images were submitted.    Comparison: CT of 3/15/2020    FINDINGS:    Evaluation of the solid abdominal organs is limited without contrast.    The visualized neck, axilla and subcutaneous tissues are unremarkable.    The tracheobronchial tree is patent centrally.  There is no mediastinal hematoma.  The great vessels are not enlarged. There are nonspecific mediastinal lymph nodes measuring up to 1.3 x 1.7 cm in the right paratracheal region. Coronary atherosclerosis. The heart is not enlarged.  There is no pericardial effusion.    Lungs: Compared to the recent CT of March 15, 2020, there are increased patchy groundglass and airspace opacities at the lung apices with septal thickening indicative of multifocal infection. Left base streaky atelectasis.    Pleura: Unremarkable.  There is no free air or focal collection.  No free fluid.    Liver: 2.2 cm bilobed cyst in theleft lobe.  Spleen: Normal.  Gallbladder: Unremarkable.  Biliary tree: Unremarkable.  Adrenal glands: Normal.  Pancreas: Normal.  Kidneys: No perinephric stranding, renal edema, hydronephrosis, obstructing stone, or intrarenal stone. There is an air and entirely    Bowel:  There is no small bowel obstruction.  The appendix is unremarkable. Diverticulosis of the left and sigmoid colon. No significant fecal load.    Bladder: Incompletely distended.  Pelvic organs: No free fluid.    There is no significant adenopathy.  Vasculature: The aorta is not dilated. Mild atherosclerotic vascular calcification. There is    Retroperitoneum: There is no mass.  Bones: Chronic degenerative changes of the spine.  Subcutaneous tissues: Unremarkable.    IMPRESSION:    Be compared to the recent CT of March 15, 2020, there are increased bilateral upper lobe opacities indicative of worsening multifocal pneumonia.  No explanation for the abdominal pain on this CT.  Diverticulosis. CC: SOB, CP (28 Mar 2020 10:19)    HPI: 80yo female with PMhx of glaucoma, HTN, OA, discharged 2 days ago for MRSA PNA on Doxy PO, new onset Afib, (COVID19 negative during hospitalization), represents to the hospital complaining of SOB and CP. Pt notes she took a nap, and woke up with SOB and CP. Denies fever, chills, palpitations, HA, dizziness. No other constitutional symptoms.     INTERVAL HPI/ OVERNIGHT EVENTS: Pt was seen and examined, confirmed history above, reports that cough and SOB  better, no current CP. Results and POC discussed     Vital Signs Last 24 Hrs  T(C): 36.8 (28 Mar 2020 09:57), Max: 37.3 (27 Mar 2020 21:49)  T(F): 98.3 (28 Mar 2020 09:57), Max: 99.1 (27 Mar 2020 21:49)  HR: 100 (28 Mar 2020 09:57) (100 - 103)  BP: 144/69 (28 Mar 2020 09:57) (114/85 - 144/69)  RR: 20 (28 Mar 2020 09:57) (18 - 20)  SpO2: 96% (28 Mar 2020 09:57) (93% - 98%)      REVIEW OF SYSTEMS:  All other review of systems is negative unless indicated above.        PHYSICAL EXAM:  General: Well developed;   in no acute distress  Eyes: PERRLA, EOMI; conjunctiva and sclera clear  Head: Normocephalic; atraumatic  ENMT: No nasal discharge; airway clear  Neck: Supple; non tender; no masses  Respiratory: Decreased BS,  No wheezes, rales or rhonchi  Cardiovascular: Regular rate and rhythm. S1 and S2 Normal; No murmurs  Gastrointestinal: Soft non-tender non-distended; Normal bowel sounds  Genitourinary: No  suprapubic  tenderness  Extremities: Normal range of motion, No  edema  Vascular: Peripheral pulses palpable 2+ bilaterally  Neurological: Alert and oriented x3, non focal   Skin: Warm and dry. No acute rash  Lymph Nodes: No acute cervical adenopathy  Musculoskeletal: Normal muscle tone, without deformities  Psychiatric: Cooperative     LABS:     CARDIAC MARKERS ( 27 Mar 2020 13:19 )  4.380 ng/mL / x     / x     / x     / x      CARDIAC MARKERS ( 27 Mar 2020 04:58 )  9.730 ng/mL / x     / x     / x     / x      CARDIAC MARKERS ( 26 Mar 2020 21:54 )  3.920 ng/mL / x     / x     / x     / x      CARDIAC MARKERS ( 26 Mar 2020 19:24 )  1.420 ng/mL / x     / x     / x     / x                                9.9    21.01 )-----------( 268      ( 28 Mar 2020 08:12 )             29.8     28 Mar 2020 08:12    141    |  112    |  16     ----------------------------<  95     3.5     |  20     |  0.94     Ca    8.0        28 Mar 2020 08:12    TPro  6.6    /  Alb  2.2    /  TBili  0.4    /  DBili  x      /  AST  21     /  ALT  21     /  AlkPhos  183    28 Mar 2020 08:12  PT/INR - ( 26 Mar 2020 21:54 )   PT: 16.0 sec;   INR: 1.43 ratio         PTT - ( 28 Mar 2020 08:12 )  PTT:65.6 sec  CAPILLARY BLOOD GLUCOSE  LIVER FUNCTIONS - ( 28 Mar 2020 08:12 )  Alb: 2.2 g/dL / Pro: 6.6 gm/dL / ALK PHOS: 183 U/L / ALT: 21 U/L / AST: 21 U/L / GGT: x           Urinalysis Basic - ( 28 Mar 2020 01:50 )  Color: Yellow / Appearance: Clear / S.020 / pH: x  Gluc: x / Ketone: Negative  / Bili: Negative / Urobili: Negative mg/dL   Blood: x / Protein: 100 mg/dL / Nitrite: Negative   Leuk Esterase: Moderate / RBC: 11-25 /HPF / WBC 26-50   Sq Epi: x / Non Sq Epi: Moderate / Bacteria: Moderate        MEDICATIONS  (STANDING):  aspirin  chewable 81 milliGRAM(s) Oral daily  atorvastatin 40 milliGRAM(s) Oral at bedtime  cefepime  Injectable. 1000 milliGRAM(s) IV Push every 12 hours  clopidogrel Tablet 75 milliGRAM(s) Oral daily  heparin  Infusion.  Unit(s)/Hr (9.5 mL/Hr) IV Continuous <Continuous>  lactobacillus acidophilus 1 Tablet(s) Oral two times a day  sodium chloride 0.9% Bolus 1000 milliLiter(s) IV Bolus once  sodium chloride 0.9%. 1000 milliLiter(s) (75 mL/Hr) IV Continuous <Continuous>  vancomycin  IVPB 1000 milliGRAM(s) IV Intermittent daily    MEDICATIONS  (PRN):  heparin  Injectable 4700 Unit(s) IV Push every 6 hours PRN For aPTT less than 40      RADIOLOGY & ADDITIONAL TESTS:  < from: CT Abdomen and Pelvis No Cont (20 @ 22:29) >  EXAM:  CT ABDOMEN AND PELVIS                          EXAM:  CT CHEST                            PROCEDURE DATE:  2020          INTERPRETATION:  CT of the chest, abdomen and pelvis    Indication: worsening pneumonia    Technique: Axial imageswere obtained from the thoracic inlet through pubic symphysis without oral or IV contrast. Reformatted coronal and sagittal images were submitted.    Comparison: CT of 3/15/2020    FINDINGS:    Evaluation of the solid abdominal organs is limited without contrast.    The visualized neck, axilla and subcutaneous tissues are unremarkable.    The tracheobronchial tree is patent centrally.  There is no mediastinal hematoma.  The great vessels are not enlarged. There are nonspecific mediastinal lymph nodes measuring up to 1.3 x 1.7 cm in the right paratracheal region. Coronary atherosclerosis. The heart is not enlarged.  There is no pericardial effusion.    Lungs: Compared to the recent CT of March 15, 2020, there are increased patchy groundglass and airspace opacities at the lung apices with septal thickening indicative of multifocal infection. Left base streaky atelectasis.    Pleura: Unremarkable.  There is no free air or focal collection.  No free fluid.    Liver: 2.2 cm bilobed cyst in theleft lobe.  Spleen: Normal.  Gallbladder: Unremarkable.  Biliary tree: Unremarkable.  Adrenal glands: Normal.  Pancreas: Normal.  Kidneys: No perinephric stranding, renal edema, hydronephrosis, obstructing stone, or intrarenal stone. There is an air and entirely    Bowel:  There is no small bowel obstruction.  The appendix is unremarkable. Diverticulosis of the left and sigmoid colon. No significant fecal load.    Bladder: Incompletely distended.  Pelvic organs: No free fluid.    There is no significant adenopathy.  Vasculature: The aorta is not dilated. Mild atherosclerotic vascular calcification. There is    Retroperitoneum: There is no mass.  Bones: Chronic degenerative changes of the spine.  Subcutaneous tissues: Unremarkable.    IMPRESSION:    Be compared to the recent CT of March 15, 2020, there are increased bilateral upper lobe opacities indicative of worsening multifocal pneumonia.  No explanation for the abdominal pain on this CT.  Diverticulosis.

## 2020-03-29 LAB
ANION GAP SERPL CALC-SCNC: 8 MMOL/L — SIGNIFICANT CHANGE UP (ref 5–17)
APTT BLD: 67.4 SEC — HIGH (ref 27.5–36.3)
BUN SERPL-MCNC: 13 MG/DL — SIGNIFICANT CHANGE UP (ref 7–23)
CALCIUM SERPL-MCNC: 8.1 MG/DL — LOW (ref 8.5–10.1)
CHLORIDE SERPL-SCNC: 114 MMOL/L — HIGH (ref 96–108)
CO2 SERPL-SCNC: 19 MMOL/L — LOW (ref 22–31)
CREAT SERPL-MCNC: 0.88 MG/DL — SIGNIFICANT CHANGE UP (ref 0.5–1.3)
GLUCOSE SERPL-MCNC: 99 MG/DL — SIGNIFICANT CHANGE UP (ref 70–99)
HCT VFR BLD CALC: 28.3 % — LOW (ref 34.5–45)
HGB BLD-MCNC: 9.3 G/DL — LOW (ref 11.5–15.5)
MCHC RBC-ENTMCNC: 29.2 PG — SIGNIFICANT CHANGE UP (ref 27–34)
MCHC RBC-ENTMCNC: 32.9 GM/DL — SIGNIFICANT CHANGE UP (ref 32–36)
MCV RBC AUTO: 88.7 FL — SIGNIFICANT CHANGE UP (ref 80–100)
PLATELET # BLD AUTO: 263 K/UL — SIGNIFICANT CHANGE UP (ref 150–400)
POTASSIUM SERPL-MCNC: 3.4 MMOL/L — LOW (ref 3.5–5.3)
POTASSIUM SERPL-SCNC: 3.4 MMOL/L — LOW (ref 3.5–5.3)
RBC # BLD: 3.19 M/UL — LOW (ref 3.8–5.2)
RBC # FLD: 14.9 % — HIGH (ref 10.3–14.5)
SODIUM SERPL-SCNC: 141 MMOL/L — SIGNIFICANT CHANGE UP (ref 135–145)
WBC # BLD: 18.46 K/UL — HIGH (ref 3.8–10.5)
WBC # FLD AUTO: 18.46 K/UL — HIGH (ref 3.8–10.5)

## 2020-03-29 PROCEDURE — 93010 ELECTROCARDIOGRAM REPORT: CPT

## 2020-03-29 PROCEDURE — 99233 SBSQ HOSP IP/OBS HIGH 50: CPT

## 2020-03-29 RX ORDER — ALBUTEROL 90 UG/1
2 AEROSOL, METERED ORAL EVERY 6 HOURS
Refills: 0 | Status: DISCONTINUED | OUTPATIENT
Start: 2020-03-29 | End: 2020-04-02

## 2020-03-29 RX ORDER — POTASSIUM CHLORIDE 20 MEQ
40 PACKET (EA) ORAL EVERY 4 HOURS
Refills: 0 | Status: COMPLETED | OUTPATIENT
Start: 2020-03-29 | End: 2020-03-29

## 2020-03-29 RX ORDER — METOPROLOL TARTRATE 50 MG
25 TABLET ORAL
Refills: 0 | Status: DISCONTINUED | OUTPATIENT
Start: 2020-03-29 | End: 2020-03-30

## 2020-03-29 RX ADMIN — NYSTATIN CREAM 1 APPLICATION(S): 100000 CREAM TOPICAL at 22:43

## 2020-03-29 RX ADMIN — ATORVASTATIN CALCIUM 40 MILLIGRAM(S): 80 TABLET, FILM COATED ORAL at 22:43

## 2020-03-29 RX ADMIN — NYSTATIN CREAM 1 APPLICATION(S): 100000 CREAM TOPICAL at 18:23

## 2020-03-29 RX ADMIN — Medication 40 MILLIEQUIVALENT(S): at 18:21

## 2020-03-29 RX ADMIN — Medication 1 TABLET(S): at 18:21

## 2020-03-29 RX ADMIN — Medication 1 TABLET(S): at 06:25

## 2020-03-29 RX ADMIN — NYSTATIN CREAM 1 APPLICATION(S): 100000 CREAM TOPICAL at 06:26

## 2020-03-29 RX ADMIN — Medication 12.5 MILLIGRAM(S): at 06:25

## 2020-03-29 RX ADMIN — Medication 25 MILLIGRAM(S): at 18:23

## 2020-03-29 RX ADMIN — CEFEPIME 1000 MILLIGRAM(S): 1 INJECTION, POWDER, FOR SOLUTION INTRAMUSCULAR; INTRAVENOUS at 22:43

## 2020-03-29 RX ADMIN — Medication 81 MILLIGRAM(S): at 11:31

## 2020-03-29 RX ADMIN — CLOPIDOGREL BISULFATE 75 MILLIGRAM(S): 75 TABLET, FILM COATED ORAL at 11:30

## 2020-03-29 RX ADMIN — Medication 40 MILLIEQUIVALENT(S): at 18:20

## 2020-03-29 RX ADMIN — Medication 250 MILLIGRAM(S): at 11:31

## 2020-03-29 RX ADMIN — CEFEPIME 1000 MILLIGRAM(S): 1 INJECTION, POWDER, FOR SOLUTION INTRAMUSCULAR; INTRAVENOUS at 11:30

## 2020-03-29 RX ADMIN — HEPARIN SODIUM 1100 UNIT(S)/HR: 5000 INJECTION INTRAVENOUS; SUBCUTANEOUS at 11:28

## 2020-03-29 RX ADMIN — PANTOPRAZOLE SODIUM 40 MILLIGRAM(S): 20 TABLET, DELAYED RELEASE ORAL at 06:25

## 2020-03-29 NOTE — PROGRESS NOTE ADULT - ASSESSMENT
Patient is 80yo female with PMhx of glaucoma, HTN, OA, discharged 2 days ago for MRSA PNA on Doxy PO, new onset Afib, (COVID19 negative during hospitalization), admitted on 3/27 for evaluation of shortness of breath and chest pain; she feels that she was discharged too early. Patient is poor historian and history per medical record.     1. Patient admitted with worsening multifocal pneumonia; has history of MRSA pneumonia; possible viral syndrome secondary to COVID-19 found to bed COVID-19 negative  - follow up cultures   - serial cbc and monitor temperature   - iv hydration and supportive care   - reviewed prior medical records to evaluate for resistant or atypical pathogens   - oxygen and nebs as needed   - COVID-19 PCR is not detected  - day #3 cefepime and vancomycin   - tolerating antibiotics without rashes or side effects   - check vancomycin trough prior to fourth dose   - continue isolation but use contact and droplet, no further airborne isolation  - okay from id standpoint to proceed with cardiac procedure  2. other issues; per medicine

## 2020-03-29 NOTE — PROGRESS NOTE ADULT - SUBJECTIVE AND OBJECTIVE BOX
Date of service: 03-29-20 @ 13:01    Patient lying in bed; had indigestion  Afebrile        ROS unable to obtain secondary to patient medical condition     MEDICATIONS  (STANDING):  aspirin  chewable 81 milliGRAM(s) Oral daily  atorvastatin 40 milliGRAM(s) Oral at bedtime  cefepime  Injectable. 1000 milliGRAM(s) IV Push every 12 hours  clopidogrel Tablet 75 milliGRAM(s) Oral daily  heparin  Infusion.  Unit(s)/Hr (9.5 mL/Hr) IV Continuous <Continuous>  lactobacillus acidophilus 1 Tablet(s) Oral two times a day  metoprolol tartrate 25 milliGRAM(s) Oral two times a day  nystatin Powder 1 Application(s) Topical three times a day  pantoprazole    Tablet 40 milliGRAM(s) Oral before breakfast  potassium chloride    Tablet ER 40 milliEquivalent(s) Oral every 4 hours  sodium chloride 0.9% Bolus 1000 milliLiter(s) IV Bolus once  sodium chloride 0.9%. 1000 milliLiter(s) (75 mL/Hr) IV Continuous <Continuous>  vancomycin  IVPB 1000 milliGRAM(s) IV Intermittent daily    MEDICATIONS  (PRN):  heparin  Injectable 4700 Unit(s) IV Push every 6 hours PRN For aPTT less than 40      Vital Signs Last 24 Hrs  T(C): 36.8 (29 Mar 2020 06:21), Max: 37.1 (28 Mar 2020 21:30)  T(F): 98.3 (29 Mar 2020 06:21), Max: 98.7 (28 Mar 2020 21:30)  HR: 93 (29 Mar 2020 12:03) (92 - 108)  BP: 155/61 (29 Mar 2020 12:03) (102/90 - 155/61)  BP(mean): --  RR: 20 (29 Mar 2020 12:03) (20 - 20)  SpO2: 98% (29 Mar 2020 12:03) (95% - 98%)    Physical Exam:        Constitutional: frail looking  HEENT: NC/AT, EOMI, PERRLA, conjunctivae clear; ears and nose atraumatic; pharynx clear  Neck: supple; thyroid not palpable  Back: no tenderness  Respiratory: respiratory effort normal; clear to auscultation  Cardiovascular: S1S2 regular, no murmurs  Abdomen: soft, not tender, not distended, positive BS; no liver or spleen organomegaly  Genitourinary: no suprapubic tenderness  Musculoskeletal: no muscle tenderness, no joint swelling or tenderness  Neurological/ Psychiatric: moving all extremities  Skin: no rashes; no palpable lesions    Labs: all available labs reviewed                      Labs:                        9.3    18.46 )-----------( 263      ( 29 Mar 2020 08:31 )             28.3     03-29    141  |  114<H>  |  13  ----------------------------<  99  3.4<L>   |  19<L>  |  0.88    Ca    8.1<L>      29 Mar 2020 08:31    TPro  6.6  /  Alb  2.2<L>  /  TBili  0.4  /  DBili  x   /  AST  21  /  ALT  21  /  AlkPhos  183<H>  03-28           Cultures:       Culture - Urine (collected 03-28-20 @ 01:50)  Source: .Urine Clean Catch (Midstream)  Preliminary Report (03-29-20 @ 11:08):    50,000 - 99,000 CFU/mL Yeast like cells    Culture - Blood (collected 03-26-20 @ 21:52)  Source: .Blood None  Preliminary Report (03-28-20 @ 10:02):    No growth to date.    Culture - Blood (collected 03-26-20 @ 21:52)  Source: .Blood None  Preliminary Report (03-28-20 @ 10:02):    No growth to date.         COVID-19 PCR . (03.27.20 @ 00:37)    COVID-19 PCR: NotDetec: As with all clinical testing, results should be correlated with clinical  findings.  This test has been validated by [employer] to be accurate;  though it has not been FDA cleared/approved by the usual pathway.  As with all laboratory tests, results should be correlated with clinical  findings.        Cultures:       Culture - Blood (collected 03-26-20 @ 21:52)  Source: .Blood None  Preliminary Report (03-28-20 @ 10:02):    No growth to date.    Culture - Blood (collected 03-26-20 @ 21:52)  Source: .Blood None  Preliminary Report (03-28-20 @ 10:02):    No growth to date.              < from: CT Chest No Cont (03.26.20 @ 22:28) >    EXAM:  CT ABDOMEN AND PELVIS                          EXAM:  CT CHEST                            PROCEDURE DATE:  03/26/2020          INTERPRETATION:  CT of the chest, abdomen and pelvis    Indication: worsening pneumonia    Technique: Axial imageswere obtained from the thoracic inlet through pubic symphysis without oral or IV contrast. Reformatted coronal and sagittal images were submitted.    Comparison: CT of 3/15/2020    FINDINGS:    Evaluation of the solid abdominal organs is limited without contrast.    The visualized neck, axilla and subcutaneous tissues are unremarkable.    The tracheobronchial tree is patent centrally.  There is no mediastinal hematoma.  The great vessels are not enlarged. There are nonspecific mediastinal lymph nodes measuring up to 1.3 x 1.7 cm in the right paratracheal region. Coronary atherosclerosis. The heart is not enlarged.  There is no pericardial effusion.    Lungs: Compared to the recent CT of March 15, 2020, there are increased patchy groundglass and airspace opacities at the lung apices with septal thickening indicative of multifocal infection. Left base streaky atelectasis.    Pleura: Unremarkable.  There is no free air or focal collection.  No free fluid.    Liver: 2.2 cm bilobed cyst in theleft lobe.  Spleen: Normal.  Gallbladder: Unremarkable.  Biliary tree: Unremarkable.  Adrenal glands: Normal.  Pancreas: Normal.  Kidneys: No perinephric stranding, renal edema, hydronephrosis, obstructing stone, or intrarenal stone. There is an air and entirely    Bowel:  There is no small bowel obstruction.  The appendix is unremarkable. Diverticulosis of the left and sigmoid colon. No significant fecal load.    Bladder: Incompletely distended.  Pelvic organs: No free fluid.    There is no significant adenopathy.  Vasculature: The aorta is not dilated. Mild atherosclerotic vascular calcification. There is    Retroperitoneum: There is no mass.  Bones: Chronic degenerative changes of the spine.  Subcutaneous tissues: Unremarkable.    IMPRESSION:    Be compared to the recent CT of March 15, 2020, there are increased bilateral upper lobe opacities indicative of worsening multifocal pneumonia.  No explanation for the abdominal pain on this CT.  Diverticulosis.    < end of copied text >        Radiology: all available radiological tests reviewed    Advanced directives addressed: full resuscitation

## 2020-03-29 NOTE — PROGRESS NOTE ADULT - ASSESSMENT
78yo female with PMhx of glaucoma, HTN, OA, recent admission for new onset afib, and MRSA PNA admitted for:     1. NSTEMI  No active CP now, had some abdominal pain but likely dyspepsia   Trop peaked at 9, last ~4  EKG: no ischemic changes  ECHO done last admission 3/18L EF 60%  C/w Heparin Drip  C/w ASA, Plavix, Lipitor, started on metoprolol   Cardio eval, D/w DR Kraus, plan for [facility] tomorrow        2. Recurrent PNA, suspect GNR  and MRSA  last admission sputum + MRSA   COVID PCR neg   F/u BCX and UCX  C/w IV Abxs: cefepime and VAnco  Mucinex  Albuterol   D/w Dr Breaux     3. Afib  In SR   Off Eliquis due to NSTEMI, on heparin drip  Monitor on tele      4. Wheezing  will start albuterol  D/c fluids  check BNP  Monitor pulse ox     5.  GI PPX      5. DVT PPX: heparin     Dispo: NPO after midnight, [facility] in am 78yo female with PMhx of glaucoma, HTN, OA, recent admission for new onset afib, and MRSA PNA admitted for:     1. NSTEMI  No active CP now, had some abdominal pain but likely dyspepsia   Trop peaked at 9, last ~4  EKG: no ischemic changes  ECHO done last admission 3/18L EF 60%  C/w Heparin Drip  C/w ASA, Plavix, Lipitor, started on metoprolol   Cardio eval, D/w DR Kraus, plan for [facility] tomorrow        2. Recurrent PNA, suspect GNR  and MRSA  last admission sputum + MRSA   COVID PCR neg   F/u BCX and UCX  C/w IV Abxs: cefepime and VAnco  Mucinex  Albuterol   D/w Dr Breaux     3.  EILEEN  likely prerenal  CR improved after IVF   D/c IVF now, monitor oral intake     4. Afib  In SR   Off Eliquis due to NSTEMI, on heparin drip  Monitor on tele      4. Wheezing  will start albuterol  D/c fluids  check BNP  Monitor pulse ox     5.  GI PPX      5. DVT PPX: heparin     Dispo: NPO after midnight, [facility] in am

## 2020-03-29 NOTE — PROGRESS NOTE ADULT - SUBJECTIVE AND OBJECTIVE BOX
Patient is a 79y old  Female who presents with a chief complaint of SOB, CP (27 Mar 2020 18:04)      HPI:  Patient is 80yo female with PMhx of glaucoma, HTN, OA, discharged 2 days ago for MRSA PNA on Doxy PO, new onset Afib, (COVID19 negative during hospitalization), represents to the hospital complaining of SOB and CP. Pt notes she took a nap, and woke up with SOB and CP. Denies fever, chills, palpitations, HA, dizziness. No other constitutional symptoms. (27 Mar 2020 09:48)    3/29  seen and examined with her RN today  no cp  breathing is ok  mild cough    PAST MEDICAL & SURGICAL HISTORY:  OA (osteoarthritis)  Glaucoma  HTN (hypertension)  Status post glaucoma surgery: and cataract      PREVIOUS DIAGNOSTIC TESTING:      MEDICATIONS  (STANDING):  aspirin  chewable 81 milliGRAM(s) Oral daily  atorvastatin 40 milliGRAM(s) Oral at bedtime  cefepime  Injectable. 1000 milliGRAM(s) IV Push every 12 hours  clopidogrel Tablet 75 milliGRAM(s) Oral daily  heparin  Infusion.  Unit(s)/Hr (9.5 mL/Hr) IV Continuous <Continuous>  lactobacillus acidophilus 1 Tablet(s) Oral two times a day  sodium chloride 0.9% Bolus 1000 milliLiter(s) IV Bolus once  sodium chloride 0.9%. 1000 milliLiter(s) (75 mL/Hr) IV Continuous <Continuous>  vancomycin  IVPB 1000 milliGRAM(s) IV Intermittent daily    MEDICATIONS  (PRN):  heparin  Injectable 4700 Unit(s) IV Push every 6 hours PRN For aPTT less than 40      FAMILY HISTORY:  FH: CAD (coronary artery disease): in mother and father, both   FH: pancreatic cancer: in sister,   FHx: colon cancer: in brother,       SOCIAL HISTORY:  ***    REVIEW OF SYSTEM:  dyspnea, fatigue, all other systems reviewed and are negative   Vital Signs Last 24 Hrs  T(C): 36.8 (29 Mar 2020 06:21), Max: 37.1 (28 Mar 2020 21:30)  T(F): 98.3 (29 Mar 2020 06:21), Max: 98.7 (28 Mar 2020 21:30)  HR: 92 (29 Mar 2020 06:21) (92 - 108)  BP: 142/48 (29 Mar 2020 06:21) (102/90 - 142/48)  BP(mean): --  RR: 20 (28 Mar 2020 21:30) (20 - 20)  SpO2: 95% (29 Mar 2020 06:21) (95% - 97%)  I&O's Summary    27 Mar 2020 07:01  -  28 Mar 2020 07:00  --------------------------------------------------------  IN: 457 mL / OUT: 0 mL / NET: 457 mL    28 Mar 2020 07:01  -  28 Mar 2020 10:20  --------------------------------------------------------  IN: 0 mL / OUT: 1 mL / NET: -1 mL      PHYSICAL EXAM  General Appearance: cooperative, no acute distress,   HEENT: PERRL, conjunctiva clear, EOM's intact, non injected pharynx, no exudate, TM   normal  Neck: Supple, , no adenopathy, thyroid: not enlarged, no carotid bruit or JVD  Back: Symmetric, no  tenderness,no soft tissue tenderness  Lungs: Diminished in the upper lobes, coarse at the bases  Heart: Regular rate and rhythm, S1, S2 normal, no murmur, rub or gallop  Abdomen: Soft, non-tender, bowel sounds active , no hepatosplenomegaly  Extremities: no cyanosis or edema, no joint swelling  Skin: Skin color, texture normal, no rashes   Neurologic: Alert and oriented X3 , cranial nerves intact, sensory and motor normal,    ECG:    LABS:                        9.3    18.46 )-----------( 263      ( 29 Mar 2020 08:31 )             28.3                           9.9    21.01 )-----------( 268      ( 28 Mar 2020 08:12 )  03-29    141  |  114<H>  |  13  ----------------------------<  99  3.4<L>   |  19<L>  |  0.88    Ca    8.1<L>      29 Mar 2020 08:31    TPro  6.6  /  Alb  2.2<L>  /  TBili  0.4  /  DBili  x   /  AST  21  /  ALT  21  /  AlkPhos  183<H>               29.8         141  |  112<H>  |  16  ----------------------------<  95  3.5   |  20<L>  |  0.94    Ca    8.0<L>      28 Mar 2020 08:12    TPro  6.6  /  Alb  2.2<L>  /  TBili  0.4  /  DBili  x   /  AST  21  /  ALT  21  /  AlkPhos  183<H>      CARDIAC MARKERS ( 27 Mar 2020 13:19 )  4.380 ng/mL / x     / x     / x     / x      CARDIAC MARKERS ( 27 Mar 2020 04:58 )  9.730 ng/mL / x     / x     / x     / x      CARDIAC MARKERS ( 26 Mar 2020 21:54 )  3.920 ng/mL / x     / x     / x     / x      CARDIAC MARKERS ( 26 Mar 2020 19:24 )  1.420 ng/mL / x     / x     / x     / x              PT/INR - ( 26 Mar 2020 21:54 )   PT: 16.0 sec;   INR: 1.43 ratio         PTT - ( 28 Mar 2020 08:12 )  PTT:65.6 sec  Urinalysis Basic - ( 28 Mar 2020 01:50 )    Color: Yellow / Appearance: Clear / S.020 / pH: x  Gluc: x / Ketone: Negative  / Bili: Negative / Urobili: Negative mg/dL   Blood: x / Protein: 100 mg/dL / Nitrite: Negative   Leuk Esterase: Moderate / RBC: 11-25 /HPF / WBC 26-50   Sq Epi: x / Non Sq Epi: Moderate / Bacteria: Moderate            RADIOLOGY & ADDITIONAL STUDIES:  < from: Xray Chest 1 View- PORTABLE-Routine (20 @ 07:19) >  PROCEDURE DATE:  2020          INTERPRETATION:  Clinical indication:  PNA f/u    Technique: XR CHEST portable AP    Comparison:3/18/2020    Findings:  Lines: None    Heart/Mediastinum/Lungs/Other: The heart size is normal.The lungs demonstrate patchy opacity in the upper lobes with interstitial prominence, similar to the previous exam..There are no pleural effusions.    Impression:    As above.    < end of copied text >

## 2020-03-29 NOTE — PROGRESS NOTE ADULT - PROBLEM SELECTOR PLAN 1
Elevated serial troponin.  Peak trop 9.  ECG w/ no ischemic changes.  Plan for cardiac cath on mon.  Cont. ASA, statin, and metoprolol tartrate 12.5 BID and hep gtt,  eliquis on hold. Elevated serial troponin.  Peak trop 9.  ECG w/ no ischemic changes.  Plan for cardiac cath on mon.  Cont. ASA, statin, Increase  metoprolol tartrate to 25  BID and hep gtt,  eliquis on hold. Elevated serial troponin.  Peak trop 9.  ECG w/ no ischemic changes.  Plan for cardiac cath on mon.  Cont. ASA, statin, Increase  metoprolol tartrate to 25  BID and hep gtt,  eliquis on hold.  Atypical chest pain this AM, will check ECG. Elevated serial troponin.  Peak trop 9.  ECG w/ no ischemic changes.  Plan for cardiac cath on mon.  Cont. ASA, statin, Increase  metoprolol tartrate to 25  BID and hep gtt d/c tommorrow for cath,  eliquis on hold.  Atypical chest pain this AM, will check ECG.

## 2020-03-29 NOTE — PROGRESS NOTE ADULT - SUBJECTIVE AND OBJECTIVE BOX
CC: SOB, CP (28 Mar 2020 10:19)    HPI: 80yo female with PMhx of glaucoma, HTN, OA, discharged 2 days ago for MRSA PNA on Doxy PO, new onset Afib, (COVID19 negative during hospitalization), represents to the hospital complaining of SOB and CP. Pt notes she took a nap, and woke up with SOB and CP. Denies fever, chills, palpitations, HA, dizziness. No other constitutional symptoms.     INTERVAL HPI/ OVERNIGHT EVENTS:  Pt was seen and examined,  feels better, reports that had some "gas " pain after breakfast but feels, resolved shortly. No SOB, no fevers       Vital Signs Last 24 Hrs  T(C): 36.8 (29 Mar 2020 06:21), Max: 37.1 (28 Mar 2020 21:30)  T(F): 98.3 (29 Mar 2020 06:21), Max: 98.7 (28 Mar 2020 21:30)  HR: 93 (29 Mar 2020 12:03) (92 - 108)  BP: 155/61 (29 Mar 2020 12:03) (102/90 - 155/61)  RR: 20 (29 Mar 2020 12:03) (20 - 20)  SpO2: 98% (29 Mar 2020 12:03) (95% - 98%)      REVIEW OF SYSTEMS:  All other review of systems is negative unless indicated above.        PHYSICAL EXAM:  General: Well developed;   in no acute distress, on NC  Eyes: PERRLA, EOMI; conjunctiva and sclera clear  Head: Normocephalic; atraumatic  ENMT: No nasal discharge; airway clear  Neck: Supple; no JVD  Respiratory: poor air entry, + wheezing b/l  Cardiovascular: Regular rate and rhythm. S1 and S2 Normal; No murmurs  Gastrointestinal: Soft non-tender non-distended; Normal bowel sounds  Genitourinary: No  suprapubic  tenderness  Extremities: Normal range of motion, No  edema  Vascular: Peripheral pulses palpable 2+ bilaterally  Neurological: Alert and oriented x3, non focal   Skin: Warm and dry. No acute rash  Lymph Nodes: No acute cervical adenopathy  Musculoskeletal: Normal muscle tone, without deformities  Psychiatric: Cooperative     LABS:     CARDIAC MARKERS ( 27 Mar 2020 13:19 )  4.380 ng/mL / x     / x     / x     / x      CARDIAC MARKERS ( 27 Mar 2020 04:58 )  9.730 ng/mL / x     / x     / x     / x      CARDIAC MARKERS ( 26 Mar 2020 21:54 )  3.920 ng/mL / x     / x     / x     / x      CARDIAC MARKERS ( 26 Mar 2020 19:24 )  1.420 ng/mL / x     / x     / x     / x                                9.9    21.01 )-----------( 268      ( 28 Mar 2020 08:12 )             29.8     28 Mar 2020 08:12    141    |  112    |  16     ----------------------------<  95     3.5     |  20     |  0.94     Ca    8.0        28 Mar 2020 08:12    TPro  6.6    /  Alb  2.2    /  TBili  0.4    /  DBili  x      /  AST  21     /  ALT  21     /  AlkPhos  183    28 Mar 2020 08:12  PT/INR - ( 26 Mar 2020 21:54 )   PT: 16.0 sec;   INR: 1.43 ratio         PTT - ( 28 Mar 2020 08:12 )  PTT:65.6 sec  CAPILLARY BLOOD GLUCOSE  LIVER FUNCTIONS - ( 28 Mar 2020 08:12 )  Alb: 2.2 g/dL / Pro: 6.6 gm/dL / ALK PHOS: 183 U/L / ALT: 21 U/L / AST: 21 U/L / GGT: x           Urinalysis Basic - ( 28 Mar 2020 01:50 )  Color: Yellow / Appearance: Clear / S.020 / pH: x  Gluc: x / Ketone: Negative  / Bili: Negative / Urobili: Negative mg/dL   Blood: x / Protein: 100 mg/dL / Nitrite: Negative   Leuk Esterase: Moderate / RBC: 11-25 /HPF / WBC 26-50   Sq Epi: x / Non Sq Epi: Moderate / Bacteria: Moderate      MEDICATIONS  (STANDING):  ALBUTerol    90 MICROgram(s) HFA Inhaler 2 Puff(s) Inhalation every 6 hours  aspirin  chewable 81 milliGRAM(s) Oral daily  atorvastatin 40 milliGRAM(s) Oral at bedtime  cefepime  Injectable. 1000 milliGRAM(s) IV Push every 12 hours  clopidogrel Tablet 75 milliGRAM(s) Oral daily  heparin  Infusion.  Unit(s)/Hr (9.5 mL/Hr) IV Continuous <Continuous>  lactobacillus acidophilus 1 Tablet(s) Oral two times a day  metoprolol tartrate 25 milliGRAM(s) Oral two times a day  nystatin Powder 1 Application(s) Topical three times a day  pantoprazole    Tablet 40 milliGRAM(s) Oral before breakfast  potassium chloride    Tablet ER 40 milliEquivalent(s) Oral every 4 hours  sodium chloride 0.9% Bolus 1000 milliLiter(s) IV Bolus once  vancomycin  IVPB 1000 milliGRAM(s) IV Intermittent daily    MEDICATIONS  (PRN):  heparin  Injectable 4700 Unit(s) IV Push every 6 hours PRN For aPTT less than 40      RADIOLOGY & ADDITIONAL TESTS:  < from: CT Abdomen and Pelvis No Cont (20 @ 22:29) >  EXAM:  CT ABDOMEN AND PELVIS                          EXAM:  CT CHEST                            PROCEDURE DATE:  2020          INTERPRETATION:  CT of the chest, abdomen and pelvis    Indication: worsening pneumonia    Technique: Axial imageswere obtained from the thoracic inlet through pubic symphysis without oral or IV contrast. Reformatted coronal and sagittal images were submitted.    Comparison: CT of 3/15/2020    FINDINGS:    Evaluation of the solid abdominal organs is limited without contrast.    The visualized neck, axilla and subcutaneous tissues are unremarkable.    The tracheobronchial tree is patent centrally.  There is no mediastinal hematoma.  The great vessels are not enlarged. There are nonspecific mediastinal lymph nodes measuring up to 1.3 x 1.7 cm in the right paratracheal region. Coronary atherosclerosis. The heart is not enlarged.  There is no pericardial effusion.    Lungs: Compared to the recent CT of March 15, 2020, there are increased patchy groundglass and airspace opacities at the lung apices with septal thickening indicative of multifocal infection. Left base streaky atelectasis.    Pleura: Unremarkable.  There is no free air or focal collection.  No free fluid.    Liver: 2.2 cm bilobed cyst in theleft lobe.  Spleen: Normal.  Gallbladder: Unremarkable.  Biliary tree: Unremarkable.  Adrenal glands: Normal.  Pancreas: Normal.  Kidneys: No perinephric stranding, renal edema, hydronephrosis, obstructing stone, or intrarenal stone. There is an air and entirely    Bowel:  There is no small bowel obstruction.  The appendix is unremarkable. Diverticulosis of the left and sigmoid colon. No significant fecal load.    Bladder: Incompletely distended.  Pelvic organs: No free fluid.    There is no significant adenopathy.  Vasculature: The aorta is not dilated. Mild atherosclerotic vascular calcification. There is    Retroperitoneum: There is no mass.  Bones: Chronic degenerative changes of the spine.  Subcutaneous tissues: Unremarkable.    IMPRESSION:    Be compared to the recent CT of March 15, 2020, there are increased bilateral upper lobe opacities indicative of worsening multifocal pneumonia.  No explanation for the abdominal pain on this CT.  Diverticulosis.

## 2020-03-29 NOTE — PROGRESS NOTE ADULT - SUBJECTIVE AND OBJECTIVE BOX
78yo female w/ Vznf-ksyhwaxiij-vbend diagnosed , HTN, glaucoma, MRSA pna discharged 2 days ago presented to hospital 2 days ago w/ dyspnea & chest pain.  At home, woke up w/ pressure type chest pain L sided lasting hours, worse w/ activity, relieved w/ rest, no atypical features (not worse w/ palpation, inspiration, position).  Called her relative & pt brought to ED.      ECG w/ no ischemic changes, initial trop 1.42, peaked at 9.73 most recent 4.38.  In ED CT chest performed w/ bilateral opacities upper lobes - COVID test sent & was neg.  Antibiotics started.      Currently pt denies chest pain, palpitations.  Reports mild dyspnea & productive cough.    3/29/2020: Complaints of indigestion , States that she did not sleep at night as she had to go t o the bathroom, Denies chest pain, Shortness of breath.     MEDICATIONS  (STANDING):  aspirin  chewable 81 milliGRAM(s) Oral daily  atorvastatin 40 milliGRAM(s) Oral at bedtime  cefepime  Injectable. 1000 milliGRAM(s) IV Push every 12 hours  clopidogrel Tablet 75 milliGRAM(s) Oral daily  heparin  Infusion.  Unit(s)/Hr (9.5 mL/Hr) IV Continuous <Continuous>  lactobacillus acidophilus 1 Tablet(s) Oral two times a day  metoprolol tartrate 12.5 milliGRAM(s) Oral every 12 hours  nystatin Powder 1 Application(s) Topical three times a day  pantoprazole    Tablet 40 milliGRAM(s) Oral before breakfast  sodium chloride 0.9% Bolus 1000 milliLiter(s) IV Bolus once  sodium chloride 0.9%. 1000 milliLiter(s) (75 mL/Hr) IV Continuous <Continuous>  vancomycin  IVPB 1000 milliGRAM(s) IV Intermittent daily    MEDICATIONS  (PRN):  heparin  Injectable 4700 Unit(s) IV Push every 6 hours PRN For aPTT less than 40      Vital Signs Last 24 Hrs  T(C): 36.8 (29 Mar 2020 06:21), Max: 37.1 (28 Mar 2020 21:30)  T(F): 98.3 (29 Mar 2020 06:21), Max: 98.7 (28 Mar 2020 21:30)  HR: 92 (29 Mar 2020 06:21) (92 - 108)  BP: 142/48 (29 Mar 2020 06:21) (102/90 - 144/69)  BP(mean): --  RR: 20 (28 Mar 2020 21:30) (20 - 20)  SpO2: 95% (29 Mar 2020 06:21) (95% - 97%)    I&O's Detail    28 Mar 2020 07:01  -  29 Mar 2020 07:00  --------------------------------------------------------  IN:  Total IN: 0 mL    OUT:    Stool: 1 mL    Voided: 500 mL  Total OUT: 501 mL    Total NET: -501 mL    Daily Weight in k.7 (29 Mar 2020 06:21  PHYSICAL EXAM:    Constitutional: NAD, awake and alert,  HEENT: PERR, EOMI,    Neck:  supple,  No JVD  Respiratory: crackles & wheezing bilaterally posteriorly, no rhonchi  Cardiovascular: S1 and S2, regular rate and rhythm, no Murmurs, gallops or rubs  Gastrointestinal: Bowel Sounds present, soft, nontender.   Extremities: No peripheral edema. No clubbing or cyanosis.  Vascular: 2+ peripheral pulses  Neurological: A/O x 3, no focal deficits      LABS: All Labs Reviewed:                                 9.9    21.01 )-----------( 268      ( 28 Mar 2020 08:12 )             29.8         141  |  112<H>  |  16  ----------------------------<  95  3.5   |  20<L>  |  0.94    Ca    8.0<L>      28 Mar 2020 08:12    TPro  6.6  /  Alb  2.2<L>  /  TBili  0.4  /  DBili  x   /  AST  21  /  ALT  21  /  AlkPhos  183<H>      LIVER FUNCTIONS - ( 28 Mar 2020 08:12 )  Alb: 2.2 g/dL / Pro: 6.6 gm/dL / ALK PHOS: 183 U/L / ALT: 21 U/L / AST: 21 U/L / GGT: x           PTT - ( 28 Mar 2020 08:12 )  PTT:65.6 sec  03-16 Chol 153 LDL 94 HDL 40<L> Trig 92     Ca    8.0        28 Mar 2020 08:12  Ca    8.8        26 Mar 2020 19:24    TPro  6.6    /  Alb  2.2    /  TBili  0.4    /  DBili  x      /  AST  21     /  ALT  21     /  AlkPhos  183    28 Mar 2020 08:12  TPro  7.8    /  Alb  2.7    /  TBili  0.3    /  DBili  x      /  AST  35     /  ALT  29     /  AlkPhos  234    26 Mar 2020 19:24    PT/INR - ( 26 Mar 2020 21:54 )   PT: 16.0 sec;   INR: 1.43 ratio         PTT - ( 28 Mar 2020 08:12 )  PTT:65.6 sec  CARDIAC MARKERS ( 27 Mar 2020 13:19 )4.380 ng/mL / x     / x     / x     / x      CARDIAC MARKERS ( 27 Mar 2020 04:58 )9.730 ng/mL / x     / x     / x     / x      CARDIAC MARKERS ( 26 Mar 2020 21:54 )3.920 ng/mL / x     / x     / x     / x      CARDIAC MARKERS ( 26 Mar 2020 19:24 )1.420 ng/mL / x     / x     / x     / x          < from: TTE Echo Complete w/o contrast w/ Doppler (20 @ 11:37) >   Impression     Summary     The left ventricle is normal in size, wall thickness, wall motion and   contractility.   Estimated left ventricular ejection fraction is 65 %.   Normal appearing left atrium.   Trace aortic regurgitation is present.   Mild to moderate tricuspid valve regurgitationis present.   Normal pulmonary artery systolic pressures.     Signature     ----------------------------------------------------------------   Electronically signed by Randal Kraus MD(Interpreting   physician) on 2020 08:08 AM   ----------------------------------------------------------------    < end of copied text >    < from: CT Chest No Cont (20 @ 22:28) >  IMPRESSION:    Be compared to the recent CT of March 15, 2020, there are increased bilateral upper lobe opacities indicative of worsening multifocal pneumonia.  No explanation for the abdominal pain on this CT.  Diverticulosis.                DEANDRE PUTNAM   This document has been electronically signed. Mar 26 2020 10:40PM    < end of copied text >      EKG:  Today's ECG: sinus tachycardia, nonspecific ST-T changes. 78yo female w/ Erqq-inwvbksekt-irduf diagnosed , HTN, glaucoma, MRSA pna discharged 2 days ago presented to hospital 2 days ago w/ dyspnea & chest pain.  At home, woke up w/ pressure type chest pain L sided lasting hours, worse w/ activity, relieved w/ rest, no atypical features (not worse w/ palpation, inspiration, position).  Called her relative & pt brought to ED.      ECG w/ no ischemic changes, initial trop 1.42, peaked at 9.73 most recent 4.38.  In ED CT chest performed w/ bilateral opacities upper lobes - COVID test sent & was neg.  Antibiotics started.      Currently pt denies chest pain, palpitations.  Reports mild dyspnea & productive cough.    3/29/2020: Complaints of indigestion , States that she did not sleep at night as she had to go t o the bathroom, Denies chest pain, Shortness of breath. Had 2 episodes of NSVT heart rate up to 150s for 6 sec. overnight.     MEDICATIONS  (STANDING):  aspirin  chewable 81 milliGRAM(s) Oral daily  atorvastatin 40 milliGRAM(s) Oral at bedtime  cefepime  Injectable. 1000 milliGRAM(s) IV Push every 12 hours  clopidogrel Tablet 75 milliGRAM(s) Oral daily  heparin  Infusion.  Unit(s)/Hr (9.5 mL/Hr) IV Continuous <Continuous>  lactobacillus acidophilus 1 Tablet(s) Oral two times a day  metoprolol tartrate 12.5 milliGRAM(s) Oral every 12 hours  nystatin Powder 1 Application(s) Topical three times a day  pantoprazole    Tablet 40 milliGRAM(s) Oral before breakfast  sodium chloride 0.9% Bolus 1000 milliLiter(s) IV Bolus once  sodium chloride 0.9%. 1000 milliLiter(s) (75 mL/Hr) IV Continuous <Continuous>  vancomycin  IVPB 1000 milliGRAM(s) IV Intermittent daily    MEDICATIONS  (PRN):  heparin  Injectable 4700 Unit(s) IV Push every 6 hours PRN For aPTT less than 40      Vital Signs Last 24 Hrs  T(C): 36.8 (29 Mar 2020 06:21), Max: 37.1 (28 Mar 2020 21:30)  T(F): 98.3 (29 Mar 2020 06:21), Max: 98.7 (28 Mar 2020 21:30)  HR: 92 (29 Mar 2020 06:21) (92 - 108)  BP: 142/48 (29 Mar 2020 06:21) (102/90 - 144/69)  BP(mean): --  RR: 20 (28 Mar 2020 21:30) (20 - 20)  SpO2: 95% (29 Mar 2020 06:21) (95% - 97%)    I&O's Detail    28 Mar 2020 07:01  -  29 Mar 2020 07:00  --------------------------------------------------------  IN:  Total IN: 0 mL    OUT:    Stool: 1 mL    Voided: 500 mL  Total OUT: 501 mL    Total NET: -501 mL    Daily Weight in k.7 (29 Mar 2020 06:21  PHYSICAL EXAM:    Constitutional: NAD, awake and alert,  HEENT: PERR, EOMI,    Neck:  supple,  No JVD  Respiratory: crackles & wheezing bilaterally posteriorly, no rhonchi  Cardiovascular: S1 and S2, regular rate and rhythm, no Murmurs, gallops or rubs  Gastrointestinal: Bowel Sounds present, soft, nontender.   Extremities: No peripheral edema. No clubbing or cyanosis.  Vascular: 2+ peripheral pulses  Neurological: A/O x 3, no focal deficits      LABS: All Labs Reviewed:                                 9.9    21.01 )-----------( 268      ( 28 Mar 2020 08:12 )             29.8     03-28    141  |  112<H>  |  16  ----------------------------<  95  3.5   |  20<L>  |  0.94    Ca    8.0<L>      28 Mar 2020 08:12    TPro  6.6  /  Alb  2.2<L>  /  TBili  0.4  /  DBili  x   /  AST  21  /  ALT  21  /  AlkPhos  183<H>  03-28    LIVER FUNCTIONS - ( 28 Mar 2020 08:12 )  Alb: 2.2 g/dL / Pro: 6.6 gm/dL / ALK PHOS: 183 U/L / ALT: 21 U/L / AST: 21 U/L / GGT: x           PTT - ( 28 Mar 2020 08:12 )  PTT:65.6 sec  16 Chol 153 LDL 94 HDL 40<L> Trig 92     Ca    8.0        28 Mar 2020 08:12  Ca    8.8        26 Mar 2020 19:24    TPro  6.6    /  Alb  2.2    /  TBili  0.4    /  DBili  x      /  AST  21     /  ALT  21     /  AlkPhos  183    28 Mar 2020 08:12  TPro  7.8    /  Alb  2.7    /  TBili  0.3    /  DBili  x      /  AST  35     /  ALT  29     /  AlkPhos  234    26 Mar 2020 19:24    PT/INR - ( 26 Mar 2020 21:54 )   PT: 16.0 sec;   INR: 1.43 ratio         PTT - ( 28 Mar 2020 08:12 )  PTT:65.6 sec  CARDIAC MARKERS ( 27 Mar 2020 13:19 )4.380 ng/mL / x     / x     / x     / x      CARDIAC MARKERS ( 27 Mar 2020 04:58 )9.730 ng/mL / x     / x     / x     / x      CARDIAC MARKERS ( 26 Mar 2020 21:54 )3.920 ng/mL / x     / x     / x     / x      CARDIAC MARKERS ( 26 Mar 2020 19:24 )1.420 ng/mL / x     / x     / x     / x          < from: TTE Echo Complete w/o contrast w/ Doppler (20 @ 11:37) >   Impression     Summary     The left ventricle is normal in size, wall thickness, wall motion and   contractility.   Estimated left ventricular ejection fraction is 65 %.   Normal appearing left atrium.   Trace aortic regurgitation is present.   Mild to moderate tricuspid valve regurgitationis present.   Normal pulmonary artery systolic pressures.     Signature     ----------------------------------------------------------------   Electronically signed by Randal Kraus MD(Interpreting   physician) on 2020 08:08 AM   ----------------------------------------------------------------    < end of copied text >    < from: CT Chest No Cont (20 @ 22:28) >  IMPRESSION:    Be compared to the recent CT of March 15, 2020, there are increased bilateral upper lobe opacities indicative of worsening multifocal pneumonia.  No explanation for the abdominal pain on this CT.  Diverticulosis.                DEANDRE PUTNAM   This document has been electronically signed. Mar 26 2020 10:40PM    < end of copied text >      EKG:  Today's ECG: sinus tachycardia, nonspecific ST-T changes. 78yo female w/ Nrdd-hmazitokle-nfgty diagnosed , HTN, glaucoma, MRSA pna discharged 2 days ago presented to hospital 2 days ago w/ dyspnea & chest pain.  At home, woke up w/ pressure type chest pain L sided lasting hours, worse w/ activity, relieved w/ rest, no atypical features (not worse w/ palpation, inspiration, position).  Called her relative & pt brought to ED.      ECG w/ no ischemic changes, initial trop 1.42, peaked at 9.73 most recent 4.38.  In ED CT chest performed w/ bilateral opacities upper lobes - COVID test sent & was neg.  Antibiotics started.      Currently pt denies chest pain, palpitations.  Reports mild dyspnea & productive cough.    3/29/2020:  Poor historian. Complaints of indigestion , States that she did not sleep at night as she had to go t o the bathroom. Atypical chest discomfort this am lasting minutes.  Shortness of breath. Had 2 episodes of NSVT heart rate up to 150s for 6 sec. overnight.     MEDICATIONS  (STANDING):  aspirin  chewable 81 milliGRAM(s) Oral daily  atorvastatin 40 milliGRAM(s) Oral at bedtime  cefepime  Injectable. 1000 milliGRAM(s) IV Push every 12 hours  clopidogrel Tablet 75 milliGRAM(s) Oral daily  heparin  Infusion.  Unit(s)/Hr (9.5 mL/Hr) IV Continuous <Continuous>  lactobacillus acidophilus 1 Tablet(s) Oral two times a day  metoprolol tartrate 12.5 milliGRAM(s) Oral every 12 hours  nystatin Powder 1 Application(s) Topical three times a day  pantoprazole    Tablet 40 milliGRAM(s) Oral before breakfast  sodium chloride 0.9% Bolus 1000 milliLiter(s) IV Bolus once  sodium chloride 0.9%. 1000 milliLiter(s) (75 mL/Hr) IV Continuous <Continuous>  vancomycin  IVPB 1000 milliGRAM(s) IV Intermittent daily    MEDICATIONS  (PRN):  heparin  Injectable 4700 Unit(s) IV Push every 6 hours PRN For aPTT less than 40      Vital Signs Last 24 Hrs  T(C): 36.8 (29 Mar 2020 06:21), Max: 37.1 (28 Mar 2020 21:30)  T(F): 98.3 (29 Mar 2020 06:21), Max: 98.7 (28 Mar 2020 21:30)  HR: 92 (29 Mar 2020 06:21) (92 - 108)  BP: 142/48 (29 Mar 2020 06:21) (102/90 - 144/69)  BP(mean): --  RR: 20 (28 Mar 2020 21:30) (20 - 20)  SpO2: 95% (29 Mar 2020 06:21) (95% - 97%)    I&O's Detail    28 Mar 2020 07:01  -  29 Mar 2020 07:00  --------------------------------------------------------  IN:  Total IN: 0 mL    OUT:    Stool: 1 mL    Voided: 500 mL  Total OUT: 501 mL    Total NET: -501 mL    Daily Weight in k.7 (29 Mar 2020 06:21  PHYSICAL EXAM:    Constitutional: NAD, awake and alert,  HEENT: PERR, EOMI,    Neck:  supple,  No JVD  Respiratory: crackles & wheezing bilaterally posteriorly, no rhonchi  Cardiovascular: S1 and S2, regular rate and rhythm, no Murmurs, gallops or rubs  Gastrointestinal: Bowel Sounds present, soft, nontender.   Extremities: No peripheral edema. No clubbing or cyanosis.  Vascular: 2+ peripheral pulses  Neurological: A/O x 3, no focal deficits      LABS: All Labs Reviewed:                                 9.9    21.01 )-----------( 268      ( 28 Mar 2020 08:12 )             29.8     03-28    141  |  112<H>  |  16  ----------------------------<  95  3.5   |  20<L>  |  0.94    Ca    8.0<L>      28 Mar 2020 08:12    TPro  6.6  /  Alb  2.2<L>  /  TBili  0.4  /  DBili  x   /  AST  21  /  ALT  21  /  AlkPhos  183<H>  03-28    LIVER FUNCTIONS - ( 28 Mar 2020 08:12 )  Alb: 2.2 g/dL / Pro: 6.6 gm/dL / ALK PHOS: 183 U/L / ALT: 21 U/L / AST: 21 U/L / GGT: x           PTT - ( 28 Mar 2020 08:12 )  PTT:65.6 sec  03-16 Chol 153 LDL 94 HDL 40<L> Trig 92     Ca    8.0        28 Mar 2020 08:12  Ca    8.8        26 Mar 2020 19:24    TPro  6.6    /  Alb  2.2    /  TBili  0.4    /  DBili  x      /  AST  21     /  ALT  21     /  AlkPhos  183    28 Mar 2020 08:12  TPro  7.8    /  Alb  2.7    /  TBili  0.3    /  DBili  x      /  AST  35     /  ALT  29     /  AlkPhos  234    26 Mar 2020 19:24    PT/INR - ( 26 Mar 2020 21:54 )   PT: 16.0 sec;   INR: 1.43 ratio         PTT - ( 28 Mar 2020 08:12 )  PTT:65.6 sec  CARDIAC MARKERS ( 27 Mar 2020 13:19 )4.380 ng/mL / x     / x     / x     / x      CARDIAC MARKERS ( 27 Mar 2020 04:58 )9.730 ng/mL / x     / x     / x     / x      CARDIAC MARKERS ( 26 Mar 2020 21:54 )3.920 ng/mL / x     / x     / x     / x      CARDIAC MARKERS ( 26 Mar 2020 19:24 )1.420 ng/mL / x     / x     / x     / x          < from: TTE Echo Complete w/o contrast w/ Doppler (20 @ 11:37) >   Impression     Summary     The left ventricle is normal in size, wall thickness, wall motion and   contractility.   Estimated left ventricular ejection fraction is 65 %.   Normal appearing left atrium.   Trace aortic regurgitation is present.   Mild to moderate tricuspid valve regurgitation is present.   Normal pulmonary artery systolic pressures.     Signature     ----------------------------------------------------------------   Electronically signed by Randal Kraus MD(Interpreting   physician) on 2020 08:08 AM   ----------------------------------------------------------------    < end of copied text >    < from: CT Chest No Cont (20 @ 22:28) >  IMPRESSION:    Be compared to the recent CT of March 15, 2020, there are increased bilateral upper lobe opacities indicative of worsening multifocal pneumonia.  No explanation for the abdominal pain on this CT.  Diverticulosis.      DEANDRE PUTNAM   This document has been electronically signed. Mar 26 2020 10:40PM    < end of copied text >      EKG:  Today's ECG: sinus tachycardia, nonspecific ST-T changes. 78yo female w/ Hwjt-ltzdvfmszt-rywgp diagnosed , HTN, glaucoma, MRSA pna discharged 2 days ago presented to hospital 2 days ago w/ dyspnea & chest pain.  At home, woke up w/ pressure type chest pain L sided lasting hours, worse w/ activity, relieved w/ rest, no atypical features (not worse w/ palpation, inspiration, position).  Called her relative & pt brought to ED.      ECG w/ no ischemic changes, initial trop 1.42, peaked at 9.73 most recent 4.38.  In ED CT chest performed w/ bilateral opacities upper lobes - COVID test sent & was neg.  Antibiotics started.      Currently pt denies chest pain, palpitations.  Reports mild dyspnea & productive cough.    3/29/2020:  Poor historian. Complaints of indigestion , States that she did not sleep at night as she had to go t o the bathroom. Atypical chest discomfort this am lasting minutes.  Shortness of breath. Had 2 episodes of SVT heart rate up to 150s for 6 sec. overnight.     MEDICATIONS  (STANDING):  aspirin  chewable 81 milliGRAM(s) Oral daily  atorvastatin 40 milliGRAM(s) Oral at bedtime  cefepime  Injectable. 1000 milliGRAM(s) IV Push every 12 hours  clopidogrel Tablet 75 milliGRAM(s) Oral daily  heparin  Infusion.  Unit(s)/Hr (9.5 mL/Hr) IV Continuous <Continuous>  lactobacillus acidophilus 1 Tablet(s) Oral two times a day  metoprolol tartrate 12.5 milliGRAM(s) Oral every 12 hours  nystatin Powder 1 Application(s) Topical three times a day  pantoprazole    Tablet 40 milliGRAM(s) Oral before breakfast  sodium chloride 0.9% Bolus 1000 milliLiter(s) IV Bolus once  sodium chloride 0.9%. 1000 milliLiter(s) (75 mL/Hr) IV Continuous <Continuous>  vancomycin  IVPB 1000 milliGRAM(s) IV Intermittent daily    MEDICATIONS  (PRN):  heparin  Injectable 4700 Unit(s) IV Push every 6 hours PRN For aPTT less than 40      Vital Signs Last 24 Hrs  T(C): 36.8 (29 Mar 2020 06:21), Max: 37.1 (28 Mar 2020 21:30)  T(F): 98.3 (29 Mar 2020 06:21), Max: 98.7 (28 Mar 2020 21:30)  HR: 92 (29 Mar 2020 06:21) (92 - 108)  BP: 142/48 (29 Mar 2020 06:21) (102/90 - 144/69)  BP(mean): --  RR: 20 (28 Mar 2020 21:30) (20 - 20)  SpO2: 95% (29 Mar 2020 06:21) (95% - 97%)    I&O's Detail    28 Mar 2020 07:01  -  29 Mar 2020 07:00  --------------------------------------------------------  IN:  Total IN: 0 mL    OUT:    Stool: 1 mL    Voided: 500 mL  Total OUT: 501 mL    Total NET: -501 mL    Daily Weight in k.7 (29 Mar 2020 06:21  PHYSICAL EXAM:    Constitutional: NAD, awake and alert,  HEENT: PERR, EOMI,    Neck:  supple,  No JVD  Respiratory: crackles & wheezing bilaterally posteriorly, no rhonchi  Cardiovascular: S1 and S2, regular rate and rhythm, no Murmurs, gallops or rubs  Gastrointestinal: Bowel Sounds present, soft, nontender.   Extremities: No peripheral edema. No clubbing or cyanosis.  Vascular: 2+ peripheral pulses  Neurological: A/O x 3, no focal deficits      LABS: All Labs Reviewed:                                 9.9    21.01 )-----------( 268      ( 28 Mar 2020 08:12 )             29.8     03-28    141  |  112<H>  |  16  ----------------------------<  95  3.5   |  20<L>  |  0.94    Ca    8.0<L>      28 Mar 2020 08:12    TPro  6.6  /  Alb  2.2<L>  /  TBili  0.4  /  DBili  x   /  AST  21  /  ALT  21  /  AlkPhos  183<H>  03-28    LIVER FUNCTIONS - ( 28 Mar 2020 08:12 )  Alb: 2.2 g/dL / Pro: 6.6 gm/dL / ALK PHOS: 183 U/L / ALT: 21 U/L / AST: 21 U/L / GGT: x           PTT - ( 28 Mar 2020 08:12 )  PTT:65.6 sec  03-16 Chol 153 LDL 94 HDL 40<L> Trig 92     Ca    8.0        28 Mar 2020 08:12  Ca    8.8        26 Mar 2020 19:24    TPro  6.6    /  Alb  2.2    /  TBili  0.4    /  DBili  x      /  AST  21     /  ALT  21     /  AlkPhos  183    28 Mar 2020 08:12  TPro  7.8    /  Alb  2.7    /  TBili  0.3    /  DBili  x      /  AST  35     /  ALT  29     /  AlkPhos  234    26 Mar 2020 19:24    PT/INR - ( 26 Mar 2020 21:54 )   PT: 16.0 sec;   INR: 1.43 ratio         PTT - ( 28 Mar 2020 08:12 )  PTT:65.6 sec  CARDIAC MARKERS ( 27 Mar 2020 13:19 )4.380 ng/mL / x     / x     / x     / x      CARDIAC MARKERS ( 27 Mar 2020 04:58 )9.730 ng/mL / x     / x     / x     / x      CARDIAC MARKERS ( 26 Mar 2020 21:54 )3.920 ng/mL / x     / x     / x     / x      CARDIAC MARKERS ( 26 Mar 2020 19:24 )1.420 ng/mL / x     / x     / x     / x          < from: TTE Echo Complete w/o contrast w/ Doppler (20 @ 11:37) >   Impression     Summary     The left ventricle is normal in size, wall thickness, wall motion and   contractility.   Estimated left ventricular ejection fraction is 65 %.   Normal appearing left atrium.   Trace aortic regurgitation is present.   Mild to moderate tricuspid valve regurgitation is present.   Normal pulmonary artery systolic pressures.     Signature     ----------------------------------------------------------------   Electronically signed by Randal Kraus MD(Interpreting   physician) on 2020 08:08 AM   ----------------------------------------------------------------    < end of copied text >    < from: CT Chest No Cont (20 @ 22:28) >  IMPRESSION:    Be compared to the recent CT of March 15, 2020, there are increased bilateral upper lobe opacities indicative of worsening multifocal pneumonia.  No explanation for the abdominal pain on this CT.  Diverticulosis.      DEANDRE PUTNAM   This document has been electronically signed. Mar 26 2020 10:40PM    < end of copied text >      EKG:  Today's ECG: sinus tachycardia, nonspecific ST-T changes.

## 2020-03-29 NOTE — PROGRESS NOTE ADULT - ASSESSMENT
1) Pneumonia   2) Abnormal CT Chest   3) Dyspnea  4) Cough  5) AF     80 yo female with a pmh/o Glaucoma, cataracts, OA, HTN, who presents to the ED with niece due to her niece noticing pt having cough x 1 week, decreased to no appetite, and weakness. Niece called EMS from pt home after visiting and EMS noted pt HR to be 210 in aflutter. Pt given IVF and 20mg cardizem en route and HR improved, pt now in NSR at 80's. Pt is a very poor historian however upon interview, admits to having a pre op assessment (cataract) in January where her RN had mentioned to her that her HR was irregular. Pt then admits to intermittent chest pressure which is centralized across chest and radiates down right arm to fingertips. Pt states this chest pressure and wheezing has been treated at home with ASA, advil, and yesterday started OTC cold medicine with pseudoephinephrine ingredient. Pt states pressure had awoken her from her sleep and is sure that she only has been taking two advil and one additional aspirin to her daily in order to control sx. Pt denies fever, however states that she did have chills. Endorses productive cough with off white sputum, and wheezing.  Reviewed CT Chest imaging, labs   COVID 19 negative, repeat negative   RVP + for enterovirus noted   Findings are consistent with a pneumonia secondary to viral etiology  She is a non-smoker with no prior pulmonary history and she states she has not seen a pulmonologist in the past   Procalcitonin reviewed, 2.08 suggesting underlying infectious process   Continue Symbicort   Will continue to monitor  Appreciate ID recommendations   cardiac eval in progress

## 2020-03-29 NOTE — PROGRESS NOTE ADULT - PROBLEM SELECTOR PLAN 2
CHADS2-VASc=4 (DM, HTN, >75). paroxysmal, currently NSR.  Cont. hep gtt for ACS. Start metoprolol tartrate 12.5 po BID If PCI needed will likely need P2Y12 inhibitor & eliquis w/o ASA. CHADS2-VASc=4 (DM, HTN, >75). paroxysmal, currently NSR.  Cont. hep gtt for ACS. Will increase  metoprolol tartrate 25 po BID If PCI needed will likely need P2Y12 inhibitor & eliquis w/o ASA. CHADS2-VASc=4 (DM, HTN, >75). paroxysmal, currently NSR.  Cont. hep gtt for ACS, stop tommorrow AM. Will increase  metoprolol tartrate 25 po BID If PCI needed will likely need P2Y12 inhibitor & eliquis w/o ASA.

## 2020-03-30 LAB
ANION GAP SERPL CALC-SCNC: 6 MMOL/L — SIGNIFICANT CHANGE UP (ref 5–17)
APTT BLD: 108.1 SEC — HIGH (ref 27.5–36.3)
BUN SERPL-MCNC: 9 MG/DL — SIGNIFICANT CHANGE UP (ref 7–23)
CALCIUM SERPL-MCNC: 8.3 MG/DL — LOW (ref 8.5–10.1)
CHLORIDE SERPL-SCNC: 108 MMOL/L — SIGNIFICANT CHANGE UP (ref 96–108)
CO2 SERPL-SCNC: 23 MMOL/L — SIGNIFICANT CHANGE UP (ref 22–31)
CREAT SERPL-MCNC: 0.88 MG/DL — SIGNIFICANT CHANGE UP (ref 0.5–1.3)
CULTURE RESULTS: SIGNIFICANT CHANGE UP
GLUCOSE SERPL-MCNC: 88 MG/DL — SIGNIFICANT CHANGE UP (ref 70–99)
HCT VFR BLD CALC: 31.5 % — LOW (ref 34.5–45)
HGB BLD-MCNC: 10.2 G/DL — LOW (ref 11.5–15.5)
MCHC RBC-ENTMCNC: 28.8 PG — SIGNIFICANT CHANGE UP (ref 27–34)
MCHC RBC-ENTMCNC: 32.4 GM/DL — SIGNIFICANT CHANGE UP (ref 32–36)
MCV RBC AUTO: 89 FL — SIGNIFICANT CHANGE UP (ref 80–100)
NT-PROBNP SERPL-SCNC: 5277 PG/ML — HIGH (ref 0–450)
PLATELET # BLD AUTO: 311 K/UL — SIGNIFICANT CHANGE UP (ref 150–400)
POTASSIUM SERPL-MCNC: 3.7 MMOL/L — SIGNIFICANT CHANGE UP (ref 3.5–5.3)
POTASSIUM SERPL-SCNC: 3.7 MMOL/L — SIGNIFICANT CHANGE UP (ref 3.5–5.3)
RBC # BLD: 3.54 M/UL — LOW (ref 3.8–5.2)
RBC # FLD: 15 % — HIGH (ref 10.3–14.5)
SODIUM SERPL-SCNC: 137 MMOL/L — SIGNIFICANT CHANGE UP (ref 135–145)
SPECIMEN SOURCE: SIGNIFICANT CHANGE UP
VANCOMYCIN TROUGH SERPL-MCNC: 13.4 UG/ML — SIGNIFICANT CHANGE UP (ref 10–20)
WBC # BLD: 22.16 K/UL — HIGH (ref 3.8–10.5)
WBC # FLD AUTO: 22.16 K/UL — HIGH (ref 3.8–10.5)

## 2020-03-30 PROCEDURE — 93458 L HRT ARTERY/VENTRICLE ANGIO: CPT | Mod: 26

## 2020-03-30 PROCEDURE — 99233 SBSQ HOSP IP/OBS HIGH 50: CPT | Mod: 25

## 2020-03-30 PROCEDURE — 99233 SBSQ HOSP IP/OBS HIGH 50: CPT

## 2020-03-30 PROCEDURE — 93306 TTE W/DOPPLER COMPLETE: CPT | Mod: 26

## 2020-03-30 RX ORDER — FUROSEMIDE 40 MG
20 TABLET ORAL ONCE
Refills: 0 | Status: COMPLETED | OUTPATIENT
Start: 2020-03-30 | End: 2020-03-30

## 2020-03-30 RX ORDER — DILTIAZEM HCL 120 MG
60 CAPSULE, EXT RELEASE 24 HR ORAL EVERY 6 HOURS
Refills: 0 | Status: DISCONTINUED | OUTPATIENT
Start: 2020-03-30 | End: 2020-04-02

## 2020-03-30 RX ORDER — RIVAROXABAN 15 MG-20MG
15 KIT ORAL DAILY
Refills: 0 | Status: DISCONTINUED | OUTPATIENT
Start: 2020-03-31 | End: 2020-04-02

## 2020-03-30 RX ORDER — METOPROLOL TARTRATE 50 MG
50 TABLET ORAL
Refills: 0 | Status: DISCONTINUED | OUTPATIENT
Start: 2020-03-30 | End: 2020-04-02

## 2020-03-30 RX ORDER — LOSARTAN POTASSIUM 100 MG/1
25 TABLET, FILM COATED ORAL DAILY
Refills: 0 | Status: DISCONTINUED | OUTPATIENT
Start: 2020-03-30 | End: 2020-04-02

## 2020-03-30 RX ORDER — SODIUM CHLORIDE 9 MG/ML
3 INJECTION INTRAMUSCULAR; INTRAVENOUS; SUBCUTANEOUS EVERY 8 HOURS
Refills: 0 | Status: DISCONTINUED | OUTPATIENT
Start: 2020-03-30 | End: 2020-04-02

## 2020-03-30 RX ADMIN — PANTOPRAZOLE SODIUM 40 MILLIGRAM(S): 20 TABLET, DELAYED RELEASE ORAL at 06:50

## 2020-03-30 RX ADMIN — Medication 20 MILLIGRAM(S): at 10:01

## 2020-03-30 RX ADMIN — CEFEPIME 1000 MILLIGRAM(S): 1 INJECTION, POWDER, FOR SOLUTION INTRAMUSCULAR; INTRAVENOUS at 21:42

## 2020-03-30 RX ADMIN — SODIUM CHLORIDE 3 MILLILITER(S): 9 INJECTION INTRAMUSCULAR; INTRAVENOUS; SUBCUTANEOUS at 13:51

## 2020-03-30 RX ADMIN — Medication 25 MILLIGRAM(S): at 06:50

## 2020-03-30 RX ADMIN — CEFEPIME 1000 MILLIGRAM(S): 1 INJECTION, POWDER, FOR SOLUTION INTRAMUSCULAR; INTRAVENOUS at 13:51

## 2020-03-30 RX ADMIN — Medication 50 MILLIGRAM(S): at 18:34

## 2020-03-30 RX ADMIN — SODIUM CHLORIDE 3 MILLILITER(S): 9 INJECTION INTRAMUSCULAR; INTRAVENOUS; SUBCUTANEOUS at 21:43

## 2020-03-30 RX ADMIN — Medication 1 TABLET(S): at 18:34

## 2020-03-30 RX ADMIN — Medication 1 TABLET(S): at 06:50

## 2020-03-30 RX ADMIN — Medication 60 MILLIGRAM(S): at 18:34

## 2020-03-30 RX ADMIN — ATORVASTATIN CALCIUM 40 MILLIGRAM(S): 80 TABLET, FILM COATED ORAL at 21:41

## 2020-03-30 RX ADMIN — NYSTATIN CREAM 1 APPLICATION(S): 100000 CREAM TOPICAL at 21:43

## 2020-03-30 RX ADMIN — NYSTATIN CREAM 1 APPLICATION(S): 100000 CREAM TOPICAL at 06:50

## 2020-03-30 RX ADMIN — NYSTATIN CREAM 1 APPLICATION(S): 100000 CREAM TOPICAL at 13:51

## 2020-03-30 RX ADMIN — CLOPIDOGREL BISULFATE 75 MILLIGRAM(S): 75 TABLET, FILM COATED ORAL at 13:48

## 2020-03-30 RX ADMIN — Medication 250 MILLIGRAM(S): at 13:49

## 2020-03-30 NOTE — PROGRESS NOTE ADULT - SUBJECTIVE AND OBJECTIVE BOX
80yo female w/ [id number] diagnosed March 17th, HTN, glaucoma, MRSA pna discharged 2 days ago presented to hospital 2 days ago w/ dyspnea & chest pain.  At home, woke up w/ pressure type chest pain L sided lasting hours, worse w/ activity, relieved w/ rest, no atypical features (not worse w/ palpation, inspiration, position).  Called her relative & pt brought to ED.      ECG w/ no ischemic changes, initial trop 1.42, peaked at 9.73 most recent 4.38.  In ED CT chest performed w/ bilateral opacities upper lobes - COVID test sent & was neg.  Antibiotics started.      Currently pt denies chest pain, palpitations.  Reports mild dyspnea & productive cough.    3/30/2020: s/p Cath with non-obstructive CAD.  NL LV FX.    PAST MEDICAL & SURGICAL HISTORY:  OA (osteoarthritis)  Glaucoma  HTN (hypertension)  Status post glaucoma surgery: and cataract      Allergies  No Known Allergies  Intolerances    MEDICATIONS  (STANDING):  ALBUTerol    90 MICROgram(s) HFA Inhaler 2 Puff(s) Inhalation every 6 hours  aspirin  chewable 81 milliGRAM(s) Oral daily  atorvastatin 40 milliGRAM(s) Oral at bedtime  cefepime  Injectable. 1000 milliGRAM(s) IV Push every 12 hours  clopidogrel Tablet 75 milliGRAM(s) Oral daily  heparin  Infusion.  Unit(s)/Hr (9.5 mL/Hr) IV Continuous <Continuous>  lactobacillus acidophilus 1 Tablet(s) Oral two times a day  metoprolol tartrate 25 milliGRAM(s) Oral two times a day  nystatin Powder 1 Application(s) Topical three times a day  pantoprazole    Tablet 40 milliGRAM(s) Oral before breakfast  sodium chloride 0.9% Bolus 1000 milliLiter(s) IV Bolus once  sodium chloride 0.9% lock flush 3 milliLiter(s) IV Push every 8 hours  vancomycin  IVPB 1000 milliGRAM(s) IV Intermittent daily    MEDICATIONS  (PRN):  heparin  Injectable 4700 Unit(s) IV Push every 6 hours PRN For aPTT less than 40      Vital Signs Last 24 Hrs  T(C): 36.8 (30 Mar 2020 05:45), Max: 36.8 (30 Mar 2020 05:45)  T(F): 98.2 (30 Mar 2020 05:45), Max: 98.2 (30 Mar 2020 05:45)  HR: 89 (30 Mar 2020 05:45) (89 - 89)  BP: 136/75 (30 Mar 2020 05:45) (136/75 - 144/75)  BP(mean): --  RR: 20 (29 Mar 2020 21:59) (20 - 20)  SpO2: 99% (30 Mar 2020 05:45) (99% - 100%)    I&O's Detail    29 Mar 2020 07:01  -  30 Mar 2020 07:00  --------------------------------------------------------  IN:  Total IN: 0 mL    OUT:    Voided: 450 mL  Total OUT: 450 mL    Total NET: -450 mL          Daily     Daily       PHYSICAL EXAM:    Constitutional: NAD, awake and alert,  HEENT: PERR, EOMI,    Neck:  supple,  No JVD  Respiratory: crackles & wheezing bilaterally posteriorly, no rhonchi  Cardiovascular: S1 and S2, regular rate and rhythm, no Murmurs, gallops or rubs  Gastrointestinal: Bowel Sounds present, soft, nontender.   Extremities: No peripheral edema. No clubbing or cyanosis.  Vascular: 2+ peripheral pulses  Neurological: A/O x 3, no focal deficits      LABS: All Labs Reviewed:                        10.2   22.16 )-----------( 311      ( 30 Mar 2020 07:12 )             31.5     03-30    137  |  108  |  9   ----------------------------<  88  3.7   |  23  |  0.88    Ca    8.3<L>      30 Mar 2020 07:12    PTT - ( 30 Mar 2020 07:12 )  PTT:108.1 sec  03-16 Chol 153 LDL 94 HDL 40<L> Trig 92    < from: TTE Echo Complete w/o contrast w/ Doppler (03.16.20 @ 11:37) >   Impression     Summary     The left ventricle is normal in size, wall thickness, wall motion and   contractility.   Estimated left ventricular ejection fraction is 65 %.   Normal appearing left atrium.   Trace aortic regurgitation is present.   Mild to moderate tricuspid valve regurgitationis present.   Normal pulmonary artery systolic pressures.      < end of copied text >    < from: CT Chest No Cont (03.26.20 @ 22:28) >  IMPRESSION:    Be compared to the recent CT of March 15, 2020, there are increased bilateral upper lobe opacities indicative of worsening multifocal pneumonia.  No explanation for the abdominal pain on this CT.  Diverticulosis.      < from: Cardiac Cath Lab - Adult (03.30.20 @ 11:49) >  Angiographic Findings     Cardiac Arteries and Lesion Findings    LMCA: Diffuse irregularity.There is mild diffuse disease noted.    LAD: Diffuse irregularity.There is mild diffuse disease noted.    LCx: Diffuse irregularity.There is mild diffuse disease noted.    RCA: Diffuse irregularity.There is mild diffuse disease noted.    Valves  +------+----------------------------+----------------------------+---------+  !Valve !Stenosis                    !Insufficiency               !Comments !  +------+----------------------------+----------------------------+---------+  !Aortic!No                          !Not assessed                !         !  +------+----------------------------+----------------------------+---------+  !Mitral!Not assessed                !No insufficiency            !         !  +------+----------------------------+----------------------------+---------+    LV function assessed as:Normal.  Ejection Fraction  +----------------------------------------------------------------------+---+  !Method                                                                !EF%!  +----------------------------------------------------------------------+---+  !LV gram                                                               !60 !  +----------------------------------------------------------------------+---+    VA  Ventriculography Findings:  Normal left ventricular systolic function.     Impression     Diagnostic Conclusions     Non-Obstructive CAD with Normal left ventricular function.    < end of copied text >            DEANDRE PUTNAM   This document has been electronically signed. Mar 26 2020 10:40PM    < end of copied text >      EKG:  Today's ECG: sinus tachycardia, nonspecific ST-T changes.

## 2020-03-30 NOTE — PROGRESS NOTE ADULT - SUBJECTIVE AND OBJECTIVE BOX
Patient is a 79y old  Female who presents with a chief complaint of SOB, CP (27 Mar 2020 18:04)      HPI:  Patient is 80yo female with PMhx of glaucoma, HTN, OA, discharged 2 days ago for MRSA PNA on Doxy PO, new onset Afib, (COVID19 negative during hospitalization), represents to the hospital complaining of SOB and CP. Pt notes she took a nap, and woke up with SOB and CP. Denies fever, chills, palpitations, HA, dizziness. No other constitutional symptoms. (27 Mar 2020 09:48)    3/29  seen and examined with her RN today  no cp  breathing is ok  mild cough  3/30  resting comfortably  no distress  no cp    PAST MEDICAL & SURGICAL HISTORY:  OA (osteoarthritis)  Glaucoma  HTN (hypertension)  Status post glaucoma surgery: and cataract      PREVIOUS DIAGNOSTIC TESTING:      MEDICATIONS  (STANDING):  aspirin  chewable 81 milliGRAM(s) Oral daily  atorvastatin 40 milliGRAM(s) Oral at bedtime  cefepime  Injectable. 1000 milliGRAM(s) IV Push every 12 hours  clopidogrel Tablet 75 milliGRAM(s) Oral daily  heparin  Infusion.  Unit(s)/Hr (9.5 mL/Hr) IV Continuous <Continuous>  lactobacillus acidophilus 1 Tablet(s) Oral two times a day  sodium chloride 0.9% Bolus 1000 milliLiter(s) IV Bolus once  sodium chloride 0.9%. 1000 milliLiter(s) (75 mL/Hr) IV Continuous <Continuous>  vancomycin  IVPB 1000 milliGRAM(s) IV Intermittent daily    MEDICATIONS  (PRN):  heparin  Injectable 4700 Unit(s) IV Push every 6 hours PRN For aPTT less than 40      FAMILY HISTORY:  FH: CAD (coronary artery disease): in mother and father, both   FH: pancreatic cancer: in sister,   FHx: colon cancer: in brother,       SOCIAL HISTORY:  ***    REVIEW OF SYSTEM:  dyspnea, fatigue, all other systems reviewed and are negative   Vital Signs Last 24 Hrs  T(C): 36.8 (30 Mar 2020 05:45), Max: 36.8 (30 Mar 2020 05:45)  T(F): 98.2 (30 Mar 2020 05:45), Max: 98.2 (30 Mar 2020 05:45)  HR: 89 (30 Mar 2020 05:45) (89 - 93)  BP: 136/75 (30 Mar 2020 05:45) (136/75 - 155/61)  BP(mean): --  RR: 20 (29 Mar 2020 21:59) (20 - 20)  SpO2: 99% (30 Mar 2020 05:45) (98% - 100%)    I&O's Detail    29 Mar 2020 07:01  -  30 Mar 2020 07:00  --------------------------------------------------------  IN:  Total IN: 0 mL    OUT:    Voided: 450 mL  Total OUT: 450 mL    Total NET: -450 mL          PHYSICAL EXAM  General Appearance: cooperative, no acute distress,   HEENT: PERRL, conjunctiva clear, EOM's intact, non injected pharynx, no exudate, TM   normal  Neck: Supple, , no adenopathy, thyroid: not enlarged, no carotid bruit or JVD  Back: Symmetric, no  tenderness,no soft tissue tenderness  Lungs: Diminished in the upper lobes, coarse at the bases  Heart: Regular rate and rhythm, S1, S2 normal, no murmur, rub or gallop  Abdomen: Soft, non-tender, bowel sounds active , no hepatosplenomegaly  Extremities: no cyanosis or edema, no joint swelling  Skin: Skin color, texture normal, no rashes   Neurologic: Alert and oriented X3 , cranial nerves intact, sensory and motor normal,    ECG:    LABS:                        9.3    18.46 )-----------( 263      ( 29 Mar 2020 08:31 )             28.3                           9.9    21.01 )-----------( 268      ( 28 Mar 2020 08:12 )      141  |  114<H>  |  13  ----------------------------<  99  3.4<L>   |  19<L>  |  0.88    Ca    8.1<L>      29 Mar 2020 08:31    TPro  6.6  /  Alb  2.2<L>  /  TBili  0.4  /  DBili  x   /  AST  21  /  ALT  21  /  AlkPhos  183<H>               29.8         141  |  112<H>  |  16  ----------------------------<  95  3.5   |  20<L>  |  0.94    Ca    8.0<L>      28 Mar 2020 08:12    TPro  6.6  /  Alb  2.2<L>  /  TBili  0.4  /  DBili  x   /  AST  21  /  ALT  21  /  AlkPhos  183<H>  28    CARDIAC MARKERS ( 27 Mar 2020 13:19 )  4.380 ng/mL / x     / x     / x     / x      CARDIAC MARKERS ( 27 Mar 2020 04:58 )  9.730 ng/mL / x     / x     / x     / x      CARDIAC MARKERS ( 26 Mar 2020 21:54 )  3.920 ng/mL / x     / x     / x     / x      CARDIAC MARKERS ( 26 Mar 2020 19:24 )  1.420 ng/mL / x     / x     / x     / x              PT/INR - ( 26 Mar 2020 21:54 )   PT: 16.0 sec;   INR: 1.43 ratio         PTT - ( 28 Mar 2020 08:12 )  PTT:65.6 sec  Urinalysis Basic - ( 28 Mar 2020 01:50 )    Color: Yellow / Appearance: Clear / S.020 / pH: x  Gluc: x / Ketone: Negative  / Bili: Negative / Urobili: Negative mg/dL   Blood: x / Protein: 100 mg/dL / Nitrite: Negative   Leuk Esterase: Moderate / RBC: 11-25 /HPF / WBC 26-50   Sq Epi: x / Non Sq Epi: Moderate / Bacteria: Moderate            RADIOLOGY & ADDITIONAL STUDIES:  < from: Xray Chest 1 View- PORTABLE-Routine (20 @ 07:19) >  PROCEDURE DATE:  2020          INTERPRETATION:  Clinical indication:  PNA f/u    Technique: XR CHEST portable AP    Comparison:3/18/2020    Findings:  Lines: None    Heart/Mediastinum/Lungs/Other: The heart size is normal.The lungs demonstrate patchy opacity in the upper lobes with interstitial prominence, similar to the previous exam..There are no pleural effusions.    Impression:    As above.    < end of copied text >

## 2020-03-30 NOTE — PROGRESS NOTE ADULT - SUBJECTIVE AND OBJECTIVE BOX
CC: SOB, CP (28 Mar 2020 10:19)    HPI: 80yo female with PMhx of glaucoma, HTN, OA, discharged 2 days ago for MRSA PNA on Doxy PO, new onset Afib, (COVID19 negative during hospitalization), represents to the hospital complaining of SOB and CP. Pt notes she took a nap, and woke up with SOB and CP. Denies fever, chills, palpitations, HA, dizziness. No other constitutional symptoms.     INTERVAL HPI/ OVERNIGHT EVENTS:  Pt was seen and examined,  reports feeling congested in the chest. No CP. Awaiting for [facility]    Vital Signs Last 24 Hrs  T(C): 36.9 (30 Mar 2020 13:47), Max: 37.2 (30 Mar 2020 12:55)  T(F): 98.5 (30 Mar 2020 13:47), Max: 99 (30 Mar 2020 12:55)  HR: 87 (30 Mar 2020 13:47) (80 - 89)  BP: 137/72 (30 Mar 2020 13:47) (108/69 - 144/75)  RR: 18 (30 Mar 2020 13:47) (18 - 20)  SpO2: 95% (30 Mar 2020 13:47) (95% - 100%)    REVIEW OF SYSTEMS:  All other review of systems is negative unless indicated above.        PHYSICAL EXAM:  General: Well developed;   in no acute distress, on NC  Eyes: PERRLA, EOMI; conjunctiva and sclera clear  Head: Normocephalic; atraumatic  ENMT: No nasal discharge; airway clear  Neck: Supple; no JVD  Respiratory: poor air entry, + wheezing b/l  Cardiovascular: Regular rate and rhythm. S1 and S2 Normal; No murmurs  Gastrointestinal: Soft non-tender non-distended; Normal bowel sounds  Genitourinary: No  suprapubic  tenderness  Extremities: Normal range of motion, No  edema  Vascular: Peripheral pulses palpable 2+ bilaterally  Neurological: Alert and oriented x3, non focal   Skin: Warm and dry. No acute rash  Lymph Nodes: No acute cervical adenopathy  Musculoskeletal: Normal muscle tone, without deformities  Psychiatric: Cooperative     LABS:                         10.2   22.16 )-----------( 311      ( 30 Mar 2020 07:12 )             31.5     03-30    137  |  108  |  9   ----------------------------<  88  3.7   |  23  |  0.88    Ca    8.3<L>      30 Mar 2020 07:12  PTT - ( 30 Mar 2020 07:12 )  PTT:108.1 sec      CARDIAC MARKERS ( 27 Mar 2020 13:19 )  4.380 ng/mL / x     / x     / x     / x      CARDIAC MARKERS ( 27 Mar 2020 04:58 )  9.730 ng/mL / x     / x     / x     / x      CARDIAC MARKERS ( 26 Mar 2020 21:54 )  3.920 ng/mL / x     / x     / x     / x      CARDIAC MARKERS ( 26 Mar 2020 19:24 )  1.420 ng/mL / x     / x     / x     / x                                9.9    21.01 )-----------( 268      ( 28 Mar 2020 08:12 )             29.8     28 Mar 2020 08:12    141    |  112    |  16     ----------------------------<  95     3.5     |  20     |  0.94     Ca    8.0        28 Mar 2020 08:12    TPro  6.6    /  Alb  2.2    /  TBili  0.4    /  DBili  x      /  AST  21     /  ALT  21     /  AlkPhos  183    28 Mar 2020 08:12  PT/INR - ( 26 Mar 2020 21:54 )   PT: 16.0 sec;   INR: 1.43 ratio         PTT - ( 28 Mar 2020 08:12 )  PTT:65.6 sec  CAPILLARY BLOOD GLUCOSE  LIVER FUNCTIONS - ( 28 Mar 2020 08:12 )  Alb: 2.2 g/dL / Pro: 6.6 gm/dL / ALK PHOS: 183 U/L / ALT: 21 U/L / AST: 21 U/L / GGT: x           Urinalysis Basic - ( 28 Mar 2020 01:50 )  Color: Yellow / Appearance: Clear / S.020 / pH: x  Gluc: x / Ketone: Negative  / Bili: Negative / Urobili: Negative mg/dL   Blood: x / Protein: 100 mg/dL / Nitrite: Negative   Leuk Esterase: Moderate / RBC: 11-25 /HPF / WBC 26-50   Sq Epi: x / Non Sq Epi: Moderate / Bacteria: Moderate      MEDICATIONS  (STANDING):  ALBUTerol    90 MICROgram(s) HFA Inhaler 2 Puff(s) Inhalation every 6 hours  atorvastatin 40 milliGRAM(s) Oral at bedtime  cefepime  Injectable. 1000 milliGRAM(s) IV Push every 12 hours  clopidogrel Tablet 75 milliGRAM(s) Oral daily  diltiazem    Tablet 60 milliGRAM(s) Oral every 6 hours  lactobacillus acidophilus 1 Tablet(s) Oral two times a day  losartan 25 milliGRAM(s) Oral daily  metoprolol tartrate 50 milliGRAM(s) Oral two times a day  nystatin Powder 1 Application(s) Topical three times a day  pantoprazole    Tablet 40 milliGRAM(s) Oral before breakfast  sodium chloride 0.9% Bolus 1000 milliLiter(s) IV Bolus once  sodium chloride 0.9% lock flush 3 milliLiter(s) IV Push every 8 hours  vancomycin  IVPB 1000 milliGRAM(s) IV Intermittent daily    MEDICATIONS  (PRN):      RADIOLOGY & ADDITIONAL TESTS:    EXAM:  CT ABDOMEN AND PELVIS                        EXAM:  CT CHEST                        PROCEDURE DATE:  2020        INTERPRETATION:  CT of the chest, abdomen and pelvis        FINDINGS:  Evaluation of the solid abdominal organs is limited without contrast.  The visualized neck, axilla and subcutaneous tissues are unremarkable.  The tracheobronchial tree is patent centrally.  There is no mediastinal hematoma.  The great vessels are not enlarged. There are nonspecific mediastinal lymph nodes measuring up to 1.3 x 1.7 cm in the right paratracheal region. Coronary atherosclerosis. The heart is not enlarged.  There is no pericardial effusion.    Lungs: Compared to the recent CT of March 15, 2020, there are increased patchy groundglass and airspace opacities at the lung apices with septal thickening indicative of multifocal infection. Left base streaky atelectasis.    Pleura: Unremarkable.  There is no free air or focal collection.  No free fluid.    Liver: 2.2 cm bilobed cyst in theleft lobe.  Spleen: Normal.  Gallbladder: Unremarkable.  Biliary tree: Unremarkable.  Adrenal glands: Normal.  Pancreas: Normal.  Kidneys: No perinephric stranding, renal edema, hydronephrosis, obstructing stone, or intrarenal stone. There is an air and entirely    Bowel:  There is no small bowel obstruction.  The appendix is unremarkable. Diverticulosis of the left and sigmoid colon. No significant fecal load.    Bladder: Incompletely distended.  Pelvic organs: No free fluid.    There is no significant adenopathy.  Vasculature: The aorta is not dilated. Mild atherosclerotic vascular calcification. There is    Retroperitoneum: There is no mass.  Bones: Chronic degenerative changes of the spine.  Subcutaneous tissues: Unremarkable.    IMPRESSION:  Be compared to the recent CT of March 15, 2020, there are increased bilateral upper lobe opacities indicative of worsening multifocal pneumonia.  No explanation for the abdominal pain on this CT.  Diverticulosis.

## 2020-03-30 NOTE — PROGRESS NOTE ADULT - ASSESSMENT
78yo female with PMhx of glaucoma, HTN, OA, recent admission for new onset afib, and MRSA PNA admitted for:     1. NSTEMI  No active CP now  Trop peaked at 9, last ~4  EKG: no ischemic changes  ECHO done last admission 3/18L EF 60%  Heparin Drip on hold for procedure   C/w ASA, Plavix, Lipitor,  metoprolol   D/w DR Han  [facility] today         2. Recurrent PNA, suspect GNR  and MRSA  last admission sputum + MRSA   COVID PCR neg   F/u BCX and UCX  C/w IV Abxs: cefepime and VAnco  Mucinex  Albuterol   D/w Dr Breaux     3.  EILEEN  likely prerenal  CR improved after IVF   D/c IVF now, monitor oral intake     4. Afib  In SR   Off Eliquis due to NSTEMI, on heparin drip  Monitor on tele      4. Wheezing, suspect du ro Acute diastolic CHF, check EF   c/w  albuterol  Off  fluids  BNP elevated   Monitor pulse ox  Stat 20mg IV lasix ordered  Monitor weight      5.  GI PPX      6. DVT PPX: heparin     Dispo: for [facility] today

## 2020-03-30 NOTE — CHART NOTE - NSCHARTNOTEFT_GEN_A_CORE
Nurse Practitioner Progress note:     HPI:  Patient is 80yo female with PMhx of glaucoma, HTN, OA, discharged 2 days ago for MRSA PNA on Doxy PO, new onset Afib, (COVID19 negative during hospitalization), presents to the hospital complaining of SOB and CP. At home, woke up w/ pressure type chest pain L sided lasting hours, worse w/ activity, relieved w/ rest, no atypical features (not worse w/ palpation, inspiration, position).  Called her relative & pt brought to ED.    ECG w/ no ischemic changes, initial trop 1.42, peaked at 9.73 most recent 4.38.  In ED CT chest performed w/ bilateral opacities upper lobes - COVID test sent & was neg.  Antibiotics started.    Pt. now referred for [facility] for further evaluation  [facility] risks, benefits and alternatives discussed with patient. Risk discussed included, but not limited to MI, stroke, mortality, major bleeding, arrythmia, or infection. Consent obtained and signed educational material provided. Pt. verbalizes and understands pre-procedural instructions.  ASA: II   Bleeding Risk Score: 3.3  Creatinine: 0.88  GFR: 63          T(C): 36.8 (03-30-20 @ 05:45), Max: 36.8 (03-30-20 @ 05:45)  HR: 89 (03-30-20 @ 05:45) (89 - 89)  BP: 136/75 (03-30-20 @ 05:45) (136/75 - 144/75)  RR: 20 (03-29-20 @ 21:59) (20 - 20)  SpO2: 99% (03-30-20 @ 05:45) (99% - 100%)  Wt(kg): --    PHYSICAL EXAM:  Neurologic: Non-focal, AxOx3.  No neuro deficits  Vascular: Peripheral pulses palpable 2+ bilaterally  Procedure Site: Rt. radial band removed by NP manual pressure applied x20 minutes site benign soft no bleeding no hematoma +1 radial pulse    	    LABS:	 	                        10.2   22.16 )-----------( 311      ( 30 Mar 2020 07:12 )             31.5   03-30    137  |  108  |  9   ----------------------------<  88  3.7   |  23  |  0.88    Ca    8.3<L>      30 Mar 2020 07:12          PROCEDURE RESULTS:          ASSESSMENT/PLAN:  Patient is 80yo female with PMhx of glaucoma, HTN, OA, discharged 2 days ago for MRSA PNA on Doxy PO, new onset Afib, (COVID19 negative during hospitalization), presents to the hospital complaining of SOB and CP. ECG w/ no ischemic changes, initial trop 1.42, peaked at 9.73 most recent 4.38.  In ED CT chest performed w/ bilateral opacities upper lobes - COVID test sent & was neg.  Antibiotics started.    S/P LHC     -VS, labs, diet, activity as per post cath orders  -IV hydration  -Encourage PO fluids  -Continue current medications  -Plan of care D/W pt. and MD  -Post cath instructions reviewed with pt., pt. verbalizes and understands instructions  -Follow-up with attending Nurse Practitioner Progress note:     HPI:  Patient is 78yo female with PMhx of glaucoma, HTN, OA, discharged 2 days ago for MRSA PNA on Doxy PO, new onset Afib, (COVID19 negative during hospitalization), presents to the hospital complaining of SOB and CP. At home, woke up w/ pressure type chest pain L sided lasting hours, worse w/ activity, relieved w/ rest, no atypical features (not worse w/ palpation, inspiration, position).  Called her relative & pt brought to ED.    ECG w/ no ischemic changes, initial trop 1.42, peaked at 9.73 most recent 4.38.  In ED CT chest performed w/ bilateral opacities upper lobes - COVID test sent & was neg.  Antibiotics started.    Pt. now referred for [facility] for further evaluation  [facility] risks, benefits and alternatives discussed with patient. Risk discussed included, but not limited to MI, stroke, mortality, major bleeding, arrythmia, or infection. Consent obtained and signed educational material provided. Pt. verbalizes and understands pre-procedural instructions.  ASA: II   Bleeding Risk Score: 3.3  Creatinine: 0.88  GFR: 63          T(C): 36.8 (03-30-20 @ 05:45), Max: 36.8 (03-30-20 @ 05:45)  HR: 89 (03-30-20 @ 05:45) (89 - 89)  BP: 136/75 (03-30-20 @ 05:45) (136/75 - 144/75)  RR: 20 (03-29-20 @ 21:59) (20 - 20)  SpO2: 99% (03-30-20 @ 05:45) (99% - 100%)  Wt(kg): --    PHYSICAL EXAM:  Neurologic: Non-focal, AxOx3.  No neuro deficits  Vascular: Peripheral pulses palpable 2+ bilaterally  Procedure Site: Rt. radial band removed by NP manual pressure applied x20 minutes site benign soft no bleeding no hematoma +1 radial pulse    	    LABS:	 	                        10.2   22.16 )-----------( 311      ( 30 Mar 2020 07:12 )             31.5   03-30    137  |  108  |  9   ----------------------------<  88  3.7   |  23  |  0.88    Ca    8.3<L>      30 Mar 2020 07:12          PROCEDURE RESULTS:  < from: Cardiac Cath Lab - Adult (03.30.20 @ 11:49) >   Impression   Diagnostic Conclusions   Non-Obstructive CAD with Normal left ventricular function.        ASSESSMENT/PLAN:  Patient is 78yo female with PMhx of glaucoma, HTN, OA, discharged 2 days ago for MRSA PNA on Doxy PO, new onset Afib, (COVID19 negative during hospitalization), presents to the hospital complaining of SOB and CP. ECG w/ no ischemic changes, initial trop 1.42, peaked at 9.73 most recent 4.38.  In ED CT chest performed w/ bilateral opacities upper lobes - COVID test sent & was neg.  Antibiotics started.    S/P LHC     -VS, labs, diet, activity as per post cath orders  -IV hydration  -Encourage PO fluids  -Manage with medical therapy.  -Continue current medications  -Plan of care D/W pt. and MD  -Post cath instructions reviewed with pt., pt. verbalizes and understands instructions  -Follow-up with attending

## 2020-03-30 NOTE — PROGRESS NOTE ADULT - ASSESSMENT
1) Pneumonia   2) Abnormal CT Chest   3) Dyspnea  4) Cough  5) AF     78 yo female with a pmh/o Glaucoma, cataracts, OA, HTN, who presents to the ED with niece due to her niece noticing pt having cough x 1 week, decreased to no appetite, and weakness. Niece called EMS from pt home after visiting and EMS noted pt HR to be 210 in aflutter. Pt given IVF and 20mg cardizem en route and HR improved, pt now in NSR at 80's. Pt is a very poor historian however upon interview, admits to having a pre op assessment (cataract) in January where her RN had mentioned to her that her HR was irregular. Pt then admits to intermittent chest pressure which is centralized across chest and radiates down right arm to fingertips. Pt states this chest pressure and wheezing has been treated at home with ASA, advil, and yesterday started OTC cold medicine with pseudoephinephrine ingredient. Pt states pressure had awoken her from her sleep and is sure that she only has been taking two advil and one additional aspirin to her daily in order to control sx. Pt denies fever, however states that she did have chills. Endorses productive cough with off white sputum, and wheezing.  Reviewed CT Chest imaging, labs   COVID 19 negative, repeat negative   RVP + for enterovirus noted   Findings are consistent with a pneumonia secondary to viral etiology  She is a non-smoker with no prior pulmonary history and she states she has not seen a pulmonologist in the past   Procalcitonin reviewed, 2.08 suggesting underlying infectious process   Continue Symbicort   Will continue to monitor  Appreciate ID recommendations   cardiac eval in progress

## 2020-03-31 LAB
ANION GAP SERPL CALC-SCNC: 7 MMOL/L — SIGNIFICANT CHANGE UP (ref 5–17)
BUN SERPL-MCNC: 11 MG/DL — SIGNIFICANT CHANGE UP (ref 7–23)
CALCIUM SERPL-MCNC: 8.6 MG/DL — SIGNIFICANT CHANGE UP (ref 8.5–10.1)
CHLORIDE SERPL-SCNC: 105 MMOL/L — SIGNIFICANT CHANGE UP (ref 96–108)
CO2 SERPL-SCNC: 23 MMOL/L — SIGNIFICANT CHANGE UP (ref 22–31)
CREAT SERPL-MCNC: 0.92 MG/DL — SIGNIFICANT CHANGE UP (ref 0.5–1.3)
CULTURE RESULTS: SIGNIFICANT CHANGE UP
CULTURE RESULTS: SIGNIFICANT CHANGE UP
GLUCOSE SERPL-MCNC: 93 MG/DL — SIGNIFICANT CHANGE UP (ref 70–99)
HCT VFR BLD CALC: 33.8 % — LOW (ref 34.5–45)
HGB BLD-MCNC: 11 G/DL — LOW (ref 11.5–15.5)
MCHC RBC-ENTMCNC: 28.9 PG — SIGNIFICANT CHANGE UP (ref 27–34)
MCHC RBC-ENTMCNC: 32.5 GM/DL — SIGNIFICANT CHANGE UP (ref 32–36)
MCV RBC AUTO: 88.9 FL — SIGNIFICANT CHANGE UP (ref 80–100)
PLATELET # BLD AUTO: 337 K/UL — SIGNIFICANT CHANGE UP (ref 150–400)
POTASSIUM SERPL-MCNC: 3.5 MMOL/L — SIGNIFICANT CHANGE UP (ref 3.5–5.3)
POTASSIUM SERPL-SCNC: 3.5 MMOL/L — SIGNIFICANT CHANGE UP (ref 3.5–5.3)
RBC # BLD: 3.8 M/UL — SIGNIFICANT CHANGE UP (ref 3.8–5.2)
RBC # FLD: 15.4 % — HIGH (ref 10.3–14.5)
SODIUM SERPL-SCNC: 135 MMOL/L — SIGNIFICANT CHANGE UP (ref 135–145)
SPECIMEN SOURCE: SIGNIFICANT CHANGE UP
SPECIMEN SOURCE: SIGNIFICANT CHANGE UP
WBC # BLD: 22.31 K/UL — HIGH (ref 3.8–10.5)
WBC # FLD AUTO: 22.31 K/UL — HIGH (ref 3.8–10.5)

## 2020-03-31 PROCEDURE — 99233 SBSQ HOSP IP/OBS HIGH 50: CPT

## 2020-03-31 RX ADMIN — NYSTATIN CREAM 1 APPLICATION(S): 100000 CREAM TOPICAL at 16:41

## 2020-03-31 RX ADMIN — Medication 50 MILLIGRAM(S): at 18:42

## 2020-03-31 RX ADMIN — Medication 1 TABLET(S): at 18:33

## 2020-03-31 RX ADMIN — CEFEPIME 1000 MILLIGRAM(S): 1 INJECTION, POWDER, FOR SOLUTION INTRAMUSCULAR; INTRAVENOUS at 09:39

## 2020-03-31 RX ADMIN — CLOPIDOGREL BISULFATE 75 MILLIGRAM(S): 75 TABLET, FILM COATED ORAL at 11:12

## 2020-03-31 RX ADMIN — CEFEPIME 1000 MILLIGRAM(S): 1 INJECTION, POWDER, FOR SOLUTION INTRAMUSCULAR; INTRAVENOUS at 21:56

## 2020-03-31 RX ADMIN — ATORVASTATIN CALCIUM 40 MILLIGRAM(S): 80 TABLET, FILM COATED ORAL at 21:56

## 2020-03-31 RX ADMIN — Medication 50 MILLIGRAM(S): at 06:24

## 2020-03-31 RX ADMIN — SODIUM CHLORIDE 3 MILLILITER(S): 9 INJECTION INTRAMUSCULAR; INTRAVENOUS; SUBCUTANEOUS at 21:56

## 2020-03-31 RX ADMIN — PANTOPRAZOLE SODIUM 40 MILLIGRAM(S): 20 TABLET, DELAYED RELEASE ORAL at 06:24

## 2020-03-31 RX ADMIN — SODIUM CHLORIDE 3 MILLILITER(S): 9 INJECTION INTRAMUSCULAR; INTRAVENOUS; SUBCUTANEOUS at 18:02

## 2020-03-31 RX ADMIN — SODIUM CHLORIDE 3 MILLILITER(S): 9 INJECTION INTRAMUSCULAR; INTRAVENOUS; SUBCUTANEOUS at 06:21

## 2020-03-31 RX ADMIN — Medication 1 TABLET(S): at 06:24

## 2020-03-31 RX ADMIN — NYSTATIN CREAM 1 APPLICATION(S): 100000 CREAM TOPICAL at 21:56

## 2020-03-31 RX ADMIN — Medication 110 MILLIGRAM(S): at 21:56

## 2020-03-31 RX ADMIN — ALBUTEROL 2 PUFF(S): 90 AEROSOL, METERED ORAL at 11:13

## 2020-03-31 RX ADMIN — Medication 60 MILLIGRAM(S): at 18:33

## 2020-03-31 RX ADMIN — LOSARTAN POTASSIUM 25 MILLIGRAM(S): 100 TABLET, FILM COATED ORAL at 06:24

## 2020-03-31 RX ADMIN — ALBUTEROL 2 PUFF(S): 90 AEROSOL, METERED ORAL at 16:45

## 2020-03-31 RX ADMIN — NYSTATIN CREAM 1 APPLICATION(S): 100000 CREAM TOPICAL at 06:25

## 2020-03-31 RX ADMIN — Medication 60 MILLIGRAM(S): at 06:24

## 2020-03-31 RX ADMIN — Medication 250 MILLIGRAM(S): at 11:13

## 2020-03-31 RX ADMIN — Medication 60 MILLIGRAM(S): at 02:00

## 2020-03-31 RX ADMIN — RIVAROXABAN 15 MILLIGRAM(S): KIT at 11:12

## 2020-03-31 RX ADMIN — Medication 60 MILLIGRAM(S): at 11:12

## 2020-03-31 NOTE — PROGRESS NOTE ADULT - SUBJECTIVE AND OBJECTIVE BOX
80yo female w/ [id number] diagnosed , HTN, glaucoma, MRSA pna discharged 2 days ago presented to hospital 2 days ago w/ dyspnea & chest pain.  At home, woke up w/ pressure type chest pain L sided lasting hours, worse w/ activity, relieved w/ rest, no atypical features (not worse w/ palpation, inspiration, position).  Called her relative & pt brought to ED.      ECG w/ no ischemic changes, initial trop 1.42, peaked at 9.73 most recent 4.38.  In ED CT chest performed w/ bilateral opacities upper lobes - COVID test sent & was neg.  Antibiotics started.      Currently pt denies chest pain, palpitations.  Reports mild dyspnea & productive cough.    3/30/2020: s/p Cath with non-obstructive CAD.  NL LV FX.  3/31/20: no chest discomfort, dyspnea overnight.  access site OK.  Tele w/ 2 short SVT runs 5-6 consecutive beats.    MEDICATIONS:    MEDICATIONS  (STANDING):  ALBUTerol    90 MICROgram(s) HFA Inhaler 2 Puff(s) Inhalation every 6 hours  atorvastatin 40 milliGRAM(s) Oral at bedtime  cefepime  Injectable. 1000 milliGRAM(s) IV Push every 12 hours  clopidogrel Tablet 75 milliGRAM(s) Oral daily  diltiazem    Tablet 60 milliGRAM(s) Oral every 6 hours  lactobacillus acidophilus 1 Tablet(s) Oral two times a day  losartan 25 milliGRAM(s) Oral daily  metoprolol tartrate 50 milliGRAM(s) Oral two times a day  nystatin Powder 1 Application(s) Topical three times a day  pantoprazole    Tablet 40 milliGRAM(s) Oral before breakfast  rivaroxaban 15 milliGRAM(s) Oral daily  sodium chloride 0.9% Bolus 1000 milliLiter(s) IV Bolus once  sodium chloride 0.9% lock flush 3 milliLiter(s) IV Push every 8 hours  vancomycin  IVPB 1000 milliGRAM(s) IV Intermittent daily    Vital Signs Last 24 Hrs  T(C): 37.1 (31 Mar 2020 06:22), Max: 37.2 (30 Mar 2020 12:55)  T(F): 98.8 (31 Mar 2020 06:22), Max: 99 (30 Mar 2020 12:55)  HR: 99 (31 Mar 2020 06:22) (80 - 101)  BP: 129/64 (31 Mar 2020 06:22) (108/69 - 155/73)  BP(mean): --  RR: 18 (31 Mar 2020 06:22) (18 - 20)  SpO2: 96% (31 Mar 2020 06:22) (95% - 96%)Daily     Daily Weight in k.8 (31 Mar 2020 06:41)I&O's Summary      PHYSICAL EXAM:    Constitutional: NAD, awake and alert,  HEENT: PERR, EOMI,    Neck:  supple,  No JVD  Respiratory: CTAB, no rhonchi  Cardiovascular: S1 and S2, regular rate and rhythm, no Murmurs, gallops or rubs  Gastrointestinal: Bowel Sounds present, soft, nontender.   Extremities: No peripheral edema. No clubbing or cyanosis.  Vascular: 2+ peripheral pulses  Neurological: A/O x 3, no focal deficits    LABS: All Labs Reviewed:                        10.2   .16 )-----------( 311      ( 30 Mar 2020 07:12 )             31.5                         9.3    18.46 )-----------( 263      ( 29 Mar 2020 08:31 )             28.3     30 Mar 2020 07:12    137    |  108    |  9      ----------------------------<  88     3.7     |  23     |  0.88   29 Mar 2020 08:31    141    |  114    |  13     ----------------------------<  99     3.4     |  19     |  0.88     Ca    8.3        30 Mar 2020 07:12  Ca    8.1        29 Mar 2020 08:31      PTT - ( 30 Mar 2020 07:12 )  PTT:108.1 sec     @ 07:12  Pro Bnp [id number]    < from: TTE Echo Complete w/o contrast w/ Doppler (20 @ 11:37) >   Impression     Summary     The left ventricle is normal in size, wall thickness, wall motion and   contractility.   Estimated left ventricular ejection fraction is 65 %.   Normal appearing left atrium.   Trace aortic regurgitation is present.   Mild to moderate tricuspid valve regurgitationis present.   Normal pulmonary artery systolic pressures.      < end of copied text >    < from: CT Chest No Cont (20 @ 22:28) >  IMPRESSION:    Be compared to the recent CT of March 15, 2020, there are increased bilateral upper lobe opacities indicative of worsening multifocal pneumonia.  No explanation for the abdominal pain on this CT.  Diverticulosis.      < from: Cardiac Cath Lab - Adult (20 @ 11:49) >  Angiographic Findings     Cardiac Arteries and Lesion Findings    LMCA: Diffuse irregularity.There is mild diffuse disease noted.    LAD: Diffuse irregularity.There is mild diffuse disease noted.    LCx: Diffuse irregularity.There is mild diffuse disease noted.    RCA: Diffuse irregularity.There is mild diffuse disease noted.    Valves  +------+----------------------------+----------------------------+---------+  !Valve !Stenosis                    !Insufficiency               !Comments !  +------+----------------------------+----------------------------+---------+  !Aortic!No                          !Not assessed                !         !  +------+----------------------------+----------------------------+---------+  !Mitral!Not assessed                !No insufficiency            !         !  +------+----------------------------+----------------------------+---------+    LV function assessed as:Normal.  Ejection Fraction  +----------------------------------------------------------------------+---+  !Method                                                                !EF%!  +----------------------------------------------------------------------+---+  !LV gram                                                               !60 !  +----------------------------------------------------------------------+---+    VA  Ventriculography Findings:  Normal left ventricular systolic function.     Impression     Diagnostic Conclusions     Non-Obstructive CAD with Normal left ventricular function.    < end of copied text >            DEANDRE PUTNAM   This document has been electronically signed. Mar 26 2020 10:40PM    < end of copied text >      EKG:  ECG Mar 30th: sinus tachycardia, nonspecific ST-T changes.

## 2020-03-31 NOTE — PROGRESS NOTE ADULT - ASSESSMENT
patient instructed to call cardiology clinic & schedule followup in approximately 1-2 weeks. patient instructed to call cardiology clinic & schedule followup in approximately 1-2 weeks.  Will sign off please call if questions.

## 2020-03-31 NOTE — PROGRESS NOTE ADULT - SUBJECTIVE AND OBJECTIVE BOX
Patient is a 79y old  Female who presents with a chief complaint of SOB, CP (27 Mar 2020 18:04)      HPI:  Patient is 80yo female with PMhx of glaucoma, HTN, OA, discharged 2 days ago for MRSA PNA on Doxy PO, new onset Afib, (COVID19 negative during hospitalization), represents to the hospital complaining of SOB and CP. Pt notes she took a nap, and woke up with SOB and CP. Denies fever, chills, palpitations, HA, dizziness. No other constitutional symptoms. (27 Mar 2020 09:48)    3/29  seen and examined with her RN today  no cp  breathing is ok  mild cough  3/30  resting comfortably  no distress  no cp  3/31  doing better clinically  s/p cath data discussed  medical management  no cp  PAST MEDICAL & SURGICAL HISTORY:  OA (osteoarthritis)  Glaucoma  HTN (hypertension)  Status post glaucoma surgery: and cataract      PREVIOUS DIAGNOSTIC TESTING:      MEDICATIONS  (STANDING):  ALBUTerol    90 MICROgram(s) HFA Inhaler 2 Puff(s) Inhalation every 6 hours  atorvastatin 40 milliGRAM(s) Oral at bedtime  cefepime  Injectable. 1000 milliGRAM(s) IV Push every 12 hours  clopidogrel Tablet 75 milliGRAM(s) Oral daily  diltiazem    Tablet 60 milliGRAM(s) Oral every 6 hours  lactobacillus acidophilus 1 Tablet(s) Oral two times a day  losartan 25 milliGRAM(s) Oral daily  metoprolol tartrate 50 milliGRAM(s) Oral two times a day  nystatin Powder 1 Application(s) Topical three times a day  pantoprazole    Tablet 40 milliGRAM(s) Oral before breakfast  rivaroxaban 15 milliGRAM(s) Oral daily  sodium chloride 0.9% Bolus 1000 milliLiter(s) IV Bolus once  sodium chloride 0.9% lock flush 3 milliLiter(s) IV Push every 8 hours  vancomycin  IVPB 1000 milliGRAM(s) IV Intermittent daily      MEDICATIONS  (PRN):  heparin  Injectable 4700 Unit(s) IV Push every 6 hours PRN For aPTT less than 40      FAMILY HISTORY:  FH: CAD (coronary artery disease): in mother and father, both   FH: pancreatic cancer: in sister,   FHx: colon cancer: in brother,       SOCIAL HISTORY:  ***    REVIEW OF SYSTEM:  dyspnea, fatigue, all other systems reviewed and are negative   Vital Signs Last 24 Hrs  T(C): 37.1 (31 Mar 2020 06:22), Max: 37.2 (30 Mar 2020 12:55)  T(F): 98.8 (31 Mar 2020 06:22), Max: 99 (30 Mar 2020 12:55)  HR: 99 (31 Mar 2020 06:22) (80 - 101)  BP: 129/64 (31 Mar 2020 06:22) (108/69 - 155/73)  BP(mean): --  RR: 18 (31 Mar 2020 06:22) (18 - 20)  SpO2: 96% (31 Mar 2020 06:22) (95% - 96%)  I&O's Detail          PHYSICAL EXAM  General Appearance: cooperative, no acute distress,   HEENT: PERRL, conjunctiva clear, EOM's intact, non injected pharynx, no exudate, TM   normal  Neck: Supple, , no adenopathy, thyroid: not enlarged, no carotid bruit or JVD  Back: Symmetric, no  tenderness,no soft tissue tenderness  Lungs: Diminished in the upper lobes, coarse at the bases  Heart: Regular rate and rhythm, S1, S2 normal, no murmur, rub or gallop  Abdomen: Soft, non-tender, bowel sounds active , no hepatosplenomegaly  Extremities: no cyanosis or edema, no joint swelling  Skin: Skin color, texture normal, no rashes   Neurologic: Alert and oriented X3 , cranial nerves intact, sensory and motor normal,    ECG:    LABS:                        9.3    18.46 )-----------( 263      ( 29 Mar 2020 08:31 )             28.3                           9.9    21.01 )-----------( 268      ( 28 Mar 2020 08:12 )      141  |  114<H>  |  13  ----------------------------<  99  3.4<L>   |  19<L>  |  0.88    Ca    8.1<L>      29 Mar 2020 08:31    TPro  6.6  /  Alb  2.2<L>  /  TBili  0.4  /  DBili  x   /  AST  21  /  ALT  21  /  AlkPhos  183<H>               29.8         141  |  112<H>  |  16  ----------------------------<  95  3.5   |  20<L>  |  0.94    Ca    8.0<L>      28 Mar 2020 08:12    TPro  6.6  /  Alb  2.2<L>  /  TBili  0.4  /  DBili  x   /  AST  21  /  ALT  21  /  AlkPhos  183<H>      CARDIAC MARKERS ( 27 Mar 2020 13:19 )  4.380 ng/mL / x     / x     / x     / x      CARDIAC MARKERS ( 27 Mar 2020 04:58 )  9.730 ng/mL / x     / x     / x     / x      CARDIAC MARKERS ( 26 Mar 2020 21:54 )  3.920 ng/mL / x     / x     / x     / x      CARDIAC MARKERS ( 26 Mar 2020 19:24 )  1.420 ng/mL / x     / x     / x     / x              PT/INR - ( 26 Mar 2020 21:54 )   PT: 16.0 sec;   INR: 1.43 ratio         PTT - ( 28 Mar 2020 08:12 )  PTT:65.6 sec  Urinalysis Basic - ( 28 Mar 2020 01:50 )    Color: Yellow / Appearance: Clear / S.020 / pH: x  Gluc: x / Ketone: Negative  / Bili: Negative / Urobili: Negative mg/dL   Blood: x / Protein: 100 mg/dL / Nitrite: Negative   Leuk Esterase: Moderate / RBC: 11-25 /HPF / WBC 26-50   Sq Epi: x / Non Sq Epi: Moderate / Bacteria: Moderate            RADIOLOGY & ADDITIONAL STUDIES:  < from: Xray Chest 1 View- PORTABLE-Routine (20 @ 07:19) >  PROCEDURE DATE:  2020          INTERPRETATION:  Clinical indication:  PNA f/u    Technique: XR CHEST portable AP    Comparison:3/18/2020    Findings:  Lines: None    Heart/Mediastinum/Lungs/Other: The heart size is normal.The lungs demonstrate patchy opacity in the upper lobes with interstitial prominence, similar to the previous exam..There are no pleural effusions.    Impression:    As above.    < end of copied text >

## 2020-03-31 NOTE — PROGRESS NOTE ADULT - ASSESSMENT
78yo female with PMhx of glaucoma, HTN, OA, recent admission for new onset afib, and MRSA PNA admitted for:     1. NSTEMI  No active CP now  Trop peaked at 9, last ~4  EKG: no ischemic changes  ECHO done last admission 3/18L EF 60%  Heparin Drip on hold for procedure   C/w ASA, Plavix, Lipitor,  metoprolol   D/w DR Han  [facility] today         2. Recurrent PNA, suspect GNR  and MRSA  last admission sputum + MRSA   COVID PCR neg   F/u BCX and UCX  C/w IV Abxs: cefepime and VAnco  Mucinex  Albuterol   D/w Dr Breaux     3.  EILEEN  likely prerenal  CR improved after IVF   D/c IVF now, monitor oral intake     4. Afib  In SR   Off Eliquis due to NSTEMI, on heparin drip  Monitor on tele      4. Wheezing, suspect du ro Acute diastolic CHF, check EF   c/w  albuterol  Off  fluids  BNP elevated   Monitor pulse ox  Stat 20mg IV lasix ordered  Monitor weight      5.  GI PPX      6. DVT PPX: heparin     Dispo: for [facility] today 80yo female with PMhx of glaucoma, HTN, OA, recent admission for new onset afib, and MRSA PNA admitted for:     1. NSTEMI  No active CP now  Trop peaked at 9, last ~4  EKG: no ischemic changes  ECHO done last admission 3/18L EF 60%  Heparin Drip on hold for procedure   C/w ASA, Plavix, Lipitor,  metoprolol   D/w DR Han - cath procedure shows non-obstructive CAD and normal LV function.       2. Recurrent PNA, suspect GNR  and MRSA  last admission sputum + MRSA   COVID PCR neg   F/u BCX and UCX  C/w IV Abxs: cefepime and VAnco  Mucinex  Albuterol   D/w Dr Breaux     3.  EILEEN  likely prerenal  CR improved after IVF   D/c IVF now, monitor oral intake     4. Afib  In SR   Off Eliquis due to NSTEMI, on heparin drip  Monitor on tele      4. Wheezing, suspect du ro Acute diastolic CHF, check EF   c/w  albuterol  Off  fluids  BNP elevated   Monitor pulse ox  Stat 20mg IV lasix ordered  Monitor weight      5.  GI PPX      6. DVT PPX: heparin     Dispo: for [facility] today 78yo female with PMhx of glaucoma, HTN, OA, recent admission for new onset afib, and MRSA PNA admitted for:     1. NSTEMI  No active CP now  Trop peaked at 9, last ~4  EKG: no ischemic changes  ECHO done last admission 3/18L EF 60%  D/c Heparin for cath.   C/w ASA, Plavix, Lipitor,  metoprolol   D/w DR Han - cath procedure shows non-obstructive CAD and normal LV function.       2. Recurrent PNA, suspect GNR  and MRSA  last admission sputum + MRSA   COVID PCR neg   F/u BCX and UCX  C/w IV Abxs: cefepime and Vanco  Mucinex  Albuterol   D/w Dr Breaux   Pulmology eval appreciated.     3.  EILEEN  likely prerenal  CR improved after IVF   D/c IVF now, monitor oral intake     4. Afib  In SR   Heparin d/c for LHC.  Xarelto 15mg PO, Plavix 75mg PO   Monitor on tele    4. Wheezing, suspect du ro Acute diastolic CHF, check EF   c/w  albuterol  Off  fluids  BNP elevated   Monitor pulse ox  Monitor weight      5.  GI PPX    6. DVT PPX:  Plavix    Dispo:

## 2020-03-31 NOTE — PROGRESS NOTE ADULT - PROBLEM SELECTOR PLAN 1
non-obstructive CAD. NL LV FX.  Cont. medical therapy with Plavix, metoprolol, & statin.  BP controlled.

## 2020-03-31 NOTE — PROGRESS NOTE ADULT - SUBJECTIVE AND OBJECTIVE BOX
Date of Service: 3/31/2020    CC: SOB, CP     HPI: 80yo female with PMhx of glaucoma, HTN, OA, discharged 2 days ago for MRSA PNA on Doxy PO, new onset Afib, (COVID19 negative during hospitalization), represents to the hospital complaining of SOB and CP. Pt notes she took a nap, and woke up with SOB and CP. Denies fever, chills, palpitations, HA, dizziness. No other constitutional symptoms.     INTERVAL HPI/ OVERNIGHT EVENTS:  Pt was seen and examined at bedside. Patient is s/p     Vital Signs Last 24 Hrs  T(C): 36.9 (30 Mar 2020 13:47), Max: 37.2 (30 Mar 2020 12:55)  T(F): 98.5 (30 Mar 2020 13:47), Max: 99 (30 Mar 2020 12:55)  HR: 87 (30 Mar 2020 13:47) (80 - 89)  BP: 137/72 (30 Mar 2020 13:47) (108/69 - 144/75)  RR: 18 (30 Mar 2020 13:47) (18 - 20)  SpO2: 95% (30 Mar 2020 13:47) (95% - 100%)    REVIEW OF SYSTEMS:  All other review of systems is negative unless indicated above.        PHYSICAL EXAM:  General: Well developed;   in no acute distress, on NC  Eyes: PERRLA, EOMI; conjunctiva and sclera clear  Head: Normocephalic; atraumatic  ENMT: No nasal discharge; airway clear  Neck: Supple; no JVD  Respiratory: poor air entry, + wheezing b/l  Cardiovascular: Regular rate and rhythm. S1 and S2 Normal; No murmurs  Gastrointestinal: Soft non-tender non-distended; Normal bowel sounds  Genitourinary: No  suprapubic  tenderness  Extremities: Normal range of motion, No  edema  Vascular: Peripheral pulses palpable 2+ bilaterally  Neurological: Alert and oriented x3, non focal   Skin: Warm and dry. No acute rash  Lymph Nodes: No acute cervical adenopathy  Musculoskeletal: Normal muscle tone, without deformities  Psychiatric: Cooperative     LABS:                         10.2   22.16 )-----------( 311      ( 30 Mar 2020 07:12 )             31.5     03-30    137  |  108  |  9   ----------------------------<  88  3.7   |  23  |  0.88    Ca    8.3<L>      30 Mar 2020 07:12  PTT - ( 30 Mar 2020 07:12 )  PTT:108.1 sec      CARDIAC MARKERS ( 27 Mar 2020 13:19 )  4.380 ng/mL / x     / x     / x     / x      CARDIAC MARKERS ( 27 Mar 2020 04:58 )  9.730 ng/mL / x     / x     / x     / x      CARDIAC MARKERS ( 26 Mar 2020 21:54 )  3.920 ng/mL / x     / x     / x     / x      CARDIAC MARKERS ( 26 Mar 2020 19:24 )  1.420 ng/mL / x     / x     / x     / x                                9.9    21.01 )-----------( 268      ( 28 Mar 2020 08:12 )             29.8     28 Mar 2020 08:12    141    |  112    |  16     ----------------------------<  95     3.5     |  20     |  0.94     Ca    8.0        28 Mar 2020 08:12    TPro  6.6    /  Alb  2.2    /  TBili  0.4    /  DBili  x      /  AST  21     /  ALT  21     /  AlkPhos  183    28 Mar 2020 08:12  PT/INR - ( 26 Mar 2020 21:54 )   PT: 16.0 sec;   INR: 1.43 ratio         PTT - ( 28 Mar 2020 08:12 )  PTT:65.6 sec  CAPILLARY BLOOD GLUCOSE  LIVER FUNCTIONS - ( 28 Mar 2020 08:12 )  Alb: 2.2 g/dL / Pro: 6.6 gm/dL / ALK PHOS: 183 U/L / ALT: 21 U/L / AST: 21 U/L / GGT: x           Urinalysis Basic - ( 28 Mar 2020 01:50 )  Color: Yellow / Appearance: Clear / S.020 / pH: x  Gluc: x / Ketone: Negative  / Bili: Negative / Urobili: Negative mg/dL   Blood: x / Protein: 100 mg/dL / Nitrite: Negative   Leuk Esterase: Moderate / RBC: 11-25 /HPF / WBC 26-50   Sq Epi: x / Non Sq Epi: Moderate / Bacteria: Moderate      MEDICATIONS  (STANDING):  ALBUTerol    90 MICROgram(s) HFA Inhaler 2 Puff(s) Inhalation every 6 hours  atorvastatin 40 milliGRAM(s) Oral at bedtime  cefepime  Injectable. 1000 milliGRAM(s) IV Push every 12 hours  clopidogrel Tablet 75 milliGRAM(s) Oral daily  diltiazem    Tablet 60 milliGRAM(s) Oral every 6 hours  lactobacillus acidophilus 1 Tablet(s) Oral two times a day  losartan 25 milliGRAM(s) Oral daily  metoprolol tartrate 50 milliGRAM(s) Oral two times a day  nystatin Powder 1 Application(s) Topical three times a day  pantoprazole    Tablet 40 milliGRAM(s) Oral before breakfast  sodium chloride 0.9% Bolus 1000 milliLiter(s) IV Bolus once  sodium chloride 0.9% lock flush 3 milliLiter(s) IV Push every 8 hours  vancomycin  IVPB 1000 milliGRAM(s) IV Intermittent daily    MEDICATIONS  (PRN):      RADIOLOGY & ADDITIONAL TESTS:    EXAM:  CT ABDOMEN AND PELVIS                        EXAM:  CT CHEST                        PROCEDURE DATE:  2020        INTERPRETATION:  CT of the chest, abdomen and pelvis        FINDINGS:  Evaluation of the solid abdominal organs is limited without contrast.  The visualized neck, axilla and subcutaneous tissues are unremarkable.  The tracheobronchial tree is patent centrally.  There is no mediastinal hematoma.  The great vessels are not enlarged. There are nonspecific mediastinal lymph nodes measuring up to 1.3 x 1.7 cm in the right paratracheal region. Coronary atherosclerosis. The heart is not enlarged.  There is no pericardial effusion.    Lungs: Compared to the recent CT of March 15, 2020, there are increased patchy groundglass and airspace opacities at the lung apices with septal thickening indicative of multifocal infection. Left base streaky atelectasis.    Pleura: Unremarkable.  There is no free air or focal collection.  No free fluid.    Liver: 2.2 cm bilobed cyst in theleft lobe.  Spleen: Normal.  Gallbladder: Unremarkable.  Biliary tree: Unremarkable.  Adrenal glands: Normal.  Pancreas: Normal.  Kidneys: No perinephric stranding, renal edema, hydronephrosis, obstructing stone, or intrarenal stone. There is an air and entirely    Bowel:  There is no small bowel obstruction.  The appendix is unremarkable. Diverticulosis of the left and sigmoid colon. No significant fecal load.    Bladder: Incompletely distended.  Pelvic organs: No free fluid.    There is no significant adenopathy.  Vasculature: The aorta is not dilated. Mild atherosclerotic vascular calcification. There is    Retroperitoneum: There is no mass.  Bones: Chronic degenerative changes of the spine.  Subcutaneous tissues: Unremarkable.    IMPRESSION:  Be compared to the recent CT of March 15, 2020, there are increased bilateral upper lobe opacities indicative of worsening multifocal pneumonia.  No explanation for the abdominal pain on this CT.  Diverticulosis. Date of Service: 3/31/2020    CC: SOB, CP     HPI: 78yo female with PMhx of glaucoma, HTN, OA, discharged 2 days ago for MRSA PNA on Doxy PO, new onset Afib, (COVID19 negative during hospitalization), represents to the hospital complaining of SOB and CP. Pt notes she took a nap, and woke up with SOB and CP. Denies fever, chills, palpitations, HA, dizziness. No other constitutional symptoms.     INTERVAL HPI/ OVERNIGHT EVENTS:  Pt was seen and examined at bedside. Patient had LHC yesterday. She states that she is feeling a little better. She denies any new complaints.      Vital Signs Last 24 Hrs  T(C): 36.9 (31 Mar 2020 11:26), Max: 37.1 (30 Mar 2020 18:47)  T(F): 98.4 (31 Mar 2020 11:26), Max: 98.8 (30 Mar 2020 18:47)  HR: 78 (31 Mar 2020 11:26) (78 - 101)  BP: 128/48 (31 Mar 2020 11:26) (128/48 - 155/73)  BP(mean): --  RR: 18 (31 Mar 2020 11:26) (18 - 18)  SpO2: 94% (31 Mar 2020 11:26) (94% - 96%)      REVIEW OF SYSTEMS:  All other review of systems is negative unless indicated above.        PHYSICAL EXAM:  General: Well developed;   in no acute distress, on NC  Eyes: PERRLA, EOMI; conjunctiva and sclera clear  Head: Normocephalic; atraumatic  ENMT: No nasal discharge; airway clear  Neck: Supple; no JVD  Respiratory: poor air entry, + wheezing b/l  Cardiovascular: Regular rate and rhythm. S1 and S2 Normal; No murmurs  Gastrointestinal: Soft non-tender non-distended; Normal bowel sounds  Genitourinary: No  suprapubic  tenderness  Extremities: Normal range of motion, No  edema  Vascular: Peripheral pulses palpable 2+ bilaterally  Neurological: Alert and oriented x3, non focal   Skin: Warm and dry. No acute rash  Lymph Nodes: No acute cervical adenopathy  Musculoskeletal: Normal muscle tone, without deformities  Psychiatric: Cooperative     LABS:   ( @ 08:12)                      11.0  22.31 )-----------( 337                 33.8        135  |  105  |  11  ----------------------------<  93  3.5   |  23  |  0.92    Ca    8.6      31 Mar 2020 08:12      PTT:108.1 sec      RVP:( @ 00:37)  NotDetec    Urinalysis Basic - ( 28 Mar 2020 01:50 )  Color: Yellow / Appearance: Clear / S.020 / pH: x  Gluc: x / Ketone: Negative  / Bili: Negative / Urobili: Negative mg/dL   Blood: x / Protein: 100 mg/dL / Nitrite: Negative   Leuk Esterase: Moderate / RBC: 11-25 /HPF / WBC 26-50   Sq Epi: x / Non Sq Epi: Moderate / Bacteria: Moderate      MEDICATIONS  (STANDING):  ALBUTerol    90 MICROgram(s) HFA Inhaler 2 Puff(s) Inhalation every 6 hours  atorvastatin 40 milliGRAM(s) Oral at bedtime  cefepime  Injectable. 1000 milliGRAM(s) IV Push every 12 hours  clopidogrel Tablet 75 milliGRAM(s) Oral daily  diltiazem    Tablet 60 milliGRAM(s) Oral every 6 hours  lactobacillus acidophilus 1 Tablet(s) Oral two times a day  losartan 25 milliGRAM(s) Oral daily  metoprolol tartrate 50 milliGRAM(s) Oral two times a day  nystatin Powder 1 Application(s) Topical three times a day  pantoprazole    Tablet 40 milliGRAM(s) Oral before breakfast  rivaroxaban 15 milliGRAM(s) Oral daily  sodium chloride 0.9% Bolus 1000 milliLiter(s) IV Bolus once  sodium chloride 0.9% lock flush 3 milliLiter(s) IV Push every 8 hours  vancomycin  IVPB 1000 milliGRAM(s) IV Intermittent daily    MEDICATIONS  (PRN):        RADIOLOGY & ADDITIONAL TESTS:    EXAM:  CT ABDOMEN AND PELVIS                        EXAM:  CT CHEST                        PROCEDURE DATE:  2020        INTERPRETATION:  CT of the chest, abdomen and pelvis        FINDINGS:  Evaluation of the solid abdominal organs is limited without contrast.  The visualized neck, axilla and subcutaneous tissues are unremarkable.  The tracheobronchial tree is patent centrally.  There is no mediastinal hematoma.  The great vessels are not enlarged. There are nonspecific mediastinal lymph nodes measuring up to 1.3 x 1.7 cm in the right paratracheal region. Coronary atherosclerosis. The heart is not enlarged.  There is no pericardial effusion.    Lungs: Compared to the recent CT of March 15, 2020, there are increased patchy groundglass and airspace opacities at the lung apices with septal thickening indicative of multifocal infection. Left base streaky atelectasis.    Pleura: Unremarkable.  There is no free air or focal collection.  No free fluid.    Liver: 2.2 cm bilobed cyst in theleft lobe.  Spleen: Normal.  Gallbladder: Unremarkable.  Biliary tree: Unremarkable.  Adrenal glands: Normal.  Pancreas: Normal.  Kidneys: No perinephric stranding, renal edema, hydronephrosis, obstructing stone, or intrarenal stone. There is an air and entirely    Bowel:  There is no small bowel obstruction.  The appendix is unremarkable. Diverticulosis of the left and sigmoid colon. No significant fecal load.    Bladder: Incompletely distended.  Pelvic organs: No free fluid.    There is no significant adenopathy.  Vasculature: The aorta is not dilated. Mild atherosclerotic vascular calcification. There is    Retroperitoneum: There is no mass.  Bones: Chronic degenerative changes of the spine.  Subcutaneous tissues: Unremarkable.    IMPRESSION:  Be compared to the recent CT of March 15, 2020, there are increased bilateral upper lobe opacities indicative of worsening multifocal pneumonia.  No explanation for the abdominal pain on this CT.  Diverticulosis. Date of Service: 3/31/2020    CC: SOB, CP     HPI: 78yo female with PMhx of glaucoma, HTN, OA, discharged 2 days ago for MRSA PNA on Doxy PO, new onset Afib, (COVID19 negative during hospitalization), represents to the hospital complaining of SOB and CP. Pt notes she took a nap, and woke up with SOB and CP. Denies fever, chills, palpitations, HA, dizziness. No other constitutional symptoms.     INTERVAL HPI/ OVERNIGHT EVENTS:  Pt was seen and examined at bedside. Patient had LHC yesterday. She states that she is feeling a little better. She denies any new complaints.      Vital Signs Last 24 Hrs  T(C): 36.9 (31 Mar 2020 11:26), Max: 37.1 (30 Mar 2020 18:47)  T(F): 98.4 (31 Mar 2020 11:26), Max: 98.8 (30 Mar 2020 18:47)  HR: 78 (31 Mar 2020 11:26) (78 - 101)  BP: 128/48 (31 Mar 2020 11:26) (128/48 - 155/73)  BP(mean): --  RR: 18 (31 Mar 2020 11:26) (18 - 18)  SpO2: 94% (31 Mar 2020 11:26) (94% - 96%)      REVIEW OF SYSTEMS:  All other review of systems is negative unless indicated above.        PHYSICAL EXAM:  General: Well developed;   in no acute distress, on NC  Eyes: PERRLA, EOMI; conjunctiva and sclera clear  Head: Normocephalic; atraumatic  ENMT: No nasal discharge; airway clear  Neck: Supple; no JVD  Respiratory: poor air entry, + wheezing b/l  Cardiovascular: Regular rate and rhythm. S1 and S2 Normal; No murmurs  Gastrointestinal: Soft non-tender non-distended; Normal bowel sounds  Genitourinary: No  suprapubic  tenderness  Extremities: Normal range of motion, No  edema  Vascular: Peripheral pulses palpable 2+ bilaterally  Neurological: Alert and oriented x3, non focal   Skin: Warm and dry. No acute rash  Lymph Nodes: No acute cervical adenopathy  Musculoskeletal: Normal muscle tone, without deformities  Psychiatric: Cooperative     LABS:   ( @ 08:12)                      11.0  22.31 )-----------( 337                 33.8        135  |  105  |  11  ----------------------------<  93  3.5   |  23  |  0.92    Ca    8.6      31 Mar 2020 08:12      PTT:108.1 sec        RVP:( @ 00:37)  NotDetec    Urinalysis Basic - ( 28 Mar 2020 01:50 )  Color: Yellow / Appearance: Clear / S.020 / pH: x  Gluc: x / Ketone: Negative  / Bili: Negative / Urobili: Negative mg/dL   Blood: x / Protein: 100 mg/dL / Nitrite: Negative   Leuk Esterase: Moderate / RBC: 11-25 /HPF / WBC 26-50   Sq Epi: x / Non Sq Epi: Moderate / Bacteria: Moderate      MEDICATIONS  (STANDING):  ALBUTerol    90 MICROgram(s) HFA Inhaler 2 Puff(s) Inhalation every 6 hours  atorvastatin 40 milliGRAM(s) Oral at bedtime  cefepime  Injectable. 1000 milliGRAM(s) IV Push every 12 hours  clopidogrel Tablet 75 milliGRAM(s) Oral daily  diltiazem    Tablet 60 milliGRAM(s) Oral every 6 hours  lactobacillus acidophilus 1 Tablet(s) Oral two times a day  losartan 25 milliGRAM(s) Oral daily  metoprolol tartrate 50 milliGRAM(s) Oral two times a day  nystatin Powder 1 Application(s) Topical three times a day  pantoprazole    Tablet 40 milliGRAM(s) Oral before breakfast  rivaroxaban 15 milliGRAM(s) Oral daily  sodium chloride 0.9% Bolus 1000 milliLiter(s) IV Bolus once  sodium chloride 0.9% lock flush 3 milliLiter(s) IV Push every 8 hours  vancomycin  IVPB 1000 milliGRAM(s) IV Intermittent daily    MEDICATIONS  (PRN):        RADIOLOGY & ADDITIONAL TESTS:    EXAM:  CT ABDOMEN AND PELVIS                        EXAM:  CT CHEST                        PROCEDURE DATE:  2020        INTERPRETATION:  CT of the chest, abdomen and pelvis        FINDINGS:  Evaluation of the solid abdominal organs is limited without contrast.  The visualized neck, axilla and subcutaneous tissues are unremarkable.  The tracheobronchial tree is patent centrally.  There is no mediastinal hematoma.  The great vessels are not enlarged. There are nonspecific mediastinal lymph nodes measuring up to 1.3 x 1.7 cm in the right paratracheal region. Coronary atherosclerosis. The heart is not enlarged.  There is no pericardial effusion.    Lungs: Compared to the recent CT of March 15, 2020, there are increased patchy groundglass and airspace opacities at the lung apices with septal thickening indicative of multifocal infection. Left base streaky atelectasis.    Pleura: Unremarkable.  There is no free air or focal collection.  No free fluid.    Liver: 2.2 cm bilobed cyst in theleft lobe.  Spleen: Normal.  Gallbladder: Unremarkable.  Biliary tree: Unremarkable.  Adrenal glands: Normal.  Pancreas: Normal.  Kidneys: No perinephric stranding, renal edema, hydronephrosis, obstructing stone, or intrarenal stone. There is an air and entirely    Bowel:  There is no small bowel obstruction.  The appendix is unremarkable. Diverticulosis of the left and sigmoid colon. No significant fecal load.    Bladder: Incompletely distended.  Pelvic organs: No free fluid.    There is no significant adenopathy.  Vasculature: The aorta is not dilated. Mild atherosclerotic vascular calcification. There is    Retroperitoneum: There is no mass.  Bones: Chronic degenerative changes of the spine.  Subcutaneous tissues: Unremarkable.    IMPRESSION:  Be compared to the recent CT of March 15, 2020, there are increased bilateral upper lobe opacities indicative of worsening multifocal pneumonia.  No explanation for the abdominal pain on this CT.  Diverticulosis.

## 2020-03-31 NOTE — PROGRESS NOTE ADULT - ASSESSMENT
1) Pneumonia   2) Abnormal CT Chest   3) Dyspnea  4) Cough  5) AF     80 yo female with a pmh/o Glaucoma, cataracts, OA, HTN, who presents to the ED with niece due to her niece noticing pt having cough x 1 week, decreased to no appetite, and weakness. Niece called EMS from pt home after visiting and EMS noted pt HR to be 210 in aflutter. Pt given IVF and 20mg cardizem en route and HR improved, pt now in NSR at 80's. Pt is a very poor historian however upon interview, admits to having a pre op assessment (cataract) in January where her RN had mentioned to her that her HR was irregular. Pt then admits to intermittent chest pressure which is centralized across chest and radiates down right arm to fingertips. Pt states this chest pressure and wheezing has been treated at home with ASA, advil, and yesterday started OTC cold medicine with pseudoephinephrine ingredient. Pt states pressure had awoken her from her sleep and is sure that she only has been taking two advil and one additional aspirin to her daily in order to control sx. Pt denies fever, however states that she did have chills. Endorses productive cough with off white sputum, and wheezing.  Reviewed CT Chest imaging, labs   COVID 19 negative, repeat negative   RVP + for enterovirus noted   Findings are consistent with a pneumonia secondary to viral etiology  She is a non-smoker with no prior pulmonary history and she states she has not seen a pulmonologist in the past   Procalcitonin reviewed, 2.08 suggesting underlying infectious process   Continue Symbicort   Will continue to monitor  Appreciate ID recommendations   cardiac eval in progress / cath data noted with non obstructive CAD  medical management

## 2020-03-31 NOTE — PHYSICAL THERAPY INITIAL EVALUATION ADULT - PERTINENT HX OF CURRENT PROBLEM, REHAB EVAL
80yo female with PMhx of glaucoma, HTN, OA, discharged 2 days ago for MRSA PNA on Doxy PO, new onset Afib, (COVID19 negative during hospitalization), represents to the hospital complaining of SOB and CP. Pt notes she took a nap, and woke up with SOB and CP. Denies fever, chills, palpitations, HA, dizziness. No other constitutional symptoms.

## 2020-03-31 NOTE — PHYSICAL THERAPY INITIAL EVALUATION ADULT - RANGE OF MOTION EXAMINATION, REHAB EVAL
bilateral upper extremity ROM was WFL (within functional limits)/bilateral lower extremity ROM was WFL (within functional limits)/except b/l shoulder elevation limited 0-90.

## 2020-03-31 NOTE — PROGRESS NOTE ADULT - PROBLEM SELECTOR PLAN 2
Rate control and rhythm supression with BB. Cont. xarelto for CVA prophylaxis (CHADS2-VASc=3 >75, HTN).

## 2020-04-01 ENCOUNTER — TRANSCRIPTION ENCOUNTER (OUTPATIENT)
Age: 80
End: 2020-04-01

## 2020-04-01 LAB
ALBUMIN SERPL ELPH-MCNC: 2.3 G/DL — LOW (ref 3.3–5)
ALP SERPL-CCNC: 196 U/L — HIGH (ref 40–120)
ALT FLD-CCNC: 25 U/L — SIGNIFICANT CHANGE UP (ref 12–78)
ANION GAP SERPL CALC-SCNC: 9 MMOL/L — SIGNIFICANT CHANGE UP (ref 5–17)
AST SERPL-CCNC: 19 U/L — SIGNIFICANT CHANGE UP (ref 15–37)
BILIRUB SERPL-MCNC: 0.3 MG/DL — SIGNIFICANT CHANGE UP (ref 0.2–1.2)
BUN SERPL-MCNC: 16 MG/DL — SIGNIFICANT CHANGE UP (ref 7–23)
CALCIUM SERPL-MCNC: 8.4 MG/DL — LOW (ref 8.5–10.1)
CHLORIDE SERPL-SCNC: 106 MMOL/L — SIGNIFICANT CHANGE UP (ref 96–108)
CO2 SERPL-SCNC: 22 MMOL/L — SIGNIFICANT CHANGE UP (ref 22–31)
CREAT SERPL-MCNC: 1 MG/DL — SIGNIFICANT CHANGE UP (ref 0.5–1.3)
GLUCOSE SERPL-MCNC: 110 MG/DL — HIGH (ref 70–99)
HCT VFR BLD CALC: 32.2 % — LOW (ref 34.5–45)
HGB BLD-MCNC: 10.9 G/DL — LOW (ref 11.5–15.5)
MAGNESIUM SERPL-MCNC: 1.7 MG/DL — SIGNIFICANT CHANGE UP (ref 1.6–2.6)
MCHC RBC-ENTMCNC: 29.8 PG — SIGNIFICANT CHANGE UP (ref 27–34)
MCHC RBC-ENTMCNC: 33.9 GM/DL — SIGNIFICANT CHANGE UP (ref 32–36)
MCV RBC AUTO: 88 FL — SIGNIFICANT CHANGE UP (ref 80–100)
PLATELET # BLD AUTO: 317 K/UL — SIGNIFICANT CHANGE UP (ref 150–400)
POTASSIUM SERPL-MCNC: 3.3 MMOL/L — LOW (ref 3.5–5.3)
POTASSIUM SERPL-SCNC: 3.3 MMOL/L — LOW (ref 3.5–5.3)
PROT SERPL-MCNC: 7 GM/DL — SIGNIFICANT CHANGE UP (ref 6–8.3)
RBC # BLD: 3.66 M/UL — LOW (ref 3.8–5.2)
RBC # FLD: 15.6 % — HIGH (ref 10.3–14.5)
SODIUM SERPL-SCNC: 137 MMOL/L — SIGNIFICANT CHANGE UP (ref 135–145)
WBC # BLD: 21.16 K/UL — HIGH (ref 3.8–10.5)
WBC # FLD AUTO: 21.16 K/UL — HIGH (ref 3.8–10.5)

## 2020-04-01 PROCEDURE — 12345: CPT | Mod: NC

## 2020-04-01 PROCEDURE — 93010 ELECTROCARDIOGRAM REPORT: CPT

## 2020-04-01 PROCEDURE — 99233 SBSQ HOSP IP/OBS HIGH 50: CPT

## 2020-04-01 RX ADMIN — NYSTATIN CREAM 1 APPLICATION(S): 100000 CREAM TOPICAL at 21:56

## 2020-04-01 RX ADMIN — LOSARTAN POTASSIUM 25 MILLIGRAM(S): 100 TABLET, FILM COATED ORAL at 05:55

## 2020-04-01 RX ADMIN — SODIUM CHLORIDE 3 MILLILITER(S): 9 INJECTION INTRAMUSCULAR; INTRAVENOUS; SUBCUTANEOUS at 14:18

## 2020-04-01 RX ADMIN — Medication 110 MILLIGRAM(S): at 05:54

## 2020-04-01 RX ADMIN — Medication 1 TABLET(S): at 17:25

## 2020-04-01 RX ADMIN — CEFEPIME 1000 MILLIGRAM(S): 1 INJECTION, POWDER, FOR SOLUTION INTRAMUSCULAR; INTRAVENOUS at 21:57

## 2020-04-01 RX ADMIN — CEFEPIME 1000 MILLIGRAM(S): 1 INJECTION, POWDER, FOR SOLUTION INTRAMUSCULAR; INTRAVENOUS at 14:14

## 2020-04-01 RX ADMIN — Medication 50 MILLIGRAM(S): at 17:25

## 2020-04-01 RX ADMIN — Medication 50 MILLIGRAM(S): at 05:55

## 2020-04-01 RX ADMIN — SODIUM CHLORIDE 3 MILLILITER(S): 9 INJECTION INTRAMUSCULAR; INTRAVENOUS; SUBCUTANEOUS at 21:57

## 2020-04-01 RX ADMIN — PANTOPRAZOLE SODIUM 40 MILLIGRAM(S): 20 TABLET, DELAYED RELEASE ORAL at 05:55

## 2020-04-01 RX ADMIN — Medication 60 MILLIGRAM(S): at 17:25

## 2020-04-01 RX ADMIN — Medication 250 MILLIGRAM(S): at 14:18

## 2020-04-01 RX ADMIN — NYSTATIN CREAM 1 APPLICATION(S): 100000 CREAM TOPICAL at 05:56

## 2020-04-01 RX ADMIN — Medication 60 MILLIGRAM(S): at 05:55

## 2020-04-01 RX ADMIN — CLOPIDOGREL BISULFATE 75 MILLIGRAM(S): 75 TABLET, FILM COATED ORAL at 14:15

## 2020-04-01 RX ADMIN — RIVAROXABAN 15 MILLIGRAM(S): KIT at 14:15

## 2020-04-01 RX ADMIN — NYSTATIN CREAM 1 APPLICATION(S): 100000 CREAM TOPICAL at 14:17

## 2020-04-01 RX ADMIN — Medication 60 MILLIGRAM(S): at 14:15

## 2020-04-01 RX ADMIN — ATORVASTATIN CALCIUM 40 MILLIGRAM(S): 80 TABLET, FILM COATED ORAL at 21:56

## 2020-04-01 RX ADMIN — SODIUM CHLORIDE 3 MILLILITER(S): 9 INJECTION INTRAMUSCULAR; INTRAVENOUS; SUBCUTANEOUS at 05:54

## 2020-04-01 RX ADMIN — Medication 1 TABLET(S): at 05:55

## 2020-04-01 NOTE — PROGRESS NOTE ADULT - PROBLEM SELECTOR PROBLEM 2
Atrial fibrillation, unspecified type
Atrial fibrillation, unspecified type
HTN (hypertension)
Atrial fibrillation, unspecified type

## 2020-04-01 NOTE — PROGRESS NOTE ADULT - ASSESSMENT
1) Pneumonia   2) Abnormal CT Chest   3) Dyspnea  4) Cough  5) AF     78 yo female with a pmh/o Glaucoma, cataracts, OA, HTN, who presents to the ED with niece due to her niece noticing pt having cough x 1 week, decreased to no appetite, and weakness. Niece called EMS from pt home after visiting and EMS noted pt HR to be 210 in aflutter. Pt given IVF and 20mg cardizem en route and HR improved, pt now in NSR at 80's. Pt is a very poor historian however upon interview, admits to having a pre op assessment (cataract) in January where her RN had mentioned to her that her HR was irregular. Pt then admits to intermittent chest pressure which is centralized across chest and radiates down right arm to fingertips. Pt states this chest pressure and wheezing has been treated at home with ASA, advil, and yesterday started OTC cold medicine with pseudoephinephrine ingredient. Pt states pressure had awoken her from her sleep and is sure that she only has been taking two advil and one additional aspirin to her daily in order to control sx. Pt denies fever, however states that she did have chills. Endorses productive cough with off white sputum, and wheezing.  Reviewed CT Chest imaging, labs   COVID 19 negative, repeat negative   RVP + for enterovirus noted   Findings are consistent with a pneumonia secondary to viral etiology  She is a non-smoker with no prior pulmonary history and she states she has not seen a pulmonologist in the past   Procalcitonin reviewed, 2.08 suggesting underlying infectious process   Continue Symbicort   Will continue to monitor  Appreciate ID recommendations   cardiac eval in progress / cath data noted with non obstructive CAD  medical management  DC planning as per primary team  I shall fu prn

## 2020-04-01 NOTE — DISCHARGE NOTE NURSING/CASE MANAGEMENT/SOCIAL WORK - PATIENT PORTAL LINK FT
You can access the FollowMyHealth Patient Portal offered by [facility] by registering at the following website: [email or web address]. By joining [email or web address]’s FollowMyHealth portal, you will also be able to view your health information using other applications (apps) compatible with our system.

## 2020-04-01 NOTE — PROGRESS NOTE ADULT - SUBJECTIVE AND OBJECTIVE BOX
HOSPITALIST ATTENDING PROGRESS NOTE    Chart and meds reviewed.  Patient seen and examined.    CC:    HPI:Pt seen today  in the chair. Denies sob denies chest pain and states thats she  has been walking to the  bathroom without difficulty    All 10 systems reviewed and found to be negative with the exception of what has been described above.    MEDICATIONS  (STANDING):  ALBUTerol    90 MICROgram(s) HFA Inhaler 2 Puff(s) Inhalation every 6 hours  atorvastatin 40 milliGRAM(s) Oral at bedtime  cefepime  Injectable. 1000 milliGRAM(s) IV Push every 12 hours  clopidogrel Tablet 75 milliGRAM(s) Oral daily  diltiazem    Tablet 60 milliGRAM(s) Oral every 6 hours  doxycycline IVPB      doxycycline IVPB 100 milliGRAM(s) IV Intermittent every 12 hours  lactobacillus acidophilus 1 Tablet(s) Oral two times a day  losartan 25 milliGRAM(s) Oral daily  metoprolol tartrate 50 milliGRAM(s) Oral two times a day  nystatin Powder 1 Application(s) Topical three times a day  pantoprazole    Tablet 40 milliGRAM(s) Oral before breakfast  rivaroxaban 15 milliGRAM(s) Oral daily  sodium chloride 0.9% Bolus 1000 milliLiter(s) IV Bolus once  sodium chloride 0.9% lock flush 3 milliLiter(s) IV Push every 8 hours  vancomycin  IVPB 1000 milliGRAM(s) IV Intermittent daily    MEDICATIONS  (PRN):      VITALS:  T(F): 98.2 (04-01-20 @ 05:42), Max: 98.8 (03-31-20 @ 22:02)  HR: 82 (04-01-20 @ 05:42) (78 - 102)  BP: 146/86 (04-01-20 @ 05:42) (105/71 - 146/86)  RR: 18 (04-01-20 @ 05:42) (18 - 19)  SpO2: 94% (04-01-20 @ 05:42) (94% - 98%)  Wt(kg): --    I&O's Summary      CAPILLARY BLOOD GLUCOSE          PHYSICAL EXAM:       CV:  +S1, +S2, regular, no murmurs or rubs  RESP:   lungs clear to auscultation bilaterally, no wheezing, rales, rhonchi, good air entry bilaterally           EXT:  no clubbing, no cyanosis, no edema, no calf pain, swelling or erythema   s    LABS:                            10.9   21.16 )-----------( 317      ( 01 Apr 2020 07:39 )             32.2     03-31    135  |  105  |  11  ----------------------------<  93  3.5   |  23  |  0.92    Ca    8.6      31 Mar 2020 08:12                                              CULTURES:

## 2020-04-01 NOTE — PROGRESS NOTE ADULT - SUBJECTIVE AND OBJECTIVE BOX
Date of service: 04-01-20 @ 13:08    Patient sitting in chair; less cough and off oxygen  Afebrile        ROS: no fever or chills; denies dizziness, no HA, no abdominal pain, no diarrhea or constipation; no dysuria, no urinary frequency, no legs pain, no rashes    MEDICATIONS  (STANDING):  ALBUTerol    90 MICROgram(s) HFA Inhaler 2 Puff(s) Inhalation every 6 hours  atorvastatin 40 milliGRAM(s) Oral at bedtime  cefepime  Injectable. 1000 milliGRAM(s) IV Push every 12 hours  clopidogrel Tablet 75 milliGRAM(s) Oral daily  diltiazem    Tablet 60 milliGRAM(s) Oral every 6 hours  doxycycline IVPB      doxycycline IVPB 100 milliGRAM(s) IV Intermittent every 12 hours  lactobacillus acidophilus 1 Tablet(s) Oral two times a day  losartan 25 milliGRAM(s) Oral daily  metoprolol tartrate 50 milliGRAM(s) Oral two times a day  nystatin Powder 1 Application(s) Topical three times a day  pantoprazole    Tablet 40 milliGRAM(s) Oral before breakfast  rivaroxaban 15 milliGRAM(s) Oral daily  sodium chloride 0.9% Bolus 1000 milliLiter(s) IV Bolus once  sodium chloride 0.9% lock flush 3 milliLiter(s) IV Push every 8 hours  vancomycin  IVPB 1000 milliGRAM(s) IV Intermittent daily    MEDICATIONS  (PRN):      Vital Signs Last 24 Hrs  T(C): 37.1 (01 Apr 2020 12:56), Max: 37.1 (31 Mar 2020 22:02)  T(F): 98.7 (01 Apr 2020 12:56), Max: 98.8 (31 Mar 2020 22:02)  HR: 70 (01 Apr 2020 12:56) (70 - 102)  BP: 115/46 (01 Apr 2020 12:56) (105/71 - 146/86)  BP(mean): --  RR: 18 (01 Apr 2020 12:56) (18 - 19)  SpO2: 100% (01 Apr 2020 12:56) (94% - 100%)    Physical Exam:      Constitutional: frail looking  HEENT: NC/AT, EOMI, PERRLA, conjunctivae clear; ears and nose atraumatic; pharynx clear  Neck: supple; thyroid not palpable  Back: no tenderness  Respiratory: respiratory effort normal; clear to auscultation  Cardiovascular: S1S2 regular, no murmurs  Abdomen: soft, not tender, not distended, positive BS; no liver or spleen organomegaly  Genitourinary: no suprapubic tenderness  Musculoskeletal: no muscle tenderness, no joint swelling or tenderness  Neurological/ Psychiatric: moving all extremities  Skin: no rashes; no palpable lesions    Labs: all available labs reviewed                      Labs:                        Labs:                        10.9   21.16 )-----------( 317      ( 01 Apr 2020 07:39 )             32.2     03-31    135  |  105  |  11  ----------------------------<  93  3.5   |  23  |  0.92    Ca    8.6      31 Mar 2020 08:12         Vancomycin Level, Trough: 13.4 ug/mL (03-30 @ 13:12)      Cultures:       Culture - Urine (collected 03-28-20 @ 01:50)  Source: .Urine Clean Catch (Midstream)  Final Report (03-30-20 @ 11:49):    50,000 - 99,000 CFU/mL Presumptive Candida albicans    Culture - Blood (collected 03-26-20 @ 21:52)  Source: .Blood None  Final Report (03-31-20 @ 10:01):    No Growth Final    Culture - Blood (collected 03-26-20 @ 21:52)  Source: .Blood None  Final Report (03-31-20 @ 10:01):    No Growth Final           COVID-19 PCR . (03.27.20 @ 00:37)    COVID-19 PCR: NotDetec: As with all clinical testing, results should be correlated with clinical  findings.  This test has been validated by [facility] to be accurate;  though it has not been FDA cleared/approved by the usual pathway.  As with all laboratory tests, results should be correlated with clinical  findings.        Cultures:       Culture - Blood (collected 03-26-20 @ 21:52)  Source: .Blood None  Preliminary Report (03-28-20 @ 10:02):    No growth to date.    Culture - Blood (collected 03-26-20 @ 21:52)  Source: .Blood None  Preliminary Report (03-28-20 @ 10:02):    No growth to date.              < from: CT Chest No Cont (03.26.20 @ 22:28) >    EXAM:  CT ABDOMEN AND PELVIS                          EXAM:  CT CHEST                            PROCEDURE DATE:  03/26/2020          INTERPRETATION:  CT of the chest, abdomen and pelvis    Indication: worsening pneumonia    Technique: Axial imageswere obtained from the thoracic inlet through pubic symphysis without oral or IV contrast. Reformatted coronal and sagittal images were submitted.    Comparison: CT of 3/15/2020    FINDINGS:    Evaluation of the solid abdominal organs is limited without contrast.    The visualized neck, axilla and subcutaneous tissues are unremarkable.    The tracheobronchial tree is patent centrally.  There is no mediastinal hematoma.  The great vessels are not enlarged. There are nonspecific mediastinal lymph nodes measuring up to 1.3 x 1.7 cm in the right paratracheal region. Coronary atherosclerosis. The heart is not enlarged.  There is no pericardial effusion.    Lungs: Compared to the recent CT of March 15, 2020, there are increased patchy groundglass and airspace opacities at the lung apices with septal thickening indicative of multifocal infection. Left base streaky atelectasis.    Pleura: Unremarkable.  There is no free air or focal collection.  No free fluid.    Liver: 2.2 cm bilobed cyst in theleft lobe.  Spleen: Normal.  Gallbladder: Unremarkable.  Biliary tree: Unremarkable.  Adrenal glands: Normal.  Pancreas: Normal.  Kidneys: No perinephric stranding, renal edema, hydronephrosis, obstructing stone, or intrarenal stone. There is an air and entirely    Bowel:  There is no small bowel obstruction.  The appendix is unremarkable. Diverticulosis of the left and sigmoid colon. No significant fecal load.    Bladder: Incompletely distended.  Pelvic organs: No free fluid.    There is no significant adenopathy.  Vasculature: The aorta is not dilated. Mild atherosclerotic vascular calcification. There is    Retroperitoneum: There is no mass.  Bones: Chronic degenerative changes of the spine.  Subcutaneous tissues: Unremarkable.    IMPRESSION:    Be compared to the recent CT of March 15, 2020, there are increased bilateral upper lobe opacities indicative of worsening multifocal pneumonia.  No explanation for the abdominal pain on this CT.  Diverticulosis.    < end of copied text >        Radiology: all available radiological tests reviewed    Advanced directives addressed: full resuscitation

## 2020-04-01 NOTE — PROGRESS NOTE ADULT - PROBLEM SELECTOR PLAN 3
as per medicine
as per medicine and ID. on antibiotics.
stable ambulating without pain
as per medicine

## 2020-04-01 NOTE — PROGRESS NOTE ADULT - ASSESSMENT
Patient is 78yo female with PMhx of glaucoma, HTN, OA, discharged 2 days ago for MRSA PNA on Doxy PO, new onset Afib, (COVID19 negative during hospitalization), admitted on 3/27 for evaluation of shortness of breath and chest pain; she feels that she was discharged too early. Patient is poor historian and history per medical record.     1. Patient admitted with worsening multifocal pneumonia; has history of MRSA pneumonia; possible viral syndrome secondary to COVID-19 found to bed COVID-19 negative  - follow up cultures   - serial cbc and monitor temperature   - iv hydration and supportive care   - reviewed prior medical records to evaluate for resistant or atypical pathogens   - oxygen and nebs as needed   - COVID-19 PCR is not detected  - day #6 cefepime and vancomycin   - tolerating antibiotics without rashes or side effects   - vancomycin trough is appropriate  - continue isolation but use contact and droplet, no further airborne isolation  - will stop doxycycline as patient is on vancomycin  2. other issues; per medicine

## 2020-04-01 NOTE — PROGRESS NOTE ADULT - PROBLEM SELECTOR PROBLEM 1
NSTEMI (non-ST elevated myocardial infarction)
NSTEMI (non-ST elevated myocardial infarction)
Pneumonia
NSTEMI (non-ST elevated myocardial infarction)

## 2020-04-01 NOTE — PROGRESS NOTE ADULT - SUBJECTIVE AND OBJECTIVE BOX
Patient is a 79y old  Female who presents with a chief complaint of SOB, CP (27 Mar 2020 18:04)      HPI:  Patient is 80yo female with PMhx of glaucoma, HTN, OA, discharged 2 days ago for MRSA PNA on Doxy PO, new onset Afib, (COVID19 negative during hospitalization), represents to the hospital complaining of SOB and CP. Pt notes she took a nap, and woke up with SOB and CP. Denies fever, chills, palpitations, HA, dizziness. No other constitutional symptoms. (27 Mar 2020 09:48)    3/29  seen and examined with her RN today  no cp  breathing is ok  mild cough  3/30  resting comfortably  no distress  no cp  3/31  doing better clinically  s/p cath data discussed  medical management  no cp    sitting OOB in chair  no cp  breathing is ok  PAST MEDICAL & SURGICAL HISTORY:  OA (osteoarthritis)  Glaucoma  HTN (hypertension)  Status post glaucoma surgery: and cataract      PREVIOUS DIAGNOSTIC TESTING:      MEDICATIONS  (STANDING):  ALBUTerol    90 MICROgram(s) HFA Inhaler 2 Puff(s) Inhalation every 6 hours  atorvastatin 40 milliGRAM(s) Oral at bedtime  cefepime  Injectable. 1000 milliGRAM(s) IV Push every 12 hours  clopidogrel Tablet 75 milliGRAM(s) Oral daily  diltiazem    Tablet 60 milliGRAM(s) Oral every 6 hours  lactobacillus acidophilus 1 Tablet(s) Oral two times a day  losartan 25 milliGRAM(s) Oral daily  metoprolol tartrate 50 milliGRAM(s) Oral two times a day  nystatin Powder 1 Application(s) Topical three times a day  pantoprazole    Tablet 40 milliGRAM(s) Oral before breakfast  rivaroxaban 15 milliGRAM(s) Oral daily  sodium chloride 0.9% Bolus 1000 milliLiter(s) IV Bolus once  sodium chloride 0.9% lock flush 3 milliLiter(s) IV Push every 8 hours  vancomycin  IVPB 1000 milliGRAM(s) IV Intermittent daily      MEDICATIONS  (PRN):  heparin  Injectable 4700 Unit(s) IV Push every 6 hours PRN For aPTT less than 40      FAMILY HISTORY:  FH: CAD (coronary artery disease): in mother and father, both   FH: pancreatic cancer: in sister,   FHx: colon cancer: in brother,       SOCIAL HISTORY:  ***    Vital Signs Last 24 Hrs  T(C): 36.8 (2020 05:42), Max: 37.1 (31 Mar 2020 22:02)  T(F): 98.2 (2020 05:42), Max: 98.8 (31 Mar 2020 22:02)  HR: 82 (2020 05:42) (78 - 102)  BP: 146/86 (2020 05:42) (105/71 - 146/86)  BP(mean): --  RR: 18 (2020 05:42) (18 - 19)  SpO2: 94% (2020 05:42) (94% - 98%)        PHYSICAL EXAM  General Appearance: cooperative, no acute distress,   HEENT: PERRL, conjunctiva clear, EOM's intact, non injected pharynx, no exudate, TM   normal  Neck: Supple, , no adenopathy, thyroid: not enlarged, no carotid bruit or JVD  Back: Symmetric, no  tenderness,no soft tissue tenderness  Lungs: Diminished in the upper lobes, coarse at the bases  Heart: Regular rate and rhythm, S1, S2 normal, no murmur, rub or gallop  Abdomen: Soft, non-tender, bowel sounds active , no hepatosplenomegaly  Extremities: no cyanosis or edema, no joint swelling  Skin: Skin color, texture normal, no rashes   Neurologic: Alert and oriented X3 , cranial nerves intact, sensory and motor normal,    ECG:    LABS:                        9.3    18.46 )-----------( 263      ( 29 Mar 2020 08:31 )             28.3                           9.9    21.01 )-----------( 268      ( 28 Mar 2020 08:12 )      141  |  114<H>  |  13  ----------------------------<  99  3.4<L>   |  19<L>  |  0.88    Ca    8.1<L>      29 Mar 2020 08:31    TPro  6.6  /  Alb  2.2<L>  /  TBili  0.4  /  DBili  x   /  AST  21  /  ALT  21  /  AlkPhos  183<H>               29.8         141  |  112<H>  |  16  ----------------------------<  95  3.5   |  20<L>  |  0.94    Ca    8.0<L>      28 Mar 2020 08:12    TPro  6.6  /  Alb  2.2<L>  /  TBili  0.4  /  DBili  x   /  AST  21  /  ALT  21  /  AlkPhos  183<H>      CARDIAC MARKERS ( 27 Mar 2020 13:19 )  4.380 ng/mL / x     / x     / x     / x      CARDIAC MARKERS ( 27 Mar 2020 04:58 )  9.730 ng/mL / x     / x     / x     / x      CARDIAC MARKERS ( 26 Mar 2020 21:54 )  3.920 ng/mL / x     / x     / x     / x      CARDIAC MARKERS ( 26 Mar 2020 19:24 )  1.420 ng/mL / x     / x     / x     / x              PT/INR - ( 26 Mar 2020 21:54 )   PT: 16.0 sec;   INR: 1.43 ratio         PTT - ( 28 Mar 2020 08:12 )  PTT:65.6 sec  Urinalysis Basic - ( 28 Mar 2020 01:50 )    Color: Yellow / Appearance: Clear / S.020 / pH: x  Gluc: x / Ketone: Negative  / Bili: Negative / Urobili: Negative mg/dL   Blood: x / Protein: 100 mg/dL / Nitrite: Negative   Leuk Esterase: Moderate / RBC: 11-25 /HPF / WBC 26-50   Sq Epi: x / Non Sq Epi: Moderate / Bacteria: Moderate            RADIOLOGY & ADDITIONAL STUDIES:  < from: Xray Chest 1 View- PORTABLE-Routine (20 @ 07:19) >  PROCEDURE DATE:  2020          INTERPRETATION:  Clinical indication:  PNA f/u    Technique: XR CHEST portable AP    Comparison:3/18/2020    Findings:  Lines: None    Heart/Mediastinum/Lungs/Other: The heart size is normal.The lungs demonstrate patchy opacity in the upper lobes with interstitial prominence, similar to the previous exam..There are no pleural effusions.    Impression:    As above.    < end of copied text >

## 2020-04-02 ENCOUNTER — TRANSCRIPTION ENCOUNTER (OUTPATIENT)
Age: 80
End: 2020-04-02

## 2020-04-02 VITALS
DIASTOLIC BLOOD PRESSURE: 78 MMHG | WEIGHT: 172.62 LBS | RESPIRATION RATE: 18 BRPM | OXYGEN SATURATION: 99 % | TEMPERATURE: 97 F | SYSTOLIC BLOOD PRESSURE: 114 MMHG | HEART RATE: 78 BPM

## 2020-04-02 LAB
HCT VFR BLD CALC: 34.3 % — LOW (ref 34.5–45)
HGB BLD-MCNC: 11 G/DL — LOW (ref 11.5–15.5)
MCHC RBC-ENTMCNC: 28.4 PG — SIGNIFICANT CHANGE UP (ref 27–34)
MCHC RBC-ENTMCNC: 32.1 GM/DL — SIGNIFICANT CHANGE UP (ref 32–36)
MCV RBC AUTO: 88.4 FL — SIGNIFICANT CHANGE UP (ref 80–100)
PHOSPHATE SERPL-MCNC: 2.7 MG/DL — SIGNIFICANT CHANGE UP (ref 2.5–4.5)
PLATELET # BLD AUTO: 339 K/UL — SIGNIFICANT CHANGE UP (ref 150–400)
RBC # BLD: 3.88 M/UL — SIGNIFICANT CHANGE UP (ref 3.8–5.2)
RBC # FLD: 15.9 % — HIGH (ref 10.3–14.5)
WBC # BLD: 17.54 K/UL — HIGH (ref 3.8–10.5)
WBC # FLD AUTO: 17.54 K/UL — HIGH (ref 3.8–10.5)

## 2020-04-02 PROCEDURE — 99233 SBSQ HOSP IP/OBS HIGH 50: CPT

## 2020-04-02 RX ORDER — CLOPIDOGREL BISULFATE 75 MG/1
1 TABLET, FILM COATED ORAL
Qty: 30 | Refills: 0
Start: 2020-04-02 | End: 2020-05-01

## 2020-04-02 RX ORDER — DILTIAZEM HCL 120 MG
1 CAPSULE, EXT RELEASE 24 HR ORAL
Qty: 120 | Refills: 0
Start: 2020-04-02

## 2020-04-02 RX ORDER — RIVAROXABAN 15 MG-20MG
1 KIT ORAL
Qty: 30 | Refills: 0
Start: 2020-04-02 | End: 2020-05-01

## 2020-04-02 RX ORDER — LOSARTAN POTASSIUM 100 MG/1
1 TABLET, FILM COATED ORAL
Qty: 0 | Refills: 0 | DISCHARGE
Start: 2020-04-02

## 2020-04-02 RX ORDER — PANTOPRAZOLE SODIUM 20 MG/1
1 TABLET, DELAYED RELEASE ORAL
Qty: 30 | Refills: 0
Start: 2020-04-02

## 2020-04-02 RX ORDER — METOPROLOL TARTRATE 50 MG
1 TABLET ORAL
Qty: 60 | Refills: 0
Start: 2020-04-02

## 2020-04-02 RX ORDER — LOSARTAN POTASSIUM 100 MG/1
1 TABLET, FILM COATED ORAL
Qty: 30 | Refills: 0
Start: 2020-04-02 | End: 2020-05-01

## 2020-04-02 RX ORDER — ALBUTEROL 90 UG/1
2 AEROSOL, METERED ORAL
Qty: 1 | Refills: 0
Start: 2020-04-02

## 2020-04-02 RX ADMIN — Medication 60 MILLIGRAM(S): at 05:25

## 2020-04-02 RX ADMIN — Medication 50 MILLIGRAM(S): at 05:25

## 2020-04-02 RX ADMIN — Medication 1 TABLET(S): at 05:25

## 2020-04-02 RX ADMIN — Medication 250 MILLIGRAM(S): at 10:55

## 2020-04-02 RX ADMIN — CEFEPIME 1000 MILLIGRAM(S): 1 INJECTION, POWDER, FOR SOLUTION INTRAMUSCULAR; INTRAVENOUS at 10:54

## 2020-04-02 RX ADMIN — CLOPIDOGREL BISULFATE 75 MILLIGRAM(S): 75 TABLET, FILM COATED ORAL at 10:55

## 2020-04-02 RX ADMIN — PANTOPRAZOLE SODIUM 40 MILLIGRAM(S): 20 TABLET, DELAYED RELEASE ORAL at 05:25

## 2020-04-02 RX ADMIN — RIVAROXABAN 15 MILLIGRAM(S): KIT at 10:54

## 2020-04-02 RX ADMIN — NYSTATIN CREAM 1 APPLICATION(S): 100000 CREAM TOPICAL at 05:26

## 2020-04-02 RX ADMIN — SODIUM CHLORIDE 3 MILLILITER(S): 9 INJECTION INTRAMUSCULAR; INTRAVENOUS; SUBCUTANEOUS at 05:08

## 2020-04-02 RX ADMIN — LOSARTAN POTASSIUM 25 MILLIGRAM(S): 100 TABLET, FILM COATED ORAL at 05:26

## 2020-04-02 RX ADMIN — Medication 60 MILLIGRAM(S): at 10:54

## 2020-04-02 NOTE — DISCHARGE NOTE PROVIDER - HOSPITAL COURSE
Admitted with MRSA Pneumonia 80yo female with PMhx of glaucoma, HTN, OA, discharged 2 days ago for MRSA PNA on Doxy PO, new onset Afib, (COVID19 negative during hospitalization), represents to the hospital complaining of SOB and CP. Pt notes she took a nap, and woke up with SOB and CP. Denies fever, chills, palpitations, HA, dizziness. No other constitutional symptoms. 78yo female with PMhx of glaucoma, HTN, OA, discharged 2 days ago for MRSA PNA on Doxy PO, new onset Afib, (COVID19 negative during hospitalization), represents to the hospital complaining of SOB and CP. Pt notes she took a nap, and woke up with SOB and CP. Denies fever, chills, palpitations, HA, dizziness. No other constitutional symptoms.         Physical Exam:     GENERAL APPEARANCE:  NAD, hemodynamically stable    T(C): 36.4 (04-02-20 @ 05:24), Max: 36.7 (04-01-20 @ 23:56)    HR: 83 (04-02-20 @ 05:24) (82 - 88)    BP: 126/54 (04-02-20 @ 05:24) (98/62 - 126/54)    RR: 18 (04-02-20 @ 05:24) (17 - 18)    SpO2: 99% (04-02-20 @ 05:24) (98% - 100%)    Wt(kg): --    HEENT:  Head is normocephalic      Skin:  Warm and dry without any rash     NECK:  Supple without lymphadenopathy.     HEART:  Regular rate and rhythm. normal S1 and S2, No M/R/G    LUNGS:  Good ins/exp effort, no W/R/R/C    ABDOMEN:  Soft, nontender, nondistended with good bowel sounds heard    EXTREMITIES:  Without cyanosis, clubbing or edema.     NEUROLOGICAL:  Gross nonfocal

## 2020-04-02 NOTE — DISCHARGE NOTE PROVIDER - NSDCMRMEDTOKEN_GEN_ALL_CORE_FT
albuterol 90 mcg/inh inhalation aerosol: 2 puff(s) inhaled every 6 hours  aspirin 81 mg oral tablet: 1 tab(s) orally once a day  atorvastatin 20 mg oral tablet: 1 tab(s) orally once a day (at bedtime)  clopidogrel 75 mg oral tablet: 1 tab(s) orally once a day  dilTIAZem 60 mg oral tablet: 1 tab(s) orally every 6 hours  losartan 25 mg oral tablet: 1 tab(s) orally once a day  losartan 25 mg oral tablet: 1 tab(s) orally once a day  metoprolol tartrate 50 mg oral tablet: 1 tab(s) orally 2 times a day  pantoprazole 40 mg oral delayed release tablet: 1 tab(s) orally once a day (before a meal)  rivaroxaban 15 mg oral tablet: 1 tab(s) orally once a day

## 2020-04-02 NOTE — PROGRESS NOTE ADULT - SUBJECTIVE AND OBJECTIVE BOX
Patient is a 79y old  Female who presents with a chief complaint of SOB, CP (27 Mar 2020 18:04)      HPI:  Patient is 78yo female with PMhx of glaucoma, HTN, OA, discharged 2 days ago for MRSA PNA on Doxy PO, new onset Afib, (COVID19 negative during hospitalization), represents to the hospital complaining of SOB and CP. Pt notes she took a nap, and woke up with SOB and CP. Denies fever, chills, palpitations, HA, dizziness. No other constitutional symptoms. (27 Mar 2020 09:48)    3/29  seen and examined with her RN today  no cp  breathing is ok  mild cough  3/30  resting comfortably  no distress  no cp  3/31  doing better clinically  s/p cath data discussed  medical management  no cp    sitting OOB in chair  no cp  breathing is ok  /  sitting OOB in chair  no complaints  Dc planning in progress  PAST MEDICAL & SURGICAL HISTORY:  OA (osteoarthritis)  Glaucoma  HTN (hypertension)  Status post glaucoma surgery: and cataract      PREVIOUS DIAGNOSTIC TESTING:      MEDICATIONS  (STANDING):  ALBUTerol    90 MICROgram(s) HFA Inhaler 2 Puff(s) Inhalation every 6 hours  atorvastatin 40 milliGRAM(s) Oral at bedtime  cefepime  Injectable. 1000 milliGRAM(s) IV Push every 12 hours  clopidogrel Tablet 75 milliGRAM(s) Oral daily  diltiazem    Tablet 60 milliGRAM(s) Oral every 6 hours  lactobacillus acidophilus 1 Tablet(s) Oral two times a day  losartan 25 milliGRAM(s) Oral daily  metoprolol tartrate 50 milliGRAM(s) Oral two times a day  nystatin Powder 1 Application(s) Topical three times a day  pantoprazole    Tablet 40 milliGRAM(s) Oral before breakfast  rivaroxaban 15 milliGRAM(s) Oral daily  sodium chloride 0.9% Bolus 1000 milliLiter(s) IV Bolus once  sodium chloride 0.9% lock flush 3 milliLiter(s) IV Push every 8 hours  vancomycin  IVPB 1000 milliGRAM(s) IV Intermittent daily      MEDICATIONS  (PRN):  heparin  Injectable 4700 Unit(s) IV Push every 6 hours PRN For aPTT less than 40      FAMILY HISTORY:  FH: CAD (coronary artery disease): in mother and father, both   FH: pancreatic cancer: in sister,   FHx: colon cancer: in brother,       SOCIAL HISTORY:  ***    Vital Signs Last 24 Hrs  T(C): 36.4 (2020 05:24), Max: 37.1 (2020 12:56)  T(F): 97.6 (2020 05:24), Max: 98.7 (2020 12:56)  HR: 83 (2020 05:24) (70 - 88)  BP: 126/54 (2020 05:24) (98/62 - 126/54)  BP(mean): --  RR: 18 (2020 05:24) (17 - 18)  SpO2: 99% (2020 05:24) (98% - 100%)      PHYSICAL EXAM  General Appearance: cooperative, no acute distress,   HEENT: PERRL, conjunctiva clear, EOM's intact, non injected pharynx, no exudate, TM   normal  Neck: Supple, , no adenopathy, thyroid: not enlarged, no carotid bruit or JVD  Back: Symmetric, no  tenderness,no soft tissue tenderness  Lungs: Diminished in the upper lobes, coarse at the bases  Heart: Regular rate and rhythm, S1, S2 normal, no murmur, rub or gallop  Abdomen: Soft, non-tender, bowel sounds active , no hepatosplenomegaly  Extremities: no cyanosis or edema, no joint swelling  Skin: Skin color, texture normal, no rashes   Neurologic: Alert and oriented X3 , cranial nerves intact, sensory and motor normal,    ECG:    LABS:                                 10.9   21.16 )-----------( 317      ( 2020 07:39 )             32.2       137  |  106  |  16  ----------------------------<  110<H>  3.3<L>   |  22  |  1.00    Ca    8.4<L>      2020 22:04  Mg     1.7         TPro  7.0  /  Alb  2.3<L>  /  TBili  0.3  /  DBili  x   /  AST  19  /  ALT  25  /  AlkPhos  196<H>  04               9.3    18.46 )-----------( 263      ( 29 Mar 2020 08:31 )             28.3                           9.9    21.01 )-----------( 268      ( 28 Mar 2020 08:12 )      141  |  114<H>  |  13  ----------------------------<  99  3.4<L>   |  19<L>  |  0.88    Ca    8.1<L>      29 Mar 2020 08:31    TPro  6.6  /  Alb  2.2<L>  /  TBili  0.4  /  DBili  x   /  AST  21  /  ALT  21  /  AlkPhos  183<H>               29.8         141  |  112<H>  |  16  ----------------------------<  95  3.5   |  20<L>  |  0.94    Ca    8.0<L>      28 Mar 2020 08:12    TPro  6.6  /  Alb  2.2<L>  /  TBili  0.4  /  DBili  x   /  AST  21  /  ALT  21  /  AlkPhos  183<H>      CARDIAC MARKERS ( 27 Mar 2020 13:19 )  4.380 ng/mL / x     / x     / x     / x      CARDIAC MARKERS ( 27 Mar 2020 04:58 )  9.730 ng/mL / x     / x     / x     / x      CARDIAC MARKERS ( 26 Mar 2020 21:54 )  3.920 ng/mL / x     / x     / x     / x      CARDIAC MARKERS ( 26 Mar 2020 19:24 )  1.420 ng/mL / x     / x     / x     / x              PT/INR - ( 26 Mar 2020 21:54 )   PT: 16.0 sec;   INR: 1.43 ratio         PTT - ( 28 Mar 2020 08:12 )  PTT:65.6 sec  Urinalysis Basic - ( 28 Mar 2020 01:50 )    Color: Yellow / Appearance: Clear / S.020 / pH: x  Gluc: x / Ketone: Negative  / Bili: Negative / Urobili: Negative mg/dL   Blood: x / Protein: 100 mg/dL / Nitrite: Negative   Leuk Esterase: Moderate / RBC: 11-25 /HPF / WBC 26-50   Sq Epi: x / Non Sq Epi: Moderate / Bacteria: Moderate            RADIOLOGY & ADDITIONAL STUDIES:  < from: Xray Chest 1 View- PORTABLE-Routine (20 @ 07:19) >  PROCEDURE DATE:  2020          INTERPRETATION:  Clinical indication:  PNA f/u    Technique: XR CHEST portable AP    Comparison:3/18/2020    Findings:  Lines: None    Heart/Mediastinum/Lungs/Other: The heart size is normal.The lungs demonstrate patchy opacity in the upper lobes with interstitial prominence, similar to the previous exam..There are no pleural effusions.    Impression:    As above.    < end of copied text >

## 2020-04-02 NOTE — PROGRESS NOTE ADULT - SUBJECTIVE AND OBJECTIVE BOX
Date of service: 04-02-20 @ 14:35      Patient off oxygen and O2 saturations are excellent; no further cough; afebrile      ROS: no fever or chills; denies dizziness, no HA, no SOB or cough, no abdominal pain, no diarrhea or constipation; no dysuria, no urinary frequency, no legs pain, no rashes    MEDICATIONS  (STANDING):  ALBUTerol    90 MICROgram(s) HFA Inhaler 2 Puff(s) Inhalation every 6 hours  atorvastatin 40 milliGRAM(s) Oral at bedtime  cefepime  Injectable. 1000 milliGRAM(s) IV Push every 12 hours  clopidogrel Tablet 75 milliGRAM(s) Oral daily  diltiazem    Tablet 60 milliGRAM(s) Oral every 6 hours  lactobacillus acidophilus 1 Tablet(s) Oral two times a day  losartan 25 milliGRAM(s) Oral daily  metoprolol tartrate 50 milliGRAM(s) Oral two times a day  nystatin Powder 1 Application(s) Topical three times a day  pantoprazole    Tablet 40 milliGRAM(s) Oral before breakfast  rivaroxaban 15 milliGRAM(s) Oral daily  sodium chloride 0.9% Bolus 1000 milliLiter(s) IV Bolus once  sodium chloride 0.9% lock flush 3 milliLiter(s) IV Push every 8 hours  vancomycin  IVPB 1000 milliGRAM(s) IV Intermittent daily    MEDICATIONS  (PRN):      Vital Signs Last 24 Hrs  T(C): 36.1 (02 Apr 2020 12:35), Max: 36.7 (01 Apr 2020 23:56)  T(F): 97 (02 Apr 2020 12:35), Max: 98 (01 Apr 2020 23:56)  HR: 78 (02 Apr 2020 12:35) (78 - 88)  BP: 114/78 (02 Apr 2020 12:35) (98/62 - 126/54)  BP(mean): --  RR: 18 (02 Apr 2020 12:35) (17 - 18)  SpO2: 99% (02 Apr 2020 12:35) (98% - 100%)    Physical Exam:      Constitutional: frail looking  HEENT: NC/AT, EOMI, PERRLA, conjunctivae clear; ears and nose atraumatic; pharynx clear  Neck: supple; thyroid not palpable  Back: no tenderness  Respiratory: respiratory effort normal; clear to auscultation  Cardiovascular: S1S2 regular, no murmurs  Abdomen: soft, not tender, not distended, positive BS; no liver or spleen organomegaly  Genitourinary: no suprapubic tenderness  Musculoskeletal: no muscle tenderness, no joint swelling or tenderness  Neurological/ Psychiatric: moving all extremities  Skin: no rashes; no palpable lesions    Labs: all available labs reviewed                      Labs:                        Labs:             Labs:                        11.0   17.54 )-----------( 339      ( 02 Apr 2020 08:45 )             34.3     04-01    137  |  106  |  16  ----------------------------<  110<H>  3.3<L>   |  22  |  1.00    Ca    8.4<L>      01 Apr 2020 22:04  Phos  2.7     04-02  Mg     1.7     04-01    TPro  7.0  /  Alb  2.3<L>  /  TBili  0.3  /  DBili  x   /  AST  19  /  ALT  25  /  AlkPhos  196<H>  04-01           Cultures:       Culture - Urine (collected 03-28-20 @ 01:50)  Source: .Urine Clean Catch (Midstream)  Final Report (03-30-20 @ 11:49):    50,000 - 99,000 CFU/mL Presumptive Candida albicans    Culture - Blood (collected 03-26-20 @ 21:52)  Source: .Blood None  Final Report (03-31-20 @ 10:01):    No Growth Final    Culture - Blood (collected 03-26-20 @ 21:52)  Source: .Blood None  Final Report (03-31-20 @ 10:01):    No Growth Final           COVID-19 PCR . (03.27.20 @ 00:37)    COVID-19 PCR: NotDetec: As with all clinical testing, results should be correlated with clinical  findings.  This test has been validated by [facility] to be accurate;  though it has not been FDA cleared/approved by the usual pathway.  As with all laboratory tests, results should be correlated with clinical  findings.        Cultures:       Culture - Blood (collected 03-26-20 @ 21:52)  Source: .Blood None  Preliminary Report (03-28-20 @ 10:02):    No growth to date.    Culture - Blood (collected 03-26-20 @ 21:52)  Source: .Blood None  Preliminary Report (03-28-20 @ 10:02):    No growth to date.              < from: CT Chest No Cont (03.26.20 @ 22:28) >    EXAM:  CT ABDOMEN AND PELVIS                          EXAM:  CT CHEST                            PROCEDURE DATE:  03/26/2020          INTERPRETATION:  CT of the chest, abdomen and pelvis    Indication: worsening pneumonia    Technique: Axial imageswere obtained from the thoracic inlet through pubic symphysis without oral or IV contrast. Reformatted coronal and sagittal images were submitted.    Comparison: CT of 3/15/2020    FINDINGS:    Evaluation of the solid abdominal organs is limited without contrast.    The visualized neck, axilla and subcutaneous tissues are unremarkable.    The tracheobronchial tree is patent centrally.  There is no mediastinal hematoma.  The great vessels are not enlarged. There are nonspecific mediastinal lymph nodes measuring up to 1.3 x 1.7 cm in the right paratracheal region. Coronary atherosclerosis. The heart is not enlarged.  There is no pericardial effusion.    Lungs: Compared to the recent CT of March 15, 2020, there are increased patchy groundglass and airspace opacities at the lung apices with septal thickening indicative of multifocal infection. Left base streaky atelectasis.    Pleura: Unremarkable.  There is no free air or focal collection.  No free fluid.    Liver: 2.2 cm bilobed cyst in theleft lobe.  Spleen: Normal.  Gallbladder: Unremarkable.  Biliary tree: Unremarkable.  Adrenal glands: Normal.  Pancreas: Normal.  Kidneys: No perinephric stranding, renal edema, hydronephrosis, obstructing stone, or intrarenal stone. There is an air and entirely    Bowel:  There is no small bowel obstruction.  The appendix is unremarkable. Diverticulosis of the left and sigmoid colon. No significant fecal load.    Bladder: Incompletely distended.  Pelvic organs: No free fluid.    There is no significant adenopathy.  Vasculature: The aorta is not dilated. Mild atherosclerotic vascular calcification. There is    Retroperitoneum: There is no mass.  Bones: Chronic degenerative changes of the spine.  Subcutaneous tissues: Unremarkable.    IMPRESSION:    Be compared to the recent CT of March 15, 2020, there are increased bilateral upper lobe opacities indicative of worsening multifocal pneumonia.  No explanation for the abdominal pain on this CT.  Diverticulosis.    < end of copied text >        Radiology: all available radiological tests reviewed    Advanced directives addressed: full resuscitation

## 2020-04-02 NOTE — PROGRESS NOTE ADULT - ASSESSMENT
1) Pneumonia   2) Abnormal CT Chest   3) Dyspnea  4) Cough  5) AF     78 yo female with a pmh/o Glaucoma, cataracts, OA, HTN, who presents to the ED with niece due to her niece noticing pt having cough x 1 week, decreased to no appetite, and weakness. Niece called EMS from pt home after visiting and EMS noted pt HR to be 210 in aflutter. Pt given IVF and 20mg cardizem en route and HR improved, pt now in NSR at 80's. Pt is a very poor historian however upon interview, admits to having a pre op assessment (cataract) in January where her RN had mentioned to her that her HR was irregular. Pt then admits to intermittent chest pressure which is centralized across chest and radiates down right arm to fingertips. Pt states this chest pressure and wheezing has been treated at home with ASA, advil, and yesterday started OTC cold medicine with pseudoephinephrine ingredient. Pt states pressure had awoken her from her sleep and is sure that she only has been taking two advil and one additional aspirin to her daily in order to control sx. Pt denies fever, however states that she did have chills. Endorses productive cough with off white sputum, and wheezing.  Reviewed CT Chest imaging, labs   COVID 19 negative, repeat negative   RVP + for enterovirus noted   Findings are consistent with a pneumonia secondary to viral etiology  She is a non-smoker with no prior pulmonary history and she states she has not seen a pulmonologist in the past   Procalcitonin reviewed, 2.08 suggesting underlying infectious process   Continue Symbicort   Will continue to monitor  Appreciate ID recommendations  / leukocytosis persistent  cardiac eval in progress / cath data noted with non obstructive CAD  medical management  DC planning as per primary team  I shall fu prn

## 2020-04-02 NOTE — DISCHARGE NOTE PROVIDER - NSDCCPCAREPLAN_GEN_ALL_CORE_FT
PRINCIPAL DISCHARGE DIAGNOSIS  Diagnosis: NSTEMI (non-ST elevated myocardial infarction)  Assessment and Plan of Treatment: - Continue all new medications as prescribed  - Follow up with cardiology in 1-2 weeks      SECONDARY DISCHARGE DIAGNOSES  Diagnosis: Pneumonia  Assessment and Plan of Treatment: - completed antibiotic in hospital  - use inhaler as needed

## 2020-04-02 NOTE — DISCHARGE NOTE PROVIDER - CARE PROVIDER_API CALL
Gianni Han (MD)  Cardiovascular Disease  [address] [place] [address]  Phone: [phone]  Fax: [phone]  Follow Up Time:

## 2020-04-02 NOTE — PROGRESS NOTE ADULT - ASSESSMENT
Patient is 80yo female with PMhx of glaucoma, HTN, OA, discharged 2 days ago for MRSA PNA on Doxy PO, new onset Afib, (COVID19 negative during hospitalization), admitted on 3/27 for evaluation of shortness of breath and chest pain; she feels that she was discharged too early. Patient is poor historian and history per medical record.     1. Patient admitted with worsening multifocal pneumonia; has history of MRSA pneumonia; possible viral syndrome secondary to COVID-19 found to bed COVID-19 negative  - follow up cultures   - serial cbc and monitor temperature   - iv hydration and supportive care   - reviewed prior medical records to evaluate for resistant or atypical pathogens   - oxygen and nebs as needed   - COVID-19 PCR is not detected  - day #7 cefepime and vancomycin   - tolerating antibiotics without rashes or side effects   - vancomycin trough is appropriate  - okay from id standpoint to discharge today on no further antibiotics as all rx was iv  2. other issues; per medicine

## 2020-04-02 NOTE — PROGRESS NOTE ADULT - REASON FOR ADMISSION
SOB, CP

## 2020-04-02 NOTE — PROGRESS NOTE ADULT - PROVIDER SPECIALTY LIST ADULT
Cardiology
Hospitalist
Infectious Disease
Internal Medicine
Pulmonology

## 2020-04-06 DIAGNOSIS — I10 ESSENTIAL (PRIMARY) HYPERTENSION: ICD-10-CM

## 2020-04-06 DIAGNOSIS — I48.0 PAROXYSMAL ATRIAL FIBRILLATION: ICD-10-CM

## 2020-04-06 DIAGNOSIS — N17.9 ACUTE KIDNEY FAILURE, UNSPECIFIED: ICD-10-CM

## 2020-04-06 DIAGNOSIS — I21.4 NON-ST ELEVATION (NSTEMI) MYOCARDIAL INFARCTION: ICD-10-CM

## 2020-04-06 DIAGNOSIS — J15.6 PNEUMONIA DUE TO OTHER GRAM-NEGATIVE BACTERIA: ICD-10-CM

## 2020-04-06 DIAGNOSIS — H40.9 UNSPECIFIED GLAUCOMA: ICD-10-CM

## 2020-04-07 ENCOUNTER — APPOINTMENT (OUTPATIENT)
Dept: CARDIOLOGY | Facility: CLINIC | Age: 80
End: 2020-04-07
Payer: MEDICARE

## 2020-04-07 ENCOUNTER — NON-APPOINTMENT (OUTPATIENT)
Age: 80
End: 2020-04-07

## 2020-04-07 VITALS
HEART RATE: 79 BPM | HEIGHT: 61 IN | SYSTOLIC BLOOD PRESSURE: 115 MMHG | DIASTOLIC BLOOD PRESSURE: 73 MMHG | TEMPERATURE: 99.6 F | WEIGHT: 167 LBS | OXYGEN SATURATION: 98 % | BODY MASS INDEX: 31.53 KG/M2

## 2020-04-07 DIAGNOSIS — Z86.69 PERSONAL HISTORY OF OTHER DISEASES OF THE NERVOUS SYSTEM AND SENSE ORGANS: ICD-10-CM

## 2020-04-07 DIAGNOSIS — Z82.49 FAMILY HISTORY OF ISCHEMIC HEART DISEASE AND OTHER DISEASES OF THE CIRCULATORY SYSTEM: ICD-10-CM

## 2020-04-07 DIAGNOSIS — Z82.3 FAMILY HISTORY OF STROKE: ICD-10-CM

## 2020-04-07 PROCEDURE — 99215 OFFICE O/P EST HI 40 MIN: CPT

## 2020-04-07 PROCEDURE — 93000 ELECTROCARDIOGRAM COMPLETE: CPT

## 2020-04-07 RX ORDER — CLOPIDOGREL BISULFATE 75 MG/1
75 TABLET, FILM COATED ORAL DAILY
Refills: 0 | Status: DISCONTINUED | COMMUNITY
End: 2020-04-07

## 2020-04-07 NOTE — PHYSICAL EXAM
[General Appearance - Well Developed] : well developed [General Appearance - Well Nourished] : well nourished [Normal Conjunctiva] : the conjunctiva exhibited no abnormalities [Normal Oral Mucosa] : normal oral mucosa [No Oral Pallor] : no oral pallor [Normal Jugular Venous V Waves Present] : normal jugular venous V waves present [] : no respiratory distress [5th Left ICS - MCL] : palpated at the 5th LICS in the midclavicular line [Rhythm Regular] : regular [No Murmur] : no murmurs heard [2+] : left 2+ [Abnormal Walk] : normal gait [Nail Clubbing] : no clubbing of the fingernails [Cyanosis, Localized] : no localized cyanosis [FreeTextEntry1] : LE with excoriations and dry skin but no erythema or sign of cellulitis.  [Oriented To Time, Place, And Person] : oriented to person, place, and time [Affect] : the affect was normal

## 2020-04-07 NOTE — REASON FOR VISIT
[Follow-Up - From Hospitalization] : follow-up of a recent hospitalization for [Atrial Fibrillation] : atrial fibrillation [Prior Myocardial Infarction] : a prior myocardial infarction

## 2020-04-07 NOTE — DISCUSSION/SUMMARY
[FreeTextEntry1] : Non-ST elevation MI / Epistaxis: Stop Clopidogrel and continue aspirin 81 mg daily. Continue metoprolol. Hopefully eliminating one antiplatelet will prevent further Epistaxis and protecting against recurrent NSTEMI.\par PAF: Doing well on Diltiazem and Metoprolol. Will continue Xarelto (15 MG) QD.\par CHF: History of this and not issue at this time and compensated. \par PNA: Recovering slowly and will see primary care MD about need for inhalers long term. \par F/u in 3-4 months.

## 2020-04-07 NOTE — HISTORY OF PRESENT ILLNESS
[FreeTextEntry1] : Returns for followup after hospitalization, initially for new onset atrial fibrillation in the setting of pneumonia. Tested negative for corona virus at that time. Was subsequently discharged and then readmitted again with chest discomfort and shortness of breath and ruled in for non-ST elevation MI. She was not back in atrial fibrillation, but did still have pneumonia and was retested for corona virus and remained negative. Underwent cardiac catheterization, which revealed nonobstructive disease with normal left ventricular function. Was treated medically for her coronary disease and non-ST elevation MI and with antibiotics for her pneumonia and recovered and was discharged. Was sent home on a combination of aspirin, Plavix, and Xarelto, and over last 3 days, developed epistaxis.Since developing the, epistaxis. We've held Zaroxolyn, Plavix, and this has resolved. Feels better overall with breathing, improving slowly and exerting herself better. Has not been back for followup with primary care as of yet. Denies chest pain, angina, palpitations, dizziness, or lightheadedness.

## 2020-04-29 ENCOUNTER — APPOINTMENT (OUTPATIENT)
Dept: CARDIOLOGY | Facility: CLINIC | Age: 80
End: 2020-04-29
Payer: MEDICARE

## 2020-04-29 VITALS
DIASTOLIC BLOOD PRESSURE: 70 MMHG | TEMPERATURE: 98.5 F | WEIGHT: 166 LBS | BODY MASS INDEX: 31.34 KG/M2 | RESPIRATION RATE: 16 BRPM | HEIGHT: 61 IN | SYSTOLIC BLOOD PRESSURE: 118 MMHG

## 2020-04-29 DIAGNOSIS — J12.82 COVID-19: ICD-10-CM

## 2020-04-29 DIAGNOSIS — U07.1 COVID-19: ICD-10-CM

## 2020-04-29 PROCEDURE — 99214 OFFICE O/P EST MOD 30 MIN: CPT | Mod: 95

## 2020-04-29 PROCEDURE — ZZZZZ: CPT | Mod: CS

## 2020-04-29 NOTE — HISTORY OF PRESENT ILLNESS
[FreeTextEntry2] : jeannette miranda [FreeTextEntry4] : gisele tabor ma [FreeTextEntry1] : initiated 12:39 pm.\par \par Doing well since last visit a few weeks ago. Recovering from Covid.  Since restarting diuretic. Weight has come down and edema is much improved. Home nurse reports she is more ambulatory and mobile. Denies palpitations, chest discomfort, orthopnea, or paroxysmal nocturnal dyspnea.

## 2020-04-29 NOTE — DISCUSSION/SUMMARY
[FreeTextEntry1] : Non-ST elevation MI / Epistaxis: Epistaxis resolved with changes in medication. No recurrence of symptoms.\par PAF: Doing well on Diltiazem and Metoprolol. Will continue Xarelto (15 MG) QD.\par CHF: Improved and compensated on current regime. \par PNA: Recovering \par F/u in 1 month via tele health. \par \par I spent 26 Minutes with patient.

## 2020-05-04 RX ORDER — DILTIAZEM HYDROCHLORIDE 60 MG/1
60 CAPSULE, EXTENDED RELEASE ORAL
Qty: 360 | Refills: 0 | Status: DISCONTINUED | COMMUNITY
Start: 1900-01-01 | End: 2020-05-04

## 2020-05-27 ENCOUNTER — APPOINTMENT (OUTPATIENT)
Dept: CARDIOLOGY | Facility: CLINIC | Age: 80
End: 2020-05-27
Payer: MEDICARE

## 2020-05-27 VITALS — BODY MASS INDEX: 30.58 KG/M2 | WEIGHT: 162 LBS | HEIGHT: 61 IN

## 2020-05-27 PROCEDURE — 99214 OFFICE O/P EST MOD 30 MIN: CPT | Mod: 95

## 2020-05-27 NOTE — HISTORY OF PRESENT ILLNESS
[Home] : at home, [unfilled] , at the time of the visit. [Family Member] : family member [Medical Office: ([facility])___] : at the medical office located in  [Verbal consent obtained from patient] : the patient, [unfilled] [___ Month(s) Ago] : [unfilled] month(s) ago [Adding Medication ___] : adding [unfilled] [Orthopnea] : no orthopnea [Dyspnea on Exertion] : no dyspnea on exertion [Shortness of Breath] : no shortness of breath [Edema] : no edema [Paroxysmal Nocturnal Dyspnea] : no paroxysmal nocturnal dyspnea [Weight Gain] : no weight gain [Weakness] : no weakness [Fatigue] : no fatigue [None] : The patient is currently asymptomatic [Beta Blockers] : beta blockers [Low Salt Diet] : low salt diet [ACE Inhibitors / ARBs] : ACE inhibitors / Angiotensin Receptor Blockers [Diuretics] : diuretics [Good Tolerance] : good tolerance of treatment [Good Compliance] : good compliance with treatment [Good Symptom Control] : good symptom control [FreeTextEntry4] : gisele tabor ma [FreeTextEntry1] : initiated at 12:32pm\par

## 2020-05-27 NOTE — DISCUSSION/SUMMARY
[FreeTextEntry1] : Non-ST elevation MI : stable on current regime.\par Epistaxis: Resolved. No recurrence of symptoms.\par PAF: Controlled on current regime. Will continue Xarelto (15 MG) QD.\par CHF: Stable on current medication and diuretic. Wt. down. \par F/u in 4 months. \par \par I spent 29 Minutes with patient.

## 2020-07-27 ENCOUNTER — RX RENEWAL (OUTPATIENT)
Age: 80
End: 2020-07-27

## 2020-08-27 LAB
25(OH)D3 SERPL-MCNC: 32.7 NG/ML
ALBUMIN SERPL ELPH-MCNC: 4.1 G/DL
ALP BLD-CCNC: 79 U/L
ALT SERPL-CCNC: 14 U/L
ANION GAP SERPL CALC-SCNC: 12 MMOL/L
AST SERPL-CCNC: 15 U/L
BASOPHILS # BLD AUTO: 0.05 K/UL
BASOPHILS NFR BLD AUTO: 0.4 %
BILIRUB SERPL-MCNC: 0.3 MG/DL
BUN SERPL-MCNC: 19 MG/DL
CALCIUM SERPL-MCNC: 9.3 MG/DL
CHLORIDE SERPL-SCNC: 103 MMOL/L
CHOLEST SERPL-MCNC: 167 MG/DL
CHOLEST/HDLC SERPL: 2.7 RATIO
CK SERPL-CCNC: 81 U/L
CO2 SERPL-SCNC: 24 MMOL/L
CREAT SERPL-MCNC: 1.12 MG/DL
EOSINOPHIL # BLD AUTO: 0.67 K/UL
EOSINOPHIL NFR BLD AUTO: 5.7 %
ESTIMATED AVERAGE GLUCOSE: 120 MG/DL
GLUCOSE SERPL-MCNC: 102 MG/DL
HBA1C MFR BLD HPLC: 5.8 %
HCT VFR BLD CALC: 36.9 %
HDLC SERPL-MCNC: 63 MG/DL
HGB BLD-MCNC: 11.2 G/DL
IMM GRANULOCYTES NFR BLD AUTO: 0.5 %
LDLC SERPL CALC-MCNC: 81 MG/DL
LDLC SERPL DIRECT ASSAY-MCNC: 86 MG/DL
LYMPHOCYTES # BLD AUTO: 1.55 K/UL
LYMPHOCYTES NFR BLD AUTO: 13.2 %
MAN DIFF?: NORMAL
MCHC RBC-ENTMCNC: 27.3 PG
MCHC RBC-ENTMCNC: 30.4 GM/DL
MCV RBC AUTO: 90 FL
MONOCYTES # BLD AUTO: 1.17 K/UL
MONOCYTES NFR BLD AUTO: 10 %
NEUTROPHILS # BLD AUTO: 8.25 K/UL
NEUTROPHILS NFR BLD AUTO: 70.2 %
PLATELET # BLD AUTO: 245 K/UL
POTASSIUM SERPL-SCNC: 4.5 MMOL/L
PROT SERPL-MCNC: 7 G/DL
RBC # BLD: 4.1 M/UL
RBC # FLD: 15.7 %
SODIUM SERPL-SCNC: 139 MMOL/L
TRIGL SERPL-MCNC: 115 MG/DL
TSH SERPL-ACNC: 2.39 UIU/ML
WBC # FLD AUTO: 11.75 K/UL

## 2020-09-04 ENCOUNTER — APPOINTMENT (OUTPATIENT)
Dept: CARDIOLOGY | Facility: CLINIC | Age: 80
End: 2020-09-04
Payer: MEDICARE

## 2020-09-04 ENCOUNTER — NON-APPOINTMENT (OUTPATIENT)
Age: 80
End: 2020-09-04

## 2020-09-04 VITALS
WEIGHT: 161 LBS | BODY MASS INDEX: 30.4 KG/M2 | HEIGHT: 61 IN | DIASTOLIC BLOOD PRESSURE: 69 MMHG | OXYGEN SATURATION: 98 % | HEART RATE: 66 BPM | SYSTOLIC BLOOD PRESSURE: 107 MMHG

## 2020-09-04 DIAGNOSIS — Z87.898 PERSONAL HISTORY OF OTHER SPECIFIED CONDITIONS: ICD-10-CM

## 2020-09-04 DIAGNOSIS — Z87.01 PERSONAL HISTORY OF PNEUMONIA (RECURRENT): ICD-10-CM

## 2020-09-04 DIAGNOSIS — Z86.79 PERSONAL HISTORY OF OTHER DISEASES OF THE CIRCULATORY SYSTEM: ICD-10-CM

## 2020-09-04 DIAGNOSIS — I25.2 OLD MYOCARDIAL INFARCTION: ICD-10-CM

## 2020-09-04 DIAGNOSIS — I48.91 UNSPECIFIED ATRIAL FIBRILLATION: ICD-10-CM

## 2020-09-04 PROCEDURE — 99214 OFFICE O/P EST MOD 30 MIN: CPT

## 2020-09-04 PROCEDURE — 93000 ELECTROCARDIOGRAM COMPLETE: CPT

## 2020-09-04 NOTE — REASON FOR VISIT
[Follow-Up - Clinic] : a clinic follow-up of [Atrial Fibrillation] : atrial fibrillation [Prior Myocardial Infarction] : a prior myocardial infarction [Heart Failure] : congestive heart failure

## 2020-09-04 NOTE — HISTORY OF PRESENT ILLNESS
[FreeTextEntry1] : Doing well.  Weight stable .  No chest pain. Infrequent raspy cough when she over exerts herself but not regularly.  No edema.  No orthopnea, or PND.

## 2020-09-04 NOTE — PHYSICAL EXAM
[General Appearance - Well Nourished] : well nourished [Normal Conjunctiva] : the conjunctiva exhibited no abnormalities [General Appearance - Well Developed] : well developed [No Oral Pallor] : no oral pallor [Normal Oral Mucosa] : normal oral mucosa [Normal Jugular Venous V Waves Present] : normal jugular venous V waves present [Auscultation Breath Sounds / Voice Sounds] : lungs were clear to auscultation bilaterally [Bowel Sounds] : normal bowel sounds [Abdomen Soft] : soft [Abnormal Walk] : normal gait [Nail Clubbing] : no clubbing of the fingernails [Cyanosis, Localized] : no localized cyanosis [] : no rash [Oriented To Time, Place, And Person] : oriented to person, place, and time [No Anxiety] : not feeling anxious [Affect] : the affect was normal [Normal S1] : normal S1 [5th Left ICS - MCL] : palpated at the 5th LICS in the midclavicular line [Rhythm Regular] : regular [Normal S2] : normal S2 [S3] : no S3 [No Murmur] : no murmurs heard [S4] : no S4 [Left Carotid Bruit] : no bruit heard over the left carotid [Right Carotid Bruit] : no bruit heard over the right carotid [2+] : left 2+ [No Pitting Edema] : no pitting edema present

## 2020-11-13 NOTE — PATIENT PROFILE ADULT - FALL HARM RISK CONCLUSION
Last OV: 3/17/2020 - Dr. Camejo - Phone Visit  DX: Asthma          Patient declining to come in at this time , due to COVID pandemic.   She states that she is very anxious and states that she feels she will have an anxiety attack.     Spoke with patient and states that she feels fine.   As far as breathing she is feeling really good.   No extra fatigue or anything at this time.     Her major problem is dealing with her leg pains she states that she is having pains on both side on the upper outer thighs. No redness or swelling. Advised to patient that she at least needs to get her legs evaluated or call her PCP to address.         Patient wants to hold off on doing her testing at this time until all of this calms down but wants to make sure Dr. Camejo is okay with it?    Fall with Harm Risk

## 2021-01-25 ENCOUNTER — RX RENEWAL (OUTPATIENT)
Age: 81
End: 2021-01-25

## 2021-03-09 ENCOUNTER — NON-APPOINTMENT (OUTPATIENT)
Age: 81
End: 2021-03-09

## 2021-03-09 ENCOUNTER — APPOINTMENT (OUTPATIENT)
Dept: CARDIOLOGY | Facility: CLINIC | Age: 81
End: 2021-03-09
Payer: MEDICARE

## 2021-03-09 VITALS
SYSTOLIC BLOOD PRESSURE: 110 MMHG | TEMPERATURE: 98.2 F | DIASTOLIC BLOOD PRESSURE: 70 MMHG | HEIGHT: 61 IN | WEIGHT: 162 LBS | HEART RATE: 74 BPM | OXYGEN SATURATION: 99 % | BODY MASS INDEX: 30.58 KG/M2

## 2021-03-09 PROCEDURE — 93000 ELECTROCARDIOGRAM COMPLETE: CPT

## 2021-03-09 PROCEDURE — 99215 OFFICE O/P EST HI 40 MIN: CPT | Mod: 25

## 2021-03-09 RX ORDER — DILTIAZEM HYDROCHLORIDE 60 MG/1
60 TABLET ORAL EVERY 6 HOURS
Qty: 360 | Refills: 3 | Status: DISCONTINUED | COMMUNITY
Start: 2020-05-04 | End: 2021-03-09

## 2021-03-09 NOTE — PHYSICAL EXAM
[General Appearance - Well Developed] : well developed [Normal Appearance] : normal appearance [Well Groomed] : well groomed [General Appearance - Well Nourished] : well nourished [No Deformities] : no deformities [General Appearance - In No Acute Distress] : no acute distress [Normal Conjunctiva] : the conjunctiva exhibited no abnormalities [Eyelids - No Xanthelasma] : the eyelids demonstrated no xanthelasmas [Normal Oral Mucosa] : normal oral mucosa [No Oral Pallor] : no oral pallor [No Oral Cyanosis] : no oral cyanosis [Normal Jugular Venous A Waves Present] : normal jugular venous A waves present [Normal Jugular Venous V Waves Present] : normal jugular venous V waves present [No Jugular Venous Johnson A Waves] : no jugular venous johnson A waves [Respiration, Rhythm And Depth] : normal respiratory rhythm and effort [Exaggerated Use Of Accessory Muscles For Inspiration] : no accessory muscle use [Auscultation Breath Sounds / Voice Sounds] : lungs were clear to auscultation bilaterally [Heart Rate And Rhythm] : heart rate and rhythm were normal [Heart Sounds] : normal S1 and S2 [Murmurs] : no murmurs present [Abdomen Soft] : soft [Abdomen Tenderness] : non-tender [Abdomen Mass (___ Cm)] : no abdominal mass palpated [Abnormal Walk] : normal gait [Gait - Sufficient For Exercise Testing] : the gait was sufficient for exercise testing [Nail Clubbing] : no clubbing of the fingernails [Cyanosis, Localized] : no localized cyanosis [Petechial Hemorrhages (___cm)] : no petechial hemorrhages [Skin Color & Pigmentation] : normal skin color and pigmentation [] : no rash [No Venous Stasis] : no venous stasis [Skin Lesions] : no skin lesions [No Skin Ulcers] : no skin ulcer [No Xanthoma] : no  xanthoma was observed [Oriented To Time, Place, And Person] : oriented to person, place, and time [Affect] : the affect was normal [Mood] : the mood was normal [No Anxiety] : not feeling anxious

## 2021-03-11 NOTE — HISTORY OF PRESENT ILLNESS
[FreeTextEntry1] : JOSEMANUEL REYNOLDSChacorta (JOSEMANUEL REYNOLDS) \par 1940(80y)F|\par \par 79 y/o w/\par *afib-paroxysmal\par *CAD-nonobstructive-s/p NSTEMI Mar '20 peak trop 9\par *HFpEF\par *HTN\par \par here for followup.\par \par Last visit Sep '20, no changes in meds\par No changes since last visit. \par Once a week feels "irritation" occurs w/ exertion\par when stops activity gets better.  started a couple of months ago.\par \par No dyspnea, no palpitations, lightheadnes, syncope.\par \par EK20, NSR, NSST changes \par Echo: 3/30/20, 1-2 + TR,, moderate pulmonary hypertension LVEF 60%. \par Cardiac Cath: 3/30/20, Non-obstructive CAD NL V FX \par Aug '20 Hb 11.2 plat 245\par LDL 81 HDL 63   Cr 1.12 LFTs WNL CPK 81 TSH 2.39\par HbA1c 5.8 (preDM)\par \par History:\par Admited for afib Mar 17th '20, d/barrett after 2 days\par admited 2 days later w/ NSTEMI peak trop 9.73.\par Followed up w/ andie in clinic until .\par

## 2021-03-11 NOTE — ASSESSMENT
[FreeTextEntry1] : A/P:\par \par *afib-paroxysmal:\par -asymptomatic\par -CHADS2-VASc=2-4 (age, CAD-nonobs, HFpEF)\par -cont. xarelto, metoprolol T 50 BID,\par -change dilt from 60 q6hrs to 240 CD  \par -Event monitor to evaluate afib burden.\par \par *CAD-nonobstructive-s/p NSTEMI Mar '20-peak trop 9:\par -atypical discomfort - unclear if due to CAD\par -will monitor for now.\par -Cont. ASA, atorva, metoprolol.\par \par *HFpEF:\par -Euvolemic\par -Cont. lasix daily & BP meds\par \par *HTN\par -controlled\par -Cont. BB, CaB & losartan\par \par Return 4 weeks to review ziopatch results.

## 2021-04-09 ENCOUNTER — APPOINTMENT (OUTPATIENT)
Dept: CARDIOLOGY | Facility: CLINIC | Age: 81
End: 2021-04-09
Payer: MEDICARE

## 2021-04-09 VITALS
OXYGEN SATURATION: 98 % | HEART RATE: 61 BPM | HEIGHT: 61 IN | DIASTOLIC BLOOD PRESSURE: 75 MMHG | WEIGHT: 170 LBS | BODY MASS INDEX: 32.1 KG/M2 | SYSTOLIC BLOOD PRESSURE: 125 MMHG

## 2021-04-09 PROCEDURE — 99215 OFFICE O/P EST HI 40 MIN: CPT

## 2021-04-09 PROCEDURE — 93000 ELECTROCARDIOGRAM COMPLETE: CPT

## 2021-04-09 RX ORDER — ALBUTEROL 90 MCG
90 AEROSOL (GRAM) INHALATION 4 TIMES DAILY
Refills: 0 | Status: DISCONTINUED | COMMUNITY
End: 2021-04-09

## 2021-04-11 NOTE — HISTORY OF PRESENT ILLNESS
[FreeTextEntry1] : JOSEMANUEL REYNOLDSChacorta (JOSEMANUEL REYNOLDS) \par 1940(80y)F|\par \par 79 y/o w/\par *afib-paroxysmal\par *CAD-nonobstructive-s/p NSTEMI Mar '20 peak trop 9\par *HFpEF\par *HTN\par \par here for followup\par no new cardiac symptoms: chest pain, dyspnea, etc.\par \par hasn't made change to long acting cardizem yet.\par \par 2 week ziopatch monitor sent 2 weeks ago, results still pending ordered to assess afib burden.\par \par EK20, NSR, NSST changes \par Echo: 3/30/20, 1-2 + TR,, moderate pulmonary hypertension LVEF 60%. \par Cardiac Cath: 3/30/20, Non-obstructive CAD NL V FX \par Aug '20 Hb 11.2 plat 245\par LDL 81 HDL 63   Cr 1.12 LFTs WNL CPK 81 TSH 2.39\par HbA1c 5.8 (preDM)\par \par History:\par Admited for afib Mar 17th '20, d/barrett after 2 days\par admited 2 days later w/ NSTEMI peak trop 9.73.\par Followed up w/ andie in clinic until .\par \par

## 2021-04-11 NOTE — ASSESSMENT
[FreeTextEntry1] : A/P:\par \par Return 3 months\par will call pt w/ results in 2 weeks - gave pt my cell #

## 2021-04-27 ENCOUNTER — NON-APPOINTMENT (OUTPATIENT)
Age: 81
End: 2021-04-27

## 2021-06-09 NOTE — PATIENT PROFILE ADULT - BRADEN MOISTURE
3) occasionally moist

## 2021-08-24 ENCOUNTER — APPOINTMENT (OUTPATIENT)
Dept: CARDIOLOGY | Facility: CLINIC | Age: 81
End: 2021-08-24
Payer: MEDICARE

## 2021-08-24 VITALS
BODY MASS INDEX: 32.66 KG/M2 | SYSTOLIC BLOOD PRESSURE: 137 MMHG | WEIGHT: 173 LBS | HEART RATE: 67 BPM | TEMPERATURE: 98.4 F | OXYGEN SATURATION: 97 % | HEIGHT: 61 IN | DIASTOLIC BLOOD PRESSURE: 81 MMHG

## 2021-08-24 PROCEDURE — 93000 ELECTROCARDIOGRAM COMPLETE: CPT

## 2021-08-24 PROCEDURE — 99214 OFFICE O/P EST MOD 30 MIN: CPT

## 2021-08-24 RX ORDER — DILTIAZEM HYDROCHLORIDE 180 MG/1
180 CAPSULE, EXTENDED RELEASE ORAL DAILY
Qty: 30 | Refills: 3 | Status: DISCONTINUED | COMMUNITY
Start: 2021-03-09 | End: 2021-08-24

## 2021-08-29 NOTE — ASSESSMENT
[FreeTextEntry1] : A/P:\par \par \par Metoprolol increased 50 tlo 75\par d/c'd diltiazem\par \par Trial of lasix 20 mg QOD if wt gain increase to daily\par \par Return 4 months

## 2021-08-29 NOTE — HISTORY OF PRESENT ILLNESS
[FreeTextEntry1] : JOSEMANUEL REYNOLDSChacorta (JOSEMANUEL REYNOLDS) \par 1940\par \par 81 y/o w/\par *afib-paroxysmal\par *CAD-nonobstructive-s/p NSTEMI Mar '20 peak trop 9\par *HFpEF\par *HTN\par \par Reviewed ziopatch ordered for afib burden - rare SVT one episode 20 sec\par Asymptomatic: no chest pain, dyspnea, palpitations, syncope, lightheadness, LE edema.\par \par EK20, NSR, NSST changes \par Echo: 3/30/20, 1-2 + TR,, moderate pulmonary hypertension LVEF 60%. \par Cardiac Cath: 3/30/20, Non-obstructive CAD NL V FX \par Aug '20 Hb 11.2 plat 245\par LDL 81 HDL 63   Cr 1.12 LFTs WNL CPK 81 TSH 2.39\par HbA1c 5.8 (preDM)\par \par History:\par Admited for afib Mar 17th '20, d/barrett after 2 days\par admited 2 days later w/ NSTEMI peak trop 9.73.\par Followed up w/ andie in clinic until .\par \par

## 2021-10-06 PROBLEM — U07.1 PNEUMONIA DUE TO COVID-19 VIRUS: Status: RESOLVED | Noted: 2020-04-29 | Resolved: 2021-10-06

## 2021-12-17 ENCOUNTER — NON-APPOINTMENT (OUTPATIENT)
Age: 81
End: 2021-12-17

## 2021-12-17 ENCOUNTER — APPOINTMENT (OUTPATIENT)
Dept: CARDIOLOGY | Facility: CLINIC | Age: 81
End: 2021-12-17
Payer: MEDICARE

## 2021-12-17 VITALS
BODY MASS INDEX: 33.04 KG/M2 | SYSTOLIC BLOOD PRESSURE: 138 MMHG | OXYGEN SATURATION: 96 % | HEIGHT: 61 IN | DIASTOLIC BLOOD PRESSURE: 76 MMHG | WEIGHT: 175 LBS | HEART RATE: 66 BPM

## 2021-12-17 PROCEDURE — 93000 ELECTROCARDIOGRAM COMPLETE: CPT

## 2021-12-17 PROCEDURE — 99214 OFFICE O/P EST MOD 30 MIN: CPT

## 2021-12-17 NOTE — HISTORY OF PRESENT ILLNESS
[FreeTextEntry1] : JOSEMANUEL REYNOLDSChacorta (JOSEMANUEL REYNOLDS) \par 1940\par \par 81 y/o w/\par *afib-paroxysmal\par *CAD-nonobstructive-s/p NSTEMI Mar '20 peak trop 9\par *HFpEF\par *HTN\par \par Last visit lasix reduced to qOD w/ daily weights\par daughter noticed slow weight gain no rapid weight gain.\par No new cardiac symptoms:\par no dyspnea, PND, orthopnea\par No chest pain, no palpitations.\par \par Does laundry, ADLs. \par Walks around yard, able to walk 1 flight of stairs,\par 1 city block, limited by orthopedic issues (knees).\par Daughter visits daily.\par \par \par EK20, NSR, NSST changes \par Echo: 3/30/20, 1-2 + TR,, moderate pulmonary hypertension LVEF 60%. \par Cardiac Cath: 3/30/20, Non-obstructive CAD NL V FX \par Aug '20 Hb 11.2 plat 245\par LDL 81 HDL 63   Cr 1.12 LFTs WNL CPK 81 TSH 2.39\par HbA1c 5.8 (preDM)\par \par History:\par Admited for afib Mar 17th '20, d/barrett after 2 days\par admited 2 days later w/ NSTEMI peak trop 9.73.\par Followed up w/ andie in clinic until .\par

## 2021-12-17 NOTE — ASSESSMENT
[FreeTextEntry1] : A/P:\par \par d/c lasix change to PRN\par w/ daily weights\par \par Cont. other meds. \par \par \par Return in 6 months.

## 2022-03-30 NOTE — PROGRESS NOTE ADULT - SUBJECTIVE AND OBJECTIVE BOX
80 yo female with a pmh/o Glaucoma, cataracts, OA, HTN, who presents to the ED with niece due to her niece noticing pt having cough x 1 week, decreased appetite, and weakness. Niece called EMS from pt home after visiting and EMS noted pt HR to be 210.  Pt given IVF and 20mg cardizem en route and HR improved, pt now in NSR at 80's. Pt denies hx of AFIB.  Pt notes chest pressure/ wheezing/ coughing/ SOB at home.  Loss of appetite.  She was taking ASA, advil, and yesterday started OTC cold medicine with pseudoephinephrine.  Pt denies fever, however states that she did have chills. Endorses productive cough with off white sputum, and wheezing.  Pt states she visited sister in the [facility] at which time sister also had a cough and a flu outbreak there.  In ER, pt found to have ARF, positive trop, afib with RVR.  CT chest/ abd initially concern for ? metastatic cancer; however, final read with bilateral airspace disease and ELIZABETH concerning for PNA/ viral etiology and diverticulitis.      3/16:  Pt seen.  C/o cough-- productive.  C/o wheezing and SOB.  Loss of appetite.  No fever.      T(C): 36.7 (03-16-20 @ 09:41), Max: 37.3 (03-16-20 @ 01:23)  HR: 67 (03-16-20 @ 08:00) (67 - 127)  BP: 120/62 (03-16-20 @ 08:00) (115/58 - 136/60)  RR: 29 (03-16-20 @ 08:00) (19 - 29)  SpO2: 100% (03-16-20 @ 08:00) (94% - 100%)    PHYSICAL EXAM:  GENERAL: Comfortable, no acute distress  HEAD:  Atraumatic, Normocephalic  EYES: EOMI, PERRLA  HEENT: Moist mucous membranes  NECK: Supple, No JVD  NERVOUS SYSTEM:  Alert & Oriented X3, Motor Strength 5/5 B/L upper and lower extremities  CHEST/LUNG:diffuse rhonchi/ wheezing on exam.    HEART: Regular rate and rhythm; No murmurs, rubs, or gallops  ABDOMEN: Soft, Nontender, Nondistended; Bowel sounds present  GENITOURINARY- Voiding, no palpable bladder  EXTREMITIES:  No clubbing, cyanosis, or edema  MUSCULOSKELTAL- No muscle tenderness, No joint tenderness  SKIN-no rash        LABS:                        12.1   32.23 )-----------( 274      ( 16 Mar 2020 08:59 )             36.5     03-16    139  |  110<H>  |  23  ----------------------------<  85  4.1   |  20<L>  |  2.33<H>    Ca    8.0<L>      16 Mar 2020 08:54  Phos  3.9     03-16  Mg     2.3     03-16    TPro  7.3  /  Alb  2.3<L>  /  TBili  0.4  /  DBili  x   /  AST  38<H>  /  ALT  35  /  AlkPhos  222<H>  03-16    PT/INR - ( 16 Mar 2020 01:03 )   PT: 14.6 sec;   INR: 1.30 ratio     PTT - ( 16 Mar 2020 08:54 )  PTT:174.3 sec    CARDIAC MARKERS ( 16 Mar 2020 01:03 )  0.407 ng/mL / x     / x     / x     / x      CARDIAC MARKERS ( 15 Mar 2020 22:28 )  0.459 ng/mL / x     / x     / x     / x      CARDIAC MARKERS ( 15 Mar 2020 19:55 )  0.299 ng/mL / x     / x     / x     / x        MEDICATIONS  (STANDING):  ALBUTerol    90 MICROgram(s) HFA Inhaler 2 Puff(s) Inhalation every 4 hours  aspirin enteric coated 81 milliGRAM(s) Oral daily  ATENolol  Tablet 50 milliGRAM(s) Oral daily  azithromycin  IVPB 500 milliGRAM(s) IV Intermittent once  azithromycin  IVPB      heparin  Infusion. 1100 Unit(s)/Hr (11 mL/Hr) IV Continuous <Continuous>  lactobacillus acidophilus 1 Tablet(s) Oral two times a day  piperacillin/tazobactam IVPB.. 3.375 Gram(s) IV Intermittent every 12 hours  sodium chloride 0.9%. 1000 milliLiter(s) (75 mL/Hr) IV Continuous <Continuous>    MEDICATIONS  (PRN):  heparin  Injectable 6500 Unit(s) IV Push every 6 hours PRN For aPTT less than 40  heparin  Injectable 3000 Unit(s) IV Push every 6 hours PRN For aPTT between 40 - 57 No

## 2022-04-19 ENCOUNTER — RX RENEWAL (OUTPATIENT)
Age: 82
End: 2022-04-19

## 2022-05-03 ENCOUNTER — APPOINTMENT (OUTPATIENT)
Dept: CARDIOLOGY | Facility: CLINIC | Age: 82
End: 2022-05-03
Payer: MEDICARE

## 2022-05-03 ENCOUNTER — NON-APPOINTMENT (OUTPATIENT)
Age: 82
End: 2022-05-03

## 2022-05-03 VITALS
WEIGHT: 171 LBS | OXYGEN SATURATION: 98 % | BODY MASS INDEX: 32.28 KG/M2 | HEART RATE: 62 BPM | DIASTOLIC BLOOD PRESSURE: 72 MMHG | SYSTOLIC BLOOD PRESSURE: 143 MMHG | HEIGHT: 61 IN

## 2022-05-03 DIAGNOSIS — I10 ESSENTIAL (PRIMARY) HYPERTENSION: ICD-10-CM

## 2022-05-03 PROCEDURE — 99214 OFFICE O/P EST MOD 30 MIN: CPT

## 2022-05-03 PROCEDURE — 93000 ELECTROCARDIOGRAM COMPLETE: CPT

## 2022-05-04 NOTE — HISTORY OF PRESENT ILLNESS
[FreeTextEntry1] : JOSEMANUEL REYNOLDSChacorta (JOSEMANUEL REYNOLDS) \par 1940\par \par 79 y/o w/\par *afib-paroxysmal\par *CAD-nonobstructive-s/p NSTEMI Mar '20 peak trop 9\par *HFpEF\par *HTN\par \par \par here for followup.\par Since last visit, PCP has increased losartan for rising BPs.\par \par No new cardiac symptoms: no chest pain, dyspnea, \par palpitations, syncope, lightheadness, LE edema.\par Able to do laundry & ADLs w/o difficulty.\par \par BP borderline elevated today.\par \par EK20, NSR, NSST changes \par Echo: 3/30/20, 1-2 + TR,, moderate pulmonary hypertension LVEF 60%. \par Cardiac Cath: 3/30/20, Non-obstructive CAD NL V FX \par Aug '20 Hb 11.2 plat 245\par LDL 81 HDL 63   Cr 1.12 LFTs WNL CPK 81 TSH 2.39\par HbA1c 5.8 (preDM)\par \par History:\par Admited for afib Mar 17th '20, d/barrett after 2 days\par admited 2 days later w/ NSTEMI peak trop 9.73.\par Followed up w/ andie in clinic until .

## 2022-05-04 NOTE — ASSESSMENT
[FreeTextEntry1] : A/P\par \par *HTN\par -24 bp cuff\par -BP diary bring cuff to next visit\par \par comp panel LDL b/f next visit yearly labs

## 2022-05-11 ENCOUNTER — APPOINTMENT (OUTPATIENT)
Dept: CARDIOLOGY | Facility: CLINIC | Age: 82
End: 2022-05-11

## 2022-05-11 PROCEDURE — 93790 AMBL BP MNTR W/SW I&R: CPT

## 2022-07-07 ENCOUNTER — RX RENEWAL (OUTPATIENT)
Age: 82
End: 2022-07-07

## 2022-07-15 ENCOUNTER — NON-APPOINTMENT (OUTPATIENT)
Age: 82
End: 2022-07-15

## 2022-07-15 ENCOUNTER — APPOINTMENT (OUTPATIENT)
Dept: CARDIOLOGY | Facility: CLINIC | Age: 82
End: 2022-07-15

## 2022-07-15 VITALS
BODY MASS INDEX: 31.91 KG/M2 | HEIGHT: 61 IN | SYSTOLIC BLOOD PRESSURE: 136 MMHG | HEART RATE: 66 BPM | WEIGHT: 169 LBS | DIASTOLIC BLOOD PRESSURE: 84 MMHG | OXYGEN SATURATION: 99 %

## 2022-07-15 PROCEDURE — 93000 ELECTROCARDIOGRAM COMPLETE: CPT

## 2022-07-15 PROCEDURE — 99214 OFFICE O/P EST MOD 30 MIN: CPT

## 2022-07-21 NOTE — ASSESSMENT
[FreeTextEntry1] : A/P:\par \par -BPs controlled on 24 hr BP monitor\par -LDL at goal.\par -no cardiac symptoms.\par \par -Cont. current meds\par -Return 6 months.

## 2022-07-21 NOTE — HISTORY OF PRESENT ILLNESS
[FreeTextEntry1] : JOSEMANUEL REYNOLDSChacorta (JOSEMANUEL REYNOLDS) \par 1940\par \par 79 y/o w/\par *afib-paroxysmal\par *CAD-nonobstructive-s/p NSTEMI Mar '20 peak trop 9\par *HFpEF\par *HTN\par \par here for followup.\par Reviewed May '22 labs: LDL 74 HDL 65 , .\par Reviewed BP monitor May '22:  ave 114/61, awake 111/60, asleep 120/63\par No new cardiac symptoms: no chest pain, dyspnea, etc.\par Able to do laundry & ADLs w/o difficulty.\par \par *EK20, NSR, NSST changes \par *Echo: 3/30/20, 1-2 + TR,, moderate pulmonary hypertension LVEF 60%. \par *Cardiac Cath: 3/30/20, Non-obstructive CAD NL V FX \par *May '22 labs: LDL 74 HDL 65 , .\par *Aug '20 Hb 11.2 plat 245\par LDL 81 HDL 63   Cr 1.12 LFTs WNL CPK 81 TSH 2.39\par HbA1c 5.8 (preDM)\par *BP monitor May '22:  ave 114/61, awake 111/60, asleep 120/63\par \par History:\par Admited for afib Mar 17th '20, d/barrett after 2 days\par admited 2 days later w/ NSTEMI peak trop 9.73.\par Followed up w/ andie in clinic until .

## 2022-08-02 NOTE — DISCUSSION/SUMMARY
Pt called in requesting a call back regarding this matter  [FreeTextEntry1] : Non-ST elevation MI / Epistaxis: Epistaxis resolved with changes in medication. No recurrence of symptoms.\par PAF: Doing well on Diltiazem and Metoprolol. Will continue Xarelto (15 MG) QD.\par CHF: Compensated on current regime.  \par F/u in 6 months.\par Labs reviewed and results wnl.

## 2022-09-04 LAB
ALBUMIN SERPL ELPH-MCNC: 3.9 G/DL
ALP BLD-CCNC: 92 U/L
ALT SERPL-CCNC: 18 U/L
ANION GAP SERPL CALC-SCNC: 14 MMOL/L
AST SERPL-CCNC: 21 U/L
BASOPHILS # BLD AUTO: 0.05 K/UL
BASOPHILS NFR BLD AUTO: 0.6 %
BILIRUB SERPL-MCNC: 0.2 MG/DL
BUN SERPL-MCNC: 19 MG/DL
CALCIUM SERPL-MCNC: 9.2 MG/DL
CHLORIDE SERPL-SCNC: 108 MMOL/L
CHOLEST SERPL-MCNC: 159 MG/DL
CO2 SERPL-SCNC: 20 MMOL/L
CREAT SERPL-MCNC: 1.13 MG/DL
EGFR: 49 ML/MIN/1.73M2
EOSINOPHIL # BLD AUTO: 0.35 K/UL
EOSINOPHIL NFR BLD AUTO: 4.3 %
GLUCOSE SERPL-MCNC: 110 MG/DL
HCT VFR BLD CALC: 34 %
HDLC SERPL-MCNC: 65 MG/DL
HGB BLD-MCNC: 10.1 G/DL
IMM GRANULOCYTES NFR BLD AUTO: 0.6 %
LDLC SERPL CALC-MCNC: 74 MG/DL
LYMPHOCYTES # BLD AUTO: 1.19 K/UL
LYMPHOCYTES NFR BLD AUTO: 14.7 %
MAN DIFF?: NORMAL
MCHC RBC-ENTMCNC: 23.9 PG
MCHC RBC-ENTMCNC: 29.7 GM/DL
MCV RBC AUTO: 80.4 FL
MONOCYTES # BLD AUTO: 0.71 K/UL
MONOCYTES NFR BLD AUTO: 8.8 %
NEUTROPHILS # BLD AUTO: 5.73 K/UL
NEUTROPHILS NFR BLD AUTO: 71 %
NONHDLC SERPL-MCNC: 94 MG/DL
PLATELET # BLD AUTO: 245 K/UL
POTASSIUM SERPL-SCNC: 4.6 MMOL/L
PROT SERPL-MCNC: 6.9 G/DL
RBC # BLD: 4.23 M/UL
RBC # FLD: 18.4 %
SODIUM SERPL-SCNC: 142 MMOL/L
TRIGL SERPL-MCNC: 100 MG/DL
WBC # FLD AUTO: 8.08 K/UL

## 2022-11-04 ENCOUNTER — APPOINTMENT (OUTPATIENT)
Dept: CARDIOLOGY | Facility: CLINIC | Age: 82
End: 2022-11-04

## 2022-11-04 PROCEDURE — 93306 TTE W/DOPPLER COMPLETE: CPT

## 2022-11-11 ENCOUNTER — APPOINTMENT (OUTPATIENT)
Dept: CARDIOLOGY | Facility: CLINIC | Age: 82
End: 2022-11-11

## 2022-11-11 VITALS
BODY MASS INDEX: 32.05 KG/M2 | SYSTOLIC BLOOD PRESSURE: 132 MMHG | DIASTOLIC BLOOD PRESSURE: 72 MMHG | OXYGEN SATURATION: 98 % | WEIGHT: 169.75 LBS | HEIGHT: 61 IN | HEART RATE: 67 BPM

## 2022-11-11 PROCEDURE — 99214 OFFICE O/P EST MOD 30 MIN: CPT

## 2022-11-11 PROCEDURE — 93000 ELECTROCARDIOGRAM COMPLETE: CPT

## 2022-11-11 NOTE — HISTORY OF PRESENT ILLNESS
[FreeTextEntry1] : 79 y/o w/\par *afib-paroxysmal\par *CAD-nonobstructive-s/p NSTEMI Mar '20 peak trop 9\par *HFpEF\par *HTN\par \par here for followup.\par No new cardiac symptoms: chest pain, dyspnea, palptiations syncope.\par Reviewed results of echo : normal EF no RWMA mild LVH\par \par *EK20, NSR, NSST changes \par *Echo: 3/30/20, 1-2 + TR,, moderate pulmonary hypertension LVEF 60%. \par *Cardiac Cath: 3/30/20, Non-obstructive CAD NL V FX \par *May '22 labs: LDL 74 HDL 65 , .\par *Aug '20 Hb 11.2 plat 245\par LDL 81 HDL 63   Cr 1.12 LFTs WNL CPK 81 TSH 2.39\par HbA1c 5.8 (preDM)\par *BP monitor May '22: ave 114/61, awake 111/60, asleep 120/63\par \par History:\par Admited for afib Mar 17th '20, d/barrett after 2 days\par admited 2 days later w/ NSTEMI peak trop 9.73.\par Followed up w/ andie in clinic until . \par

## 2022-12-22 ENCOUNTER — RX RENEWAL (OUTPATIENT)
Age: 82
End: 2022-12-22

## 2023-01-27 ENCOUNTER — RX RENEWAL (OUTPATIENT)
Age: 83
End: 2023-01-27

## 2023-05-09 ENCOUNTER — NON-APPOINTMENT (OUTPATIENT)
Age: 83
End: 2023-05-09

## 2023-05-09 ENCOUNTER — APPOINTMENT (OUTPATIENT)
Dept: CARDIOLOGY | Facility: CLINIC | Age: 83
End: 2023-05-09
Payer: MEDICARE

## 2023-05-09 VITALS
SYSTOLIC BLOOD PRESSURE: 110 MMHG | HEIGHT: 61 IN | DIASTOLIC BLOOD PRESSURE: 80 MMHG | WEIGHT: 163 LBS | HEART RATE: 79 BPM | BODY MASS INDEX: 30.78 KG/M2 | OXYGEN SATURATION: 96 %

## 2023-05-09 PROCEDURE — 99213 OFFICE O/P EST LOW 20 MIN: CPT

## 2023-05-09 PROCEDURE — 93000 ELECTROCARDIOGRAM COMPLETE: CPT

## 2023-05-09 RX ORDER — RIVAROXABAN 15 MG/1
15 TABLET, FILM COATED ORAL
Qty: 90 | Refills: 3 | Status: ACTIVE | COMMUNITY
Start: 2020-04-07 | End: 1900-01-01

## 2023-05-09 RX ORDER — ATORVASTATIN CALCIUM 20 MG/1
20 TABLET, FILM COATED ORAL
Qty: 90 | Refills: 3 | Status: ACTIVE | COMMUNITY
Start: 2023-01-27 | End: 1900-01-01

## 2023-05-16 NOTE — HISTORY OF PRESENT ILLNESS
[FreeTextEntry1] : 83 y/o\par \par *afib-paroxysmal\par *CAD-nonobstructive-s/p NSTEMI Mar '20 peak trop 9\par *HFpEF\par *HTN\par \par here for followup.\par Reports indigestion in chest, related to eating once a month\par no chest discomfort, no dyspnea, no palpitations,\par no lightheadness/dizziness/syncope.\par Walks outside around yard. Walks upstairs 2 flights w/o problems.\par Reviewed meds: asa 81 mg daily, lipitor 20 qhs\par losartan 100 daily, metoprolol 75 BID, xarelto 15 mg daikly.\par no BRBPR no melena on xarelto.\par \par REviewed labs from Dr. roberts \par HbA1c 5.7% LDL 66 HDL 57   comp panel [facility].\par \par *EK20, NSR, NSST changes \par *Echo: 3/30/20, 1-2 + TR,, moderate pulmonary hypertension LVEF 60%. \par *Cardiac Cath: 3/30/20, Non-obstructive CAD NL V FX \par *May '22 labs: LDL 74 HDL 65 , .\par *Aug '20 Hb 11.2 plat 245\par LDL 81 HDL 63   Cr 1.12 LFTs WNL CPK 81 TSH 2.39\par HbA1c 5.8 (preDM)\par *BP monitor May '22: ave 114/61, awake 111/60, asleep 120/63\par \par History:\par Admited for afib Mar 17th '20, d/barrett after 2 days\par admited 2 days later w/ NSTEMI peak trop 9.73.\par Followed up w/ andie in clinic until . \par

## 2023-07-26 ENCOUNTER — EMERGENCY (EMERGENCY)
Facility: HOSPITAL | Age: 83
LOS: 0 days | Discharge: HOME | End: 2023-07-26
Attending: STUDENT IN AN ORGANIZED HEALTH CARE EDUCATION/TRAINING PROGRAM
Payer: MEDICARE

## 2023-07-26 VITALS
OXYGEN SATURATION: 100 % | DIASTOLIC BLOOD PRESSURE: 64 MMHG | HEART RATE: 88 BPM | SYSTOLIC BLOOD PRESSURE: 145 MMHG | WEIGHT: 169.98 LBS | HEIGHT: 61 IN | TEMPERATURE: 98 F | RESPIRATION RATE: 20 BRPM

## 2023-07-26 VITALS
TEMPERATURE: 97 F | HEART RATE: 81 BPM | RESPIRATION RATE: 19 BRPM | DIASTOLIC BLOOD PRESSURE: 90 MMHG | OXYGEN SATURATION: 99 % | SYSTOLIC BLOOD PRESSURE: 155 MMHG

## 2023-07-26 DIAGNOSIS — Z79.01 LONG TERM (CURRENT) USE OF ANTICOAGULANTS: ICD-10-CM

## 2023-07-26 DIAGNOSIS — S81.812A LACERATION WITHOUT FOREIGN BODY, LEFT LOWER LEG, INITIAL ENCOUNTER: ICD-10-CM

## 2023-07-26 DIAGNOSIS — Z79.02 LONG TERM (CURRENT) USE OF ANTITHROMBOTICS/ANTIPLATELETS: ICD-10-CM

## 2023-07-26 DIAGNOSIS — R51.9 HEADACHE, UNSPECIFIED: ICD-10-CM
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
## 2023-07-26 DIAGNOSIS — S52.502A UNSPECIFIED FRACTURE OF THE LOWER END OF LEFT RADIUS, INITIAL ENCOUNTER FOR CLOSED FRACTURE: ICD-10-CM

## 2023-07-26 DIAGNOSIS — Z98.83 FILTERING (VITREOUS) BLEB AFTER GLAUCOMA SURGERY STATUS: Chronic | ICD-10-CM
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
## 2023-07-26 DIAGNOSIS — M19.90 UNSPECIFIED OSTEOARTHRITIS, UNSPECIFIED SITE: ICD-10-CM

## 2023-07-26 DIAGNOSIS — Z79.82 LONG TERM (CURRENT) USE OF ASPIRIN: ICD-10-CM

## 2023-07-26 DIAGNOSIS — I10 ESSENTIAL (PRIMARY) HYPERTENSION: ICD-10-CM

## 2023-07-26 DIAGNOSIS — M25.532 PAIN IN LEFT WRIST: ICD-10-CM

## 2023-07-26 DIAGNOSIS — S00.83XA CONTUSION OF OTHER PART OF HEAD, INITIAL ENCOUNTER: ICD-10-CM

## 2023-07-26 DIAGNOSIS — S00.31XA ABRASION OF NOSE, INITIAL ENCOUNTER: ICD-10-CM

## 2023-07-26 PROCEDURE — 99285 EMERGENCY DEPT VISIT HI MDM: CPT | Mod: 25

## 2023-07-26 PROCEDURE — 73130 X-RAY EXAM OF HAND: CPT | Mod: 26,LT

## 2023-07-26 PROCEDURE — 72125 CT NECK SPINE W/O DYE: CPT | Mod: MG

## 2023-07-26 PROCEDURE — 73100 X-RAY EXAM OF WRIST: CPT | Mod: 26,LT,XP

## 2023-07-26 PROCEDURE — 76376 3D RENDER W/INTRP POSTPROCES: CPT | Mod: 26

## 2023-07-26 PROCEDURE — G1004: CPT

## 2023-07-26 PROCEDURE — 73130 X-RAY EXAM OF HAND: CPT | Mod: LT

## 2023-07-26 PROCEDURE — 73090 X-RAY EXAM OF FOREARM: CPT | Mod: 26,LT,77

## 2023-07-26 PROCEDURE — 70486 CT MAXILLOFACIAL W/O DYE: CPT | Mod: 26,MG

## 2023-07-26 PROCEDURE — 25605 CLTX DST RDL FX/EPHYS SEP W/: CPT | Mod: LT

## 2023-07-26 PROCEDURE — 72125 CT NECK SPINE W/O DYE: CPT | Mod: 26,MG

## 2023-07-26 PROCEDURE — 73090 X-RAY EXAM OF FOREARM: CPT | Mod: LT

## 2023-07-26 PROCEDURE — 70450 CT HEAD/BRAIN W/O DYE: CPT | Mod: 26,MG

## 2023-07-26 PROCEDURE — 12001 RPR S/N/AX/GEN/TRNK 2.5CM/<: CPT | Mod: XU

## 2023-07-26 PROCEDURE — 73110 X-RAY EXAM OF WRIST: CPT | Mod: LT

## 2023-07-26 PROCEDURE — 70450 CT HEAD/BRAIN W/O DYE: CPT | Mod: MG

## 2023-07-26 PROCEDURE — 76376 3D RENDER W/INTRP POSTPROCES: CPT

## 2023-07-26 PROCEDURE — 73090 X-RAY EXAM OF FOREARM: CPT | Mod: 26,LT

## 2023-07-26 PROCEDURE — 12001 RPR S/N/AX/GEN/TRNK 2.5CM/<: CPT

## 2023-07-26 PROCEDURE — 73100 X-RAY EXAM OF WRIST: CPT | Mod: LT

## 2023-07-26 PROCEDURE — 73110 X-RAY EXAM OF WRIST: CPT | Mod: 26,LT

## 2023-07-26 PROCEDURE — 70486 CT MAXILLOFACIAL W/O DYE: CPT | Mod: MG

## 2023-07-26 NOTE — ED ADULT NURSE NOTE - ED STAT RN HANDOFF DETAILS
Received pt endorsed from ISIDRA Hyman, evaluated s/p [facility] fall walking to the bank, pt sustained distal left radial fx s/p reduction, pending dispo.

## 2023-07-26 NOTE — ED ADULT NURSE NOTE - HISTORY OF COVID-19 VACCINATION
COVID-19 (Coronavirus Disease 2019)   WHAT YOU NEED TO KNOW:   COVID-19 is the disease caused by the novel (new) coronavirus first discovered in December 2019  Coronaviruses generally cause upper respiratory (nose, throat, and lung) infections, such as a cold  The new virus can also cause serious lower respiratory conditions, such as pneumonia or acute respiratory distress syndrome (ARDS)  Anyone can develop serious problems from the new virus, but your risk is higher if you are 65 or older  A weak immune system, diabetes, or a heart or lung condition can also increase your risk  DISCHARGE INSTRUCTIONS:   If you think you or someone you know may be infected:  Do the following to protect others:  · If emergency care is needed,  tell the  about the possible infection, or call ahead and tell the emergency department  · Call a healthcare provider  for instructions if symptoms are mild  Anyone who may be infected should not  arrive without calling first  The provider will need to protect staff members and other patients  · The person who may be infected needs to wear a face covering  while getting medical care  This will help lower the risk of infecting others  Coverings are not used for anyone who is younger than 2 years, has breathing problems, or cannot remove it  The provider can give you instructions for anyone who cannot wear a covering  Call your local emergency number (911 in the [address] [facility] [facility]) or go to the emergency department if:   · You have trouble breathing or shortness of breath at rest     · You have chest pain or pressure that lasts longer than 5 minutes  · You become confused or hard to wake  · Your lips or face are blue  · You have a fever of 104°F (40°C) or higher  Call your doctor if:   · You do not  have symptoms of COVID-19 but had close physical contact within 14 days with someone who tested positive  · You have questions or concerns about your condition or care      Medicines: You may need any of the following for mild symptoms:  · Decongestants  help reduce nasal congestion and help you breathe more easily  If you take decongestant pills, they may make you feel restless or cause problems with your sleep  Do not use decongestant sprays for more than a few days  · Cough suppressants  help reduce coughing  Ask your healthcare provider which type of cough medicine is best for you  · Acetaminophen  decreases pain and fever  It is available without a doctor's order  Ask how much to take and how often to take it  Follow directions  Read the labels of all other medicines you are using to see if they also contain acetaminophen, or ask your doctor or pharmacist  Acetaminophen can cause liver damage if not taken correctly  Do not use more than 4 grams (4,000 milligrams) total of acetaminophen in one day  · NSAIDs , such as ibuprofen, help decrease swelling, pain, and fever  NSAIDs can cause stomach bleeding or kidney problems in certain people  If you take blood thinner medicine, always ask your healthcare provider if NSAIDs are safe for you  Always read the medicine label and follow directions  · Take your medicine as directed  Contact your healthcare provider if you think your medicine is not helping or if you have side effects  Tell him or her if you are allergic to any medicine  Keep a list of the medicines, vitamins, and herbs you take  Include the amounts, and when and why you take them  Bring the list or the pill bottles to follow-up visits  Carry your medicine list with you in case of an emergency  How the 2019 coronavirus spreads: The virus spreads quickly and easily  You can become infected if you are in contact with a large amount of the virus, even for a short time  You can also become infected by being around a small amount of virus for a long time   The following are ways the virus is thought to spread, but more information may be coming:  · Droplets are the most common way all coronaviruses spread  The virus can travel in droplets that form when a person talks, coughs, or sneezes  Anyone who breathes in the droplets or gets them in his or her eyes can become infected with the virus  Close personal contact with an infected person is thought to be the main way the virus spreads  Close personal contact means you are within 6 feet (2 meters) of the person  · Person-to-person contact can spread the virus  For example, a person with the virus on his or her hands can spread it by shaking hands with someone  At this time, it does not appear that the virus can be passed to a baby during pregnancy or delivery  The baby can be infected after he or she is born through person-to-person contact  The virus also does not appear to spread in breast milk  If you are pregnant or breastfeeding, talk to your healthcare provider or obstetrician about any concerns you have  · The virus can stay on objects and surfaces  A person can get the virus on his or her hands by touching the object or surface  Infection happens if the person then touches his or her eyes or mouth with unwashed hands  It is not yet known how long the virus can stay on an object or surface  That is why it is important to clean all surfaces that are used regularly  · An infected animal may be able to infect a person who touches it  This may happen at live markets or on a farm  How everyone can lower the risk for COVID-19:  The best way to prevent infection is to avoid anyone who is infected, but this can be hard to do  An infected person can spread the virus before signs or symptoms begin, or even if signs or symptoms never develop  The following can help lower the risk for infection:      · Wash your hands often throughout the day  Use soap and water  Rub your soapy hands together, lacing your fingers  Wash the front and back of each hand, and in between your fingers   Use the fingers of one hand to scrub under the fingernails of the other hand  Wash for at least 20 seconds  Rinse with warm, running water for several seconds  Then dry your hands with a clean towel or paper towel  Use hand  that contains alcohol if soap and water are not available  Do not touch your eyes, nose, or mouth without washing your hands first  Teach children how to wash their hands and use hand   · Cover a sneeze or cough  This prevents droplets from traveling from you to others  Turn your face away and cover your mouth and nose with a tissue  Throw the tissue away  Use the bend of your arm if a tissue is not available  Then wash your hands well with soap and water or use hand   Turn and cover your face if you are around someone who is sneezing or coughing  Teach children how to cover a cough or sneeze  · Follow worldwide, national, and local social distancing guidelines  Social distancing means people avoid close physical contact so the virus cannot spread from one person to another  Keep at least 6 feet (2 meters) between you and others  Also keep this distance from anyone who comes to your home, such as someone making a delivery  · Make a habit of not touching your face  It is not known how long the virus can stay on objects and surfaces  If you get the virus on your hands, you can transfer it to your eyes, nose, or mouth and become infected  You can also transfer it to objects, surfaces, or people  Be aware of what you touch when you go out  Examples include handrails and elevator buttons  Try not to touch anything with bare hands unless it is necessary  Wash your hands before you leave your home and when you return  · Clean and disinfect high-touch surfaces and objects often  Use a disinfecting solution or wipes  You can make a solution by diluting 4 teaspoons of bleach in 1 quart (4 cups) of water   Clean and disinfect even if you think no one living in or coming to your home is infected with the virus  You can wipe items with a disinfecting cloth before you bring them into your home  Wash your hands after you handle anything you bring into your home  · Make your immune system as healthy as possible  A weakened immune system makes you more vulnerable to the new coronavirus  No COVID-19 vaccine is available yet  Vaccines such as the flu and pneumonia vaccines can help your immune system  Your healthcare provider can tell you which vaccines to get, and when to get them  Keep your immune system as strong as possible  Do not smoke  Eat healthy foods, exercise regularly, and try to manage stress  Go to bed and wake up at the same times each day  Social distancing guidelines:  National and local social distancing rules vary  Rules may change over time as restrictions are lifted  Restrictions may return if an outbreak happens where you live  It is important to know and follow all current social distancing rules in your area  The following are general guidelines:  · Limit trips out of your home  You may be able to have food, medicines, and other supplies delivered  If possible, have delivered items left at your door or other area  Try not to have someone hand you an item  You will be so close to the person that the virus can spread between you  · Do not have close physical contact with anyone who does not live in your home  Do not shake hands with, hug, or kiss a person as a greeting  Stand or walk as far from others as possible  If you must use public transportation (such as a bus or subway), try to sit or stand away from others  You can stay safely connected with others through phone calls, e-mail messages, social media websites, and video chats  Check in on anyone who may be having a hard time socially distancing, or who lives alone  Ask administrators at nursing homes or long-term care facilities how you can safely communicate with someone living there      · Wear a cloth face covering around others who do not live in your home  Face coverings help prevent the virus from spreading to others in droplets  You can use a clear face covering if someone needs to read your lips  This is a cloth covering that has plastic over the mouth area so your lips can be seen  Do not use coverings that have breathing valves or vents  The virus can travel out of the valve or vent and be spread to others  Do not take your covering off to talk, cough, or sneeze  Do not use coverings on children younger than 2 years or on anyone who has breathing problems or cannot remove it  · Only allow medical or other necessary professionals into your home  Wear your face covering, and remind professionals to wear a face covering  Remind them to wash their hands when they arrive and before they leave  Do not  let anyone who does not live in your home in, even if the person is not sick  A person can pass the virus to others before symptoms of COVID-19 begin  Some people never even develop symptoms  Children commonly have mild symptoms or no symptoms  It may be hard to tell a child not to hug or kiss you  Explain that this is how he or she can help you stay healthy  · Do not go to someone else's home unless it is necessary  Do not go over to visit, even if the person is lonely  Only go if you need to help him or her  Make sure you both wear face coverings while you are there  · Avoid large gatherings and crowds  Gatherings or crowds of 10 or more individuals can cause the virus to spread  Examples of gatherings include parties, sporting events, Jain services, and conferences  Crowds may form at beaches, schulz, and tourist attractions  Protect yourself by staying away from large gatherings and crowds  · Ask your healthcare provider for other ways to have appointments  You may be able to have appointments without having to go into the provider's office  Some providers offer phone, video, or other types of appointments  You may also be able to get prescriptions for a few months of your medicines at a time  · Stay safe if you must go out to work  You may have a job that can only be done outside your home  Keep physical distance between you and other workers as much as possible  Follow your employer's rules so everyone stays safe  If you have COVID-19 and are recovering at home:  Healthcare providers will give you specific instructions to follow  The following are general guidelines to remind you how to keep others safe until you are well:  · Wash your hands often  Use soap and water as much as possible  You can use hand  that contains alcohol if soap and water are not available  Do not share towels with anyone  If you use paper towels, throw them away in a lined trash can kept in your room or area  Use a covered trash can, if possible  · Do not go out of your home unless it is necessary  You may have to go to your healthcare provider's office for check-ups or to get prescription refills  Do not arrive at the provider's office without an appointment  Providers have to make their offices safe for staff and other patients  · Do not have close physical contact with anyone unless it is necessary  Only have close physical contact with a person giving direct care, or a baby or child you must care for  Family members and friends should not visit you  If possible, stay in a separate area or room of your home if you live with others  No one should go into the area or room except to give you care  You can visit with others by phone, video chat, e-mail, or similar systems  It is important to stay connected with others in your life while you recover  · Wear a face covering while others are near you  This can help prevent droplets from spreading the virus when you talk, sneeze, or cough  Put the covering on before anyone comes into your room or area   Remind the person to cover his or her nose and mouth before going in to provide care for you  · Do not share items  Do not share dishes, towels, or other items with anyone  Items need to be washed after you use them  · Protect your baby  Wash your hands with soap and water often throughout the day  Wear a clean face covering while you breastfeed, or while you express or pump breast milk  If possible, ask someone who is well to care for your baby  You can put breast milk in bottles for the person to use, if needed  Talk to your healthcare provider if you have any questions or concerns about caring for or bonding with your baby  He or she will tell you when to bring your baby in for check-ups and vaccines  He or she will also tell you what to do if you think your baby was infected with the new virus  · Do not handle live animals  Until more is known, it is best not to touch, play with, or handle live animals  Some animals, including pets, have been infected with the new coronavirus  Do not handle or care for animals until you are well  Care includes feeding, petting, and cuddling your pet  Do not let your pet lick you or share your food  Ask someone who is not infected to take care of your pet, if possible  If you must care for a pet, wear a face covering  Wash your hands before and after you give care  · Follow directions from your healthcare provider for being around others after you recover  You will need to wait at least 10 days after symptoms first appeared  Then you will need to have no fever for 24 hours without fever medicine, and no other symptoms  A loss of taste or smell may continue for several months  It is considered okay to be around others if this is your only symptom  It is not known for sure if or for how long a recovered person can pass the virus to others  Your provider may give you instructions, such as continuing social distancing or wearing a face covering around others      How to take care of someone who has COVID-19:  If the person lives in another home, arrange for a time to give care  Remember to bring a few pairs of disposable gloves and a cloth face covering  The following are general guidelines to help you safely care for anyone who has COVID-19:  · Wash your hands often  Wash before and after you go into the person's home, area, or room  Throw paper towels away in a lined trash can that has a lid, if possible  · Do not allow others to go near the person  No one should come into the person's home unless it is necessary  If possible, the person should be in a separate area or room if he or she lives with others  Keep the room's door shut unless you need to go in or out  Have others call, video chat, or e-mail the person if he or she is feeling well enough  The person may feel lonely if he or she is kept separate for a long period of time  Safe communication can help him or her stay connected to family and friends  · Make sure the person's room has good air flow  You may be able to open the window if the weather allows  An air conditioner can also be turned on to help air move  · Contact the person before you go in to give care  Make sure the person is wearing a face covering  Remind him or her to wash his or her hands with soap and water  He or she can use hand  that contains alcohol if soap and water are not available  Put on a face covering before you go in to give care  · Wear gloves while you give care and clean  Clean items the person uses often  Clean countertops, cooking surfaces, and the fronts and insides of the microwave and refrigerator  Clean the shower, toilet, the area around the toilet, the sink, the area around the sink, and faucets  Gather used laundry or bedding  Wash and dry items on the warmest settings the fabric allows  Wash dishes and silverware in hot, soapy water or in a   · Anything you throw away needs to go into a lined trash can    When you need to empty the trash, close the open end of the lining and tie it closed  This helps prevent items the virus is on from spilling out of the trash  Remove your gloves and throw them away  Wash your hands  Follow up with your doctor as directed:  Write down your questions so you remember to ask them during your visits  For more information:   · Centers for Disease Control and Prevention  [address] , [address]  Phone: 4- 652 - 552-6114  Web Address: [employer] com br    © Copyright [address] Information is for End User's use only and may not be sold, redistributed or otherwise used for commercial purposes  All illustrations and images included in CareNotes® are the copyrighted property of A D A M , Inc  or [facility] Adeola Gore   The above information is an  only  It is not intended as medical advice for individual conditions or treatments  Talk to your doctor, nurse or pharmacist before following any medical regimen to see if it is safe and effective for you  Yes

## 2023-07-26 NOTE — ED PROVIDER NOTE - NSFOLLOWUPINSTRUCTIONS_ED_ALL_ED_FT
Repetition Rate (Hz): 1 Fracture    A fracture is a break in one of your bones. This can occur from a variety of injuries, especially traumatic ones. Symptoms include pain, bruising, or swelling. Do not use the injured limb. If a fracture is in one of the bones below your waist, do not put weight on that limb unless instructed to do so by your healthcare provider. Crutches or a cane may have been provided. A splint or cast may have been applied by your health care provider. Make sure to keep it dry and follow up with an orthopedist in 2 days (Friday).    Take Tylenol 650-1000 mg every 4-6 hours as needed for pain. Do not exceed 4,000 mg in a 24 hour period.     Rest, ice, and elevate the affected extremity. Apply ice to the area for 20 minutes every 2 hours for the first 2 days after injury. You can expect any swelling or bruising to get a little worse in the next few days, but if you have markedly increasing pain or swelling, or any numbness, changes in sensation, weakness or any other concerns please return to the emergency department immediately.     SEEK IMMEDIATE MEDICAL CARE IF YOU HAVE ANY OF THE FOLLOWING SYMPTOMS: numbness, tingling, increasing pain, or weakness in any part of the injured limb.     -----  Laceration    A laceration is a cut that goes through all of the layers of the skin and into the tissue that is right under the skin. Some lacerations heal on their own. Others need to be closed with skin adhesive strips, skin glue, stitches (sutures), or staples. You had 1 running non-absorbable SUTURE placed in the emergency department. These must be removed in 10-14 days. You may return to the emergency department, visit any urgent care, or call your primary care doctor to have these removed.     Proper laceration care minimizes the risk of infection and helps the laceration to heal better. Do not get the site wet for 24-48 hours, after which you may indirectly get it wet. Do NOT soak the wound. Pat the area to dry - avoid rubbing. Apply Bacitracin or Neosporin to the site twice daily until healed. The site may scar - apply sunscreen for 6-12 months or keep the site covered from direct sunlight to avoid color changes to the scar.    SEEK IMMEDIATE MEDICAL CARE IF YOU HAVE ANY OF THE FOLLOWING SYMPTOMS: swelling around the wound, worsening pain, drainage from the wound, red streaking going away from your wound, inability to move finger or toe near the laceration, or discoloration of skin near the laceration.

## 2023-07-26 NOTE — ED PROVIDER NOTE - PATIENT PORTAL LINK FT
You can access the FollowMyHealth Patient Portal offered by [facility] by registering at the following website: [email or web address]. By joining [facility]’s FollowMyHealth portal, you will also be able to view your health information using other applications (apps) compatible with our system.

## 2023-07-26 NOTE — ED PROVIDER NOTE - CARE PLAN
1 Principal Discharge DX:	Closed fracture of left distal radius  Secondary Diagnosis:	Laceration of lower leg, left

## 2023-07-26 NOTE — ED PROVIDER NOTE - DIFFERENTIAL DIAGNOSIS
ICH, hematoma, soft tissue injury, nasal fracture, wrist fracture, abrasion, laceration Differential Diagnosis

## 2023-07-26 NOTE — ED ADULT TRIAGE NOTE - CHIEF COMPLAINT QUOTE
Pt presents to er with complaints of mechanical fall walking into her bank today, on Elaquis, neg loc, neg cervical tenderness, NA evaluation,  at bedside for evaluation and called, pt sent to ct at this time.

## 2023-07-26 NOTE — ED ADULT NURSE NOTE - OBJECTIVE STATEMENT
83yo Female BIBEMS co mechanical fall earlier this afternoon. Pt states that she was walking to her car from the bank when she lost her step and fell forward injuring L wrist and scraping face. Pt co pain in L wrist and face. Prior to fall, pt denies dizziness, LOC, Chest pain, SOB. Pt denies LOC, dizziness, chest pain, SOB, fever, N/V, numbing and tingling after fall.  Pt normally walks with cane and could walk after fall. Pt on eliquis, baby aspirin and blood pressure medication. PMH HTN, osteoarthritis, glaucoma. 81yo Female BIBEMS co mechanical fall earlier this afternoon. Pt states that she was walking to her car from the bank when she lost her step and fell forward injuring L wrist and scraping face. Pt co pain in R wrist and face. Prior to fall, pt denies dizziness, LOC, Chest pain, SOB. Pt denies LOC, dizziness, chest pain, SOB, fever, N/V, numbing and tingling after fall.  Pt normally walks with cane and could walk after fall. Pt on eliquis, baby aspirin and blood pressure medication. PMH HTN, osteoarthritis, glaucoma.

## 2023-07-26 NOTE — CONSULT NOTE ADULT - SUBJECTIVE AND OBJECTIVE BOX
*** INCOMPLETE NOTE - CHARTING IN PROGRESS ***    Consult Note: Orthopaedic Surgery - Hand Service    82F community-ambulator with assistive devices at baseline (desiree) presents complaining of Left wrist pain status-post mechanical fall. Patient states she was walking to her car through a parking lot when she lost her balance and tripped on the sidewalk, landing on an outstretched Left hand. The patient endorses a head-strike resulting on facial abrasions but denies any associated loss of consciousness. The patient denies any numbness or tingling in the Left upper extremity. The patient denies any other acute pain or injuries. Denies fevers, chills, headaches, chest pain, or shortness of breath. Endorses a previous orthopaedic history including two left shoulder dislocations (20 and 5 years ago), a "Left knee fracture" requiring surgery (13 years ago), and a "Right arm fracture" requiring surgery (unsure of date of treatment, but states remote). Patient takes chemical anticoagulation at home, unsure of agent but endorses taking this morning (chart review suggests Eliquis). Patient has been able to ambulate since her injury.        VITAL SIGNS:  T(C): 36.9 (07-26-23 @ 14:16), Max: 36.9 (07-26-23 @ 14:16)  T(F): 98.4 (07-26-23 @ 14:16), Max: 98.4 (07-26-23 @ 14:16)  HR: 88 (07-26-23 @ 14:16) (88 - 88)  BP: 145/64 (07-26-23 @ 14:16) (145/64 - 145/64)  BP(mean): --  RR: 20 (07-26-23 @ 14:16) (20 - 20)  SpO2: 100% (07-26-23 @ 14:16) (100% - 100%)    PHYSICAL EXAM:  Gen: NAD, awake and alert.  LUE:   Wrist swelling noted.    Skin intact.   + TTP distal radius, no bony TTP elsewhere.   Compartments soft/compressive.   Extremity warm/well perfused.   No elbow or shoulder tenderness or swelling.   2+ radial pulse.   +AIN/PIN/M/U/R.   SILT C5-T1.       IMAGING:  XR Left Wrist/Forearm: Dorsally angulated and displaced fracture of the distal radius with intra-articular involvement.        PROCEDURE:   10 cc 1% lidocaine injected under sterile procedure into Left Distal radius fracture site. Time was allowed to achieve anesthetic effect.  Closed reduction performed. Placed in well padded sugartong splint, molded appropriately. Post procedure imaging obtained. Post procedure exam unchanged, NV intact, able to wiggles all fingers, SILT distally.     ASSESSMENT:   82F with Left distal radius fracture status-post closed reduction.     PLAN:  - Pain control.   - NWB LUE in splint, keep c/d/I,   - Ice/elevation.   - Si/sx compartment syndrome discussed with patient, told to return to ED if exhibit any.   - Possible need for surgical intervention in future discussed, all questions answered  - Follow up with Dr. Palomino within 3-5 days , call office for appointment.  - Will discuss with Dr. Palomino and advise of any changes to the above plan.     *** INCOMPLETE NOTE - CHARTING IN PROGRESS ***   *** INCOMPLETE NOTE - CHARTING IN PROGRESS ***    Consult Note: Orthopaedic Surgery - Hand Service    82F community-ambulator with assistive devices at baseline (desiree) presents complaining of Left wrist pain status-post mechanical fall. Patient states she was walking to her car through a parking lot when she lost her balance and tripped on the sidewalk, landing on an outstretched Left hand. The patient endorses a head-strike resulting on facial abrasions but denies any associated loss of consciousness. The patient denies any numbness or tingling in the Left upper extremity. The patient denies any other acute pain or injuries. Denies fevers, chills, headaches, chest pain, or shortness of breath. Endorses a previous orthopaedic history including two left shoulder dislocations (20 and 5 years ago), a "Left knee fracture" requiring surgery (13 years ago), and a "Right arm fracture" requiring surgery (unsure of date of treatment, but states remote). Patient takes chemical anticoagulation at home, unsure of agent but endorses taking this morning (chart review suggests Eliquis). Patient has been able to ambulate since her injury.        VITAL SIGNS:  T(C): 36.9 (07-26-23 @ 14:16), Max: 36.9 (07-26-23 @ 14:16)  T(F): 98.4 (07-26-23 @ 14:16), Max: 98.4 (07-26-23 @ 14:16)  HR: 88 (07-26-23 @ 14:16) (88 - 88)  BP: 145/64 (07-26-23 @ 14:16) (145/64 - 145/64)  BP(mean): --  RR: 20 (07-26-23 @ 14:16) (20 - 20)  SpO2: 100% (07-26-23 @ 14:16) (100% - 100%)    PHYSICAL EXAM:  Gen: NAD, awake and alert.  LUE:   Wrist swelling noted.    Skin intact.   + TTP distal radius, no bony TTP elsewhere.   Compartments soft/compressive.   Extremity warm/well perfused.   No elbow or shoulder tenderness or swelling.   2+ radial pulse.   +AIN/PIN/M/U/R.   SILT C5-T1.       IMAGING:  XR Left Wrist/Forearm: Dorsally angulated and displaced fracture of the distal radius with intra-articular involvement.        PROCEDURE:   10 cc 1% lidocaine injected under sterile procedure into Left Distal radius fracture site. Time was allowed to achieve anesthetic effect.  Closed reduction performed. Placed in well padded sugartong splint, molded appropriately. Post procedure imaging obtained. Post procedure exam unchanged, NV intact, able to wiggles all fingers, SILT distally.     ASSESSMENT:   82F with Left distal radius fracture status-post closed reduction.     PLAN:  - Pain control.   - NWB LUE in splint, keep c/d/I,   - Ice/elevation.   - Si/sx compartment syndrome discussed with patient, told to return to ED if exhibit any.   - Possible need for surgical intervention in future discussed, all questions answered  - Shin and facial abrasions per ED.   - Follow up with Dr. Palomino within 3-5 days , call office for appointment.  - Will discuss with Dr. Palomino and advise of any changes to the above plan.     *** INCOMPLETE NOTE - CHARTING IN PROGRESS ***       Consult Note: Orthopaedic Surgery - Hand Service    82F community-ambulator with assistive devices at baseline (desiree) presents complaining of Left wrist pain status-post mechanical fall. Patient states she was walking to her car through a parking lot when she lost her balance and tripped on the sidewalk, landing on an outstretched Left hand. The patient endorses a head-strike resulting on facial abrasions but denies any associated loss of consciousness. The patient denies any numbness or tingling in the Left upper extremity. The patient denies any other acute pain or injuries. Denies fevers, chills, headaches, chest pain, or shortness of breath. Endorses a previous orthopaedic history including two left shoulder dislocations (20 and 5 years ago), a "Left knee fracture" requiring surgery (13 years ago), and a "Right arm fracture" requiring surgery (unsure of date of treatment, but states remote). Patient takes chemical anticoagulation at home, unsure of agent but endorses taking this morning (chart review suggests Eliquis). Patient has been able to ambulate since her injury.        VITAL SIGNS:  T(C): 36.9 (07-26-23 @ 14:16), Max: 36.9 (07-26-23 @ 14:16)  T(F): 98.4 (07-26-23 @ 14:16), Max: 98.4 (07-26-23 @ 14:16)  HR: 88 (07-26-23 @ 14:16) (88 - 88)  BP: 145/64 (07-26-23 @ 14:16) (145/64 - 145/64)  BP(mean): --  RR: 20 (07-26-23 @ 14:16) (20 - 20)  SpO2: 100% (07-26-23 @ 14:16) (100% - 100%)    PHYSICAL EXAM:  Gen: NAD, awake and alert.  LUE:   Wrist swelling noted.    Skin intact.   + TTP distal radius, no bony TTP elsewhere.   Compartments soft/compressive.   Extremity warm/well perfused.   No elbow or shoulder tenderness or swelling.   2+ radial pulse.   +AIN/PIN/M/U/R.   SILT C5-T1.       IMAGING:  XR Left Wrist/Forearm: Dorsally angulated and displaced fracture of the distal radius with intra-articular involvement.        PROCEDURE:   10 cc 1% lidocaine injected under sterile procedure into Left Distal radius fracture site. Time was allowed to achieve anesthetic effect.  Closed reduction performed. Placed in well padded sugartong splint, molded appropriately. Post procedure imaging obtained. Post procedure exam unchanged, NV intact, able to wiggles all fingers, SILT distally.     ASSESSMENT:   82F with Left distal radius fracture status-post closed reduction.     PLAN:  - Pain control.   - NWB LUE in splint, keep c/d/I,   - Ice/elevation.   - Si/sx compartment syndrome discussed with patient, told to return to ED if exhibit any.   - Possible need for surgical intervention in future discussed, all questions answered  - Shin and facial abrasions per ED.   - Follow up with Dr. Palomino in office on friday, call office for appointment.  - D/w Georgette who is aware and in agreement with above plan

## 2023-07-26 NOTE — ED ADULT NURSE NOTE - NSFALLLASTSIX_ED_ALL_ED
Called patient informed patient of upcoming phone visit with Dr. Frost, asked if would rather have video visit, patient accepted video visit   No.

## 2023-07-26 NOTE — ED PROVIDER NOTE - PHYSICAL EXAMINATION
GENERAL: A&Ox4, non-toxic appearing, no acute distress  HEENT: EOMI, oral mucosa moist, normal conjunctiva, abrasions to nose and R cheek/lip, hematoma to anterior forehead, no periorbital ecchymosis  RESP: CTAB, no respiratory distress, no wheezes/rhonchi/rales, speaking in full sentences  CV: RRR, no murmurs/rubs/gallops  ABDOMEN: soft, non-tender, non-distended, no guarding  MSK: no midline spinal tenderness, pelvis stable, L wrist limited ROM in flexion, L distal radius ttp w/ swelling, no other forearm tenderness  NEURO: no focal sensory or motor deficits, CN 2-12 grossly intact  SKIN: warm, normal color, well perfused, no rash, abrasions to b/l knees, 1.5cm laceration to L anterior shin  PSYCH: normal affect

## 2023-07-26 NOTE — ED PROVIDER NOTE - OBJECTIVE STATEMENT
82-year-old female PMH hypertension, osteoarthritis, glaucoma, currently on Eliquis, presents to the emergency department for injuries from a fall.  Patient states she was leaving the bank when she tripped over her feet, falling forward and striking her left wrist and face on the ground.  She did not lose consciousness and was able to ambulate after the fall.  She typically walks with a cane.  She complains of pain to the front of the face and the left wrist.  She denies headache, dizziness, visual changes, chest pain, palpitations, weakness, numbness, or tingling. Patient reports last tetanus 3 years ago.

## 2023-07-26 NOTE — ED ADULT NURSE NOTE - NSFALLRISKINTERV_ED_ALL_ED

## 2023-07-26 NOTE — ED PROVIDER NOTE - PROGRESS NOTE DETAILS
Patient seen upon arrival by Dr. Jones and neuro alert was called. X-ray showing left comminuted distal radius fracture, ortho aware and will see pt. CT scans without any other acute findings. Wrist splinted by orthopedic team.  Left shin laceration sutured by PA.  Discussed all return precautions with patient and family friend with all questions answered.  Patient to follow-up in the orthopedic office in 2 days.  Discussed return precautions and all questions answered. Pt in agreement w/ plan. CAOx3, NAD, VSS. Stable for d/c..

## 2023-07-26 NOTE — ED PROVIDER NOTE - CLINICAL SUMMARY MEDICAL DECISION MAKING FREE TEXT BOX
82-year-old female with mechanical trip and fall with injuries to the face and left wrist, currently on Eliquis, no loss of consciousness, ambulatory after incident.  Patient noted to have traumatic findings on the face, left wrist, and bilateral knee abrasions.  Patient called as neuro alert on arrival.  Will evaluate with CT scans, x-ray, tetanus up-to-date, pain control.  History sounds purely mechanical and do not suspect that performing labs or urinalysis will contribute to patient's care.

## 2023-07-26 NOTE — ED PROVIDER NOTE - NS ED ATTENDING STATEMENT MOD
DATE OF ADMISSION:_08/07/2020  DATE OF DISCHARGE:_08/10/2020    DISCHARGE DIAGNOSIS: _Obstructing choledocholithiasis S/P ERCP    HOSPITAL COURSE: _62-year-old male with multiple comorbidities, presented with yellowish discoloration of the skin and conjunctiva  #Jaundice 2/2 obstructing choledocholithiasis  #S/P ERCP  #Transaminits   - and   -Bilirubin 14.1  -Hepatitis panel negative  -CT abdomen pelvis showing 1 cm obstructing choledocholithiasis the common bile duct  -Liver enzymes and bilirubin are trending down  -Zosyn AB   -GI on consult appreciate recommendation  -Cardiology on board for cardiac clearance  -Echo of the heart ordered  -Patient had ERCP, will need repeat ERCP in 4 to 6 weeks for stent removal  -Plan to have laparoscopic cholecystectomy but considering his  creatinine level CKD and the cardiac clearance, patient will be discharged and plan to have the laparoscopic cholecystectomy in 4 to 6-week    #CHULA on CKD  -GFR in June this year was over 30, creatinine 1.85  -Creatinine 4.49 with a GFR of 13  -Hold ACE inhibitor's and other nephrotoxic drugs  -Nephrology on board appreciate recommendation  -Ultrasound of the kidney atrophic left kidney with cortical thinning    #Hyponatremia/ improved   -Today sodium 136  -IV hydration will continue monitor    #CAD status post MI  -PCI last year  -Continue home meds    #Dyslipidemia  -Continue statin    #CHF  -Congestive heart failure compensated  -Continue home medications he is on Lasix.  -Echo of the heart ordered  -Cardiology on board for clearance, records were requested [facility]    #Alcoholism  -Unclear on amount of alcohol he drinks  -Alcohol level less than 10  -Has not had a drink in 3 weeks no signs of withdrawal    #GERD  -Resumed PPI    #Type 2 diabetes  -States he is only on diet control  -Hemoglobin A1c pending      Vitals: _ Vital Signs (last 24 hrs)_____ Last  Charted___________Minimum____________ Maximum____________  Temp    98.5  (AUG 10 07:54) 98  (AUG 09 19:38) 98.5  (AUG 10 07:54)  Heart Rate   74  (AUG 10 07:54) L 59 (AUG 09 16:33) 88  (AUG 09 11:52)  Resp Rate       16  (AUG 10 07:54) 16  (AUG 09 19:38) 20  (AUG 09 11:52)  SBP    H 143 (AUG 10 07:54) 115  (AUG 09 19:38) H 143 (AUG 09 22:19)  DBP    84  (AUG 10 07:54) 69  (AUG 09 19:38) 84  (AUG 10 07:54)    Physical Exam: General: Obese patient, not in acute distress  Skin: Yellowish discoloration of the skin   HEENT: normal , yellowish discoloration of the conjunctiva  Hear: S1, S2 regular   Lungs: Clear, no wheezing or rales   Abdomen: BS present no tenderness onf palpation  Extremities: no edema  Neurologic: No gross focal deficit, AAO    Labs: _ Labs (Last four charted values)  WBC                  5.7 (AUG 10) 6.4 (AUG 09) 7.4 (AUG 08) 8.6 (AUG 07)   Hgb                  L 11.5 (AUG 10) L 10.9 (AUG 09) L 11.3 (AUG 08) 12.4 (AUG 07)   Hct                  L 35 (AUG 10) L 34 (AUG 09) L 34 (AUG 08) 37 (AUG 07)   Plt                  227 (AUG 10) 245 (AUG 09) 256 (AUG 08) 281 (AUG 07)   Na                   136 (AUG 10) 136 (AUG 09) L 134 (AUG 08) L 132 (AUG 07)   K                    4.3 (AUG 10) 4.1 (AUG 09) 3.9 (AUG 08) 3.8 (AUG 07)   CO2                  21 (AUG 10) 21 (AUG 09) 19 (AUG 08) L 18 (AUG 07)   Cl                   106 (AUG 10) 103 (AUG 09) 101 (AUG 08) 99 (AUG 07)   Cr                   H 3.32 (AUG 10) H 4.03 (AUG 09) H 4.19 (AUG 08) H 4.49 (AUG 07)   BUN                  H 53 (AUG 10) H 68 (AUG 09) H 68 (AUG 08) H 69 (AUG 07)   Glucose              88 (AUG 10) 94 (AUG 09) 98 (AUG 08) H 124 (AUG 07)   Mg                   2.0 (AUG 10) 2.0 (AUG 09) L 1.5 (AUG 08)   Phos                 3.2 (AUG 10)   Ca                   L 7.6 (AUG 10) L 7.4 (AUG 09) L 7.7 (AUG 08) L 8.1 (AUG 07)   PT                   H 13.1 (AUG 07)   INR                  H 1.3 (AUG 07)   PTT                  H 33 (AUG  07)     Radiology results:  _@  US of the kidney IMPRESSION:   1.  Unremarkable ultrasound right kidney.  2.  Atrophic left kidney with cortical thinning.    CT abdomen and pelvis without contrast  IMPRESSION:   1.  1 cm obstructing choledocholith in the distal common bile duct  with at least moderate intrahepatic and extrahepatic biliary ductal  dilation.  2.  Cholelithiasis with mild nonspecific gallbladder wall thickening.  3.  Colonic diverticulosis.  4.  Small fat-containing left inguinal hernia.  5.  Nonspecific mildly enlarged gastrohepatic ligament region lymph  node, possibly reactive.    Echocardiogram Results: _EKG SINUS BRADYCARDIA WITH SINUS ARRHYTHMIA  INTRAVENTRICULAR CONDUCTION DELAY  Echo done     PROCEDURE HISTORY: _ ERCP    Pending results: _ NONE    DISCHARGE MEDICATION LIST     Allergies: _ NKDA     Primary Care Physician none staff physician      Follow-up instructions:  -Patient need to follow-up with GI, will need ERCP in 4 to 6-week  -Patient will need to follow-up with nephrology  -Patient will need to follow-up with general surgery, plan for lap cholecystectomy in 4 to 6-week  @  I spent 35_ minutes completing this patient's discharge             Electronically Signed On 08.10.2020 15:23  ___________________________________________________   Rusty Garcia MD              :#Hypomagnesemia , MG replacement protocol initiated   #Chronic diastolic HF , ECHO 1. Mildly enlarged left ventricle with mildly reduced LV systolic  function with ischemic myopathy as described above..  Normal left  ventricular wall thickness.  Diastolic function is mildly abnormal.  2. Moderate to severe eccentric mitral regurgitation.  Moderate left  atrial enlargement.             Electronically Signed On 08.12.2020 20:06  ___________________________________________________   Rusty Garcia MD     Attending Only

## 2023-08-01 ENCOUNTER — APPOINTMENT (OUTPATIENT)
Dept: CARDIOLOGY | Facility: CLINIC | Age: 83
End: 2023-08-01

## 2023-08-01 ENCOUNTER — INPATIENT (INPATIENT)
Facility: HOSPITAL | Age: 83
LOS: 9 days | Discharge: NURSING, REHAB OR LONG-TERM CARE | DRG: 981 | End: 2023-08-11
Attending: INTERNAL MEDICINE | Admitting: HOSPITALIST
Payer: MEDICARE

## 2023-08-01 VITALS
TEMPERATURE: 98 F | SYSTOLIC BLOOD PRESSURE: 124 MMHG | DIASTOLIC BLOOD PRESSURE: 47 MMHG | OXYGEN SATURATION: 100 % | WEIGHT: 162.04 LBS | HEIGHT: 61 IN | HEART RATE: 69 BPM | RESPIRATION RATE: 16 BRPM

## 2023-08-01 DIAGNOSIS — D64.9 ANEMIA, UNSPECIFIED: ICD-10-CM

## 2023-08-01 DIAGNOSIS — Z98.83 FILTERING (VITREOUS) BLEB AFTER GLAUCOMA SURGERY STATUS: Chronic | ICD-10-CM

## 2023-08-01 LAB
ALBUMIN SERPL ELPH-MCNC: 3 G/DL — LOW (ref 3.3–5)
ALP SERPL-CCNC: 82 U/L — SIGNIFICANT CHANGE UP (ref 40–120)
ALT FLD-CCNC: 17 U/L — SIGNIFICANT CHANGE UP (ref 12–78)
ANION GAP SERPL CALC-SCNC: 6 MMOL/L — SIGNIFICANT CHANGE UP (ref 5–17)
APTT BLD: 29.2 SEC — SIGNIFICANT CHANGE UP (ref 24.5–35.6)
AST SERPL-CCNC: 13 U/L — LOW (ref 15–37)
BASOPHILS # BLD AUTO: 0.01 K/UL — SIGNIFICANT CHANGE UP (ref 0–0.2)
BASOPHILS NFR BLD AUTO: 0.1 % — SIGNIFICANT CHANGE UP (ref 0–2)
BILIRUB SERPL-MCNC: 0.3 MG/DL — SIGNIFICANT CHANGE UP (ref 0.2–1.2)
BUN SERPL-MCNC: 18 MG/DL — SIGNIFICANT CHANGE UP (ref 7–23)
CALCIUM SERPL-MCNC: 8.9 MG/DL — SIGNIFICANT CHANGE UP (ref 8.5–10.1)
CHLORIDE SERPL-SCNC: 109 MMOL/L — HIGH (ref 96–108)
CO2 SERPL-SCNC: 24 MMOL/L — SIGNIFICANT CHANGE UP (ref 22–31)
CREAT SERPL-MCNC: 0.9 MG/DL — SIGNIFICANT CHANGE UP (ref 0.5–1.3)
EGFR: 64 ML/MIN/1.73M2 — SIGNIFICANT CHANGE UP
EOSINOPHIL # BLD AUTO: 0.16 K/UL — SIGNIFICANT CHANGE UP (ref 0–0.5)
EOSINOPHIL NFR BLD AUTO: 2.1 % — SIGNIFICANT CHANGE UP (ref 0–6)
GLUCOSE SERPL-MCNC: 99 MG/DL — SIGNIFICANT CHANGE UP (ref 70–99)
HCT VFR BLD CALC: 22.7 % — LOW (ref 34.5–45)
HGB BLD-MCNC: 6.6 G/DL — CRITICAL LOW (ref 11.5–15.5)
IMM GRANULOCYTES NFR BLD AUTO: 0.8 % — SIGNIFICANT CHANGE UP (ref 0–0.9)
INR BLD: 1.22 RATIO — HIGH (ref 0.85–1.18)
LYMPHOCYTES # BLD AUTO: 0.76 K/UL — LOW (ref 1–3.3)
LYMPHOCYTES # BLD AUTO: 9.9 % — LOW (ref 13–44)
MCHC RBC-ENTMCNC: 20.6 PG — LOW (ref 27–34)
MCHC RBC-ENTMCNC: 29.1 GM/DL — LOW (ref 32–36)
MCV RBC AUTO: 70.7 FL — LOW (ref 80–100)
MONOCYTES # BLD AUTO: 0.73 K/UL — SIGNIFICANT CHANGE UP (ref 0–0.9)
MONOCYTES NFR BLD AUTO: 9.5 % — SIGNIFICANT CHANGE UP (ref 2–14)
NEUTROPHILS # BLD AUTO: 5.97 K/UL — SIGNIFICANT CHANGE UP (ref 1.8–7.4)
NEUTROPHILS NFR BLD AUTO: 77.6 % — HIGH (ref 43–77)
PLATELET # BLD AUTO: 250 K/UL — SIGNIFICANT CHANGE UP (ref 150–400)
POTASSIUM SERPL-MCNC: 4.3 MMOL/L — SIGNIFICANT CHANGE UP (ref 3.5–5.3)
POTASSIUM SERPL-SCNC: 4.3 MMOL/L — SIGNIFICANT CHANGE UP (ref 3.5–5.3)
PROT SERPL-MCNC: 7.3 GM/DL — SIGNIFICANT CHANGE UP (ref 6–8.3)
PROTHROM AB SERPL-ACNC: 13.7 SEC — HIGH (ref 9.5–13)
RBC # BLD: 3.21 M/UL — LOW (ref 3.8–5.2)
RBC # FLD: 19.1 % — HIGH (ref 10.3–14.5)
SODIUM SERPL-SCNC: 139 MMOL/L — SIGNIFICANT CHANGE UP (ref 135–145)
WBC # BLD: 7.69 K/UL — SIGNIFICANT CHANGE UP (ref 3.8–10.5)
WBC # FLD AUTO: 7.69 K/UL — SIGNIFICANT CHANGE UP (ref 3.8–10.5)

## 2023-08-01 PROCEDURE — 36430 TRANSFUSION BLD/BLD COMPNT: CPT

## 2023-08-01 PROCEDURE — 74177 CT ABD & PELVIS W/CONTRAST: CPT

## 2023-08-01 PROCEDURE — 88305 TISSUE EXAM BY PATHOLOGIST: CPT

## 2023-08-01 PROCEDURE — 97163 PT EVAL HIGH COMPLEX 45 MIN: CPT | Mod: GP

## 2023-08-01 PROCEDURE — 97166 OT EVAL MOD COMPLEX 45 MIN: CPT | Mod: GO

## 2023-08-01 PROCEDURE — 83036 HEMOGLOBIN GLYCOSYLATED A1C: CPT

## 2023-08-01 PROCEDURE — 71045 X-RAY EXAM CHEST 1 VIEW: CPT

## 2023-08-01 PROCEDURE — 87635 SARS-COV-2 COVID-19 AMP PRB: CPT

## 2023-08-01 PROCEDURE — 85610 PROTHROMBIN TIME: CPT

## 2023-08-01 PROCEDURE — 88313 SPECIAL STAINS GROUP 2: CPT

## 2023-08-01 PROCEDURE — 85730 THROMBOPLASTIN TIME PARTIAL: CPT

## 2023-08-01 PROCEDURE — 93010 ELECTROCARDIOGRAM REPORT: CPT

## 2023-08-01 PROCEDURE — 82378 CARCINOEMBRYONIC ANTIGEN: CPT

## 2023-08-01 PROCEDURE — 97530 THERAPEUTIC ACTIVITIES: CPT | Mod: GP

## 2023-08-01 PROCEDURE — 97164 PT RE-EVAL EST PLAN CARE: CPT | Mod: GP

## 2023-08-01 PROCEDURE — 80048 BASIC METABOLIC PNL TOTAL CA: CPT

## 2023-08-01 PROCEDURE — 86923 COMPATIBILITY TEST ELECTRIC: CPT

## 2023-08-01 PROCEDURE — 85027 COMPLETE CBC AUTOMATED: CPT

## 2023-08-01 PROCEDURE — 36415 COLL VENOUS BLD VENIPUNCTURE: CPT

## 2023-08-01 PROCEDURE — 97535 SELF CARE MNGMENT TRAINING: CPT | Mod: GO

## 2023-08-01 PROCEDURE — C1713: CPT

## 2023-08-01 PROCEDURE — P9016: CPT

## 2023-08-01 PROCEDURE — 99291 CRITICAL CARE FIRST HOUR: CPT

## 2023-08-01 PROCEDURE — 76000 FLUOROSCOPY <1 HR PHYS/QHP: CPT

## 2023-08-01 PROCEDURE — 82962 GLUCOSE BLOOD TEST: CPT

## 2023-08-01 PROCEDURE — 97116 GAIT TRAINING THERAPY: CPT | Mod: GP

## 2023-08-01 RX ORDER — LOSARTAN POTASSIUM 100 MG/1
1 TABLET, FILM COATED ORAL
Refills: 0 | DISCHARGE

## 2023-08-01 NOTE — ED PROVIDER NOTE - CLINICAL SUMMARY MEDICAL DECISION MAKING FREE TEXT BOX
Patient presenting to the ED for anemia found outpatient.  Patient is having symptoms.  Hemoglobin in the ED resulted 6.6.  Patient consented for blood transfusion.  Guaiac sample taken and negative for bleeding.  Patient  admitted for symptomatic anemia and blood transfusion.

## 2023-08-01 NOTE — ED ADULT TRIAGE NOTE - CHIEF COMPLAINT QUOTE
Pt presents to the ED for abnormal labs. Pt had preop testing done yesterday with Dr. Cerda and is scheduled for wrist surgery on Thursday. Pt told to come to the ED for further evaluation due to Hgb of 6.6. Pt reports fatigue and WINTERS. PMH of HTN and HLD. Pt on Xarelto and Aspirin. Pt sent for EKG.

## 2023-08-01 NOTE — ED PROVIDER NOTE - OBJECTIVE STATEMENT
83 yo female with PMHx of HTN, OA, glaucoma presents to the ED for abnormal labs of low Hgb. as per daughter, pt c/o fatigue and WINTERS x about 1 month. denies bloody stools. pt was at [facility] 7/26 for wrist fracture. preop testing done yesterday with Dr. Cerda and scheduled for wrist shx on Thursday, told to come to the ED for further eval due to Hgb of 6.6. NKDA. pt has never had colonoscopy. meds: 50 mg Xarelto qd, baby asa qd, 100 mg losartan qd, Atorvastatin, Metoprolol.

## 2023-08-01 NOTE — ED ADULT NURSE NOTE - NURSING SKIN BRUISING LOCATION
below both eyes and to nose with abrasions and abrasions to legs bilaterally and splint to left wrist with fx from a fall on July 26

## 2023-08-01 NOTE — ED PROVIDER NOTE - ENMT, MLM
old ecchymosis to face. Airway patent, Nasal mucosa clear. Mouth with normal mucosa. Throat has no vesicles, no oropharyngeal exudates and uvula is midline.

## 2023-08-01 NOTE — ED ADULT NURSE NOTE - OBJECTIVE STATEMENT
Fall on Wednesday July 16th with facial and wrist injury   Referred to ED after Medical Clearance blood work with abnormal labs  Low H & H

## 2023-08-01 NOTE — ED ADULT NURSE NOTE - NSFALLHARMRISKINTERV_ED_ALL_ED

## 2023-08-01 NOTE — ED ADULT NURSE NOTE - NURSING ED SKIN COLOR
ecchymotic below eyes bilaterally and abrasion to nose and Abrasions to both lower legs with scabs and family states she has sutures in left lower leg wound/pale

## 2023-08-01 NOTE — ED ADULT NURSE NOTE - NSFALLRISKFACTORS_ED_ALL_ED
xarelto and baby ASA  Abnormal lab work/Age: 85 years old or older/Coagulation: Bleeding disorder either through use of anticoagulants or underlying clinical condition(s)

## 2023-08-01 NOTE — PATIENT PROFILE ADULT - FOOD INSECURITY
Physical Therapy     Referred by: Elizabeth Lopez MD; Medical Diagnosis (from order):    Diagnosis Information      Diagnosis    V43.64 (ICD-9-CM) - Z96.641 (ICD-10-CM) - Status post total replacement of right hip    781.2 (ICD-9-CM) - R26.81 (ICD-10-CM) - Unsteady gait    V15.88 (ICD-9-CM) - Z91.81 (ICD-10-CM) - History of fall                Daily Treatment Note    Visit:  5     SUBJECTIVE                                                                                                             Serenity reports some soreness due to the weather today.  Overall she is doing well.     OBJECTIVE                                                                                                                        TREATMENT                                                                                                                  Therapeutic Exercise:  Nu Step with discussion of home exercise program and current symptoms   6 inch step up 2x10  Single leg Press 30# 3x10 - seat straight up  TRX side squat 2x10  DL heel raise 2x10  Retro lower extremity walk 20# x5  Sit to stand with 5# kettlebell x10    Skilled input: verbal instruction/cues and tactile instruction/cues    Writer verbally educated and received verbal consent for hand placement, positioning of patient, and techniques to be performed today from patient for therapist position for techniques as described above and how they are pertinent to the patient's plan of care.    Home Exercise Program: Also performing basic 6 standing lower extremity exercises at home    Access Code: [email or web address]  URL: [email or web address]  Date: 07/09/2021  Prepared by: Dillon Freedman    Exercises  Romberg Stance - 3 x daily - 7 x weekly - 30 hold  Tandem Stance - 3 x daily - 7 x weekly - 15 hold  Standing Hip Abduction with Resistance at Ankles and Counter Support - 2 x daily - 3 x weekly - 10 reps  Step Up - 2 x daily - 3 x weekly - 10 reps       ASSESSMENT                                                                                                              Serenity continues to do well and can tolerate more difficult exercises.  She again is able to ambulate throughout therapy session without any assistive device.         PLAN                                                                                                                           Suggestions for next session as indicated: Progress per plan of care         Therapy procedure time and total treatment time can be found documented on the Time Entry flowsheet   no

## 2023-08-01 NOTE — PATIENT PROFILE ADULT - FALL HARM RISK - HARM RISK INTERVENTIONS
Assistance with ambulation/Assistance OOB with selected safe patient handling equipment/Communicate Risk of Fall with Harm to all staff/Discuss with provider need for PT consult/Monitor gait and stability/Reinforce activity limits and safety measures with patient and family/Tailored Fall Risk Interventions/Visual Cue: Yellow wristband and red socks/Bed in lowest position, wheels locked, appropriate side rails in place/Call bell, personal items and telephone in reach/Instruct patient to call for assistance before getting out of bed or chair/Non-slip footwear when patient is out of bed/[place] to call system/Physically safe environment - no spills, clutter or unnecessary equipment/Purposeful Proactive Rounding/Room/bathroom lighting operational, light cord in reach

## 2023-08-02 ENCOUNTER — TRANSCRIPTION ENCOUNTER (OUTPATIENT)
Age: 83
End: 2023-08-02

## 2023-08-02 LAB
HCT VFR BLD CALC: 29 % — LOW (ref 34.5–45)
HGB BLD-MCNC: 8.8 G/DL — LOW (ref 11.5–15.5)
IRON SATN MFR SERPL: 11 UG/DL — LOW (ref 30–160)
IRON SATN MFR SERPL: 4 % — LOW (ref 14–50)
MCHC RBC-ENTMCNC: 22.1 PG — LOW (ref 27–34)
MCHC RBC-ENTMCNC: 30.3 GM/DL — LOW (ref 32–36)
MCV RBC AUTO: 72.7 FL — LOW (ref 80–100)
PLATELET # BLD AUTO: 245 K/UL — SIGNIFICANT CHANGE UP (ref 150–400)
RBC # BLD: 3.99 M/UL — SIGNIFICANT CHANGE UP (ref 3.8–5.2)
RBC # FLD: 19.3 % — HIGH (ref 10.3–14.5)
TIBC SERPL-MCNC: 288 UG/DL — SIGNIFICANT CHANGE UP (ref 220–430)
UIBC SERPL-MCNC: 277 UG/DL — SIGNIFICANT CHANGE UP (ref 110–370)
WBC # BLD: 6.7 K/UL — SIGNIFICANT CHANGE UP (ref 3.8–10.5)
WBC # FLD AUTO: 6.7 K/UL — SIGNIFICANT CHANGE UP (ref 3.8–10.5)

## 2023-08-02 PROCEDURE — 99222 1ST HOSP IP/OBS MODERATE 55: CPT

## 2023-08-02 PROCEDURE — 99223 1ST HOSP IP/OBS HIGH 75: CPT

## 2023-08-02 PROCEDURE — 71045 X-RAY EXAM CHEST 1 VIEW: CPT | Mod: 26

## 2023-08-02 RX ORDER — LANOLIN ALCOHOL/MO/W.PET/CERES
3 CREAM (GRAM) TOPICAL AT BEDTIME
Refills: 0 | Status: DISCONTINUED | OUTPATIENT
Start: 2023-08-02 | End: 2023-08-11

## 2023-08-02 RX ORDER — ATORVASTATIN CALCIUM 80 MG/1
20 TABLET, FILM COATED ORAL AT BEDTIME
Refills: 0 | Status: DISCONTINUED | OUTPATIENT
Start: 2023-08-02 | End: 2023-08-11

## 2023-08-02 RX ORDER — METOPROLOL TARTRATE 50 MG
75 TABLET ORAL
Refills: 0 | Status: DISCONTINUED | OUTPATIENT
Start: 2023-08-02 | End: 2023-08-11

## 2023-08-02 RX ORDER — ACETAMINOPHEN 500 MG
650 TABLET ORAL EVERY 6 HOURS
Refills: 0 | Status: DISCONTINUED | OUTPATIENT
Start: 2023-08-02 | End: 2023-08-11

## 2023-08-02 RX ORDER — ONDANSETRON 8 MG/1
4 TABLET, FILM COATED ORAL EVERY 8 HOURS
Refills: 0 | Status: DISCONTINUED | OUTPATIENT
Start: 2023-08-02 | End: 2023-08-11

## 2023-08-02 RX ORDER — DILTIAZEM HCL 120 MG
60 CAPSULE, EXT RELEASE 24 HR ORAL EVERY 6 HOURS
Refills: 0 | Status: DISCONTINUED | OUTPATIENT
Start: 2023-08-02 | End: 2023-08-11

## 2023-08-02 RX ADMIN — Medication 60 MILLIGRAM(S): at 23:35

## 2023-08-02 RX ADMIN — Medication 60 MILLIGRAM(S): at 12:08

## 2023-08-02 RX ADMIN — ATORVASTATIN CALCIUM 20 MILLIGRAM(S): 80 TABLET, FILM COATED ORAL at 21:05

## 2023-08-02 RX ADMIN — Medication 75 MILLIGRAM(S): at 21:07

## 2023-08-02 RX ADMIN — Medication 60 MILLIGRAM(S): at 17:07

## 2023-08-02 NOTE — CONSULT NOTE ADULT - SUBJECTIVE AND OBJECTIVE BOX
Orthopedics Consult Note:    This is a 82y Female admitted with a left distal radius fracture ~1 week ago admitted with anemia. Pt was scheduled and booked to have ORIF left distal radius fracture as OP with Dr. Palomino tomorrow. While undergoing pre-op testing she was found to be anemic and sent to the ED for further management. Pt was admitted to medical service last night and got 2u PRBCs blood transfusion. Pt seen at bedside resting comfortably with no acute complaints. Pt denies any new injury or trauma. Pt denies any fever or chills. Pt denies any numbness, tingling or paresthesias. Pt takes Xarelto; currently being held.     Past Medical & Surgical History:  Anemia    Congestive Heart Failure    FHx: colon cancer    FH: pancreatic cancer    FH: CAD (coronary artery disease)    Handoff    MEWS Score    HTN (hypertension)    Glaucoma    OA (osteoarthritis)    Symptomatic anemia    Status post glaucoma surgery    LOW HGB    6    SysAdmin_VisitLink        Allergies:  No Known Allergies      Vital Signs:  T(C): 37.1 (08-02-23 @ 07:45), Max: 37.1 (08-02-23 @ 07:45)  HR: 92 (08-02-23 @ 07:45) (69 - 92)  BP: 146/71 (08-02-23 @ 07:45) (124/47 - 184/73)  RR: 18 (08-02-23 @ 07:45) (16 - 18)  SpO2: 96% (08-02-23 @ 07:45) (96% - 100%)    Labs:                          8.8    6.70  )-----------( 245      ( 02 Aug 2023 09:10 )             29.0     08-01    139  |  109<H>  |  18  ----------------------------<  99  4.3   |  24  |  0.90    Ca    8.9      01 Aug 2023 17:11    TPro  7.3  /  Alb  3.0<L>  /  TBili  0.3  /  DBili  x   /  AST  13<L>  /  ALT  17  /  AlkPhos  82  08-01    PT/INR - ( 01 Aug 2023 17:11 )   PT: 13.7 sec;   INR: 1.22 ratio         PTT - ( 01 Aug 2023 17:11 )  PTT:29.2 sec    Imaging:  X-rays of the left wrist and forearm from 7/26 demonstrate a distal radius fracture s/p closed reduction and sugar tong splint.    Physical Exam:  General:  AAOx3. No acute distress.  Physical Exam:  Left wrist :  Sugar tong splint and sling in place; clean, dry and intact. +mild finger swelling and ecchymosis. Normothermic. Moving all fingers. +AIN/PIN/Radial nerve/Median nerve/Ulnar nerve. SILT throughout. Cap refill <2secs.    Assessment:  82y Female with a left distal radius fracture admitted with anemia s/p 2u PRBC transfusion.     Plan:  Pt planned for ORIF left wrist tomorrow. Surgical procedure discussed with patient in detail including all risks, benefits and alternatives. Pt expresses understanding and consents obtained.  NWB LUE with sugar tong splint and sling.  Keep splint clean, dry and intact.  Pain control.  Ice application.  LUE elevation.  NPO and hold any chemical AC after midnight. Continue to hold Xarelto in view of plan for surgery tomorrow.  IVF while NPO.    Case discussed with and plan as per Dr. Palomino.           Total Encounter Time: 55 min    Encounter time included reviewing the history, patient records, speaking with pt, discussing the nature of the injury, reviewing imaging, interpreting labs, discussing surgery, obtaining surgical consent, ordering additional tests, and coordinating plan w Attending and relaying plan to Medical Team.

## 2023-08-02 NOTE — H&P ADULT - ASSESSMENT
#Anemia  Admit to med-surg  Iron-deficiency. NO acute bleeding  S/p 2U PRBC transfusion with good HH response  Never had colonoscopy- GI eval DR Pearce called    #LT wrist fracture  D/w DR Palomino, pt is on the scheduled for OR tomorrow  Ortho eval  EKG- NSR  NPO p MN for OR  Patient is medically optimized for OR    #Parox afib  Hold xarelto, hold aspirin in view of anemia  Cont BB and cardizem    #HTN/hyperlipidemia- cont home meds    #DVt proph- venodynes    #Dispo- inpatient admit

## 2023-08-02 NOTE — H&P ADULT - NSHPLABSRESULTS_GEN_ALL_CORE
8.8    6.70  )-----------( 245      ( 02 Aug 2023 09:10 )             29.0     01 Aug 2023 17:11    139    |  109    |  18     ----------------------------<  99     4.3     |  24     |  0.90     Ca    8.9        01 Aug 2023 17:11    TPro  7.3    /  Alb  3.0    /  TBili  0.3    /  DBili  x      /  AST  13     /  ALT  17     /  AlkPhos  82     01 Aug 2023 17:11    LIVER FUNCTIONS - ( 01 Aug 2023 17:11 )  Alb: 3.0 g/dL / Pro: 7.3 gm/dL / ALK PHOS: 82 U/L / ALT: 17 U/L / AST: 13 U/L / GGT: x           PT/INR - ( 01 Aug 2023 17:11 )   PT: 13.7 sec;   INR: 1.22 ratio         PTT - ( 01 Aug 2023 17:11 )  PTT:29.2 sec      Urinalysis Basic - ( 01 Aug 2023 17:11 )  Color: x / Appearance: x / SG: x / pH: x  Gluc: 99 mg/dL / Ketone: x  / Bili: x / Urobili: x   Blood: x / Protein: x / Nitrite: x   Leuk Esterase: x / RBC: x / WBC x   Sq Epi: x / Non Sq Epi: x / Bacteria: x

## 2023-08-02 NOTE — PROVIDER CONTACT NOTE (OTHER) - SITUATION
notified Dr Bauer's office of admission please fax over discharged paperwork once patient is discharged to  [phone]

## 2023-08-02 NOTE — DISCHARGE NOTE NURSING/CASE MANAGEMENT/SOCIAL WORK - PATIENT PORTAL LINK FT
You can access the FollowMyHealth Patient Portal offered by [facility] by registering at the following website: [email or web address]. By joining [employer]’s FollowMyHealth portal, you will also be able to view your health information using other applications (apps) compatible with our system.

## 2023-08-02 NOTE — H&P ADULT - NSHPPHYSICALEXAM_GEN_ALL_CORE
Vital Signs Last 24 Hrs  T(C): 37.1 (02 Aug 2023 07:45), Max: 37.1 (02 Aug 2023 07:45)  T(F): 98.7 (02 Aug 2023 07:45), Max: 98.7 (02 Aug 2023 07:45)  HR: 92 (02 Aug 2023 07:45) (69 - 92)  BP: 146/71 (02 Aug 2023 07:45) (124/47 - 184/73)  BP(mean): 67 (01 Aug 2023 15:09) (67 - 67)  RR: 18 (02 Aug 2023 07:45) (16 - 18)  SpO2: 96% (02 Aug 2023 07:45) (96% - 100%)    Parameters below as of 02 Aug 2023 07:45  Patient On (Oxygen Delivery Method): room air

## 2023-08-02 NOTE — H&P ADULT - HISTORY OF PRESENT ILLNESS
81yo/F with PMH parox afib on xarelto, HTN, hyperlipidemia presented with low HH. Patient sustained mechanical fall 7 days ago and went for preop clearance that showed low HH and she referred to ED for blood transfusion. Poor historian, states she never had colonoscopy, denies black tarry stools or seeing blood in stool.

## 2023-08-02 NOTE — DISCHARGE NOTE NURSING/CASE MANAGEMENT/SOCIAL WORK - NSDCPEFALRISK_GEN_ALL_CORE
For information on Fall & Injury Prevention, visit: [email or web address] OR  [email or web address] OR  https://www.cdc.gov/steadi/patient.html

## 2023-08-03 ENCOUNTER — TRANSCRIPTION ENCOUNTER (OUTPATIENT)
Age: 83
End: 2023-08-03

## 2023-08-03 LAB
ANION GAP SERPL CALC-SCNC: 5 MMOL/L — SIGNIFICANT CHANGE UP (ref 5–17)
ANION GAP SERPL CALC-SCNC: 5 MMOL/L — SIGNIFICANT CHANGE UP (ref 5–17)
APTT BLD: 28.2 SEC — SIGNIFICANT CHANGE UP (ref 24.5–35.6)
BUN SERPL-MCNC: 21 MG/DL — SIGNIFICANT CHANGE UP (ref 7–23)
BUN SERPL-MCNC: 24 MG/DL — HIGH (ref 7–23)
CALCIUM SERPL-MCNC: 8.3 MG/DL — LOW (ref 8.5–10.1)
CALCIUM SERPL-MCNC: 8.4 MG/DL — LOW (ref 8.5–10.1)
CHLORIDE SERPL-SCNC: 111 MMOL/L — HIGH (ref 96–108)
CHLORIDE SERPL-SCNC: 112 MMOL/L — HIGH (ref 96–108)
CO2 SERPL-SCNC: 22 MMOL/L — SIGNIFICANT CHANGE UP (ref 22–31)
CO2 SERPL-SCNC: 22 MMOL/L — SIGNIFICANT CHANGE UP (ref 22–31)
CREAT SERPL-MCNC: 0.92 MG/DL — SIGNIFICANT CHANGE UP (ref 0.5–1.3)
CREAT SERPL-MCNC: 0.92 MG/DL — SIGNIFICANT CHANGE UP (ref 0.5–1.3)
EGFR: 63 ML/MIN/1.73M2 — SIGNIFICANT CHANGE UP
EGFR: 63 ML/MIN/1.73M2 — SIGNIFICANT CHANGE UP
GLUCOSE SERPL-MCNC: 106 MG/DL — HIGH (ref 70–99)
GLUCOSE SERPL-MCNC: 108 MG/DL — HIGH (ref 70–99)
HCT VFR BLD CALC: 28.2 % — LOW (ref 34.5–45)
HCT VFR BLD CALC: 30.1 % — LOW (ref 34.5–45)
HGB BLD-MCNC: 8.5 G/DL — LOW (ref 11.5–15.5)
HGB BLD-MCNC: 9 G/DL — LOW (ref 11.5–15.5)
INR BLD: 1.04 RATIO — SIGNIFICANT CHANGE UP (ref 0.85–1.18)
MCHC RBC-ENTMCNC: 21.9 PG — LOW (ref 27–34)
MCHC RBC-ENTMCNC: 22 PG — LOW (ref 27–34)
MCHC RBC-ENTMCNC: 29.9 GM/DL — LOW (ref 32–36)
MCHC RBC-ENTMCNC: 30.1 GM/DL — LOW (ref 32–36)
MCV RBC AUTO: 72.5 FL — LOW (ref 80–100)
MCV RBC AUTO: 73.6 FL — LOW (ref 80–100)
PLATELET # BLD AUTO: 243 K/UL — SIGNIFICANT CHANGE UP (ref 150–400)
PLATELET # BLD AUTO: 271 K/UL — SIGNIFICANT CHANGE UP (ref 150–400)
POTASSIUM SERPL-MCNC: 3.8 MMOL/L — SIGNIFICANT CHANGE UP (ref 3.5–5.3)
POTASSIUM SERPL-MCNC: 4 MMOL/L — SIGNIFICANT CHANGE UP (ref 3.5–5.3)
POTASSIUM SERPL-SCNC: 3.8 MMOL/L — SIGNIFICANT CHANGE UP (ref 3.5–5.3)
POTASSIUM SERPL-SCNC: 4 MMOL/L — SIGNIFICANT CHANGE UP (ref 3.5–5.3)
PROTHROM AB SERPL-ACNC: 11.7 SEC — SIGNIFICANT CHANGE UP (ref 9.5–13)
RBC # BLD: 3.89 M/UL — SIGNIFICANT CHANGE UP (ref 3.8–5.2)
RBC # BLD: 4.09 M/UL — SIGNIFICANT CHANGE UP (ref 3.8–5.2)
RBC # FLD: 19.7 % — HIGH (ref 10.3–14.5)
RBC # FLD: 19.9 % — HIGH (ref 10.3–14.5)
SODIUM SERPL-SCNC: 138 MMOL/L — SIGNIFICANT CHANGE UP (ref 135–145)
SODIUM SERPL-SCNC: 139 MMOL/L — SIGNIFICANT CHANGE UP (ref 135–145)
WBC # BLD: 8.33 K/UL — SIGNIFICANT CHANGE UP (ref 3.8–10.5)
WBC # BLD: 8.57 K/UL — SIGNIFICANT CHANGE UP (ref 3.8–10.5)
WBC # FLD AUTO: 8.33 K/UL — SIGNIFICANT CHANGE UP (ref 3.8–10.5)
WBC # FLD AUTO: 8.57 K/UL — SIGNIFICANT CHANGE UP (ref 3.8–10.5)

## 2023-08-03 RX ORDER — SODIUM CHLORIDE 9 MG/ML
1000 INJECTION, SOLUTION INTRAVENOUS
Refills: 0 | Status: DISCONTINUED | OUTPATIENT
Start: 2023-08-07 | End: 2023-08-07

## 2023-08-03 RX ORDER — DEXTROSE 50 % IN WATER 50 %
25 SYRINGE (ML) INTRAVENOUS ONCE
Refills: 0 | Status: DISCONTINUED | OUTPATIENT
Start: 2023-08-07 | End: 2023-08-07

## 2023-08-03 RX ORDER — SENNA PLUS 8.6 MG/1
2 TABLET ORAL AT BEDTIME
Refills: 0 | Status: DISCONTINUED | OUTPATIENT
Start: 2023-08-04 | End: 2023-08-11

## 2023-08-03 RX ORDER — OXYCODONE HYDROCHLORIDE 5 MG/1
10 TABLET ORAL EVERY 6 HOURS
Refills: 0 | Status: DISCONTINUED | OUTPATIENT
Start: 2023-08-04 | End: 2023-08-04

## 2023-08-03 RX ORDER — POLYETHYLENE GLYCOL 3350 17 G/17G
17 POWDER, FOR SOLUTION ORAL AT BEDTIME
Refills: 0 | Status: DISCONTINUED | OUTPATIENT
Start: 2023-08-04 | End: 2023-08-11

## 2023-08-03 RX ORDER — OXYCODONE HYDROCHLORIDE 5 MG/1
5 TABLET ORAL EVERY 6 HOURS
Refills: 0 | Status: DISCONTINUED | OUTPATIENT
Start: 2023-08-04 | End: 2023-08-04

## 2023-08-03 RX ORDER — DEXTROSE 50 % IN WATER 50 %
15 SYRINGE (ML) INTRAVENOUS ONCE
Refills: 0 | Status: DISCONTINUED | OUTPATIENT
Start: 2023-08-07 | End: 2023-08-07

## 2023-08-03 RX ORDER — ACETAMINOPHEN 500 MG
1000 TABLET ORAL ONCE
Refills: 0 | Status: DISCONTINUED | OUTPATIENT
Start: 2023-08-04 | End: 2023-08-11

## 2023-08-03 RX ORDER — PANTOPRAZOLE SODIUM 20 MG/1
40 TABLET, DELAYED RELEASE ORAL
Refills: 0 | Status: DISCONTINUED | OUTPATIENT
Start: 2023-08-04 | End: 2023-08-11

## 2023-08-03 RX ORDER — ONDANSETRON 8 MG/1
4 TABLET, FILM COATED ORAL EVERY 6 HOURS
Refills: 0 | Status: DISCONTINUED | OUTPATIENT
Start: 2023-08-04 | End: 2023-08-11

## 2023-08-03 RX ORDER — DEXTROSE 50 % IN WATER 50 %
12.5 SYRINGE (ML) INTRAVENOUS ONCE
Refills: 0 | Status: DISCONTINUED | OUTPATIENT
Start: 2023-08-07 | End: 2023-08-07

## 2023-08-03 RX ORDER — ACETAMINOPHEN 500 MG
1000 TABLET ORAL EVERY 8 HOURS
Refills: 0 | Status: DISCONTINUED | OUTPATIENT
Start: 2023-08-04 | End: 2023-08-11

## 2023-08-03 RX ORDER — GLUCAGON INJECTION, SOLUTION 0.5 MG/.1ML
1 INJECTION, SOLUTION SUBCUTANEOUS ONCE
Refills: 0 | Status: DISCONTINUED | OUTPATIENT
Start: 2023-08-07 | End: 2023-08-11

## 2023-08-03 RX ORDER — MAGNESIUM HYDROXIDE 400 MG/1
30 TABLET, CHEWABLE ORAL DAILY
Refills: 0 | Status: DISCONTINUED | OUTPATIENT
Start: 2023-08-04 | End: 2023-08-11

## 2023-08-03 RX ORDER — INSULIN LISPRO 100/ML
VIAL (ML) SUBCUTANEOUS AT BEDTIME
Refills: 0 | Status: DISCONTINUED | OUTPATIENT
Start: 2023-08-07 | End: 2023-08-11

## 2023-08-03 RX ORDER — TRAMADOL HYDROCHLORIDE 50 MG/1
50 TABLET ORAL EVERY 6 HOURS
Refills: 0 | Status: DISCONTINUED | OUTPATIENT
Start: 2023-08-04 | End: 2023-08-04

## 2023-08-03 RX ORDER — HYDROMORPHONE HYDROCHLORIDE 2 MG/ML
0.5 INJECTION INTRAMUSCULAR; INTRAVENOUS; SUBCUTANEOUS ONCE
Refills: 0 | Status: DISCONTINUED | OUTPATIENT
Start: 2023-08-04 | End: 2023-08-11

## 2023-08-03 RX ADMIN — Medication 60 MILLIGRAM(S): at 05:58

## 2023-08-03 RX ADMIN — Medication 75 MILLIGRAM(S): at 22:10

## 2023-08-03 RX ADMIN — Medication 60 MILLIGRAM(S): at 23:59

## 2023-08-03 RX ADMIN — ATORVASTATIN CALCIUM 20 MILLIGRAM(S): 80 TABLET, FILM COATED ORAL at 22:10

## 2023-08-03 NOTE — DISCHARGE NOTE PROVIDER - NSDCFUADDINST_GEN_ALL_CORE_FT
1. Keep bandage on for 5 days. Then you can remove and get it wet. Otherwise cover hand with plastic bag for showering.    2. If you have a splint on, keep it on until you see Dr. Palomino in the office. Do not get the splint wet at all. Left Wrist Non-Weight Bearing.     3. Elevate hand as much as possible and wiggle fingers as much as you can, as often as you think of it (wiggle 10 times every commercial  if you are watching TV, or reading a book after every 5 pages read). The exception is if you have a splint you will not be able to wiggle the affected digit. Try to wiggle the free ones though.    4. Walk plenty, no sitting around.    5. See Dr. Palomino in the office in about 7-10 days. Call to schedule. You will have you wound checked then, any sutures will be removed, and your splint (if you have one on) will be changed.

## 2023-08-03 NOTE — DISCHARGE NOTE PROVIDER - NSDCCPCAREPLAN_GEN_ALL_CORE_FT
PRINCIPAL DISCHARGE DIAGNOSIS  Diagnosis: Distal radius fracture, left  Assessment and Plan of Treatment:

## 2023-08-03 NOTE — DISCHARGE NOTE PROVIDER - NSDCACTIVITY_GEN_ALL_CORE
Follow Instructions Provided by your Surgical Team Walking - Indoors allowed/Follow Instructions Provided by your Surgical Team

## 2023-08-03 NOTE — DISCHARGE NOTE PROVIDER - NSDCCPTREATMENT_GEN_ALL_CORE_FT
PRINCIPAL PROCEDURE  Procedure: ORIF fracture of left distal radius and ulna  Findings and Treatment:

## 2023-08-03 NOTE — DISCHARGE NOTE PROVIDER - NSDCMRMEDTOKEN_GEN_ALL_CORE_FT
aspirin 81 mg oral tablet: 1 tab(s) orally once a day  atorvastatin 20 mg oral tablet: 1 tab(s) orally once a day (at bedtime)  dilTIAZem 60 mg oral tablet: 1 tab(s) orally every 6 hours  losartan 100 mg oral tablet: 1 orally once a day  metoprolol tartrate 75 mg oral tablet: 1 orally 2 times a day  rivaroxaban 15 mg oral tablet: 1 tab(s) orally once a day  Xarelto 15 mg oral tablet: 1 orally once a day   aspirin 81 mg oral tablet: 1 tab(s) orally once a day  atorvastatin 20 mg oral tablet: 1 tab(s) orally once a day (at bedtime)  dilTIAZem 60 mg oral tablet: 1 tab(s) orally every 6 hours  losartan 100 mg oral tablet: 1 orally once a day  metoprolol tartrate 75 mg oral tablet: 1 orally 2 times a day  rivaroxaban 15 mg oral tablet: 1 tab(s) orally once a day   acetaminophen 325 mg oral tablet: 2 tab(s) orally every 6 hours As needed Temp greater or equal to 38C (100.4F), Mild Pain (1 - 3)  aspirin 81 mg oral tablet: 1 tab(s) orally once a day  atorvastatin 20 mg oral tablet: 1 tab(s) orally once a day (at bedtime)  dilTIAZem 60 mg oral tablet: 1 tab(s) orally every 6 hours  losartan 100 mg oral tablet: 1 orally once a day  metoprolol tartrate 75 mg oral tablet: 1 orally 2 times a day  oxyCODONE 10 mg oral tablet: 1 tab(s) orally every 6 hours As needed Severe Pain (7 - 10)  oxyCODONE 5 mg oral tablet: 1 tab(s) orally every 6 hours As needed Moderate Pain (4 - 6)  Protonix 40 mg oral delayed release tablet: 1 tab(s) orally once a day  rivaroxaban 15 mg oral tablet: 1 tab(s) orally once a day  Senna 8.6 mg oral tablet: 1 tab(s) orally once a day (at bedtime)

## 2023-08-03 NOTE — DISCHARGE NOTE PROVIDER - PROVIDER TOKENS
PROVIDER:[TOKEN:[[id number]]] PROVIDER:[TOKEN:[[id number]]],PROVIDER:[TOKEN:[[id number]],FOLLOWUP:[2 weeks]],PROVIDER:[TOKEN:[[id number]]]

## 2023-08-03 NOTE — DISCHARGE NOTE PROVIDER - CARE PROVIDER_API CALL
Gloria Palomino  Orthopaedic Surgery  [address] [place] [address]  Phone: [phone]  Fax: [phone]  Follow Up Time:    Gloria Palomino  Orthopaedic Surgery  [address] [place] [address]  Phone: [phone]  Fax: [phone]  Follow Up Time:     Manoj Pearce  Gastroenterology  [address] [facility] [facility] [address]  [place] [address]  Phone: [phone]  Fax: [phone]  Follow Up Time: 2 weeks    Pita Potter  Colon/Rectal Surgery  [address] [facility], [place] [address]  Phone: [phone]  Fax: [phone]  Follow Up Time:

## 2023-08-03 NOTE — BRIEF OPERATIVE NOTE - NSICDXBRIEFPROCEDURE_GEN_ALL_CORE_FT
PROCEDURES:  Open reduction and internal fixation of 3 or more fragments of distal radius 03-Aug-2023 15:00:25 L Distal Radius José Miguel Dueñas

## 2023-08-03 NOTE — DISCHARGE NOTE PROVIDER - CARE PROVIDERS DIRECT ADDRESSES
[email or web address]

## 2023-08-04 LAB
ANION GAP SERPL CALC-SCNC: 10 MMOL/L — SIGNIFICANT CHANGE UP (ref 5–17)
BUN SERPL-MCNC: 21 MG/DL — SIGNIFICANT CHANGE UP (ref 7–23)
CALCIUM SERPL-MCNC: 8.6 MG/DL — SIGNIFICANT CHANGE UP (ref 8.5–10.1)
CHLORIDE SERPL-SCNC: 110 MMOL/L — HIGH (ref 96–108)
CO2 SERPL-SCNC: 20 MMOL/L — LOW (ref 22–31)
CREAT SERPL-MCNC: 1.1 MG/DL — SIGNIFICANT CHANGE UP (ref 0.5–1.3)
EGFR: 50 ML/MIN/1.73M2 — LOW
GLUCOSE SERPL-MCNC: 162 MG/DL — HIGH (ref 70–99)
HCT VFR BLD CALC: 31.2 % — LOW (ref 34.5–45)
HGB BLD-MCNC: 9.2 G/DL — LOW (ref 11.5–15.5)
MCHC RBC-ENTMCNC: 21.7 PG — LOW (ref 27–34)
MCHC RBC-ENTMCNC: 29.5 GM/DL — LOW (ref 32–36)
MCV RBC AUTO: 73.8 FL — LOW (ref 80–100)
PLATELET # BLD AUTO: 310 K/UL — SIGNIFICANT CHANGE UP (ref 150–400)
POTASSIUM SERPL-MCNC: 4.1 MMOL/L — SIGNIFICANT CHANGE UP (ref 3.5–5.3)
POTASSIUM SERPL-SCNC: 4.1 MMOL/L — SIGNIFICANT CHANGE UP (ref 3.5–5.3)
RBC # BLD: 4.23 M/UL — SIGNIFICANT CHANGE UP (ref 3.8–5.2)
RBC # FLD: 19.9 % — HIGH (ref 10.3–14.5)
SODIUM SERPL-SCNC: 140 MMOL/L — SIGNIFICANT CHANGE UP (ref 135–145)
WBC # BLD: 11.75 K/UL — HIGH (ref 3.8–10.5)
WBC # FLD AUTO: 11.75 K/UL — HIGH (ref 3.8–10.5)

## 2023-08-04 PROCEDURE — 99233 SBSQ HOSP IP/OBS HIGH 50: CPT

## 2023-08-04 RX ORDER — CEFAZOLIN SODIUM 1 G
2000 VIAL (EA) INJECTION EVERY 8 HOURS
Refills: 0 | Status: COMPLETED | OUTPATIENT
Start: 2023-08-04 | End: 2023-08-04

## 2023-08-04 RX ORDER — NALOXONE HYDROCHLORIDE 4 MG/.1ML
0.2 SPRAY NASAL
Refills: 0 | Status: DISCONTINUED | OUTPATIENT
Start: 2023-08-04 | End: 2023-08-11

## 2023-08-04 RX ORDER — RIVAROXABAN 15 MG-20MG
15 KIT ORAL
Refills: 0 | Status: DISCONTINUED | OUTPATIENT
Start: 2023-08-04 | End: 2023-08-06

## 2023-08-04 RX ADMIN — Medication 60 MILLIGRAM(S): at 11:52

## 2023-08-04 RX ADMIN — Medication 75 MILLIGRAM(S): at 20:58

## 2023-08-04 RX ADMIN — POLYETHYLENE GLYCOL 3350 17 GRAM(S): 17 POWDER, FOR SOLUTION ORAL at 20:58

## 2023-08-04 RX ADMIN — Medication 75 MILLIGRAM(S): at 11:19

## 2023-08-04 RX ADMIN — OXYCODONE HYDROCHLORIDE 5 MILLIGRAM(S): 5 TABLET ORAL at 11:20

## 2023-08-04 RX ADMIN — Medication 100 MILLIGRAM(S): at 05:26

## 2023-08-04 RX ADMIN — SENNA PLUS 2 TABLET(S): 8.6 TABLET ORAL at 20:59

## 2023-08-04 RX ADMIN — Medication 60 MILLIGRAM(S): at 18:00

## 2023-08-04 RX ADMIN — Medication 1000 MILLIGRAM(S): at 17:35

## 2023-08-04 RX ADMIN — PANTOPRAZOLE SODIUM 40 MILLIGRAM(S): 20 TABLET, DELAYED RELEASE ORAL at 05:26

## 2023-08-04 RX ADMIN — Medication 1000 MILLIGRAM(S): at 20:57

## 2023-08-04 RX ADMIN — Medication 100 MILLIGRAM(S): at 15:05

## 2023-08-04 RX ADMIN — Medication 60 MILLIGRAM(S): at 05:26

## 2023-08-04 RX ADMIN — Medication 1 TABLET(S): at 11:53

## 2023-08-04 RX ADMIN — Medication 1000 MILLIGRAM(S): at 15:12

## 2023-08-04 RX ADMIN — RIVAROXABAN 15 MILLIGRAM(S): KIT at 18:01

## 2023-08-04 RX ADMIN — ATORVASTATIN CALCIUM 20 MILLIGRAM(S): 80 TABLET, FILM COATED ORAL at 20:58

## 2023-08-04 RX ADMIN — Medication 1000 MILLIGRAM(S): at 21:50

## 2023-08-04 RX ADMIN — Medication 1000 MILLIGRAM(S): at 05:25

## 2023-08-04 RX ADMIN — OXYCODONE HYDROCHLORIDE 5 MILLIGRAM(S): 5 TABLET ORAL at 12:25

## 2023-08-04 NOTE — PHYSICAL THERAPY INITIAL EVALUATION ADULT - MANUAL MUSCLE TESTING RESULTS, REHAB EVAL
Right UE and bilateral LE's WFL. Pt able to wiggle fingers left hand, left UE not fully assessed at this time.

## 2023-08-04 NOTE — PHYSICAL THERAPY INITIAL EVALUATION ADULT - ACTIVE RANGE OF MOTION EXAMINATION, REHAB EVAL
Left UE not fully assessed. Pt able to move fingers./Right UE Active ROM was WFL (within functional limits)/Left LE Active ROM was WFL (within functional limits)/Right LE Active ROM was WFL (within functional limits)

## 2023-08-04 NOTE — PHYSICAL THERAPY INITIAL EVALUATION ADULT - PLANNED THERAPY INTERVENTIONS, PT EVAL
Stair training. Eval, amb, transfers, bed mobility x 15'. Pt left OOB in chair with all observation section equipment/material intact and pad alarm intact/activated. Pt c/o pain left lower arm 4/10. Will cont to follow pt and increase as tolerated./bed mobility training/gait training/transfer training

## 2023-08-04 NOTE — PHYSICAL THERAPY INITIAL EVALUATION ADULT - GAIT TRAINING, PT EVAL
By D/C: Pt will amb with ' with independence. By D/C; Pt will ascend/descend 5 steps with HR/SAC, step to gait pattern, and independence.

## 2023-08-05 LAB
HCT VFR BLD CALC: 28.9 % — LOW (ref 34.5–45)
HGB BLD-MCNC: 8.8 G/DL — LOW (ref 11.5–15.5)
MCHC RBC-ENTMCNC: 22.5 PG — LOW (ref 27–34)
MCHC RBC-ENTMCNC: 30.4 GM/DL — LOW (ref 32–36)
MCV RBC AUTO: 73.9 FL — LOW (ref 80–100)
PLATELET # BLD AUTO: 294 K/UL — SIGNIFICANT CHANGE UP (ref 150–400)
RBC # BLD: 3.91 M/UL — SIGNIFICANT CHANGE UP (ref 3.8–5.2)
RBC # FLD: 20.3 % — HIGH (ref 10.3–14.5)
WBC # BLD: 12.49 K/UL — HIGH (ref 3.8–10.5)
WBC # FLD AUTO: 12.49 K/UL — HIGH (ref 3.8–10.5)

## 2023-08-05 PROCEDURE — 99233 SBSQ HOSP IP/OBS HIGH 50: CPT

## 2023-08-05 RX ADMIN — Medication 60 MILLIGRAM(S): at 23:13

## 2023-08-05 RX ADMIN — RIVAROXABAN 15 MILLIGRAM(S): KIT at 17:18

## 2023-08-05 RX ADMIN — POLYETHYLENE GLYCOL 3350 17 GRAM(S): 17 POWDER, FOR SOLUTION ORAL at 22:35

## 2023-08-05 RX ADMIN — Medication 1000 MILLIGRAM(S): at 22:34

## 2023-08-05 RX ADMIN — Medication 60 MILLIGRAM(S): at 12:29

## 2023-08-05 RX ADMIN — Medication 75 MILLIGRAM(S): at 09:57

## 2023-08-05 RX ADMIN — Medication 3 MILLIGRAM(S): at 22:32

## 2023-08-05 RX ADMIN — Medication 1 TABLET(S): at 09:57

## 2023-08-05 RX ADMIN — Medication 60 MILLIGRAM(S): at 05:33

## 2023-08-05 RX ADMIN — SENNA PLUS 2 TABLET(S): 8.6 TABLET ORAL at 22:33

## 2023-08-05 RX ADMIN — ATORVASTATIN CALCIUM 20 MILLIGRAM(S): 80 TABLET, FILM COATED ORAL at 22:33

## 2023-08-05 RX ADMIN — Medication 1000 MILLIGRAM(S): at 05:33

## 2023-08-05 RX ADMIN — Medication 1000 MILLIGRAM(S): at 06:30

## 2023-08-05 RX ADMIN — Medication 1000 MILLIGRAM(S): at 14:07

## 2023-08-05 RX ADMIN — Medication 60 MILLIGRAM(S): at 00:26

## 2023-08-05 RX ADMIN — Medication 60 MILLIGRAM(S): at 17:17

## 2023-08-05 RX ADMIN — PANTOPRAZOLE SODIUM 40 MILLIGRAM(S): 20 TABLET, DELAYED RELEASE ORAL at 05:33

## 2023-08-05 NOTE — CHART NOTE - NSCHARTNOTEFT_GEN_A_CORE
pt HR 59, trending into low 50s. on metoprolol tartrate 75 BID and diltiazem 60mg Q 6 hours.     Hold night dose of metoprolol for now. consider dose decrease.

## 2023-08-06 LAB
ANION GAP SERPL CALC-SCNC: 7 MMOL/L — SIGNIFICANT CHANGE UP (ref 5–17)
BUN SERPL-MCNC: 25 MG/DL — HIGH (ref 7–23)
CALCIUM SERPL-MCNC: 8.4 MG/DL — LOW (ref 8.5–10.1)
CHLORIDE SERPL-SCNC: 113 MMOL/L — HIGH (ref 96–108)
CO2 SERPL-SCNC: 22 MMOL/L — SIGNIFICANT CHANGE UP (ref 22–31)
CREAT SERPL-MCNC: 0.93 MG/DL — SIGNIFICANT CHANGE UP (ref 0.5–1.3)
EGFR: 62 ML/MIN/1.73M2 — SIGNIFICANT CHANGE UP
GLUCOSE SERPL-MCNC: 91 MG/DL — SIGNIFICANT CHANGE UP (ref 70–99)
HCT VFR BLD CALC: 28.7 % — LOW (ref 34.5–45)
HGB BLD-MCNC: 8.5 G/DL — LOW (ref 11.5–15.5)
MCHC RBC-ENTMCNC: 22.1 PG — LOW (ref 27–34)
MCHC RBC-ENTMCNC: 29.6 GM/DL — LOW (ref 32–36)
MCV RBC AUTO: 74.5 FL — LOW (ref 80–100)
PLATELET # BLD AUTO: 247 K/UL — SIGNIFICANT CHANGE UP (ref 150–400)
POTASSIUM SERPL-MCNC: 4.1 MMOL/L — SIGNIFICANT CHANGE UP (ref 3.5–5.3)
POTASSIUM SERPL-SCNC: 4.1 MMOL/L — SIGNIFICANT CHANGE UP (ref 3.5–5.3)
RBC # BLD: 3.85 M/UL — SIGNIFICANT CHANGE UP (ref 3.8–5.2)
RBC # FLD: 20.9 % — HIGH (ref 10.3–14.5)
SODIUM SERPL-SCNC: 142 MMOL/L — SIGNIFICANT CHANGE UP (ref 135–145)
WBC # BLD: 8.09 K/UL — SIGNIFICANT CHANGE UP (ref 3.8–10.5)
WBC # FLD AUTO: 8.09 K/UL — SIGNIFICANT CHANGE UP (ref 3.8–10.5)

## 2023-08-06 PROCEDURE — 99232 SBSQ HOSP IP/OBS MODERATE 35: CPT

## 2023-08-06 RX ADMIN — Medication 1000 MILLIGRAM(S): at 23:15

## 2023-08-06 RX ADMIN — Medication 60 MILLIGRAM(S): at 11:08

## 2023-08-06 RX ADMIN — Medication 3 MILLIGRAM(S): at 22:06

## 2023-08-06 RX ADMIN — ATORVASTATIN CALCIUM 20 MILLIGRAM(S): 80 TABLET, FILM COATED ORAL at 22:07

## 2023-08-06 RX ADMIN — Medication 75 MILLIGRAM(S): at 11:07

## 2023-08-06 RX ADMIN — Medication 1 TABLET(S): at 11:07

## 2023-08-06 RX ADMIN — POLYETHYLENE GLYCOL 3350 17 GRAM(S): 17 POWDER, FOR SOLUTION ORAL at 22:06

## 2023-08-06 RX ADMIN — Medication 1000 MILLIGRAM(S): at 22:07

## 2023-08-06 RX ADMIN — Medication 60 MILLIGRAM(S): at 17:25

## 2023-08-06 RX ADMIN — Medication 60 MILLIGRAM(S): at 06:02

## 2023-08-06 RX ADMIN — PANTOPRAZOLE SODIUM 40 MILLIGRAM(S): 20 TABLET, DELAYED RELEASE ORAL at 06:02

## 2023-08-06 RX ADMIN — Medication 60 MILLIGRAM(S): at 23:21

## 2023-08-06 RX ADMIN — SENNA PLUS 2 TABLET(S): 8.6 TABLET ORAL at 22:06

## 2023-08-06 RX ADMIN — Medication 1000 MILLIGRAM(S): at 13:28

## 2023-08-06 RX ADMIN — Medication 1000 MILLIGRAM(S): at 06:03

## 2023-08-06 NOTE — CONSULT NOTE ADULT - SUBJECTIVE AND OBJECTIVE BOX
HPI:  83yo/F with PMH parox afib on xarelto, HTN, hyperlipidemia presented with low HH. Patient sustained mechanical fall 7 days ago and went for preop clearance that showed low HH and she referred to ED for blood transfusion. Poor historian, states she never had colonoscopy, denies black tarry stools or seeing blood in stool.  (02 Aug 2023 11:16)      PAST MEDICAL & SURGICAL HISTORY:  HTN (hypertension)      Glaucoma      OA (osteoarthritis)      Arteriosclerotic heart disease (ASHD)      Hyperlipidemia      Status post glaucoma surgery  and cataract          MEDICATIONS  (STANDING):  acetaminophen     Tablet .. 1000 milliGRAM(s) Oral every 8 hours  acetaminophen   IVPB .. 1000 milliGRAM(s) IV Intermittent once  atorvastatin 20 milliGRAM(s) Oral at bedtime  diltiazem    Tablet 60 milliGRAM(s) Oral every 6 hours  HYDROmorphone  Injectable 0.5 milliGRAM(s) IV Push once  metoprolol tartrate 75 milliGRAM(s) Oral two times a day  multivitamin 1 Tablet(s) Oral daily  pantoprazole    Tablet 40 milliGRAM(s) Oral before breakfast  polyethylene glycol 3350 17 Gram(s) Oral at bedtime  senna 2 Tablet(s) Oral at bedtime    MEDICATIONS  (PRN):  acetaminophen     Tablet .. 650 milliGRAM(s) Oral every 6 hours PRN Temp greater or equal to 38C (100.4F), Mild Pain (1 - 3)  aluminum hydroxide/magnesium hydroxide/simethicone Suspension 30 milliLiter(s) Oral every 4 hours PRN Dyspepsia  magnesium hydroxide Suspension 30 milliLiter(s) Oral daily PRN Constipation  melatonin 3 milliGRAM(s) Oral at bedtime PRN Insomnia  naloxone Injectable 0.2 milliGRAM(s) IV Push every 3 minutes PRN signs/ symptoms opioid overdose  ondansetron Injectable 4 milliGRAM(s) IV Push every 8 hours PRN Nausea and/or Vomiting  ondansetron Injectable 4 milliGRAM(s) IV Push every 6 hours PRN Nausea and/or Vomiting  oxyCODONE    IR 5 milliGRAM(s) Oral every 6 hours PRN Moderate Pain (4 - 6)  oxyCODONE    IR 10 milliGRAM(s) Oral every 6 hours PRN Severe Pain (7 - 10)  traMADol 50 milliGRAM(s) Oral every 6 hours PRN Mild Pain (1 - 3)      Allergies    No Known Allergies    Intolerances        SOCIAL HISTORY:    FAMILY HISTORY:  FHx: colon cancer  in brother,     FH: pancreatic cancer  in sister,     FH: CAD (coronary artery disease)  in mother and father, both      Non-contributory    REVIEW OF SYSTEMS      General:	    Respiratory and Thorax:  	  Cardiovascular:	    Gastrointestinal:	    Musculoskeletal:	   Vital Signs Last 24 Hrs  T(C): 36.6 (06 Aug 2023 07:59), Max: 36.6 (05 Aug 2023 16:39)  T(F): 97.9 (06 Aug 2023 07:59), Max: 97.9 (05 Aug 2023 16:39)  HR: 63 (06 Aug 2023 07:59) (58 - 63)  BP: 132/50 (06 Aug 2023 07:59) (118/45 - 139/62)  BP(mean): --  RR: 18 (06 Aug 2023 07:59) (17 - 18)  SpO2: 98% (06 Aug 2023 07:59) (96% - 98%)    Parameters below as of 06 Aug 2023 07:59  Patient On (Oxygen Delivery Method): room air        HEENT  Pallor.No icterus. EOMI,PERLAA  Chest : Clear to Auscultation  CVS : S1S2 Normal.No murmurs.  Abdomen: Soft.Non tender .Normal bowel sounds.No Organomegaly.  CNS: Alert.Oriented to Time,Place and Person.No focal deficit.  LABS:                        8.5    8.09  )-----------( 247      ( 06 Aug 2023 07:51 )             28.7     08-06    142  |  113<H>  |  25<H>  ----------------------------<  91  4.1   |  22  |  0.93    Ca    8.4<L>      06 Aug 2023 07:51            RADIOLOGY & ADDITIONAL STUDIES:

## 2023-08-07 LAB
ANION GAP SERPL CALC-SCNC: 10 MMOL/L — SIGNIFICANT CHANGE UP (ref 5–17)
BUN SERPL-MCNC: 24 MG/DL — HIGH (ref 7–23)
CALCIUM SERPL-MCNC: 8.1 MG/DL — LOW (ref 8.5–10.1)
CHLORIDE SERPL-SCNC: 108 MMOL/L — SIGNIFICANT CHANGE UP (ref 96–108)
CO2 SERPL-SCNC: 21 MMOL/L — LOW (ref 22–31)
CREAT SERPL-MCNC: 0.9 MG/DL — SIGNIFICANT CHANGE UP (ref 0.5–1.3)
EGFR: 64 ML/MIN/1.73M2 — SIGNIFICANT CHANGE UP
GLUCOSE BLDC GLUCOMTR-MCNC: 135 MG/DL — HIGH (ref 70–99)
GLUCOSE SERPL-MCNC: 166 MG/DL — HIGH (ref 70–99)
HCT VFR BLD CALC: 29.7 % — LOW (ref 34.5–45)
HGB BLD-MCNC: 8.8 G/DL — LOW (ref 11.5–15.5)
MCHC RBC-ENTMCNC: 21.9 PG — LOW (ref 27–34)
MCHC RBC-ENTMCNC: 29.6 GM/DL — LOW (ref 32–36)
MCV RBC AUTO: 74.1 FL — LOW (ref 80–100)
PLATELET # BLD AUTO: 253 K/UL — SIGNIFICANT CHANGE UP (ref 150–400)
POTASSIUM SERPL-MCNC: 3.8 MMOL/L — SIGNIFICANT CHANGE UP (ref 3.5–5.3)
POTASSIUM SERPL-SCNC: 3.8 MMOL/L — SIGNIFICANT CHANGE UP (ref 3.5–5.3)
RBC # BLD: 4.01 M/UL — SIGNIFICANT CHANGE UP (ref 3.8–5.2)
RBC # FLD: 20.9 % — HIGH (ref 10.3–14.5)
SODIUM SERPL-SCNC: 139 MMOL/L — SIGNIFICANT CHANGE UP (ref 135–145)
WBC # BLD: 9.31 K/UL — SIGNIFICANT CHANGE UP (ref 3.8–10.5)
WBC # FLD AUTO: 9.31 K/UL — SIGNIFICANT CHANGE UP (ref 3.8–10.5)

## 2023-08-07 PROCEDURE — 99232 SBSQ HOSP IP/OBS MODERATE 35: CPT

## 2023-08-07 RX ORDER — SOD SULF/SODIUM/NAHCO3/KCL/PEG
1000 SOLUTION, RECONSTITUTED, ORAL ORAL ONCE
Refills: 0 | Status: COMPLETED | OUTPATIENT
Start: 2023-08-07 | End: 2023-08-07

## 2023-08-07 RX ORDER — INSULIN LISPRO 100/ML
VIAL (ML) SUBCUTANEOUS
Refills: 0 | Status: DISCONTINUED | OUTPATIENT
Start: 2023-08-07 | End: 2023-08-07

## 2023-08-07 RX ORDER — SODIUM CHLORIDE 9 MG/ML
1000 INJECTION, SOLUTION INTRAVENOUS
Refills: 0 | Status: DISCONTINUED | OUTPATIENT
Start: 2023-08-07 | End: 2023-08-07

## 2023-08-07 RX ORDER — SOD SULF/SODIUM/NAHCO3/KCL/PEG
1000 SOLUTION, RECONSTITUTED, ORAL ORAL ONCE
Refills: 0 | Status: COMPLETED | OUTPATIENT
Start: 2023-08-08 | End: 2023-08-08

## 2023-08-07 RX ADMIN — Medication 1 TABLET(S): at 10:35

## 2023-08-07 RX ADMIN — POLYETHYLENE GLYCOL 3350 17 GRAM(S): 17 POWDER, FOR SOLUTION ORAL at 22:49

## 2023-08-07 RX ADMIN — Medication 75 MILLIGRAM(S): at 10:34

## 2023-08-07 RX ADMIN — Medication 650 MILLIGRAM(S): at 10:34

## 2023-08-07 RX ADMIN — Medication 60 MILLIGRAM(S): at 06:20

## 2023-08-07 RX ADMIN — Medication 1000 MILLIGRAM(S): at 23:44

## 2023-08-07 RX ADMIN — Medication 60 MILLIGRAM(S): at 18:20

## 2023-08-07 RX ADMIN — Medication 60 MILLIGRAM(S): at 23:41

## 2023-08-07 RX ADMIN — Medication 650 MILLIGRAM(S): at 11:34

## 2023-08-07 RX ADMIN — ATORVASTATIN CALCIUM 20 MILLIGRAM(S): 80 TABLET, FILM COATED ORAL at 22:49

## 2023-08-07 RX ADMIN — Medication 1000 MILLIGRAM(S): at 06:20

## 2023-08-07 RX ADMIN — Medication 1000 MILLIGRAM(S): at 22:58

## 2023-08-07 RX ADMIN — PANTOPRAZOLE SODIUM 40 MILLIGRAM(S): 20 TABLET, DELAYED RELEASE ORAL at 06:20

## 2023-08-07 RX ADMIN — SENNA PLUS 2 TABLET(S): 8.6 TABLET ORAL at 22:49

## 2023-08-07 RX ADMIN — Medication 1000 MILLILITER(S): at 18:23

## 2023-08-07 RX ADMIN — Medication 75 MILLIGRAM(S): at 22:50

## 2023-08-07 RX ADMIN — Medication 60 MILLIGRAM(S): at 12:02

## 2023-08-07 NOTE — OCCUPATIONAL THERAPY INITIAL EVALUATION ADULT - PERTINENT HX OF CURRENT PROBLEM, REHAB EVAL
Per EMR, pt is a 81 y/o female with PMH parox afib on xarelto, HTN, hyperlipidemia presented with low HH. Patient sustained mechanical fall 7 days ago and went for preop clearance that showed low HH and she referred to ED for blood transfusion. Poor historian, states she never had colonoscopy, denies black tarry stools or seeing blood in stool. Pt found to have left wrist fracture and is now s/p L distal radius ORIF. Per EMR, pt is a 81 y/o female with PMH parox Afib on Xarelto, HTN, hyperlipidemia presented with low HH. Patient sustained mechanical fall 7 days ago and went for preop clearance that showed low HH and she referred to ED for blood transfusion. Poor historian, states she never had colonoscopy, denies black tarry stools or seeing blood in stool. Pt found to have left wrist fracture and is now s/p L distal radius ORIF.

## 2023-08-07 NOTE — OCCUPATIONAL THERAPY INITIAL EVALUATION ADULT - NSACTIVITYREC_GEN_A_OT
Pt presents with impaired balance, endurance and muscle strength that will benefit from skilled OT to improve independence in ADLs, reduce fall risk and chance for readmission. Pt educated on WBP and impact on ADL/IADL tasks and functional mobility. Pt provided with handout going over precautions and compensatory techniques for ADLs.

## 2023-08-07 NOTE — OCCUPATIONAL THERAPY INITIAL EVALUATION ADULT - COGNITIVE, VISUAL PERCEPTUAL, OT EVAL
Patient will improve memory and recall 3/3 items for medication management in 2 weeks. Pt will adhere to and recall WBP with 100% accuracy using compensatory techniques prn to increase safety with ADL, IADL, and fxl transfers in 2 weeks.

## 2023-08-07 NOTE — OCCUPATIONAL THERAPY INITIAL EVALUATION ADULT - ADDITIONAL COMMENTS
Pt resides in a PH alone with 4 RAFAEL and FOS inside. Pt resides in a  alone with 4 RAFAEL and FOS inside to bedroom/full bathroom. Pt has a walk in shower stall with GB's, retractable shower head, non-slip mat, and standard toilet. Pt reports being (I) with all ADL, IADL tasks PTA, walked with SAC. +, RHD.

## 2023-08-07 NOTE — OCCUPATIONAL THERAPY INITIAL EVALUATION ADULT - TRANSFER TRAINING, PT EVAL
Pt will perform shower transfer with SBA in 3-5 tx sessions. Pt will perform toilet/commode transfer with supervision assist in 3-5 tx sessions.

## 2023-08-07 NOTE — OCCUPATIONAL THERAPY INITIAL EVALUATION ADULT - GENERAL OBSERVATIONS, REHAB EVAL
Pt rec'd/left sitting in bedside chair, chair alarmed, sling and splint intact on LUE, lines intact, CB/TT/phone IR, needs met.

## 2023-08-07 NOTE — OCCUPATIONAL THERAPY INITIAL EVALUATION ADULT - ADL RETRAINING, OT EVAL
Pt will perform UE dressing with minimal assist in 3-5 tx sessions. Pt will perform LE dressing with CG assist in 3-5 tx sessions. Pt will perform footwear management with CG assist in 3-5 tx sessions. Pt will perform toileting with CG assist in 3-5 tx sessions.

## 2023-08-07 NOTE — OCCUPATIONAL THERAPY INITIAL EVALUATION ADULT - NS ASR FOLLOW COMMAND OT EVAL
cues for redirection and following directions/75% of the time/able to follow single-step instructions

## 2023-08-07 NOTE — OCCUPATIONAL THERAPY INITIAL EVALUATION ADULT - NSOTDISCHREC_GEN_A_CORE
home OT- if d/c home pt would need constant supervision (HHA) 2* mildly impaired cognition, balance, insight, and assistance needed for ADL, IADL tasks, and fxl transfers (shower transfer)

## 2023-08-07 NOTE — OCCUPATIONAL THERAPY INITIAL EVALUATION ADULT - MD ORDER
"OT Evaluate and Treat"- MD orders received. Chart reviewed, contents noted, conferred with RN "OT Evaluate and Treat"- MD orders received. Chart reviewed, contents noted, conferred with ISIDRA PADILLA

## 2023-08-07 NOTE — OCCUPATIONAL THERAPY INITIAL EVALUATION ADULT - LEVEL OF INDEPENDENCE: DRESS UPPER BODY, OT EVAL
educated on compensatory techniques, sequencing  and how to don/doff sling/moderate assist (50% patients effort)

## 2023-08-08 LAB
A1C WITH ESTIMATED AVERAGE GLUCOSE RESULT: 5.5 % — SIGNIFICANT CHANGE UP (ref 4–5.6)
ANION GAP SERPL CALC-SCNC: 9 MMOL/L — SIGNIFICANT CHANGE UP (ref 5–17)
BUN SERPL-MCNC: 18 MG/DL — SIGNIFICANT CHANGE UP (ref 7–23)
CALCIUM SERPL-MCNC: 8.6 MG/DL — SIGNIFICANT CHANGE UP (ref 8.5–10.1)
CHLORIDE SERPL-SCNC: 112 MMOL/L — HIGH (ref 96–108)
CO2 SERPL-SCNC: 21 MMOL/L — LOW (ref 22–31)
CREAT SERPL-MCNC: 0.82 MG/DL — SIGNIFICANT CHANGE UP (ref 0.5–1.3)
EGFR: 72 ML/MIN/1.73M2 — SIGNIFICANT CHANGE UP
ESTIMATED AVERAGE GLUCOSE: 111 MG/DL — SIGNIFICANT CHANGE UP (ref 68–114)
GLUCOSE BLDC GLUCOMTR-MCNC: 131 MG/DL — HIGH (ref 70–99)
GLUCOSE SERPL-MCNC: 96 MG/DL — SIGNIFICANT CHANGE UP (ref 70–99)
HCT VFR BLD CALC: 31.3 % — LOW (ref 34.5–45)
HGB BLD-MCNC: 9.3 G/DL — LOW (ref 11.5–15.5)
MCHC RBC-ENTMCNC: 21.9 PG — LOW (ref 27–34)
MCHC RBC-ENTMCNC: 29.7 GM/DL — LOW (ref 32–36)
MCV RBC AUTO: 73.8 FL — LOW (ref 80–100)
PLATELET # BLD AUTO: 259 K/UL — SIGNIFICANT CHANGE UP (ref 150–400)
POTASSIUM SERPL-MCNC: 3.7 MMOL/L — SIGNIFICANT CHANGE UP (ref 3.5–5.3)
POTASSIUM SERPL-SCNC: 3.7 MMOL/L — SIGNIFICANT CHANGE UP (ref 3.5–5.3)
RBC # BLD: 4.24 M/UL — SIGNIFICANT CHANGE UP (ref 3.8–5.2)
RBC # FLD: 21.6 % — HIGH (ref 10.3–14.5)
SODIUM SERPL-SCNC: 142 MMOL/L — SIGNIFICANT CHANGE UP (ref 135–145)
WBC # BLD: 8.4 K/UL — SIGNIFICANT CHANGE UP (ref 3.8–10.5)
WBC # FLD AUTO: 8.4 K/UL — SIGNIFICANT CHANGE UP (ref 3.8–10.5)

## 2023-08-08 PROCEDURE — 99232 SBSQ HOSP IP/OBS MODERATE 35: CPT

## 2023-08-08 PROCEDURE — 88305 TISSUE EXAM BY PATHOLOGIST: CPT | Mod: 26

## 2023-08-08 PROCEDURE — 88313 SPECIAL STAINS GROUP 2: CPT | Mod: 26

## 2023-08-08 RX ADMIN — Medication 75 MILLIGRAM(S): at 22:34

## 2023-08-08 RX ADMIN — Medication 3 MILLIGRAM(S): at 22:33

## 2023-08-08 RX ADMIN — Medication 75 MILLIGRAM(S): at 11:51

## 2023-08-08 RX ADMIN — Medication 1000 MILLILITER(S): at 06:11

## 2023-08-08 RX ADMIN — Medication 1000 MILLIGRAM(S): at 22:34

## 2023-08-08 RX ADMIN — Medication 60 MILLIGRAM(S): at 06:08

## 2023-08-08 RX ADMIN — ATORVASTATIN CALCIUM 20 MILLIGRAM(S): 80 TABLET, FILM COATED ORAL at 22:33

## 2023-08-08 RX ADMIN — PANTOPRAZOLE SODIUM 40 MILLIGRAM(S): 20 TABLET, DELAYED RELEASE ORAL at 06:08

## 2023-08-08 RX ADMIN — Medication 60 MILLIGRAM(S): at 17:37

## 2023-08-08 RX ADMIN — Medication 60 MILLIGRAM(S): at 11:50

## 2023-08-08 RX ADMIN — Medication 1 TABLET(S): at 11:51

## 2023-08-08 RX ADMIN — Medication 200 MILLIGRAM(S): at 02:08

## 2023-08-08 RX ADMIN — Medication 60 MILLIGRAM(S): at 22:33

## 2023-08-08 RX ADMIN — Medication 650 MILLIGRAM(S): at 06:10

## 2023-08-08 RX ADMIN — SENNA PLUS 2 TABLET(S): 8.6 TABLET ORAL at 22:33

## 2023-08-09 LAB
ANION GAP SERPL CALC-SCNC: 4 MMOL/L — LOW (ref 5–17)
BUN SERPL-MCNC: 16 MG/DL — SIGNIFICANT CHANGE UP (ref 7–23)
CALCIUM SERPL-MCNC: 8.3 MG/DL — LOW (ref 8.5–10.1)
CHLORIDE SERPL-SCNC: 111 MMOL/L — HIGH (ref 96–108)
CO2 SERPL-SCNC: 25 MMOL/L — SIGNIFICANT CHANGE UP (ref 22–31)
CREAT SERPL-MCNC: 0.82 MG/DL — SIGNIFICANT CHANGE UP (ref 0.5–1.3)
EGFR: 72 ML/MIN/1.73M2 — SIGNIFICANT CHANGE UP
GLUCOSE BLDC GLUCOMTR-MCNC: 163 MG/DL — HIGH (ref 70–99)
GLUCOSE SERPL-MCNC: 96 MG/DL — SIGNIFICANT CHANGE UP (ref 70–99)
HCT VFR BLD CALC: 30.1 % — LOW (ref 34.5–45)
HGB BLD-MCNC: 8.9 G/DL — LOW (ref 11.5–15.5)
MCHC RBC-ENTMCNC: 21.9 PG — LOW (ref 27–34)
MCHC RBC-ENTMCNC: 29.6 GM/DL — LOW (ref 32–36)
MCV RBC AUTO: 74.1 FL — LOW (ref 80–100)
PLATELET # BLD AUTO: 231 K/UL — SIGNIFICANT CHANGE UP (ref 150–400)
POTASSIUM SERPL-MCNC: 4 MMOL/L — SIGNIFICANT CHANGE UP (ref 3.5–5.3)
POTASSIUM SERPL-SCNC: 4 MMOL/L — SIGNIFICANT CHANGE UP (ref 3.5–5.3)
RBC # BLD: 4.06 M/UL — SIGNIFICANT CHANGE UP (ref 3.8–5.2)
RBC # FLD: 21.6 % — HIGH (ref 10.3–14.5)
SODIUM SERPL-SCNC: 140 MMOL/L — SIGNIFICANT CHANGE UP (ref 135–145)
WBC # BLD: 6.7 K/UL — SIGNIFICANT CHANGE UP (ref 3.8–10.5)
WBC # FLD AUTO: 6.7 K/UL — SIGNIFICANT CHANGE UP (ref 3.8–10.5)

## 2023-08-09 PROCEDURE — 99232 SBSQ HOSP IP/OBS MODERATE 35: CPT

## 2023-08-09 PROCEDURE — 74177 CT ABD & PELVIS W/CONTRAST: CPT | Mod: 26

## 2023-08-09 RX ADMIN — POLYETHYLENE GLYCOL 3350 17 GRAM(S): 17 POWDER, FOR SOLUTION ORAL at 22:50

## 2023-08-09 RX ADMIN — Medication 3 MILLIGRAM(S): at 22:42

## 2023-08-09 RX ADMIN — Medication 1000 MILLIGRAM(S): at 05:50

## 2023-08-09 RX ADMIN — PANTOPRAZOLE SODIUM 40 MILLIGRAM(S): 20 TABLET, DELAYED RELEASE ORAL at 05:50

## 2023-08-09 RX ADMIN — Medication 1 TABLET(S): at 10:07

## 2023-08-09 RX ADMIN — Medication 75 MILLIGRAM(S): at 10:07

## 2023-08-09 RX ADMIN — SENNA PLUS 2 TABLET(S): 8.6 TABLET ORAL at 22:43

## 2023-08-09 RX ADMIN — Medication 75 MILLIGRAM(S): at 22:41

## 2023-08-09 RX ADMIN — Medication 60 MILLIGRAM(S): at 17:37

## 2023-08-09 RX ADMIN — Medication 1000 MILLIGRAM(S): at 14:03

## 2023-08-09 RX ADMIN — Medication 60 MILLIGRAM(S): at 14:03

## 2023-08-09 RX ADMIN — Medication 60 MILLIGRAM(S): at 05:50

## 2023-08-09 RX ADMIN — ATORVASTATIN CALCIUM 20 MILLIGRAM(S): 80 TABLET, FILM COATED ORAL at 22:43

## 2023-08-09 RX ADMIN — Medication 1000 MILLIGRAM(S): at 22:32

## 2023-08-09 RX ADMIN — Medication 1000 MILLIGRAM(S): at 14:33

## 2023-08-09 NOTE — CHART NOTE - NSCHARTNOTEFT_GEN_A_CORE
Called by Radiologist to report CT abdomen findings:     CT abdomen demonstrated findings of:     Apparent eccentric and irregular wall thickening of the cecum, highly   suspicious for cecal mass. Fecal debris is also a consideration, but   thought to be less likely. If unable to further assess this region by   endoscopy, contrast enhanced pelvic MRI may be helpful for further   evaluation.    Colonic diverticulosis. Wall thickening of the sigmoid colon, similar to   the prior exam of 3/26/2020, and may be secondary to chronic changes   related to diverticular disease.    Tethered appearance between the sigmoid colon and the rectum, which could   be due to underlying adhesion, with fistula not entirely excluded.   Findings may be chronic, as there is no significant surrounding   inflammatory changes, and there was questionable less conspicuous   developing tethering on the prior exam of 3/26/2020. An associated linear   hyperdensity at the site of apparent tethering possibly represents a   foreign body or other retained ingested material, which questionably   spans the sigmoid colon and rectum, possibly contained within an   underlying fistula, with bowel perforation not excluded. No   pneumoperitoneum or intra-abdominal fluid collection. Called by Radiologist to report CT abdomen findings:     CT abdomen demonstrated findings of:     Apparent eccentric and irregular wall thickening of the cecum, highly   suspicious for cecal mass. Fecal debris is also a consideration, but   thought to be less likely. If unable to further assess this region by   endoscopy, contrast enhanced pelvic MRI may be helpful for further   evaluation.    Colonic diverticulosis. Wall thickening of the sigmoid colon, similar to   the prior exam of 3/26/2020, and may be secondary to chronic changes   related to diverticular disease.    Tethered appearance between the sigmoid colon and the rectum, which could   be due to underlying adhesion, with fistula not entirely excluded.   Findings may be chronic, as there is no significant surrounding   inflammatory changes, and there was questionable less conspicuous   developing tethering on the prior exam of 3/26/2020. An associated linear   hyperdensity at the site of apparent tethering possibly represents a   foreign body or other retained ingested material, which questionably   spans the sigmoid colon and rectum, possibly contained within an   underlying fistula, with bowel perforation not excluded. No   pneumoperitoneum or intra-abdominal fluid collection.      Will defer to primary team and GI for further evaluation and management of CT findings

## 2023-08-10 ENCOUNTER — TRANSCRIPTION ENCOUNTER (OUTPATIENT)
Age: 83
End: 2023-08-10

## 2023-08-10 LAB
ANION GAP SERPL CALC-SCNC: 6 MMOL/L — SIGNIFICANT CHANGE UP (ref 5–17)
BUN SERPL-MCNC: 16 MG/DL — SIGNIFICANT CHANGE UP (ref 7–23)
CALCIUM SERPL-MCNC: 8.4 MG/DL — LOW (ref 8.5–10.1)
CEA SERPL-MCNC: 2.2 NG/ML — SIGNIFICANT CHANGE UP (ref 0–3.8)
CHLORIDE SERPL-SCNC: 109 MMOL/L — HIGH (ref 96–108)
CO2 SERPL-SCNC: 22 MMOL/L — SIGNIFICANT CHANGE UP (ref 22–31)
CREAT SERPL-MCNC: 0.99 MG/DL — SIGNIFICANT CHANGE UP (ref 0.5–1.3)
EGFR: 57 ML/MIN/1.73M2 — LOW
GLUCOSE BLDC GLUCOMTR-MCNC: 115 MG/DL — HIGH (ref 70–99)
GLUCOSE SERPL-MCNC: 152 MG/DL — HIGH (ref 70–99)
POTASSIUM SERPL-MCNC: 4.3 MMOL/L — SIGNIFICANT CHANGE UP (ref 3.5–5.3)
POTASSIUM SERPL-SCNC: 4.3 MMOL/L — SIGNIFICANT CHANGE UP (ref 3.5–5.3)
SARS-COV-2 RNA SPEC QL NAA+PROBE: DETECTED
SODIUM SERPL-SCNC: 137 MMOL/L — SIGNIFICANT CHANGE UP (ref 135–145)
SURGICAL PATHOLOGY STUDY: SIGNIFICANT CHANGE UP

## 2023-08-10 PROCEDURE — 99232 SBSQ HOSP IP/OBS MODERATE 35: CPT

## 2023-08-10 RX ADMIN — SENNA PLUS 2 TABLET(S): 8.6 TABLET ORAL at 22:25

## 2023-08-10 RX ADMIN — Medication 60 MILLIGRAM(S): at 14:34

## 2023-08-10 RX ADMIN — PANTOPRAZOLE SODIUM 40 MILLIGRAM(S): 20 TABLET, DELAYED RELEASE ORAL at 06:19

## 2023-08-10 RX ADMIN — Medication 75 MILLIGRAM(S): at 10:56

## 2023-08-10 RX ADMIN — Medication 75 MILLIGRAM(S): at 22:25

## 2023-08-10 RX ADMIN — Medication 60 MILLIGRAM(S): at 23:36

## 2023-08-10 RX ADMIN — Medication 1000 MILLIGRAM(S): at 22:25

## 2023-08-10 RX ADMIN — Medication 1 TABLET(S): at 10:56

## 2023-08-10 RX ADMIN — Medication 60 MILLIGRAM(S): at 00:00

## 2023-08-10 RX ADMIN — Medication 1000 MILLIGRAM(S): at 06:19

## 2023-08-10 RX ADMIN — ATORVASTATIN CALCIUM 20 MILLIGRAM(S): 80 TABLET, FILM COATED ORAL at 22:25

## 2023-08-10 RX ADMIN — Medication 1000 MILLIGRAM(S): at 14:35

## 2023-08-10 RX ADMIN — Medication 1000 MILLIGRAM(S): at 23:25

## 2023-08-10 NOTE — CONSULT NOTE ADULT - CONSULT REASON
Iron def. Anemia
Left Distal Radius Fracture    Consult called ~1100  Pt evaluated ~1130
possible cecal mass

## 2023-08-10 NOTE — CONSULT NOTE ADULT - SUBJECTIVE AND OBJECTIVE BOX
Surgery Consultation    81 yo F with PMH of Afib on xarelto, HTN, hyperlipidemia s/p fall with L radius who presented with low HH. Patient sustained mechanical fall 7 days ago and went for preop clearance that showed low HH and she referred to ED for blood transfusion. Pt had the ORIF 8/3 and GI was consulted for Anemia. Pt had EGD/Colonoscopy , where they could go beyond the sigmoid due to tortuosity and possible adhesion. Pt had subsuwent CT a/p w/oral and IV contrast which showed possible cecal wall thickening could represent mass. Colorectal was consulted for evaluation. Pt was seen at bedside, reports she never had colonoscopy until yesterday. Pt is tolerating regular diet and reports passing flatus and had nl BM w/o blood this morning. Denies fever or chills, n/v/d/c.      FH: CAD (coronary artery disease), in mother and father, both   FH: pancreatic cancer, in sister,   FHx: colon cancer, in brother, .         PAST MEDICAL & SURGICAL HISTORY:  HTN (hypertension)      Glaucoma      OA (osteoarthritis)      Arteriosclerotic heart disease (ASHD)      Hyperlipidemia      Status post glaucoma surgery  and cataract        Allergies:  No Known Allergies    Home Medications:  aspirin 81 mg oral tablet: 1 tab(s) orally once a day  losartan 100 mg oral tablet: 1 orally once a day  metoprolol tartrate 75 mg oral tablet: 1 orally 2 times a day      Family History:  FAMILY HISTORY:  FHx: colon cancer  in brother,     FH: pancreatic cancer  in sister,     FH: CAD (coronary artery disease)  in mother and father, both         ROS:  Constitutional: Denies fever, fatigue or weight loss.  Skin: Denies rash.  Eyes: Denies recent vision problems or eye pain.  ENT: Denies congestion, ear pain, or sore throat.  Endocrine: Denies thyroid problems.  Cardiovascular: Denies chest pain or palpation.  Respiratory: Denies cough, shortness of breath, congestion, or wheezing.  Gastrointestinal: Denies abdominal pain, nausea, vomiting, or diarrhea.  Genitourinary: Denies dysuria.  Musculoskeletal: Denies joint swelling.  Neurologic: Denies headache.      Physical Examination:  AO x3, NAD  GENERAL: No acute distress, well-developed  HEAD:  Atraumatic, Normocephalic  CHEST/LUNG: CTAB; No wheezes, rales, or rhonchi  HEART: Regular rate and rhythm; No murmurs, rubs, or gallops  ABDOMEN: Soft, non-tender, non-distended  LAURA: tone nl, minimal stool, no blood or any mass palpated  NEUROLOGY: responding appropriately, no focal deficits    Data:                        8.9    6.70  )-----------( 231      ( 09 Aug 2023 08:05 )             30.1     08-10    137  |  109<H>  |  16  ----------------------------<  152<H>  4.3   |  22  |  0.99    Ca    8.4<L>      10 Aug 2023 09:46          Urinalysis Basic - ( 10 Aug 2023 09:46 )    Color: x / Appearance: x / SG: x / pH: x  Gluc: 152 mg/dL / Ketone: x  / Bili: x / Urobili: x   Blood: x / Protein: x / Nitrite: x   Leuk Esterase: x / RBC: x / WBC x   Sq Epi: x / Non Sq Epi: x / Bacteria: x        Radiology:    CT a/p:  < from: CT Abdomen and Pelvis w/ Oral Cont and w/ IV Cont (23 @ 17:34) >  Apparent eccentric and irregular wall thickening of the cecum, highly   suspicious for cecal mass. Fecal debris is also a consideration, but   thought to be less likely. If unable to further assess this region by   endoscopy, contrast enhanced pelvic MRI may be helpful for further   evaluation.    Colonic diverticulosis. Wall thickeningof the sigmoid colon, similar to   the prior exam of 3/26/2020, and may be secondary to chronic changes   related to diverticular disease.    Tethered appearance between the sigmoid colon and the rectum, which could   be due to underlying adhesion, with fistula not entirely excluded.   Findings may be chronic, as there is no significant surrounding   inflammatory changes, and there was questionable less conspicuous   developing tethering on the prior exam of 3/26/2020. An associated linear   hyperdensity at the site of apparent tethering possibly represents a   foreign body or other retained ingested material, which questionably   spans the sigmoid colon and rectum, possibly contained within an   underlying fistula, with bowel perforation notexcluded. No   pneumoperitoneum or intra-abdominal fluid collection.      < end of copied text >      Colonoscopy: The sigmoid colon was significantly tortuous. Unable to pass the scope beyond the Sigmoid Colon due to possible adhesions.    IMPRESS Impression:          - Tortuous colon.    IMPRESS                      - No specimens collected.       Surgery Consultation    83 yo F with PMH of Afib on xarelto, HTN, hyperlipidemia s/p fall with L radius who presented with low HH. Patient sustained mechanical fall 7 days ago and went for preop clearance that showed low HH and she referred to ED for blood transfusion. Pt had the ORIF 8/3 and GI was consulted for Anemia. Pt had EGD/Colonoscopy , where they could go beyond the sigmoid due to tortuosity and possible adhesion. Pt had subsequent CT a/p w/oral and IV contrast which showed possible cecal wall thickening could represent mass. Colorectal was consulted for evaluation. Pt was seen at bedside, reports she never had colonoscopy until yesterday. Pt is tolerating regular diet and reports passing flatus and had nl BM w/o blood this morning. Denies fever or chills, n/v/d/c.      FH: CAD (coronary artery disease), in mother and father, both   FH: pancreatic cancer, in sister,   FHx: colon cancer, in brother, .         PAST MEDICAL & SURGICAL HISTORY:  HTN (hypertension)      Glaucoma      OA (osteoarthritis)      Arteriosclerotic heart disease (ASHD)      Hyperlipidemia      Status post glaucoma surgery  and cataract        Allergies:  No Known Allergies    Home Medications:  aspirin 81 mg oral tablet: 1 tab(s) orally once a day  losartan 100 mg oral tablet: 1 orally once a day  metoprolol tartrate 75 mg oral tablet: 1 orally 2 times a day      Family History:  FAMILY HISTORY:  FHx: colon cancer  in brother,     FH: pancreatic cancer  in sister,     FH: CAD (coronary artery disease)  in mother and father, both         ROS:  Constitutional: Denies fever, fatigue or weight loss.  Skin: Denies rash.  Eyes: Denies recent vision problems or eye pain.  ENT: Denies congestion, ear pain, or sore throat.  Endocrine: Denies thyroid problems.  Cardiovascular: Denies chest pain or palpation.  Respiratory: Denies cough, shortness of breath, congestion, or wheezing.  Gastrointestinal: Denies abdominal pain, nausea, vomiting, or diarrhea.  Genitourinary: Denies dysuria.  Musculoskeletal: Denies joint swelling.  Neurologic: Denies headache.      Physical Examination:  AO x3, NAD  GENERAL: No acute distress, well-developed  HEAD:  Atraumatic, Normocephalic  CHEST/LUNG: CTAB; No wheezes, rales, or rhonchi  HEART: Regular rate and rhythm; No murmurs, rubs, or gallops  ABDOMEN: Soft, non-tender, non-distended  LAURA: tone nl, minimal stool, no blood or any mass palpated  NEUROLOGY: responding appropriately, no focal deficits    Data:                        8.9    6.70  )-----------( 231      ( 09 Aug 2023 08:05 )             30.1     08-10    137  |  109<H>  |  16  ----------------------------<  152<H>  4.3   |  22  |  0.99    Ca    8.4<L>      10 Aug 2023 09:46          Urinalysis Basic - ( 10 Aug 2023 09:46 )    Color: x / Appearance: x / SG: x / pH: x  Gluc: 152 mg/dL / Ketone: x  / Bili: x / Urobili: x   Blood: x / Protein: x / Nitrite: x   Leuk Esterase: x / RBC: x / WBC x   Sq Epi: x / Non Sq Epi: x / Bacteria: x        Radiology:    CT a/p:  < from: CT Abdomen and Pelvis w/ Oral Cont and w/ IV Cont (23 @ 17:34) >  Apparent eccentric and irregular wall thickening of the cecum, highly   suspicious for cecal mass. Fecal debris is also a consideration, but   thought to be less likely. If unable to further assess this region by   endoscopy, contrast enhanced pelvic MRI may be helpful for further   evaluation.    Colonic diverticulosis. Wall thickeningof the sigmoid colon, similar to   the prior exam of 3/26/2020, and may be secondary to chronic changes   related to diverticular disease.    Tethered appearance between the sigmoid colon and the rectum, which could   be due to underlying adhesion, with fistula not entirely excluded.   Findings may be chronic, as there is no significant surrounding   inflammatory changes, and there was questionable less conspicuous   developing tethering on the prior exam of 3/26/2020. An associated linear   hyperdensity at the site of apparent tethering possibly represents a   foreign body or other retained ingested material, which questionably   spans the sigmoid colon and rectum, possibly contained within an   underlying fistula, with bowel perforation notexcluded. No   pneumoperitoneum or intra-abdominal fluid collection.      < end of copied text >      Colonoscopy: The sigmoid colon was significantly tortuous. Unable to pass the scope beyond the Sigmoid Colon due to possible adhesions.    IMPRESS Impression:          - Tortuous colon.    IMPRESS                      - No specimens collected.

## 2023-08-10 NOTE — CONSULT NOTE ADULT - ASSESSMENT
A/P:  81 yo F with cecal wall thickening and sigmoid colon adhesion possible fistula on CT  passing flatus and having nl BM      P:  pending  A/P:  83 yo F with cecal wall thickening and sigmoid colon adhesion possible fistula on CT  passing flatus and having nl BM  colonoscopy performed but could not get passed Sigmoid 2/2 adhesions      P:  f/u [facility]  Pt needs CT colography ( not available in hospital)  pt can f/u outpatient after the study is done  cont rest of management as per primary team    Plan d/w Dr. Potter

## 2023-08-11 VITALS
TEMPERATURE: 98 F | RESPIRATION RATE: 18 BRPM | SYSTOLIC BLOOD PRESSURE: 134 MMHG | HEART RATE: 69 BPM | OXYGEN SATURATION: 100 % | DIASTOLIC BLOOD PRESSURE: 63 MMHG

## 2023-08-11 LAB
ANION GAP SERPL CALC-SCNC: 9 MMOL/L — SIGNIFICANT CHANGE UP (ref 5–17)
BUN SERPL-MCNC: 16 MG/DL — SIGNIFICANT CHANGE UP (ref 7–23)
CALCIUM SERPL-MCNC: 8.3 MG/DL — LOW (ref 8.5–10.1)
CHLORIDE SERPL-SCNC: 108 MMOL/L — SIGNIFICANT CHANGE UP (ref 96–108)
CO2 SERPL-SCNC: 19 MMOL/L — LOW (ref 22–31)
CREAT SERPL-MCNC: 0.98 MG/DL — SIGNIFICANT CHANGE UP (ref 0.5–1.3)
EGFR: 58 ML/MIN/1.73M2 — LOW
GLUCOSE BLDC GLUCOMTR-MCNC: 109 MG/DL — HIGH (ref 70–99)
GLUCOSE BLDC GLUCOMTR-MCNC: 134 MG/DL — HIGH (ref 70–99)
GLUCOSE SERPL-MCNC: 140 MG/DL — HIGH (ref 70–99)
POTASSIUM SERPL-MCNC: 4.1 MMOL/L — SIGNIFICANT CHANGE UP (ref 3.5–5.3)
POTASSIUM SERPL-SCNC: 4.1 MMOL/L — SIGNIFICANT CHANGE UP (ref 3.5–5.3)
SODIUM SERPL-SCNC: 136 MMOL/L — SIGNIFICANT CHANGE UP (ref 135–145)

## 2023-08-11 PROCEDURE — 99239 HOSP IP/OBS DSCHRG MGMT >30: CPT

## 2023-08-11 RX ORDER — OXYCODONE HYDROCHLORIDE 5 MG/1
1 TABLET ORAL
Qty: 0 | Refills: 0 | DISCHARGE
Start: 2023-08-11

## 2023-08-11 RX ORDER — ACETAMINOPHEN 500 MG
2 TABLET ORAL
Qty: 0 | Refills: 0 | DISCHARGE
Start: 2023-08-11

## 2023-08-11 RX ORDER — SENNA PLUS 8.6 MG/1
1 TABLET ORAL
Qty: 0 | Refills: 0 | DISCHARGE
Start: 2023-08-11

## 2023-08-11 RX ORDER — PANTOPRAZOLE SODIUM 20 MG/1
1 TABLET, DELAYED RELEASE ORAL
Qty: 0 | Refills: 0 | DISCHARGE
Start: 2023-08-11

## 2023-08-11 RX ADMIN — Medication 1000 MILLIGRAM(S): at 07:15

## 2023-08-11 RX ADMIN — Medication 60 MILLIGRAM(S): at 06:15

## 2023-08-11 RX ADMIN — Medication 1 TABLET(S): at 09:53

## 2023-08-11 RX ADMIN — Medication 75 MILLIGRAM(S): at 09:54

## 2023-08-11 RX ADMIN — Medication 1000 MILLIGRAM(S): at 06:15

## 2023-08-11 RX ADMIN — PANTOPRAZOLE SODIUM 40 MILLIGRAM(S): 20 TABLET, DELAYED RELEASE ORAL at 06:15

## 2023-08-11 NOTE — PROGRESS NOTE ADULT - PROVIDER SPECIALTY LIST ADULT
Hospitalist
Orthopedics
Hospitalist
Orthopedics

## 2023-08-11 NOTE — PROGRESS NOTE ADULT - SUBJECTIVE AND OBJECTIVE BOX
81yo/F with PMH parox afib on xarelto, HTN, hyperlipidemia presented with low HH. Patient sustained mechanical fall 7 days ago and went for preop clearance that showed low HH and she referred to ED for blood transfusion. Poor historian, states she never had colonoscopy, denies black tarry stools or seeing blood in stool.     08/04/23: Sitting in chair, denies any complaints.   08/05/23: No new issues.  08/06/23: Discussed with Dr Pearce, will see her tomorrow and possible EGD/colon on Tuesday. Discussed with patient.   08/07/23: No new issues. Possible EGD/colon tomorrow. Discussed with patient.  08/08/23: EGD/colonoscopy today. Family requesting for [facility]  08/09/23: EGD-reflux esophagitis, colonoscopy- tortuous colon. No new issues. Discussed with Dr Pearce, cleared  for discharge      Vital Signs Last 24 Hrs  T(C): 36.5 (09 Aug 2023 07:44), Max: 37.7 (08 Aug 2023 17:02)  T(F): 97.7 (09 Aug 2023 07:44), Max: 99.8 (08 Aug 2023 17:02)  HR: 69 (09 Aug 2023 13:57) (60 - 73)  BP: 120/52 (09 Aug 2023 13:57) (120/52 - 140/57)  BP(mean): --  RR: 18 (09 Aug 2023 07:44) (18 - 18)  SpO2: 100% (09 Aug 2023 07:44) (98% - 100%)    Parameters below as of 09 Aug 2023 07:44  Patient On (Oxygen Delivery Method): room air            Physical Exam:    · Constitutional no distress  · Eyes EOMI  · ENMT no gross abnormalities  · Respiratory clear to auscultation bilaterally  · Cardiovascular regular rate and rhythm  · Gastrointestinal soft; nontender; nondistended; normal active bowel sounds  · Neurological cranial nerves II-XII intact; sensation intact  · Skin warm and dry  · Musculoskeletal Comments LT wrist in splint                              8.9    6.70  )-----------( 231      ( 09 Aug 2023 08:05 )             30.1     09 Aug 2023 08:05    140    |  111    |  16     ----------------------------<  96     4.0     |  25     |  0.82     Ca    8.3        09 Aug 2023 08:05          CAPILLARY BLOOD GLUCOSE      POCT Blood Glucose.: 131 mg/dL (08 Aug 2023 22:32)        Urinalysis Basic - ( 09 Aug 2023 08:05 )    Color: x / Appearance: x / SG: x / pH: x  Gluc: 96 mg/dL / Ketone: x  / Bili: x / Urobili: x   Blood: x / Protein: x / Nitrite: x   Leuk Esterase: x / RBC: x / WBC x   Sq Epi: x / Non Sq Epi: x / Bacteria: x              MEDICATIONS  (STANDING):  acetaminophen     Tablet .. 1000 milliGRAM(s) Oral every 8 hours  acetaminophen   IVPB .. 1000 milliGRAM(s) IV Intermittent once  atorvastatin 20 milliGRAM(s) Oral at bedtime  ceFAZolin   IVPB 2000 milliGRAM(s) IV Intermittent every 8 hours  diltiazem    Tablet 60 milliGRAM(s) Oral every 6 hours  HYDROmorphone  Injectable 0.5 milliGRAM(s) IV Push once  metoprolol tartrate 75 milliGRAM(s) Oral two times a day  multivitamin 1 Tablet(s) Oral daily  pantoprazole    Tablet 40 milliGRAM(s) Oral before breakfast  polyethylene glycol 3350 17 Gram(s) Oral at bedtime  rivaroxaban 15 milliGRAM(s) Oral with dinner  senna 2 Tablet(s) Oral at bedtime    MEDICATIONS  (PRN):  acetaminophen     Tablet .. 650 milliGRAM(s) Oral every 6 hours PRN Temp greater or equal to 38C (100.4F), Mild Pain (1 - 3)  aluminum hydroxide/magnesium hydroxide/simethicone Suspension 30 milliLiter(s) Oral every 4 hours PRN Dyspepsia  magnesium hydroxide Suspension 30 milliLiter(s) Oral daily PRN Constipation  melatonin 3 milliGRAM(s) Oral at bedtime PRN Insomnia  ondansetron Injectable 4 milliGRAM(s) IV Push every 8 hours PRN Nausea and/or Vomiting  ondansetron Injectable 4 milliGRAM(s) IV Push every 6 hours PRN Nausea and/or Vomiting  oxyCODONE    IR 5 milliGRAM(s) Oral every 4 hours PRN Moderate Pain (4 - 6)  oxyCODONE    IR 10 milliGRAM(s) Oral every 4 hours PRN Severe Pain (7 - 10)  traMADol 50 milliGRAM(s) Oral every 6 hours PRN Mild Pain (1 - 3)            Assessment:      #Anemia  Suspected GI bleed  Iron-deficiency. No acute bleeding  S/p 2U PRBC transfusion with good HH response  Never had colonoscopy- GI eval DR Pearce appreciated  S/P EGD/colonoscopy  yesterday as above  Check CT abd/pelvis    #LT wrist fracture S/P ORIF POD#6  Ortho follow up appreciated  Pain meds  Continue PT    # Urinary retention-resolved  Follow clinically    #Parox afib  Restarted xarelto  Cont BB and cardizem    #HTN/hyperlipidemia- cont home meds    #DVt proph- venodynes    Disposition: CT abd today  Possibly to [facility] tomorrow  
83yo/F with PMH parox afib on xarelto, HTN, hyperlipidemia presented with low HH. Patient sustained mechanical fall 7 days ago and went for preop clearance that showed low HH and she referred to ED for blood transfusion. Poor historian, states she never had colonoscopy, denies black tarry stools or seeing blood in stool.     08/04/23: Sitting in chair, denies any complaints.   08/05/23: No new issues.    Vital Signs Last 24 Hrs  T(C): 36.7 (05 Aug 2023 08:02), Max: 36.7 (05 Aug 2023 08:02)  T(F): 98.1 (05 Aug 2023 08:02), Max: 98.1 (05 Aug 2023 08:02)  HR: 51 (05 Aug 2023 08:02) (51 - 66)  BP: 121/50 (05 Aug 2023 08:02) (119/53 - 141/60)  BP(mean): 70 (05 Aug 2023 05:29) (70 - 70)  RR: 18 (05 Aug 2023 08:02) (18 - 18)  SpO2: 97% (05 Aug 2023 08:02) (97% - 100%)    Parameters below as of 05 Aug 2023 08:02  Patient On (Oxygen Delivery Method): room air        Physical Exam:    · Constitutional no distress  · Eyes EOMI  · ENMT no gross abnormalities  · Respiratory clear to auscultation bilaterally  · Cardiovascular regular rate and rhythm  · Gastrointestinal soft; nontender; nondistended; normal active bowel sounds  · Neurological cranial nerves II-XII intact; sensation intact  · Skin warm and dry  · Musculoskeletal Comments LT wrist in splint          Labs:                                             8.8    12.49 )-----------( 294      ( 05 Aug 2023 07:50 )             28.9     04 Aug 2023 08:30    140    |  110    |  21     ----------------------------<  162    4.1     |  20     |  1.10     Ca    8.6        04 Aug 2023 08:30          CAPILLARY BLOOD GLUCOSE            Urinalysis Basic - ( 04 Aug 2023 08:30 )    Color: x / Appearance: x / SG: x / pH: x  Gluc: 162 mg/dL / Ketone: x  / Bili: x / Urobili: x   Blood: x / Protein: x / Nitrite: x   Leuk Esterase: x / RBC: x / WBC x   Sq Epi: x / Non Sq Epi: x / Bacteria: x          MEDICATIONS  (STANDING):  acetaminophen     Tablet .. 1000 milliGRAM(s) Oral every 8 hours  acetaminophen   IVPB .. 1000 milliGRAM(s) IV Intermittent once  atorvastatin 20 milliGRAM(s) Oral at bedtime  ceFAZolin   IVPB 2000 milliGRAM(s) IV Intermittent every 8 hours  diltiazem    Tablet 60 milliGRAM(s) Oral every 6 hours  HYDROmorphone  Injectable 0.5 milliGRAM(s) IV Push once  metoprolol tartrate 75 milliGRAM(s) Oral two times a day  multivitamin 1 Tablet(s) Oral daily  pantoprazole    Tablet 40 milliGRAM(s) Oral before breakfast  polyethylene glycol 3350 17 Gram(s) Oral at bedtime  rivaroxaban 15 milliGRAM(s) Oral with dinner  senna 2 Tablet(s) Oral at bedtime    MEDICATIONS  (PRN):  acetaminophen     Tablet .. 650 milliGRAM(s) Oral every 6 hours PRN Temp greater or equal to 38C (100.4F), Mild Pain (1 - 3)  aluminum hydroxide/magnesium hydroxide/simethicone Suspension 30 milliLiter(s) Oral every 4 hours PRN Dyspepsia  magnesium hydroxide Suspension 30 milliLiter(s) Oral daily PRN Constipation  melatonin 3 milliGRAM(s) Oral at bedtime PRN Insomnia  ondansetron Injectable 4 milliGRAM(s) IV Push every 8 hours PRN Nausea and/or Vomiting  ondansetron Injectable 4 milliGRAM(s) IV Push every 6 hours PRN Nausea and/or Vomiting  oxyCODONE    IR 5 milliGRAM(s) Oral every 4 hours PRN Moderate Pain (4 - 6)  oxyCODONE    IR 10 milliGRAM(s) Oral every 4 hours PRN Severe Pain (7 - 10)  traMADol 50 milliGRAM(s) Oral every 6 hours PRN Mild Pain (1 - 3)            Assessment:      #Anemia  Suspected GI bleed  Iron-deficiency. NO acute bleeding  S/p 2U PRBC transfusion with good HH response  Never had colonoscopy- GI eval DR Pearce pending. Called today and left message on voice mail    #LT wrist fracture S/P ORIF POD#2  Ortho follow up appreciated  Pain meds  Continue PT    # Urinary retention-resolved  Follow clinically    #Parox afib  Restarted xarelto  Cont BB and cardizem    #HTN/hyperlipidemia- cont home meds    #DVt proph- venodynes    Disposition: Follow GI eval  
Orthopedics Post-op Check  POD 0  Patient seen and examined at bedside. Pain is controlled. Patient is feeling well and denies any CP, SOB, nausea or vomiting.    LABS:                        9.0    8.33  )-----------( 243      ( 03 Aug 2023 15:04 )             30.1     08-03    138  |  111<H>  |  21  ----------------------------<  108<H>  3.8   |  22  |  0.92    Ca    8.4<L>      03 Aug 2023 15:04    TPro  7.3  /  Alb  3.0<L>  /  TBili  0.3  /  DBili  x   /  AST  13<L>  /  ALT  17  /  AlkPhos  82  08-01    PT/INR - ( 03 Aug 2023 03:46 )   PT: 11.7 sec;   INR: 1.04 ratio         PTT - ( 03 Aug 2023 03:46 )  PTT:28.2 sec  Urinalysis Basic - ( 03 Aug 2023 15:04 )    Color: x / Appearance: x / SG: x / pH: x  Gluc: 108 mg/dL / Ketone: x  / Bili: x / Urobili: x   Blood: x / Protein: x / Nitrite: x   Leuk Esterase: x / RBC: x / WBC x   Sq Epi: x / Non Sq Epi: x / Bacteria: x        VITAL SIGNS:  T(C): 36.7 (08-03-23 @ 16:00), Max: 37.1 (08-03-23 @ 07:59)  HR: 68 (08-03-23 @ 16:00) (64 - 89)  BP: 145/71 (08-03-23 @ 16:00) (96/83 - 151/68)  RR: 20 (08-03-23 @ 16:00) (15 - 22)  SpO2: 95% (08-03-23 @ 16:00) (94% - 98%)    Physical Exam:   Gen: NAD, resting comfortably  LUE:   Dressing c/d/i, Splint in place  Able to move all fingers without difficulty or pain  SILT Ax/Musc/U/M/R  Compartments soft and compressible  Fingers warm and well perfused, Cap Refill <2 sec    A/P:  82yFemale Stable POD 0  s/p ORIF L Distal radius    LUE NWB in splint, PT/OT  Pain management PRN  Continue PPx antibiotics x24 hrs.   DVT PPx: hold until POD1. Per Primary team.   Incentive spirometry  Medical management appreciated.   Ortho stable for dc  No further acute orthopedic surgical intervention  Follow up with Dr. Palomino in office in 7-10 days from discharge. Please call office for appointment. 
Orthopedics Progress Note  POD 1  Patient seen and examined at bedside. Pain controlled. Patient is feeling well and denies any CP, SOB, nausea or vomiting.    Vital Signs Last 24 Hrs  T(C): 36.6 (03 Aug 2023 21:48), Max: 37.1 (03 Aug 2023 07:59)  T(F): 97.9 (03 Aug 2023 21:48), Max: 98.7 (03 Aug 2023 07:59)  HR: 64 (04 Aug 2023 00:01) (64 - 81)  BP: 131/69 (04 Aug 2023 00:01) (94/53 - 151/68)  BP(mean): --  ABP: --  ABP(mean): --  RR: 19 (04 Aug 2023 00:01) (15 - 22)  SpO2: 99% (04 Aug 2023 00:01) (94% - 99%)    O2 Parameters below as of 04 Aug 2023 00:01  Patient On (Oxygen Delivery Method): room air          LABS:                        9.0    8.33  )-----------( 243      ( 03 Aug 2023 15:04 )             30.1     08-03    138  |  111<H>  |  21  ----------------------------<  108<H>  3.8   |  22  |  0.92    Ca    8.4<L>      03 Aug 2023 15:04      PT/INR - ( 03 Aug 2023 03:46 )   PT: 11.7 sec;   INR: 1.04 ratio         PTT - ( 03 Aug 2023 03:46 )  PTT:28.2 sec        Physical Exam:   Gen: NAD, resting comfortably  LUE:   Dressing c/d/i, Splint in place  Able to move all fingers without difficulty or pain  Ax/Musc/U/M/R  SILT in all digits of the hand  Accessible compartments soft and compressible  Fingers warm and well perfused, Cap Refill <2 sec    A/P:  82yFemale Stable POD 1 s/p ORIF L Distal radius    LUE NWB in splint, PT/OT  Pain management PRN  Continue PPx antibiotics x24 hrs.   DVT PPx: hold until POD1. Per Primary team.   Incentive spirometry  Medical management appreciated.   Ortho stable for dc  No further acute orthopedic surgical intervention  Follow up with Dr. Palomino in office in 7-10 days from discharge. Please call office for appointment. 
Patient seen and examined at bedside. No acute complaints at this time. Pain well controlled. Denies chest pain, shortness of breath, nausea or vomiting. Difficulty following commands.     PE:  Vital Signs Last 24 Hrs  T(C): 36.3 (08-02-23 @ 21:03), Max: 37.1 (08-02-23 @ 07:45)  T(F): 97.4 (08-02-23 @ 21:03), Max: 98.7 (08-02-23 @ 07:45)  HR: 66 (08-03-23 @ 05:55) (64 - 101)  BP: 124/65 (08-03-23 @ 05:55) (119/68 - 146/71)  BP(mean): --  RR: 18 (08-02-23 @ 21:03) (18 - 18)  SpO2: 98% (08-02-23 @ 21:03) (96% - 98%)    General: NAD, resting comfortably in bed  LUE:   Splint C/D/I  Compartments soft and compressible  No calf tenderness bilaterally  +Axillary/Musculocutaneous/Radial/Median/AIN/PIN intact  SILT C5-T1  Fingers WWP        PT/INR - ( 03 Aug 2023 03:46 )   PT: 11.7 sec;   INR: 1.04 ratio         PTT - ( 03 Aug 2023 03:46 )  PTT:28.2 sec    A/P:  82y f L DR Philip for ORIF  Plan for OR today   Please document clearances   NPO  NWB  -Pain Control  -Hold DVT ppx  -FU AM Labs  -Medical management appreciated  D/w Dr. Palomino 
Pt seen and examined at bedside. Pain is controlled. Denies CP, SOB, fever, chills, numbness/tingling, weakness or any other complaints.     LABS:    08-11    136  |  108  |  16  ----------------------------<  140<H>  4.1   |  19<L>  |  0.98    Ca    8.3<L>      11 Aug 2023 10:34        Urinalysis Basic - ( 11 Aug 2023 10:34 )    Color: x / Appearance: x / SG: x / pH: x  Gluc: 140 mg/dL / Ketone: x  / Bili: x / Urobili: x   Blood: x / Protein: x / Nitrite: x   Leuk Esterase: x / RBC: x / WBC x   Sq Epi: x / Non Sq Epi: x / Bacteria: x        VITAL SIGNS:  T(C): 37.1 (08-11-23 @ 11:31), Max: 37.1 (08-10-23 @ 22:25)  HR: 52 (08-11-23 @ 11:31) (52 - 71)  BP: 90/50 (08-11-23 @ 11:31) (90/50 - 138/60)  RR: 18 (08-11-23 @ 11:31) (18 - 18)  SpO2: 100% (08-11-23 @ 11:31) (96% - 100%)    PE:   LUE:  Volar Splint in place, removed for exam  Incision site healing appropriately no active drainage or bleeding  No TTP  + AIN/PIN/U/M/R/Musc/Ax  SILT Ax/Musc/U/M/R  2+ Radial  Compartments soft and compressible    AP: 83y/o F s/p L DR ROSS on 8/3/23.     Volar splint removed, Cock-up wrist splint applied. Pt may remove for hygeine purposes and may use L wrist/hand for light activity w/o brace.   PT/OT  Pain control prn  DVT PPx: Per primary  Medical management appreciated  Ortho stable for dc  No further acute orthopedic surgical intervention  Follow up w/ Dr. Palomino in office in 2-3 weeks. Please call office for appointment.   Discussed plan w/ Dr. Palomino who agrees.   
Pt seen at bedside this AM. No acute complaints. Pain is well controlled. Denies numbness, tingling, fevers, chills, CP, SOB, N/V/D.    Vital Signs (24 Hrs):  T(C): 36.3 (08-05-23 @ 20:22), Max: 36.7 (08-05-23 @ 08:02)  HR: 61 (08-06-23 @ 05:54) (51 - 61)  BP: 139/62 (08-06-23 @ 05:54) (118/45 - 139/62)  RR: 17 (08-06-23 @ 05:54) (17 - 18)  SpO2: 97% (08-06-23 @ 05:54) (96% - 97%)  Wt(kg): --    LABS:                          8.8    12.49 )-----------( 294      ( 05 Aug 2023 07:50 )             28.9     08-04    140  |  110<H>  |  21  ----------------------------<  162<H>  4.1   |  20<L>  |  1.10    Ca    8.6      04 Aug 2023 08:30      Physical Exam:   Gen: NAD, pt resting comfortably    LUE:   Dressing C/D/I, Splint in place  Compartments soft and compressible  Able to move all fingers without difficulty or pain  Ax/Musc/U/M/R  SILT in all digits of the hand  Cap Refill <2 sec    A/P:  82F POD3 s/p ORIF L Distal radius    LUE NWB in splint  PT/OT  Analgesics  DVT PPx per primary team, currently on Xarelto  Incentive spirometry  Medical management appreciated  Ortho stable for dc  No further acute orthopedic surgical intervention  Follow up with Dr. Palomino in office in 7-10 days from discharge  Discussed plan with Dr. Palomino who agrees with above
Pt seen at bedside this AM. No acute complaints. Pain is well controlled. Denies numbness, tingling, fevers, chills, CP, SOB, N/V/D.    Vital Signs (24 Hrs):  T(C): 36.7 (08-05-23 @ 08:02), Max: 36.7 (08-05-23 @ 08:02)  HR: 51 (08-05-23 @ 08:02) (51 - 66)  BP: 121/50 (08-05-23 @ 08:02) (119/53 - 141/60)  RR: 18 (08-05-23 @ 08:02) (18 - 18)  SpO2: 97% (08-05-23 @ 08:02) (96% - 100%)  Wt(kg): --    LABS:                          9.2    11.75 )-----------( 310      ( 04 Aug 2023 08:30 )             31.2     08-04    140  |  110<H>  |  21  ----------------------------<  162<H>  4.1   |  20<L>  |  1.10    Ca    8.6      04 Aug 2023 08:30    Physical Exam:   Gen: NAD, pt resting comfortably    LUE:   Dressing C/D/I, Splint in place  Compartments soft and compressible  Able to move all fingers without difficulty or pain  Ax/Musc/U/M/R  SILT in all digits of the hand  Cap Refill <2 sec    A/P:  82F POD2 s/p ORIF L Distal radius    LUE NWB in splint  PT/OT  Analgesics  DVT PPx per primary team, currently on Xarelto  Incentive spirometry  Medical management appreciated  Ortho stable for dc  No further acute orthopedic surgical intervention  Follow up with Dr. Palomino in office in 7-10 days from discharge. Please call office for appointment. 
81yo/F with PMH parox afib on xarelto, HTN, hyperlipidemia presented with low HH. Patient sustained mechanical fall 7 days ago and went for preop clearance that showed low HH and she referred to ED for blood transfusion. Poor historian, states she never had colonoscopy, denies black tarry stools or seeing blood in stool.     08/04/23: Sitting in chair, denies any complaints.   08/05/23: No new issues.  08/06/23: Discussed with Dr Pearce, will see her tomorrow and possible EGD/colon on Tuesday. Discussed with patient.   08/07/23: No new issues. Possible EGD/colon tomorrow. Discussed with patient    Vital Signs Last 24 Hrs  T(C): 36.6 (06 Aug 2023 07:59), Max: 36.6 (05 Aug 2023 16:39)  T(F): 97.9 (06 Aug 2023 07:59), Max: 97.9 (05 Aug 2023 16:39)  HR: 63 (06 Aug 2023 07:59) (58 - 63)  BP: 132/50 (06 Aug 2023 07:59) (118/45 - 139/62)  BP(mean): --  RR: 18 (06 Aug 2023 07:59) (17 - 18)  SpO2: 98% (06 Aug 2023 07:59) (96% - 98%)    Parameters below as of 06 Aug 2023 07:59  Patient On (Oxygen Delivery Method): room air        Physical Exam:    · Constitutional no distress  · Eyes EOMI  · ENMT no gross abnormalities  · Respiratory clear to auscultation bilaterally  · Cardiovascular regular rate and rhythm  · Gastrointestinal soft; nontender; nondistended; normal active bowel sounds  · Neurological cranial nerves II-XII intact; sensation intact  · Skin warm and dry  · Musculoskeletal Comments LT wrist in splint          Labs:                                             8.8    9.31  )-----------( 253      ( 07 Aug 2023 11:22 )             29.7     07 Aug 2023 11:22    139    |  108    |  24     ----------------------------<  166    3.8     |  21     |  0.90     Ca    8.1        07 Aug 2023 11:22        Urinalysis Basic - ( 07 Aug 2023 11:22 )    Color: x / Appearance: x / SG: x / pH: x  Gluc: 166 mg/dL / Ketone: x  / Bili: x / Urobili: x   Blood: x / Protein: x / Nitrite: x   Leuk Esterase: x / RBC: x / WBC x   Sq Epi: x / Non Sq Epi: x / Bacteria: x            MEDICATIONS  (STANDING):  acetaminophen     Tablet .. 1000 milliGRAM(s) Oral every 8 hours  acetaminophen   IVPB .. 1000 milliGRAM(s) IV Intermittent once  atorvastatin 20 milliGRAM(s) Oral at bedtime  ceFAZolin   IVPB 2000 milliGRAM(s) IV Intermittent every 8 hours  diltiazem    Tablet 60 milliGRAM(s) Oral every 6 hours  HYDROmorphone  Injectable 0.5 milliGRAM(s) IV Push once  metoprolol tartrate 75 milliGRAM(s) Oral two times a day  multivitamin 1 Tablet(s) Oral daily  pantoprazole    Tablet 40 milliGRAM(s) Oral before breakfast  polyethylene glycol 3350 17 Gram(s) Oral at bedtime  rivaroxaban 15 milliGRAM(s) Oral with dinner  senna 2 Tablet(s) Oral at bedtime    MEDICATIONS  (PRN):  acetaminophen     Tablet .. 650 milliGRAM(s) Oral every 6 hours PRN Temp greater or equal to 38C (100.4F), Mild Pain (1 - 3)  aluminum hydroxide/magnesium hydroxide/simethicone Suspension 30 milliLiter(s) Oral every 4 hours PRN Dyspepsia  magnesium hydroxide Suspension 30 milliLiter(s) Oral daily PRN Constipation  melatonin 3 milliGRAM(s) Oral at bedtime PRN Insomnia  ondansetron Injectable 4 milliGRAM(s) IV Push every 8 hours PRN Nausea and/or Vomiting  ondansetron Injectable 4 milliGRAM(s) IV Push every 6 hours PRN Nausea and/or Vomiting  oxyCODONE    IR 5 milliGRAM(s) Oral every 4 hours PRN Moderate Pain (4 - 6)  oxyCODONE    IR 10 milliGRAM(s) Oral every 4 hours PRN Severe Pain (7 - 10)  traMADol 50 milliGRAM(s) Oral every 6 hours PRN Mild Pain (1 - 3)            Assessment:      #Anemia  Suspected GI bleed  Iron-deficiency. No acute bleeding  S/p 2U PRBC transfusion with good HH response  Never had colonoscopy- GI eval DR Pearce appreciated  Possible EGD/colonoscopy tomorrow    #LT wrist fracture S/P ORIF POD#4  Ortho follow up appreciated  Pain meds  Continue PT    # Urinary retention-resolved  Follow clinically    #Parox afib  Restarted xarelto  Cont BB and cardizem    #HTN/hyperlipidemia- cont home meds    #DVt proph- venodynes    Disposition: EGD/colon tomorrow  
Pt seen at bedside this AM. No acute complaints. Pain is well controlled. Denies numbness, tingling, fevers, chills, CP, SOB, N/V/D. Pending colonoscopy 8/8      Vital Signs Last 24 Hrs  T(C): 36.2 (07 Aug 2023 09:22), Max: 36.6 (06 Aug 2023 15:45)  T(F): 97.2 (07 Aug 2023 09:22), Max: 97.9 (06 Aug 2023 15:45)  HR: 66 (07 Aug 2023 09:22) (59 - 69)  BP: 137/53 (07 Aug 2023 09:22) (116/44 - 150/58)  BP(mean): --  RR: 17 (07 Aug 2023 09:22) (17 - 18)  SpO2: 99% (07 Aug 2023 09:22) (94% - 100%)    Parameters below as of 07 Aug 2023 09:22  Patient On (Oxygen Delivery Method): room air                          8.5    8.09  )-----------( 247      ( 06 Aug 2023 07:51 )             28.7       08-06    142  |  113<H>  |  25<H>  ----------------------------<  91  4.1   |  22  |  0.93    Ca    8.4<L>      06 Aug 2023 07:51              Physical Exam:   Gen: NAD, pt resting comfortably    LUE:   Dressing C/D/I, Splint in place  Compartments soft and compressible  Able to move all fingers without difficulty or pain  Ax/Musc/U/M/R  SILT in all digits of the hand  Cap Refill <2 sec    A/P:  82F POD4 s/p ORIF L Distal radius    LUE NWB in splint  PT/OT  Analgesics  DVT PPx per primary team, currently on Xarelto  Incentive spirometry  Medical management appreciated  Ortho stable for dc  No further acute orthopedic surgical intervention  Follow up with Dr. Palomino in office in 7-10 days from discharge  Discussed plan with Dr. Palomino who agrees with above
81yo/F with PMH parox afib on xarelto, HTN, hyperlipidemia presented with low HH. Patient sustained mechanical fall 7 days ago and went for preop clearance that showed low HH and she referred to ED for blood transfusion. Poor historian, states she never had colonoscopy, denies black tarry stools or seeing blood in stool.     08/04/23: Sitting in chair, denies any complaints.       Vital Signs Last 24 Hrs  T(C): 36.3 (04 Aug 2023 08:29), Max: 36.7 (03 Aug 2023 16:00)  T(F): 97.4 (04 Aug 2023 08:29), Max: 98 (03 Aug 2023 16:00)  HR: 63 (04 Aug 2023 08:29) (62 - 81)  BP: 134/47 (04 Aug 2023 08:29) (94/53 - 151/68)  BP(mean): --  RR: 18 (04 Aug 2023 08:29) (15 - 22)  SpO2: 96% (04 Aug 2023 08:29) (94% - 99%)    Parameters below as of 04 Aug 2023 08:29  Patient On (Oxygen Delivery Method): room air          Physical Exam:    · Constitutional no distress  · Eyes EOMI  · ENMT no gross abnormalities  · Respiratory clear to auscultation bilaterally  · Cardiovascular regular rate and rhythm  · Gastrointestinal soft; nontender; nondistended; normal active bowel sounds  · Neurological cranial nerves II-XII intact; sensation intact  · Skin warm and dry  · Musculoskeletal Comments LT wrist in splint          Labs:                           9.2    11.75 )-----------( 310      ( 04 Aug 2023 08:30 )             31.2     04 Aug 2023 08:30    140    |  110    |  21     ----------------------------<  162    4.1     |  20     |  1.10     Ca    8.6        04 Aug 2023 08:30        PT/INR - ( 03 Aug 2023 03:46 )   PT: 11.7 sec;   INR: 1.04 ratio         PTT - ( 03 Aug 2023 03:46 )  PTT:28.2 sec  CAPILLARY BLOOD GLUCOSE          MEDICATIONS  (STANDING):  acetaminophen     Tablet .. 1000 milliGRAM(s) Oral every 8 hours  acetaminophen   IVPB .. 1000 milliGRAM(s) IV Intermittent once  atorvastatin 20 milliGRAM(s) Oral at bedtime  ceFAZolin   IVPB 2000 milliGRAM(s) IV Intermittent every 8 hours  diltiazem    Tablet 60 milliGRAM(s) Oral every 6 hours  HYDROmorphone  Injectable 0.5 milliGRAM(s) IV Push once  metoprolol tartrate 75 milliGRAM(s) Oral two times a day  multivitamin 1 Tablet(s) Oral daily  pantoprazole    Tablet 40 milliGRAM(s) Oral before breakfast  polyethylene glycol 3350 17 Gram(s) Oral at bedtime  rivaroxaban 15 milliGRAM(s) Oral with dinner  senna 2 Tablet(s) Oral at bedtime    MEDICATIONS  (PRN):  acetaminophen     Tablet .. 650 milliGRAM(s) Oral every 6 hours PRN Temp greater or equal to 38C (100.4F), Mild Pain (1 - 3)  aluminum hydroxide/magnesium hydroxide/simethicone Suspension 30 milliLiter(s) Oral every 4 hours PRN Dyspepsia  magnesium hydroxide Suspension 30 milliLiter(s) Oral daily PRN Constipation  melatonin 3 milliGRAM(s) Oral at bedtime PRN Insomnia  ondansetron Injectable 4 milliGRAM(s) IV Push every 8 hours PRN Nausea and/or Vomiting  ondansetron Injectable 4 milliGRAM(s) IV Push every 6 hours PRN Nausea and/or Vomiting  oxyCODONE    IR 5 milliGRAM(s) Oral every 4 hours PRN Moderate Pain (4 - 6)  oxyCODONE    IR 10 milliGRAM(s) Oral every 4 hours PRN Severe Pain (7 - 10)  traMADol 50 milliGRAM(s) Oral every 6 hours PRN Mild Pain (1 - 3)            Assessment:      #Anemia  Iron-deficiency. NO acute bleeding  S/p 2U PRBC transfusion with good HH response  Never had colonoscopy- GI eval DR Pearce pending    #LT wrist fracture S/P ORIF POD#1  Ortho follow up appreciated  Pain meds  Continue PT    # Urinary retention  Follow clinically  Voiding as per RN    #Parox afib  Restarted xarelto  Cont BB and cardizem    #HTN/hyperlipidemia- cont home meds    #DVt proph- venodynes    Disposition: Follow GI eval  
81yo/F with PMH parox afib on xarelto, HTN, hyperlipidemia presented with low HH. Patient sustained mechanical fall 7 days ago and went for preop clearance that showed low HH and she referred to ED for blood transfusion. Poor historian, states she never had colonoscopy, denies black tarry stools or seeing blood in stool.     08/04/23: Sitting in chair, denies any complaints.   08/05/23: No new issues.  08/06/23: Discussed with Dr Pearce, will see her tomorrow and possible EGD/colon on Tuesday. Discussed with patient.       Vital Signs Last 24 Hrs  T(C): 36.6 (06 Aug 2023 07:59), Max: 36.6 (05 Aug 2023 16:39)  T(F): 97.9 (06 Aug 2023 07:59), Max: 97.9 (05 Aug 2023 16:39)  HR: 63 (06 Aug 2023 07:59) (58 - 63)  BP: 132/50 (06 Aug 2023 07:59) (118/45 - 139/62)  BP(mean): --  RR: 18 (06 Aug 2023 07:59) (17 - 18)  SpO2: 98% (06 Aug 2023 07:59) (96% - 98%)    Parameters below as of 06 Aug 2023 07:59  Patient On (Oxygen Delivery Method): room air        Physical Exam:    · Constitutional no distress  · Eyes EOMI  · ENMT no gross abnormalities  · Respiratory clear to auscultation bilaterally  · Cardiovascular regular rate and rhythm  · Gastrointestinal soft; nontender; nondistended; normal active bowel sounds  · Neurological cranial nerves II-XII intact; sensation intact  · Skin warm and dry  · Musculoskeletal Comments LT wrist in splint          Labs:                                                        8.5    8.09  )-----------( 247      ( 06 Aug 2023 07:51 )             28.7     06 Aug 2023 07:51    142    |  113    |  25     ----------------------------<  91     4.1     |  22     |  0.93     Ca    8.4        06 Aug 2023 07:51          CAPILLARY BLOOD GLUCOSE            Urinalysis Basic - ( 06 Aug 2023 07:51 )    Color: x / Appearance: x / SG: x / pH: x  Gluc: 91 mg/dL / Ketone: x  / Bili: x / Urobili: x   Blood: x / Protein: x / Nitrite: x   Leuk Esterase: x / RBC: x / WBC x   Sq Epi: x / Non Sq Epi: x / Bacteria: x        MEDICATIONS  (STANDING):  acetaminophen     Tablet .. 1000 milliGRAM(s) Oral every 8 hours  acetaminophen   IVPB .. 1000 milliGRAM(s) IV Intermittent once  atorvastatin 20 milliGRAM(s) Oral at bedtime  ceFAZolin   IVPB 2000 milliGRAM(s) IV Intermittent every 8 hours  diltiazem    Tablet 60 milliGRAM(s) Oral every 6 hours  HYDROmorphone  Injectable 0.5 milliGRAM(s) IV Push once  metoprolol tartrate 75 milliGRAM(s) Oral two times a day  multivitamin 1 Tablet(s) Oral daily  pantoprazole    Tablet 40 milliGRAM(s) Oral before breakfast  polyethylene glycol 3350 17 Gram(s) Oral at bedtime  rivaroxaban 15 milliGRAM(s) Oral with dinner  senna 2 Tablet(s) Oral at bedtime    MEDICATIONS  (PRN):  acetaminophen     Tablet .. 650 milliGRAM(s) Oral every 6 hours PRN Temp greater or equal to 38C (100.4F), Mild Pain (1 - 3)  aluminum hydroxide/magnesium hydroxide/simethicone Suspension 30 milliLiter(s) Oral every 4 hours PRN Dyspepsia  magnesium hydroxide Suspension 30 milliLiter(s) Oral daily PRN Constipation  melatonin 3 milliGRAM(s) Oral at bedtime PRN Insomnia  ondansetron Injectable 4 milliGRAM(s) IV Push every 8 hours PRN Nausea and/or Vomiting  ondansetron Injectable 4 milliGRAM(s) IV Push every 6 hours PRN Nausea and/or Vomiting  oxyCODONE    IR 5 milliGRAM(s) Oral every 4 hours PRN Moderate Pain (4 - 6)  oxyCODONE    IR 10 milliGRAM(s) Oral every 4 hours PRN Severe Pain (7 - 10)  traMADol 50 milliGRAM(s) Oral every 6 hours PRN Mild Pain (1 - 3)            Assessment:      #Anemia  Suspected GI bleed  Iron-deficiency. No acute bleeding  S/p 2U PRBC transfusion with good HH response  Never had colonoscopy- GI eval DR Pearce pending. Discussed with him yesterday. Possible EGD/colon on uesday    #LT wrist fracture S/P ORIF POD#3  Ortho follow up appreciated  Pain meds  Continue PT    # Urinary retention-resolved  Follow clinically    #Parox afib  Restarted xarelto  Cont BB and cardizem    #HTN/hyperlipidemia- cont home meds    #DVt proph- venodynes    Disposition: Follow GI eval  
81yo/F with PMH parox afib on xarelto, HTN, hyperlipidemia presented with low HH. Patient sustained mechanical fall 7 days ago and went for preop clearance that showed low HH and she referred to ED for blood transfusion. Poor historian, states she never had colonoscopy, denies black tarry stools or seeing blood in stool.     08/04/23: Sitting in chair, denies any complaints.   08/05/23: No new issues.  08/06/23: Discussed with Dr Pearce, will see her tomorrow and possible EGD/colon on Tuesday. Discussed with patient.   08/07/23: No new issues. Possible EGD/colon tomorrow. Discussed with patient.  08/08/23: EGD/colonoscopy today. Family requesting for [facility]      Vital Signs Last 24 Hrs  T(C): 37.3 (08 Aug 2023 07:52), Max: 37.3 (08 Aug 2023 07:52)  T(F): 99.2 (08 Aug 2023 07:52), Max: 99.2 (08 Aug 2023 07:52)  HR: 68 (08 Aug 2023 11:45) (65 - 91)  BP: 134/45 (08 Aug 2023 11:45) (117/52 - 147/90)  BP(mean): --  RR: 18 (07 Aug 2023 23:45) (17 - 18)  SpO2: 97% (08 Aug 2023 07:52) (97% - 100%)    Parameters below as of 08 Aug 2023 07:52  Patient On (Oxygen Delivery Method): room air        Physical Exam:    · Constitutional no distress  · Eyes EOMI  · ENMT no gross abnormalities  · Respiratory clear to auscultation bilaterally  · Cardiovascular regular rate and rhythm  · Gastrointestinal soft; nontender; nondistended; normal active bowel sounds  · Neurological cranial nerves II-XII intact; sensation intact  · Skin warm and dry  · Musculoskeletal Comments LT wrist in splint                            9.3    8.40  )-----------( 259      ( 08 Aug 2023 08:49 )             31.3     08 Aug 2023 08:49    142    |  112    |  18     ----------------------------<  96     3.7     |  21     |  0.82     Ca    8.6        08 Aug 2023 08:49          CAPILLARY BLOOD GLUCOSE      POCT Blood Glucose.: 135 mg/dL (07 Aug 2023 21:04)        Urinalysis Basic - ( 08 Aug 2023 08:49 )    Color: x / Appearance: x / SG: x / pH: x  Gluc: 96 mg/dL / Ketone: x  / Bili: x / Urobili: x   Blood: x / Protein: x / Nitrite: x   Leuk Esterase: x / RBC: x / WBC x   Sq Epi: x / Non Sq Epi: x / Bacteria: x        MEDICATIONS  (STANDING):  acetaminophen     Tablet .. 1000 milliGRAM(s) Oral every 8 hours  acetaminophen   IVPB .. 1000 milliGRAM(s) IV Intermittent once  atorvastatin 20 milliGRAM(s) Oral at bedtime  ceFAZolin   IVPB 2000 milliGRAM(s) IV Intermittent every 8 hours  diltiazem    Tablet 60 milliGRAM(s) Oral every 6 hours  HYDROmorphone  Injectable 0.5 milliGRAM(s) IV Push once  metoprolol tartrate 75 milliGRAM(s) Oral two times a day  multivitamin 1 Tablet(s) Oral daily  pantoprazole    Tablet 40 milliGRAM(s) Oral before breakfast  polyethylene glycol 3350 17 Gram(s) Oral at bedtime  rivaroxaban 15 milliGRAM(s) Oral with dinner  senna 2 Tablet(s) Oral at bedtime    MEDICATIONS  (PRN):  acetaminophen     Tablet .. 650 milliGRAM(s) Oral every 6 hours PRN Temp greater or equal to 38C (100.4F), Mild Pain (1 - 3)  aluminum hydroxide/magnesium hydroxide/simethicone Suspension 30 milliLiter(s) Oral every 4 hours PRN Dyspepsia  magnesium hydroxide Suspension 30 milliLiter(s) Oral daily PRN Constipation  melatonin 3 milliGRAM(s) Oral at bedtime PRN Insomnia  ondansetron Injectable 4 milliGRAM(s) IV Push every 8 hours PRN Nausea and/or Vomiting  ondansetron Injectable 4 milliGRAM(s) IV Push every 6 hours PRN Nausea and/or Vomiting  oxyCODONE    IR 5 milliGRAM(s) Oral every 4 hours PRN Moderate Pain (4 - 6)  oxyCODONE    IR 10 milliGRAM(s) Oral every 4 hours PRN Severe Pain (7 - 10)  traMADol 50 milliGRAM(s) Oral every 6 hours PRN Mild Pain (1 - 3)            Assessment:      #Anemia  Suspected GI bleed  Iron-deficiency. No acute bleeding  S/p 2U PRBC transfusion with good HH response  Never had colonoscopy- GI eval DR Pearce appreciated  Possible EGD/colonoscopy today    #LT wrist fracture S/P ORIF POD#5  Ortho follow up appreciated  Pain meds  Continue PT    # Urinary retention-resolved  Follow clinically    #Parox afib  Restarted xarelto  Cont BB and cardizem    #HTN/hyperlipidemia- cont home meds    #DVt proph- venodynes    Disposition: EGD/colon today  Possibly to [facility] tomorrow  
83yo/F with PMH parox afib on xarelto, HTN, hyperlipidemia presented with low HH. Patient sustained mechanical fall 7 days ago and went for preop clearance that showed low HH and she referred to ED for blood transfusion. Poor historian, states she never had colonoscopy, denies black tarry stools or seeing blood in stool.     08/04/23: Sitting in chair, denies any complaints.   08/05/23: No new issues.  08/06/23: Discussed with Dr Pearce, will see her tomorrow and possible EGD/colon on Tuesday. Discussed with patient.   08/07/23: No new issues. Possible EGD/colon tomorrow. Discussed with patient.  08/08/23: EGD/colonoscopy today. Family requesting for [facility]  08/09/23: EGD-reflux esophagitis, colonoscopy- tortuous colon. No new issues. Discussed with Dr Pearce, cleared  for discharge.  09/10/23: Patient exposed to COVID-19 room mate. No symptoms. Discussed with niece regarding management plan.       Vital Signs Last 24 Hrs  T(C): 36.6 (09 Aug 2023 22:26), Max: 36.6 (09 Aug 2023 22:26)  T(F): 97.9 (09 Aug 2023 22:26), Max: 97.9 (09 Aug 2023 22:26)  HR: 71 (10 Aug 2023 14:30) (59 - 73)  BP: 132/52 (10 Aug 2023 14:30) (103/50 - 152/60)  BP(mean): --  RR: 18 (10 Aug 2023 06:22) (18 - 18)  SpO2: 100% (10 Aug 2023 06:22) (99% - 100%)    Parameters below as of 10 Aug 2023 06:22  Patient On (Oxygen Delivery Method): room air          Physical Exam:    · Constitutional no distress  · Eyes EOMI  · ENMT no gross abnormalities  · Respiratory clear to auscultation bilaterally  · Cardiovascular regular rate and rhythm  · Gastrointestinal soft; nontender; nondistended; normal active bowel sounds  · Neurological cranial nerves II-XII intact; sensation intact  · Skin warm and dry  · Musculoskeletal Comments LT wrist in splint                                8.9    6.70  )-----------( 231      ( 09 Aug 2023 08:05 )             30.1     10 Aug 2023 09:46    137    |  109    |  16     ----------------------------<  152    4.3     |  22     |  0.99     Ca    8.4        10 Aug 2023 09:46          CAPILLARY BLOOD GLUCOSE      POCT Blood Glucose.: 163 mg/dL (09 Aug 2023 22:47)        Urinalysis Basic - ( 10 Aug 2023 09:46 )    Color: x / Appearance: x / SG: x / pH: x  Gluc: 152 mg/dL / Ketone: x  / Bili: x / Urobili: x   Blood: x / Protein: x / Nitrite: x   Leuk Esterase: x / RBC: x / WBC x   Sq Epi: x / Non Sq Epi: x / Bacteria: x          MEDICATIONS  (STANDING):  acetaminophen     Tablet .. 1000 milliGRAM(s) Oral every 8 hours  acetaminophen   IVPB .. 1000 milliGRAM(s) IV Intermittent once  atorvastatin 20 milliGRAM(s) Oral at bedtime  ceFAZolin   IVPB 2000 milliGRAM(s) IV Intermittent every 8 hours  diltiazem    Tablet 60 milliGRAM(s) Oral every 6 hours  HYDROmorphone  Injectable 0.5 milliGRAM(s) IV Push once  metoprolol tartrate 75 milliGRAM(s) Oral two times a day  multivitamin 1 Tablet(s) Oral daily  pantoprazole    Tablet 40 milliGRAM(s) Oral before breakfast  polyethylene glycol 3350 17 Gram(s) Oral at bedtime  rivaroxaban 15 milliGRAM(s) Oral with dinner  senna 2 Tablet(s) Oral at bedtime    MEDICATIONS  (PRN):  acetaminophen     Tablet .. 650 milliGRAM(s) Oral every 6 hours PRN Temp greater or equal to 38C (100.4F), Mild Pain (1 - 3)  aluminum hydroxide/magnesium hydroxide/simethicone Suspension 30 milliLiter(s) Oral every 4 hours PRN Dyspepsia  magnesium hydroxide Suspension 30 milliLiter(s) Oral daily PRN Constipation  melatonin 3 milliGRAM(s) Oral at bedtime PRN Insomnia  ondansetron Injectable 4 milliGRAM(s) IV Push every 8 hours PRN Nausea and/or Vomiting  ondansetron Injectable 4 milliGRAM(s) IV Push every 6 hours PRN Nausea and/or Vomiting  oxyCODONE    IR 5 milliGRAM(s) Oral every 4 hours PRN Moderate Pain (4 - 6)  oxyCODONE    IR 10 milliGRAM(s) Oral every 4 hours PRN Severe Pain (7 - 10)  traMADol 50 milliGRAM(s) Oral every 6 hours PRN Mild Pain (1 - 3)            Assessment:      #Anemia  Suspected GI bleed  Iron-deficiency. 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

## 2023-08-13 ENCOUNTER — NON-APPOINTMENT (OUTPATIENT)
Age: 83
End: 2023-08-13

## 2023-08-18 DIAGNOSIS — E78.5 HYPERLIPIDEMIA, UNSPECIFIED: ICD-10-CM
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
## 2023-08-18 DIAGNOSIS — Z79.82 LONG TERM (CURRENT) USE OF ASPIRIN: ICD-10-CM

## 2023-08-18 DIAGNOSIS — K21.00 GASTRO-ESOPHAGEAL REFLUX DISEASE WITH ESOPHAGITIS, WITHOUT BLEEDING: ICD-10-CM

## 2023-08-18 DIAGNOSIS — I10 ESSENTIAL (PRIMARY) HYPERTENSION: ICD-10-CM

## 2023-08-18 DIAGNOSIS — U07.1 COVID-19: ICD-10-CM

## 2023-08-18 DIAGNOSIS — H40.9 UNSPECIFIED GLAUCOMA: ICD-10-CM

## 2023-08-18 DIAGNOSIS — D50.9 IRON DEFICIENCY ANEMIA, UNSPECIFIED: ICD-10-CM
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
## 2023-08-18 DIAGNOSIS — I48.0 PAROXYSMAL ATRIAL FIBRILLATION: ICD-10-CM

## 2023-08-18 DIAGNOSIS — K66.0 PERITONEAL ADHESIONS (POSTPROCEDURAL) (POSTINFECTION): ICD-10-CM

## 2023-08-18 DIAGNOSIS — R33.9 RETENTION OF URINE, UNSPECIFIED: ICD-10-CM

## 2023-08-18 DIAGNOSIS — M19.90 UNSPECIFIED OSTEOARTHRITIS, UNSPECIFIED SITE: ICD-10-CM

## 2023-08-18 DIAGNOSIS — S52.502A UNSPECIFIED FRACTURE OF THE LOWER END OF LEFT RADIUS, INITIAL ENCOUNTER FOR CLOSED FRACTURE: ICD-10-CM

## 2023-08-18 DIAGNOSIS — Q43.9 CONGENITAL MALFORMATION OF INTESTINE, UNSPECIFIED: ICD-10-CM

## 2023-08-18 DIAGNOSIS — K57.30 DIVERTICULOSIS OF LARGE INTESTINE WITHOUT PERFORATION OR ABSCESS WITHOUT BLEEDING: ICD-10-CM

## 2023-09-19 NOTE — ED ADULT NURSE NOTE - CHPI ED NUR SYMPTOMS NEG
Patient is requesting a 90 day supply if possible. no chills/no decreased eating/drinking/no fever/no nausea/no pain/no tingling/no vomiting/no weakness

## 2023-11-01 NOTE — H&P ADULT - CARDIOVASCULAR
Detail Level: Zone Detail Level: Simple Detail Level: Detailed Regular rate & rhythm, normal S1, S2; no murmurs, gallops or rubs; no S3, S4

## 2023-11-14 ENCOUNTER — NON-APPOINTMENT (OUTPATIENT)
Age: 83
End: 2023-11-14

## 2023-11-14 ENCOUNTER — APPOINTMENT (OUTPATIENT)
Dept: CARDIOLOGY | Facility: CLINIC | Age: 83
End: 2023-11-14
Payer: MEDICARE

## 2023-11-14 VITALS
DIASTOLIC BLOOD PRESSURE: 84 MMHG | HEART RATE: 76 BPM | OXYGEN SATURATION: 98 % | BODY MASS INDEX: 30.58 KG/M2 | SYSTOLIC BLOOD PRESSURE: 104 MMHG | HEIGHT: 61 IN | WEIGHT: 162 LBS

## 2023-11-14 PROBLEM — I25.10 ATHEROSCLEROTIC HEART DISEASE OF NATIVE CORONARY ARTERY WITHOUT ANGINA PECTORIS: Chronic | Status: ACTIVE | Noted: 2023-08-02

## 2023-11-14 PROBLEM — E78.5 HYPERLIPIDEMIA, UNSPECIFIED: Chronic | Status: ACTIVE | Noted: 2023-08-02

## 2023-11-14 PROCEDURE — 93000 ELECTROCARDIOGRAM COMPLETE: CPT

## 2023-11-14 PROCEDURE — 99214 OFFICE O/P EST MOD 30 MIN: CPT

## 2023-11-14 RX ORDER — ASPIRIN 81 MG
81 TABLET, DELAYED RELEASE (ENTERIC COATED) ORAL DAILY
Refills: 0 | Status: DISCONTINUED | COMMUNITY
End: 2023-11-14

## 2023-11-14 RX ORDER — VIT C/E/ZN/COPPR/LUTEIN/ZEAXAN 250MG-90MG
CAPSULE ORAL TWICE DAILY
Refills: 0 | Status: ACTIVE | COMMUNITY

## 2023-11-30 ENCOUNTER — APPOINTMENT (OUTPATIENT)
Dept: COLORECTAL SURGERY | Facility: CLINIC | Age: 83
End: 2023-11-30
Payer: MEDICARE

## 2023-11-30 VITALS
RESPIRATION RATE: 16 BRPM | HEART RATE: 59 BPM | WEIGHT: 162 LBS | HEIGHT: 61 IN | BODY MASS INDEX: 30.58 KG/M2 | DIASTOLIC BLOOD PRESSURE: 77 MMHG | SYSTOLIC BLOOD PRESSURE: 142 MMHG | OXYGEN SATURATION: 98 %

## 2023-11-30 DIAGNOSIS — K63.9 DISEASE OF INTESTINE, UNSPECIFIED: ICD-10-CM

## 2023-11-30 PROCEDURE — 99204 OFFICE O/P NEW MOD 45 MIN: CPT

## 2023-12-19 ENCOUNTER — OUTPATIENT (OUTPATIENT)
Dept: OUTPATIENT SERVICES | Facility: HOSPITAL | Age: 83
LOS: 1 days | End: 2023-12-19
Payer: MEDICARE

## 2023-12-19 VITALS
RESPIRATION RATE: 16 BRPM | TEMPERATURE: 98 F | WEIGHT: 160.06 LBS | HEART RATE: 59 BPM | DIASTOLIC BLOOD PRESSURE: 55 MMHG | HEIGHT: 58 IN | SYSTOLIC BLOOD PRESSURE: 110 MMHG | OXYGEN SATURATION: 99 %

## 2023-12-19 DIAGNOSIS — Z98.890 OTHER SPECIFIED POSTPROCEDURAL STATES: Chronic | ICD-10-CM

## 2023-12-19 DIAGNOSIS — I48.91 UNSPECIFIED ATRIAL FIBRILLATION: ICD-10-CM

## 2023-12-19 DIAGNOSIS — K63.9 DISEASE OF INTESTINE, UNSPECIFIED: ICD-10-CM

## 2023-12-19 DIAGNOSIS — I10 ESSENTIAL (PRIMARY) HYPERTENSION: ICD-10-CM

## 2023-12-19 DIAGNOSIS — Z98.83 FILTERING (VITREOUS) BLEB AFTER GLAUCOMA SURGERY STATUS: Chronic | ICD-10-CM

## 2023-12-19 DIAGNOSIS — Z01.818 ENCOUNTER FOR OTHER PREPROCEDURAL EXAMINATION: ICD-10-CM

## 2023-12-19 LAB
A1C WITH ESTIMATED AVERAGE GLUCOSE RESULT: 5.3 % — SIGNIFICANT CHANGE UP (ref 4–5.6)
A1C WITH ESTIMATED AVERAGE GLUCOSE RESULT: 5.3 % — SIGNIFICANT CHANGE UP (ref 4–5.6)
ANION GAP SERPL CALC-SCNC: 6 MMOL/L — SIGNIFICANT CHANGE UP (ref 5–17)
ANION GAP SERPL CALC-SCNC: 6 MMOL/L — SIGNIFICANT CHANGE UP (ref 5–17)
APTT BLD: 30 SEC — SIGNIFICANT CHANGE UP (ref 24.5–35.6)
APTT BLD: 30 SEC — SIGNIFICANT CHANGE UP (ref 24.5–35.6)
BASOPHILS # BLD AUTO: 0.02 K/UL — SIGNIFICANT CHANGE UP (ref 0–0.2)
BASOPHILS # BLD AUTO: 0.02 K/UL — SIGNIFICANT CHANGE UP (ref 0–0.2)
BASOPHILS NFR BLD AUTO: 0.2 % — SIGNIFICANT CHANGE UP (ref 0–2)
BASOPHILS NFR BLD AUTO: 0.2 % — SIGNIFICANT CHANGE UP (ref 0–2)
BUN SERPL-MCNC: 15 MG/DL — SIGNIFICANT CHANGE UP (ref 7–23)
BUN SERPL-MCNC: 15 MG/DL — SIGNIFICANT CHANGE UP (ref 7–23)
CALCIUM SERPL-MCNC: 8.2 MG/DL — LOW (ref 8.5–10.1)
CALCIUM SERPL-MCNC: 8.2 MG/DL — LOW (ref 8.5–10.1)
CHLORIDE SERPL-SCNC: 110 MMOL/L — HIGH (ref 96–108)
CHLORIDE SERPL-SCNC: 110 MMOL/L — HIGH (ref 96–108)
CO2 SERPL-SCNC: 23 MMOL/L — SIGNIFICANT CHANGE UP (ref 22–31)
CO2 SERPL-SCNC: 23 MMOL/L — SIGNIFICANT CHANGE UP (ref 22–31)
CREAT SERPL-MCNC: 1.08 MG/DL — SIGNIFICANT CHANGE UP (ref 0.5–1.3)
CREAT SERPL-MCNC: 1.08 MG/DL — SIGNIFICANT CHANGE UP (ref 0.5–1.3)
EGFR: 51 ML/MIN/1.73M2 — LOW
EGFR: 51 ML/MIN/1.73M2 — LOW
EOSINOPHIL # BLD AUTO: 0.05 K/UL — SIGNIFICANT CHANGE UP (ref 0–0.5)
EOSINOPHIL # BLD AUTO: 0.05 K/UL — SIGNIFICANT CHANGE UP (ref 0–0.5)
EOSINOPHIL NFR BLD AUTO: 0.6 % — SIGNIFICANT CHANGE UP (ref 0–6)
EOSINOPHIL NFR BLD AUTO: 0.6 % — SIGNIFICANT CHANGE UP (ref 0–6)
ESTIMATED AVERAGE GLUCOSE: 105 MG/DL — SIGNIFICANT CHANGE UP (ref 68–114)
ESTIMATED AVERAGE GLUCOSE: 105 MG/DL — SIGNIFICANT CHANGE UP (ref 68–114)
GLUCOSE SERPL-MCNC: 130 MG/DL — HIGH (ref 70–99)
GLUCOSE SERPL-MCNC: 130 MG/DL — HIGH (ref 70–99)
HCT VFR BLD CALC: 37.5 % — SIGNIFICANT CHANGE UP (ref 34.5–45)
HCT VFR BLD CALC: 37.5 % — SIGNIFICANT CHANGE UP (ref 34.5–45)
HGB BLD-MCNC: 12 G/DL — SIGNIFICANT CHANGE UP (ref 11.5–15.5)
HGB BLD-MCNC: 12 G/DL — SIGNIFICANT CHANGE UP (ref 11.5–15.5)
IMM GRANULOCYTES NFR BLD AUTO: 0.5 % — SIGNIFICANT CHANGE UP (ref 0–0.9)
IMM GRANULOCYTES NFR BLD AUTO: 0.5 % — SIGNIFICANT CHANGE UP (ref 0–0.9)
INR BLD: 1.06 RATIO — SIGNIFICANT CHANGE UP (ref 0.85–1.18)
INR BLD: 1.06 RATIO — SIGNIFICANT CHANGE UP (ref 0.85–1.18)
LYMPHOCYTES # BLD AUTO: 0.49 K/UL — LOW (ref 1–3.3)
LYMPHOCYTES # BLD AUTO: 0.49 K/UL — LOW (ref 1–3.3)
LYMPHOCYTES # BLD AUTO: 6 % — LOW (ref 13–44)
LYMPHOCYTES # BLD AUTO: 6 % — LOW (ref 13–44)
MCHC RBC-ENTMCNC: 28.5 PG — SIGNIFICANT CHANGE UP (ref 27–34)
MCHC RBC-ENTMCNC: 28.5 PG — SIGNIFICANT CHANGE UP (ref 27–34)
MCHC RBC-ENTMCNC: 32 GM/DL — SIGNIFICANT CHANGE UP (ref 32–36)
MCHC RBC-ENTMCNC: 32 GM/DL — SIGNIFICANT CHANGE UP (ref 32–36)
MCV RBC AUTO: 89.1 FL — SIGNIFICANT CHANGE UP (ref 80–100)
MCV RBC AUTO: 89.1 FL — SIGNIFICANT CHANGE UP (ref 80–100)
MONOCYTES # BLD AUTO: 0.79 K/UL — SIGNIFICANT CHANGE UP (ref 0–0.9)
MONOCYTES # BLD AUTO: 0.79 K/UL — SIGNIFICANT CHANGE UP (ref 0–0.9)
MONOCYTES NFR BLD AUTO: 9.7 % — SIGNIFICANT CHANGE UP (ref 2–14)
MONOCYTES NFR BLD AUTO: 9.7 % — SIGNIFICANT CHANGE UP (ref 2–14)
NEUTROPHILS # BLD AUTO: 6.75 K/UL — SIGNIFICANT CHANGE UP (ref 1.8–7.4)
NEUTROPHILS # BLD AUTO: 6.75 K/UL — SIGNIFICANT CHANGE UP (ref 1.8–7.4)
NEUTROPHILS NFR BLD AUTO: 83 % — HIGH (ref 43–77)
NEUTROPHILS NFR BLD AUTO: 83 % — HIGH (ref 43–77)
PLATELET # BLD AUTO: 171 K/UL — SIGNIFICANT CHANGE UP (ref 150–400)
PLATELET # BLD AUTO: 171 K/UL — SIGNIFICANT CHANGE UP (ref 150–400)
POTASSIUM SERPL-MCNC: 3.7 MMOL/L — SIGNIFICANT CHANGE UP (ref 3.5–5.3)
POTASSIUM SERPL-MCNC: 3.7 MMOL/L — SIGNIFICANT CHANGE UP (ref 3.5–5.3)
POTASSIUM SERPL-SCNC: 3.7 MMOL/L — SIGNIFICANT CHANGE UP (ref 3.5–5.3)
POTASSIUM SERPL-SCNC: 3.7 MMOL/L — SIGNIFICANT CHANGE UP (ref 3.5–5.3)
PROTHROM AB SERPL-ACNC: 12 SEC — SIGNIFICANT CHANGE UP (ref 9.5–13)
PROTHROM AB SERPL-ACNC: 12 SEC — SIGNIFICANT CHANGE UP (ref 9.5–13)
RBC # BLD: 4.21 M/UL — SIGNIFICANT CHANGE UP (ref 3.8–5.2)
RBC # BLD: 4.21 M/UL — SIGNIFICANT CHANGE UP (ref 3.8–5.2)
RBC # FLD: 18.2 % — HIGH (ref 10.3–14.5)
RBC # FLD: 18.2 % — HIGH (ref 10.3–14.5)
SODIUM SERPL-SCNC: 139 MMOL/L — SIGNIFICANT CHANGE UP (ref 135–145)
SODIUM SERPL-SCNC: 139 MMOL/L — SIGNIFICANT CHANGE UP (ref 135–145)
WBC # BLD: 8.14 K/UL — SIGNIFICANT CHANGE UP (ref 3.8–10.5)
WBC # BLD: 8.14 K/UL — SIGNIFICANT CHANGE UP (ref 3.8–10.5)
WBC # FLD AUTO: 8.14 K/UL — SIGNIFICANT CHANGE UP (ref 3.8–10.5)
WBC # FLD AUTO: 8.14 K/UL — SIGNIFICANT CHANGE UP (ref 3.8–10.5)

## 2023-12-19 PROCEDURE — 99214 OFFICE O/P EST MOD 30 MIN: CPT | Mod: 25

## 2023-12-19 PROCEDURE — 86901 BLOOD TYPING SEROLOGIC RH(D): CPT

## 2023-12-19 PROCEDURE — 83036 HEMOGLOBIN GLYCOSYLATED A1C: CPT

## 2023-12-19 PROCEDURE — 85730 THROMBOPLASTIN TIME PARTIAL: CPT

## 2023-12-19 PROCEDURE — 85610 PROTHROMBIN TIME: CPT

## 2023-12-19 PROCEDURE — 86850 RBC ANTIBODY SCREEN: CPT

## 2023-12-19 PROCEDURE — 86923 COMPATIBILITY TEST ELECTRIC: CPT

## 2023-12-19 PROCEDURE — 80048 BASIC METABOLIC PNL TOTAL CA: CPT

## 2023-12-19 PROCEDURE — 85025 COMPLETE CBC W/AUTO DIFF WBC: CPT

## 2023-12-19 PROCEDURE — 36415 COLL VENOUS BLD VENIPUNCTURE: CPT

## 2023-12-19 PROCEDURE — 86900 BLOOD TYPING SEROLOGIC ABO: CPT

## 2023-12-19 RX ORDER — ASPIRIN/CALCIUM CARB/MAGNESIUM 324 MG
1 TABLET ORAL
Qty: 0 | Refills: 0 | DISCHARGE

## 2023-12-19 NOTE — HISTORY OF PRESENT ILLNESS
[FreeTextEntry1] : 83 y/o  *afib-paroxysmal *CAD-nonobstructive-s/p NSTEMI Mar '20 peak trop 9 *HFpEF *HTN  here for followup.  since last visit went to clinic & hb 6.6 sent to ED. was there from Jul to Aug.  possible GIB Fe . no acute bleeding received 2 U PRBCs EGD/colonoscopy colonoscopy performed but could not get passed Sigmoid 2/2 adhesions EGD reflex esophagitis. discharged. Virtual CT reccomended  but this could not be performed b/c of flatus. Hb improved to 8.9 on discharge. Followup & GI, hematology for further evaluation. Hb end of sept 10.3.  No chest pain, dyspnea.   Reviewed meds: no ASA atorva 20 mg daily  losartan 100 daily metoprolol tartrate 75 BID xarelto 15 mg daily.  HFpEF diagnosis w/ no prior BNP measurement.  *EK20, NSR, NSST changes *Echo : EF 55-60% mild LVH mild diast. dysfx, AV sclerosis, mild-mod TR PASP 36 mmHg. *Echo: 3/30/20, 1-2 + TR,, moderate pulmonary hypertension LVEF 60%. *Cardiac Cath: 3/30/20, Non-obstructive CAD NL V FX *May '22 labs: LDL 74 HDL 65 , . *Aug '20 Hb 11.2 plat 245 LDL 81 HDL 63   Cr 1.12 LFTs WNL CPK 81 TSH 2.39 HbA1c 5.8 (preDM) *BP monitor May '22: ave 114/61, awake 111/60, asleep 120/63  History: Admited for afib Mar 17th '20, d/barrett after 2 days admited 2 days later w/ NSTEMI peak trop 9.73. Followed up w/ andie in clinic until .  Reviewed meds not on asa due to bleeding hx but on xarelto.

## 2023-12-19 NOTE — H&P PST ADULT - PROBLEM SELECTOR PLAN 1
Pre op and chlorhexidine instructions given and explained.  Avoid NSAIDs and OTC supplements (perversion).   Patient verbalized understanding  medical consult requested by surgeon

## 2023-12-19 NOTE — H&P PST ADULT - HISTORY OF PRESENT ILLNESS
82 y/o female accompanied by niece Emy with hx of afib on xarelto,  HTn and HLD presents to [facility] for scheduled laparoscopic right colectomy and any procedures on 12/28/23. Patient with admission for anemia 7/2023 noted during medical clearance for wrist surgery 7/2023. Seen at  s/p 2 units of RBC's during work up, unsuccessful due to adhesions and ct scan done and mass noted.  82 y/o female accompanied by niece Emy with hx of afib on xarelto,  HTn and HLD presents to [facility] for scheduled laparoscopic right colectomy and any procedures on 12/28/23. Patient with admission for anemia 7/2023 noted during medical clearance for wrist surgery 7/2023. Seen at  s/p 2 units of RBC's during work up, unsuccessful due to adhesions and ct scan done and mass noted.

## 2023-12-19 NOTE — H&P PST ADULT - NSICDXPASTMEDICALHX_GEN_ALL_CORE_FT
PAST MEDICAL HISTORY:  Afib     Arteriosclerotic heart disease (ASHD)     Cecal lesion     Glaucoma     HTN (hypertension)     Hyperlipidemia     OA (osteoarthritis)

## 2023-12-19 NOTE — H&P PST ADULT - NSICDXPASTSURGICALHX_GEN_ALL_CORE_FT
PAST SURGICAL HISTORY:  H/O wrist surgery     S/P knee surgery     Status post glaucoma surgery and cataract

## 2023-12-19 NOTE — H&P PST ADULT - TEMPERATURE IN CELSIUS (DEGREES C)
Operative Note/Laparoscopic Cholecystectomy      Patient ID:   Name: Sharon Treviño   Medical Record Number: [id number]   YOB: 1958            OPERATIVE REPORT      PREOPERATIVE DIAGNOSIS:   1. Symptomatic cholelithiasis  2. Ventral hernia    POSTOPERATIVE DIAGNOSIS  1. Symptomatic cholelithiasis  2. Ventral hernia    OPERATIVE PROCEDURE:   1. Laparoscopic cholecystectomy with intraoperative cholangiogram  2. Ventral hernia repair    SURGEON: Manning Habermann. Mathew Amaya MD    ASSISTANT:  Jackson Marley    ANESTHESIA: General.    IMPLANTS:  none    COMPLICATIONS:   None    SPECIMENS:  1.  Gallbladder  2. Hernia sac    FINDINGS:  1.  mildly diseased gallbladder with cholelithiasis  2.  normal liver  3. Normal Intraoperative cholangiogram  4.  no adhesions  5. Ventral hernia    ESTIMATED BLOOD LOSS: 25 mL. BRIEF HISTORY: The patient is a 65 y.o. yo male with symptomatic cholelithiasis for cholecystectomy. The patient understood the risks and benefits  of laparoscopic cholecystectomy with possible cholangiogram and ventral hernia repair including bleeding,  infection, biliary injury, bowel injury, post cholecystectomy diarrhea, and  residual stones, post operative respiratory and cardiac complications, DVT, and wishes to proceed. PROCEDURE: The patient was taken to the operating room, placed on  the operating table in the supine position and underwent general anesthesia. Afterward, the abdomen was prepped and  draped in the usual sterile fashion. After appropriate time-out 0.5%  Marcaine with epinephrine was infiltrated in the skin and subcutaneous  tissues in the periumbilical region. A curvilinear incision was made above  the umbilicus, and subcutaneous tissue dissected off bluntly. We freed up the ventral hernia sac and excised it. Next a 0 Vicryl  stay suture was placed on either side of the midline.  The peritoneal cavity  was cautiously entered, and a blunt 12-mm Jaki trocar was inserted in, CO2  insufflation begun, and 15 mmHg pressure gave good visualization of the  peritoneal cavity. Two 5-mm trocars were placed in the upper abdomen. The  liver looked normal, and the gallbladder was moderately diseased with adhesions around it. The remaining abdominal compartment was grossly without unusual findings. The infundibulum was pulled up superior-laterally and the cystic duct  dissected out. A clip was placed at the cystic duct-infundibular junction  and a partial ductotomy performed. A cholangiogram was obtained giving good filling of the cystic duct and intra and extrahepatic ducts. There were no strictures or filling defects and contrast flowed freely into the duodenum. Two clips were placed proximally on the cystic duct and the cystic duct was divided. The cystic artery was dissected  out and 2 clips placed proximally and 1 distally, and the cystic artery was divided. Then utilizing the electrocautery,  the gallbladder was removed from the liver bed. The gallbladder was brought  out the umbilical trocar site. Reinsufflation begun. A small amount of  bleeding on the liver bed was controlled with electrocautery, and  irrigation and suctioning performed. Trocars were removed and there was no apparent bleeding internally from the trocar sites. CO2 was allowed to be evacuated from the abdominal cavity. Next, we elongated the incision and freed up the fascia edges around the hernia. Interrupted 0-Neurolons were used to approximate the fascia at the ventral hernia. Running 4-0 Vicryl used to close skin on all the incisions and a dermabond dressing was placed. Upon completion of the procedure, the needle, sponge and instrument  counts were correct x2. The patient was extubated and brought to the recovery room. The patient tolerated the procedure well.     Crispin Damon MD 36.7

## 2023-12-19 NOTE — H&P PST ADULT - ASSESSMENT
84 y/o female accompanied by niдмитрий Quevedo with hx of afib on xarelto,  HTn and HLD presents to [facility] for scheduled laparoscopic right colectomy and any procedures on 23.     CAPRINI SCORE    AGE RELATED RISK FACTORS                                                       MOBILITY RELATED FACTORS  [ ] Age 41-60 years                                            (1 Point)                  [ ] Bed rest                                                        (1 Point)  [ ] Age: 61-74 years                                           (2 Points)                [ ] Plaster cast                                                   (2 Points)  [x ] Age= 75 years                                              (3 Points)                 [ ] Bed bound for more than 72 hours                   (2 Points)    DISEASE RELATED RISK FACTORS                                               GENDER SPECIFIC FACTORS  [ ] Edema in the lower extremities                       (1 Point)                  [ ] Pregnancy                                                     (1 Point)  [ ] Varicose veins                                               (1 Point)                  [ ] Post-partum < 6 weeks                                   (1 Point)             [ x] BMI > 25 Kg/m2                                            (1 Point)                  [ ] Hormonal therapy  or oral contraception            (1 Point)                 [ ] Sepsis (in the previous month)                        (1 Point)                  [ ] History of pregnancy complications  [ ] Pneumonia or serious lung disease                                               [ ] Unexplained or recurrent                       (1 Point)           (in the previous month)                               (1 Point)  [ ] Abnormal pulmonary function test                     (1 Point)                 SURGERY RELATED RISK FACTORS  [ ] Acute myocardial infarction                              (1 Point)                 [ ]  Section                                            (1 Point)  [ ] Congestive heart failure (in the previous month)  (1 Point)                 [ ] Minor surgery                                                 (1 Point)   [ ] Inflammatory bowel disease                             (1 Point)                 [ ] Arthroscopic surgery                                        (2 Points)  [ ] Central venous access                                    (2 Points)                [x ] General surgery lasting more than 45 minutes   (2 Points)       [ ] Stroke (in the previous month)                          (5 Points)               [ ] Elective arthroplasty                                        (5 Points)            [ ] malignancy                                                             (2 points)                                                                                                                                 HEMATOLOGY RELATED FACTORS                                                 TRAUMA RELATED RISK FACTORS  [ ] Prior episodes of VTE                                     (3 Points)                 [ ] Fracture of the hip, pelvis, or leg                       (5 Points)  [ ] Positive family history for VTE                         (3 Points)                 [ ] Acute spinal cord injury (in the previous month)  (5 Points)  [ ] Prothrombin 25395 A                                      (3 Points)                 [ ] Paralysis  (less than 1 month)                          (5 Points)  [ ] Factor V Leiden                                             (3 Points)                 [ ] Multiple Trauma within 1 month                         (5 Points)  [ ] Lupus anticoagulants                                     (3 Points)                                                           [ ] Anticardiolipin antibodies                                (3 Points)                                                       [ ] High homocysteine in the blood                      (3 Points)                                             [ ] Other congenital or acquired thrombophilia       (3 Points)                                                [ ] Heparin induced thrombocytopenia                  (3 Points)                                          Total Score [  6        ]  The Caprini score indicates that this patient is at high risk for a VTE event (score 6 or greater). Surgical patients in this group will benefit from both pharmacologic prophylaxis and intermittent compression devices.  The surgical team will determine the balance between VTE risk and bleeding risk, and other clinical considerations  82 y/o female accompanied by niдмитрий Quevedo with hx of afib on xarelto,  HTn and HLD presents to [facility] for scheduled laparoscopic right colectomy and any procedures on 23.     CAPRINI SCORE    AGE RELATED RISK FACTORS                                                       MOBILITY RELATED FACTORS  [ ] Age 41-60 years                                            (1 Point)                  [ ] Bed rest                                                        (1 Point)  [ ] Age: 61-74 years                                           (2 Points)                [ ] Plaster cast                                                   (2 Points)  [x ] Age= 75 years                                              (3 Points)                 [ ] Bed bound for more than 72 hours                   (2 Points)    DISEASE RELATED RISK FACTORS                                               GENDER SPECIFIC FACTORS  [ ] Edema in the lower extremities                       (1 Point)                  [ ] Pregnancy                                                     (1 Point)  [ ] Varicose veins                                               (1 Point)                  [ ] Post-partum < 6 weeks                                   (1 Point)             [ x] BMI > 25 Kg/m2                                            (1 Point)                  [ ] Hormonal therapy  or oral contraception            (1 Point)                 [ ] Sepsis (in the previous month)                        (1 Point)                  [ ] History of pregnancy complications  [ ] Pneumonia or serious lung disease                                               [ ] Unexplained or recurrent                       (1 Point)           (in the previous month)                               (1 Point)  [ ] Abnormal pulmonary function test                     (1 Point)                 SURGERY RELATED RISK FACTORS  [ ] Acute myocardial infarction                              (1 Point)                 [ ]  Section                                            (1 Point)  [ ] Congestive heart failure (in the previous month)  (1 Point)                 [ ] Minor surgery                                                 (1 Point)   [ ] Inflammatory bowel disease                             (1 Point)                 [ ] Arthroscopic surgery                                        (2 Points)  [ ] Central venous access                                    (2 Points)                [x ] General surgery lasting more than 45 minutes   (2 Points)       [ ] Stroke (in the previous month)                          (5 Points)               [ ] Elective arthroplasty                                        (5 Points)            [ ] malignancy                                                             (2 points)                                                                                                                                 HEMATOLOGY RELATED FACTORS                                                 TRAUMA RELATED RISK FACTORS  [ ] Prior episodes of VTE                                     (3 Points)                 [ ] Fracture of the hip, pelvis, or leg                       (5 Points)  [ ] Positive family history for VTE                         (3 Points)                 [ ] Acute spinal cord injury (in the previous month)  (5 Points)  [ ] Prothrombin 21920 A                                      (3 Points)                 [ ] Paralysis  (less than 1 month)                          (5 Points)  [ ] Factor V Leiden                                             (3 Points)                 [ ] Multiple Trauma within 1 month                         (5 Points)  [ ] Lupus anticoagulants                                     (3 Points)                                                           [ ] Anticardiolipin antibodies                                (3 Points)                                                       [ ] High homocysteine in the blood                      (3 Points)                                             [ ] Other congenital or acquired thrombophilia       (3 Points)                                                [ ] Heparin induced thrombocytopenia                  (3 Points)                                          Total Score [  6        ]  The Caprini score indicates that this patient is at high risk for a VTE event (score 6 or greater). Surgical patients in this group will benefit from both pharmacologic prophylaxis and intermittent compression devices.  The surgical team will determine the balance between VTE risk and bleeding risk, and other clinical considerations

## 2023-12-19 NOTE — ASSESSMENT
[FreeTextEntry1] : A/P:  Cont. current meds Return in 3 months.  During next visit once anemia issues & workup have resolved will consider the following: -Echo -BNP to assess diastolic dysfunction. -Return after testing to review results - will consider starting SGLT2 based on above. =============================  12/19/'23: Called for preop clearance for laparoscopic right hemicolectomy Echo 1 yr ago w/ normal EF moderate pulmonary hypertension. Cath w/ nonobstructive CAD. Functional capacity  approximately 4 METs ECG NSR w/ no ischemic changes.  May proceed w/ surgery w/o further cardiac testing. low risk for cardiac events for low risk procedure - pt is "cleared". OK to hold xarelto for 48 hrs prior to procedure     resume when safe from surgical standpoint.

## 2023-12-19 NOTE — H&P PST ADULT - PROBLEM SELECTOR PLAN 2
Cardiac consult scheduled pending reports  xarelto as per cardiology.  On  the DOS  with sip of water take cardiac meds - metoprolol and losartan   Patient verbalized understanding.

## 2023-12-20 DIAGNOSIS — K63.9 DISEASE OF INTESTINE, UNSPECIFIED: ICD-10-CM

## 2023-12-20 DIAGNOSIS — Z01.818 ENCOUNTER FOR OTHER PREPROCEDURAL EXAMINATION: ICD-10-CM

## 2023-12-20 PROBLEM — I48.91 UNSPECIFIED ATRIAL FIBRILLATION: Chronic | Status: ACTIVE | Noted: 2023-12-19

## 2023-12-21 ENCOUNTER — APPOINTMENT (OUTPATIENT)
Dept: CARDIOLOGY | Facility: CLINIC | Age: 83
End: 2023-12-21

## 2023-12-22 ENCOUNTER — NON-APPOINTMENT (OUTPATIENT)
Age: 83
End: 2023-12-22

## 2023-12-28 ENCOUNTER — RESULT REVIEW (OUTPATIENT)
Age: 83
End: 2023-12-28

## 2023-12-28 ENCOUNTER — APPOINTMENT (OUTPATIENT)
Dept: COLORECTAL SURGERY | Facility: HOSPITAL | Age: 83
End: 2023-12-28
Payer: MEDICARE

## 2023-12-28 ENCOUNTER — INPATIENT (INPATIENT)
Facility: HOSPITAL | Age: 83
LOS: 9 days | Discharge: HOME HEALTH | DRG: 329 | End: 2024-01-07
Attending: COLON & RECTAL SURGERY | Admitting: COLON & RECTAL SURGERY
Payer: MEDICARE

## 2023-12-28 ENCOUNTER — TRANSCRIPTION ENCOUNTER (OUTPATIENT)
Age: 83
End: 2023-12-28

## 2023-12-28 VITALS
WEIGHT: 160.06 LBS | TEMPERATURE: 97 F | DIASTOLIC BLOOD PRESSURE: 72 MMHG | HEART RATE: 84 BPM | OXYGEN SATURATION: 100 % | RESPIRATION RATE: 16 BRPM | HEIGHT: 57 IN | SYSTOLIC BLOOD PRESSURE: 117 MMHG

## 2023-12-28 DIAGNOSIS — Z98.890 OTHER SPECIFIED POSTPROCEDURAL STATES: Chronic | ICD-10-CM

## 2023-12-28 DIAGNOSIS — Z98.83 FILTERING (VITREOUS) BLEB AFTER GLAUCOMA SURGERY STATUS: Chronic | ICD-10-CM

## 2023-12-28 DIAGNOSIS — K63.9 DISEASE OF INTESTINE, UNSPECIFIED: ICD-10-CM

## 2023-12-28 LAB
ANION GAP SERPL CALC-SCNC: 8 MMOL/L — SIGNIFICANT CHANGE UP (ref 5–17)
ANION GAP SERPL CALC-SCNC: 8 MMOL/L — SIGNIFICANT CHANGE UP (ref 5–17)
BUN SERPL-MCNC: 12 MG/DL — SIGNIFICANT CHANGE UP (ref 7–23)
BUN SERPL-MCNC: 12 MG/DL — SIGNIFICANT CHANGE UP (ref 7–23)
CALCIUM SERPL-MCNC: 8.5 MG/DL — SIGNIFICANT CHANGE UP (ref 8.5–10.1)
CALCIUM SERPL-MCNC: 8.5 MG/DL — SIGNIFICANT CHANGE UP (ref 8.5–10.1)
CHLORIDE SERPL-SCNC: 109 MMOL/L — HIGH (ref 96–108)
CHLORIDE SERPL-SCNC: 109 MMOL/L — HIGH (ref 96–108)
CO2 SERPL-SCNC: 22 MMOL/L — SIGNIFICANT CHANGE UP (ref 22–31)
CO2 SERPL-SCNC: 22 MMOL/L — SIGNIFICANT CHANGE UP (ref 22–31)
CREAT SERPL-MCNC: 1.09 MG/DL — SIGNIFICANT CHANGE UP (ref 0.5–1.3)
CREAT SERPL-MCNC: 1.09 MG/DL — SIGNIFICANT CHANGE UP (ref 0.5–1.3)
EGFR: 51 ML/MIN/1.73M2 — LOW
EGFR: 51 ML/MIN/1.73M2 — LOW
GLUCOSE BLDC GLUCOMTR-MCNC: 107 MG/DL — HIGH (ref 70–99)
GLUCOSE BLDC GLUCOMTR-MCNC: 107 MG/DL — HIGH (ref 70–99)
GLUCOSE BLDC GLUCOMTR-MCNC: 121 MG/DL — HIGH (ref 70–99)
GLUCOSE BLDC GLUCOMTR-MCNC: 121 MG/DL — HIGH (ref 70–99)
GLUCOSE BLDC GLUCOMTR-MCNC: 169 MG/DL — HIGH (ref 70–99)
GLUCOSE BLDC GLUCOMTR-MCNC: 169 MG/DL — HIGH (ref 70–99)
GLUCOSE SERPL-MCNC: 149 MG/DL — HIGH (ref 70–99)
GLUCOSE SERPL-MCNC: 149 MG/DL — HIGH (ref 70–99)
HCT VFR BLD CALC: 35.8 % — SIGNIFICANT CHANGE UP (ref 34.5–45)
HCT VFR BLD CALC: 35.8 % — SIGNIFICANT CHANGE UP (ref 34.5–45)
HGB BLD-MCNC: 11.5 G/DL — SIGNIFICANT CHANGE UP (ref 11.5–15.5)
HGB BLD-MCNC: 11.5 G/DL — SIGNIFICANT CHANGE UP (ref 11.5–15.5)
MAGNESIUM SERPL-MCNC: 2 MG/DL — SIGNIFICANT CHANGE UP (ref 1.6–2.6)
MAGNESIUM SERPL-MCNC: 2 MG/DL — SIGNIFICANT CHANGE UP (ref 1.6–2.6)
MCHC RBC-ENTMCNC: 29.4 PG — SIGNIFICANT CHANGE UP (ref 27–34)
MCHC RBC-ENTMCNC: 29.4 PG — SIGNIFICANT CHANGE UP (ref 27–34)
MCHC RBC-ENTMCNC: 32.1 GM/DL — SIGNIFICANT CHANGE UP (ref 32–36)
MCHC RBC-ENTMCNC: 32.1 GM/DL — SIGNIFICANT CHANGE UP (ref 32–36)
MCV RBC AUTO: 91.6 FL — SIGNIFICANT CHANGE UP (ref 80–100)
MCV RBC AUTO: 91.6 FL — SIGNIFICANT CHANGE UP (ref 80–100)
PHOSPHATE SERPL-MCNC: 2 MG/DL — LOW (ref 2.5–4.5)
PHOSPHATE SERPL-MCNC: 2 MG/DL — LOW (ref 2.5–4.5)
PLATELET # BLD AUTO: 171 K/UL — SIGNIFICANT CHANGE UP (ref 150–400)
PLATELET # BLD AUTO: 171 K/UL — SIGNIFICANT CHANGE UP (ref 150–400)
POTASSIUM SERPL-MCNC: 3.7 MMOL/L — SIGNIFICANT CHANGE UP (ref 3.5–5.3)
POTASSIUM SERPL-MCNC: 3.7 MMOL/L — SIGNIFICANT CHANGE UP (ref 3.5–5.3)
POTASSIUM SERPL-SCNC: 3.7 MMOL/L — SIGNIFICANT CHANGE UP (ref 3.5–5.3)
POTASSIUM SERPL-SCNC: 3.7 MMOL/L — SIGNIFICANT CHANGE UP (ref 3.5–5.3)
RBC # BLD: 3.91 M/UL — SIGNIFICANT CHANGE UP (ref 3.8–5.2)
RBC # BLD: 3.91 M/UL — SIGNIFICANT CHANGE UP (ref 3.8–5.2)
RBC # FLD: 19 % — HIGH (ref 10.3–14.5)
RBC # FLD: 19 % — HIGH (ref 10.3–14.5)
SODIUM SERPL-SCNC: 139 MMOL/L — SIGNIFICANT CHANGE UP (ref 135–145)
SODIUM SERPL-SCNC: 139 MMOL/L — SIGNIFICANT CHANGE UP (ref 135–145)
WBC # BLD: 12.49 K/UL — HIGH (ref 3.8–10.5)
WBC # BLD: 12.49 K/UL — HIGH (ref 3.8–10.5)
WBC # FLD AUTO: 12.49 K/UL — HIGH (ref 3.8–10.5)
WBC # FLD AUTO: 12.49 K/UL — HIGH (ref 3.8–10.5)

## 2023-12-28 PROCEDURE — 97116 GAIT TRAINING THERAPY: CPT | Mod: GP

## 2023-12-28 PROCEDURE — 80048 BASIC METABOLIC PNL TOTAL CA: CPT

## 2023-12-28 PROCEDURE — 97163 PT EVAL HIGH COMPLEX 45 MIN: CPT | Mod: GP

## 2023-12-28 PROCEDURE — 85025 COMPLETE CBC W/AUTO DIFF WBC: CPT

## 2023-12-28 PROCEDURE — 88309 TISSUE EXAM BY PATHOLOGIST: CPT

## 2023-12-28 PROCEDURE — 82962 GLUCOSE BLOOD TEST: CPT

## 2023-12-28 PROCEDURE — 87507 IADNA-DNA/RNA PROBE TQ 12-25: CPT

## 2023-12-28 PROCEDURE — 85027 COMPLETE CBC AUTOMATED: CPT

## 2023-12-28 PROCEDURE — C9290: CPT

## 2023-12-28 PROCEDURE — 97530 THERAPEUTIC ACTIVITIES: CPT | Mod: GP

## 2023-12-28 PROCEDURE — 81001 URINALYSIS AUTO W/SCOPE: CPT

## 2023-12-28 PROCEDURE — 99222 1ST HOSP IP/OBS MODERATE 55: CPT

## 2023-12-28 PROCEDURE — C1889: CPT

## 2023-12-28 PROCEDURE — 44205 LAP COLECTOMY PART W/ILEUM: CPT

## 2023-12-28 PROCEDURE — 84100 ASSAY OF PHOSPHORUS: CPT

## 2023-12-28 PROCEDURE — 36415 COLL VENOUS BLD VENIPUNCTURE: CPT

## 2023-12-28 PROCEDURE — 88309 TISSUE EXAM BY PATHOLOGIST: CPT | Mod: 26

## 2023-12-28 PROCEDURE — C9399: CPT

## 2023-12-28 PROCEDURE — 80053 COMPREHEN METABOLIC PANEL: CPT

## 2023-12-28 PROCEDURE — 87493 C DIFF AMPLIFIED PROBE: CPT

## 2023-12-28 PROCEDURE — 71045 X-RAY EXAM CHEST 1 VIEW: CPT

## 2023-12-28 PROCEDURE — C9113: CPT

## 2023-12-28 PROCEDURE — 83735 ASSAY OF MAGNESIUM: CPT

## 2023-12-28 RX ORDER — SODIUM CHLORIDE 9 MG/ML
1000 INJECTION, SOLUTION INTRAVENOUS
Refills: 0 | Status: DISCONTINUED | OUTPATIENT
Start: 2023-12-28 | End: 2023-12-28

## 2023-12-28 RX ORDER — ONDANSETRON 8 MG/1
4 TABLET, FILM COATED ORAL ONCE
Refills: 0 | Status: DISCONTINUED | OUTPATIENT
Start: 2023-12-28 | End: 2023-12-28

## 2023-12-28 RX ORDER — HYDROMORPHONE HYDROCHLORIDE 2 MG/ML
0.5 INJECTION INTRAMUSCULAR; INTRAVENOUS; SUBCUTANEOUS EVERY 4 HOURS
Refills: 0 | Status: DISCONTINUED | OUTPATIENT
Start: 2023-12-28 | End: 2023-12-28

## 2023-12-28 RX ORDER — ATORVASTATIN CALCIUM 80 MG/1
20 TABLET, FILM COATED ORAL AT BEDTIME
Refills: 0 | Status: DISCONTINUED | OUTPATIENT
Start: 2023-12-28 | End: 2024-01-07

## 2023-12-28 RX ORDER — ALVIMOPAN 12 MG/1
12 CAPSULE ORAL
Refills: 0 | Status: DISCONTINUED | OUTPATIENT
Start: 2023-12-29 | End: 2023-12-29

## 2023-12-28 RX ORDER — METOPROLOL TARTRATE 50 MG
75 TABLET ORAL
Refills: 0 | Status: DISCONTINUED | OUTPATIENT
Start: 2023-12-28 | End: 2023-12-30

## 2023-12-28 RX ORDER — CEFOTETAN DISODIUM 1 G
2 VIAL (EA) INJECTION EVERY 12 HOURS
Refills: 0 | Status: COMPLETED | OUTPATIENT
Start: 2023-12-28 | End: 2023-12-28

## 2023-12-28 RX ORDER — POTASSIUM PHOSPHATE, MONOBASIC POTASSIUM PHOSPHATE, DIBASIC 236; 224 MG/ML; MG/ML
30 INJECTION, SOLUTION INTRAVENOUS ONCE
Refills: 0 | Status: COMPLETED | OUTPATIENT
Start: 2023-12-28 | End: 2023-12-28

## 2023-12-28 RX ORDER — HYDROMORPHONE HYDROCHLORIDE 2 MG/ML
0.5 INJECTION INTRAMUSCULAR; INTRAVENOUS; SUBCUTANEOUS
Refills: 0 | Status: DISCONTINUED | OUTPATIENT
Start: 2023-12-28 | End: 2023-12-28

## 2023-12-28 RX ORDER — LOSARTAN POTASSIUM 100 MG/1
100 TABLET, FILM COATED ORAL DAILY
Refills: 0 | Status: DISCONTINUED | OUTPATIENT
Start: 2023-12-29 | End: 2024-01-04

## 2023-12-28 RX ORDER — ACETAMINOPHEN 500 MG
975 TABLET ORAL EVERY 6 HOURS
Refills: 0 | Status: DISCONTINUED | OUTPATIENT
Start: 2023-12-28 | End: 2023-12-30

## 2023-12-28 RX ORDER — OXYCODONE HYDROCHLORIDE 5 MG/1
5 TABLET ORAL EVERY 6 HOURS
Refills: 0 | Status: DISCONTINUED | OUTPATIENT
Start: 2023-12-28 | End: 2023-12-28

## 2023-12-28 RX ORDER — SODIUM CHLORIDE 9 MG/ML
1000 INJECTION, SOLUTION INTRAVENOUS
Refills: 0 | Status: DISCONTINUED | OUTPATIENT
Start: 2023-12-28 | End: 2023-12-31

## 2023-12-28 RX ORDER — ONDANSETRON 8 MG/1
4 TABLET, FILM COATED ORAL EVERY 6 HOURS
Refills: 0 | Status: DISCONTINUED | OUTPATIENT
Start: 2023-12-28 | End: 2024-01-07

## 2023-12-28 RX ORDER — CEFOTETAN DISODIUM 1 G
1 VIAL (EA) INJECTION EVERY 12 HOURS
Refills: 0 | Status: DISCONTINUED | OUTPATIENT
Start: 2023-12-28 | End: 2023-12-28

## 2023-12-28 RX ORDER — MULTIVIT-MIN/FERROUS GLUCONATE 9 MG/15 ML
0 LIQUID (ML) ORAL
Refills: 0 | DISCHARGE

## 2023-12-28 RX ORDER — ACETAMINOPHEN 500 MG
1000 TABLET ORAL EVERY 6 HOURS
Refills: 0 | Status: DISCONTINUED | OUTPATIENT
Start: 2023-12-28 | End: 2024-01-07

## 2023-12-28 RX ORDER — ALVIMOPAN 12 MG/1
12 CAPSULE ORAL ONCE
Refills: 0 | Status: COMPLETED | OUTPATIENT
Start: 2023-12-28 | End: 2023-12-28

## 2023-12-28 RX ORDER — HEPARIN SODIUM 5000 [USP'U]/ML
5000 INJECTION INTRAVENOUS; SUBCUTANEOUS ONCE
Refills: 0 | Status: COMPLETED | OUTPATIENT
Start: 2023-12-28 | End: 2023-12-28

## 2023-12-28 RX ORDER — HYDROMORPHONE HYDROCHLORIDE 2 MG/ML
0.2 INJECTION INTRAMUSCULAR; INTRAVENOUS; SUBCUTANEOUS EVERY 4 HOURS
Refills: 0 | Status: DISCONTINUED | OUTPATIENT
Start: 2023-12-28 | End: 2023-12-28

## 2023-12-28 RX ADMIN — HYDROMORPHONE HYDROCHLORIDE 0.5 MILLIGRAM(S): 2 INJECTION INTRAMUSCULAR; INTRAVENOUS; SUBCUTANEOUS at 18:12

## 2023-12-28 RX ADMIN — HYDROMORPHONE HYDROCHLORIDE 0.2 MILLIGRAM(S): 2 INJECTION INTRAMUSCULAR; INTRAVENOUS; SUBCUTANEOUS at 15:37

## 2023-12-28 RX ADMIN — Medication 975 MILLIGRAM(S): at 18:03

## 2023-12-28 RX ADMIN — ALVIMOPAN 12 MILLIGRAM(S): 12 CAPSULE ORAL at 08:02

## 2023-12-28 RX ADMIN — HEPARIN SODIUM 5000 UNIT(S): 5000 INJECTION INTRAVENOUS; SUBCUTANEOUS at 08:01

## 2023-12-28 RX ADMIN — OXYCODONE HYDROCHLORIDE 5 MILLIGRAM(S): 5 TABLET ORAL at 20:04

## 2023-12-28 RX ADMIN — OXYCODONE HYDROCHLORIDE 5 MILLIGRAM(S): 5 TABLET ORAL at 19:34

## 2023-12-28 RX ADMIN — HYDROMORPHONE HYDROCHLORIDE 0.2 MILLIGRAM(S): 2 INJECTION INTRAMUSCULAR; INTRAVENOUS; SUBCUTANEOUS at 15:43

## 2023-12-28 RX ADMIN — SODIUM CHLORIDE 75 MILLILITER(S): 9 INJECTION, SOLUTION INTRAVENOUS at 14:41

## 2023-12-28 RX ADMIN — POTASSIUM PHOSPHATE, MONOBASIC POTASSIUM PHOSPHATE, DIBASIC 83.33 MILLIMOLE(S): 236; 224 INJECTION, SOLUTION INTRAVENOUS at 19:09

## 2023-12-28 NOTE — PATIENT PROFILE ADULT - HAVE YOU RECENTLY LOST WEIGHT WITHOUT TRYING?
Patient Name: Beatriz Starr  Date:2023  : 1955  [x]  Patient  Verified  Payor: [employer] / Plan: [address] [place] [address] HMO / Product Type: Managed Care Medicare /    Total Treatment Time (min): 50  1:1 Treatment Time ([address] only): 50   Eliud Vidal MD  1. S/P total knee arthroplasty, left  2. Postoperative pain of left knee  3. Primary osteoarthritis of left knee      Subjective:    Patient is a 78 y.o. female referred to physical therapy by Dr. Yesenia Schulz with a diagnosis of a left total knee replacement performed 22 as a result of osteoarthritis with increasing pain and decreasing function. Patient report postoperative pain continues to range anywhere from 1 to a 9/10 with occasional 10/10 at night while sleeping. She states sleep is certainly still interrupted. She reports that standing/walking endurance is limited to approximately 15 minutes. She has weaned herself from rolling walker to a cane but admits she is a bit more antalgic with her cane. She would like to return to recreational walking at the track. She would like to be active with her 1 young grandsons. She does have stairs in her home. She is  but family and friends have been nearby and at times staying with her to assist in her recovery. She has recently noticed some itchiness and bumps along the incision for which she has already contacted the physician's office and has treated at home with Benadryl. She has been discharged from home health physical therapy. She would like to return to a seated part-time job which she is able to do at home. She did have a right partial knee replacement 2016 by an outside physician group. Remainder of her past medical history and medication list can otherwise be reviewed per the EHR. Objective: Incisions: Appears intact and without signs of infection. Appropriate scar adherence along the incision line.   She does still have some bumpiness globally throughout the incisional region. Gait: Slow but steady gait with lack in both terminal knee flexion and extension with use of single-point cane  Strength: She is able to produce isometric quad contraction but with clear weakness as compared to the contralateral side. Left knee ROM: -5 degrees of extension to 75 degrees of flexion  Right knee ROM: 3 degrees of extension to 120 degrees of flexion  Circumfererence: increased by approximately 2cm as compared to contralateral side  LEFS: 32/80. Ex: 15 min  Treatment today to include instruction in a home exercise program as well as providing patient with written and visual handouts. Man: 10 min  Edema massage to the knee with patellofemoral and tibiofemoral mobilization to encourage further flexion and extension. Tactile cueing for isometric quad contraction. NMR:  min    All questions were addressed. Assessment:    Patient presents with increased pain and decreased ROM, strength, and mobility consistent with left total knee replacement performed December 20, 2022. Long Term Goals. 8 weeks  1. Patient will demonstrate the ability to ambulate on level surfaces, uneven surfaces, stairs with a smooth and nonantalgic gait pattern. 2.  Patient will demonstrate improved AROM of the knee to within 95% or greater as compared to the contralateral side to assist with home/work/community/recreational ADL activity. 3.  Patient will report pain to be consistently less than or equal to 1/10 with all home/work/community/recreational ADL activity. Short Term Goals. 2 visits. Patient will demonstrate independence with HEP. Plan:  Plan of care: Physical therapy consisted of frequency of 2/week for the next 8 weeks. Physical therapy will consist of therapeutic exercise, modalities, patient education, neuromuscular reeducation, manual therapy, therapeutic activity, dry needling, and instruction in home exercise program as appropriate.     Eval  Ex: 15  Man: 10  NMR: The referring physician has reviewed and approved this evaluation and plan of care as noted by the electronic signature attached to note.     Kristy Zaragoza DPT, ATC No ([phone])

## 2023-12-28 NOTE — PATIENT PROFILE ADULT - FALL HARM RISK - HARM RISK INTERVENTIONS
Assistance with ambulation/Assistance OOB with selected safe patient handling equipment/Communicate Risk of Fall with Harm to all staff/Discuss with provider need for PT consult/Monitor gait and stability/Provide patient with walking aids - walker, cane, crutches/Reinforce activity limits and safety measures with patient and family/Tailored Fall Risk Interventions/Visual Cue: Yellow wristband and red socks/Bed in lowest position, wheels locked, appropriate side rails in place/Call bell, personal items and telephone in reach/Instruct patient to call for assistance before getting out of bed or chair/Non-slip footwear when patient is out of bed/[place] to call system/Physically safe environment - no spills, clutter or unnecessary equipment/Purposeful Proactive Rounding/Room/bathroom lighting operational, light cord in reach Assistance with ambulation/Assistance OOB with selected safe patient handling equipment/Communicate Risk of Fall with Harm to all staff/Discuss with provider need for PT consult/Monitor gait and stability/Provide patient with walking aids - walker, cane, crutches/Reinforce activity limits and safety measures with patient and family/Tailored Fall Risk Interventions/Visual Cue: Yellow wristband and red socks/Bed in lowest position, wheels locked, appropriate side rails in place/Call bell, personal items and telephone in reach/Instruct patient to call for assistance before getting out of bed or chair/Non-slip footwear when patient is out of bed/[place] to call system/Physically safe environment - no spills, clutter or unnecessary equipment/Purposeful Proactive Rounding/Room/bathroom lighting operational, light cord in reach

## 2023-12-28 NOTE — CONSULT NOTE ADULT - SUBJECTIVE AND OBJECTIVE BOX
Patient is a 83y old  Female who presents with a chief complaint of     BRIEF HOSPITAL COURSE: 84 yo  female  with PMHx of afib (on xarelto), HTN and HLD, hx admission in August for anemia, CT imaging revealing cecal lesion, now  presents for scheduled laparoscopic right colectomy     s/p lap R colectomy and partial omentectomy       12/28     PAST MEDICAL & SURGICAL HISTORY:  HTN (hypertension  Glaucoma  OA (osteoarthritis)  Arteriosclerotic heart disease (ASHD)  Hyperlipidemia  Cecal lesion  Afib  Status post glaucoma surgery  and cataract  S/P knee surgery  H/O wrist surgery    Medications:  cefoTEtan  IVPB 1 Gram(s) IV Intermittent every 12 hours  metoprolol tartrate 75 milliGRAM(s) Oral two times a day  acetaminophen   IVPB .. 1000 milliGRAM(s) IV Intermittent every 6 hours  HYDROmorphone  Injectable 0.5 milliGRAM(s) IV Push every 10 minutes PRN  HYDROmorphone  Injectable 0.5 milliGRAM(s) IV Push every 4 hours PRN  HYDROmorphone  Injectable 0.2 milliGRAM(s) IV Push every 4 hours PRN  ondansetron Injectable 4 milliGRAM(s) IV Push once PRN  ondansetron Injectable 4 milliGRAM(s) IV Push every 6 hours PRN  atorvastatin 20 milliGRAM(s) Oral at bedtime  lactated ringers. 1000 milliLiter(s) IV Continuous <Continuous>  lactated ringers. 1000 milliLiter(s) IV Continuous <Continuous>      ICU Vital Signs Last 24 Hrs  T(C): 36.4 (28 Dec 2023 12:16), Max: 36.4 (28 Dec 2023 12:16)  T(F): 97.5 (28 Dec 2023 12:16), Max: 97.5 (28 Dec 2023 12:16)  HR: 86 (28 Dec 2023 14:30) (73 - 86)  BP: 150/76 (28 Dec 2023 14:30) (117/72 - 150/76)  BP(mean): --  ABP: --  ABP(mean): --  RR: 12 (28 Dec 2023 14:30) (12 - 20)  SpO2: 100% (28 Dec 2023 14:30) (97% - 100%)    O2 Parameters below as of 28 Dec 2023 13:15  Patient On (Oxygen Delivery Method): nasal cannula  O2 Flow (L/min): 2      I&O's Detail    28 Dec 2023 07:01  -  28 Dec 2023 15:15  --------------------------------------------------------  IN:    Other (mL): 2100 mL  Total IN: 2100 mL    OUT:    Blood Loss (mL): 100 mL    Indwelling Catheter - Urethral (mL): 50 mL    Other (mL): 300 mL  Total OUT: 450 mL    Total NET: 1650 mL      LABS: post op labs pending    CAPILLARY BLOOD GLUCOSE  POCT Blood Glucose.: 121 mg/dL (28 Dec 2023 12:24)    CULTURES:    Physical Examination:    General: No acute distress.  frail appearing  HEENT: Pupils equal, reactive to light.  Symmetric. very dry lips and mouth. Hard of hearing  PULM: Clear to auscultation bilaterally, no crackles or wheezing  CVS: Regular rate and rhythm, no murmurs  ABD: Soft, nondistended, incisional tender, normoactive bowel sounds, NP seal mid line, lap sites with dermabond   EXT: No LE edema,   SKIN: Warm and  dry skin  NEURO: Alert, oriented, interactive, no gross focal deficits    DEVICES:   rizzo    RADIOLOGY: Patient is a 83y old  Female who presents with a chief complaint of     BRIEF HOSPITAL COURSE: 82 yo  female  with PMHx of afib (on xarelto), HTN and HLD, hx admission in August for anemia, CT imaging revealing cecal lesion, now  presents for scheduled laparoscopic right colectomy     s/p lap R colectomy and partial omentectomy       12/28     PAST MEDICAL & SURGICAL HISTORY:  HTN (hypertension  Glaucoma  OA (osteoarthritis)  Arteriosclerotic heart disease (ASHD)  Hyperlipidemia  Cecal lesion  Afib  Status post glaucoma surgery  and cataract  S/P knee surgery  H/O wrist surgery    Medications:  cefoTEtan  IVPB 1 Gram(s) IV Intermittent every 12 hours  metoprolol tartrate 75 milliGRAM(s) Oral two times a day  acetaminophen   IVPB .. 1000 milliGRAM(s) IV Intermittent every 6 hours  HYDROmorphone  Injectable 0.5 milliGRAM(s) IV Push every 10 minutes PRN  HYDROmorphone  Injectable 0.5 milliGRAM(s) IV Push every 4 hours PRN  HYDROmorphone  Injectable 0.2 milliGRAM(s) IV Push every 4 hours PRN  ondansetron Injectable 4 milliGRAM(s) IV Push once PRN  ondansetron Injectable 4 milliGRAM(s) IV Push every 6 hours PRN  atorvastatin 20 milliGRAM(s) Oral at bedtime  lactated ringers. 1000 milliLiter(s) IV Continuous <Continuous>  lactated ringers. 1000 milliLiter(s) IV Continuous <Continuous>      ICU Vital Signs Last 24 Hrs  T(C): 36.4 (28 Dec 2023 12:16), Max: 36.4 (28 Dec 2023 12:16)  T(F): 97.5 (28 Dec 2023 12:16), Max: 97.5 (28 Dec 2023 12:16)  HR: 86 (28 Dec 2023 14:30) (73 - 86)  BP: 150/76 (28 Dec 2023 14:30) (117/72 - 150/76)  BP(mean): --  ABP: --  ABP(mean): --  RR: 12 (28 Dec 2023 14:30) (12 - 20)  SpO2: 100% (28 Dec 2023 14:30) (97% - 100%)    O2 Parameters below as of 28 Dec 2023 13:15  Patient On (Oxygen Delivery Method): nasal cannula  O2 Flow (L/min): 2      I&O's Detail    28 Dec 2023 07:01  -  28 Dec 2023 15:15  --------------------------------------------------------  IN:    Other (mL): 2100 mL  Total IN: 2100 mL    OUT:    Blood Loss (mL): 100 mL    Indwelling Catheter - Urethral (mL): 50 mL    Other (mL): 300 mL  Total OUT: 450 mL    Total NET: 1650 mL      LABS: post op labs pending    CAPILLARY BLOOD GLUCOSE  POCT Blood Glucose.: 121 mg/dL (28 Dec 2023 12:24)    CULTURES:    Physical Examination:    General: No acute distress.  frail appearing  HEENT: Pupils equal, reactive to light.  Symmetric. very dry lips and mouth. Hard of hearing  PULM: Clear to auscultation bilaterally, no crackles or wheezing  CVS: Regular rate and rhythm, no murmurs  ABD: Soft, nondistended, incisional tender, normoactive bowel sounds, NP seal mid line, lap sites with dermabond   EXT: No LE edema,   SKIN: Warm and  dry skin  NEURO: Alert, oriented, interactive, no gross focal deficits    DEVICES:   rizzo    RADIOLOGY: Patient is a 83y old  Female who presents with a chief complaint of     BRIEF HOSPITAL COURSE: 84yo female  with PMHx of afib (on xarelto), HTN and HLD, hx admission in August for anemia, CT imaging revealing cecal lesion, now  presents for scheduled laparoscopic right colectomy     s/p lap R colectomy and partial omentectomy   Intra op omentum adhered to cecum and ascending colon  anastamosis stapled  EBL 100cc, received 2100cc IV fluid.     12/28 pt seen in PACU. c/o abd soreness and dry mouth. pt [place]. in sinus on monitor 80-90s. BP elevated.    PAST MEDICAL & SURGICAL HISTORY:  HTN (hypertension  Glaucoma  OA (osteoarthritis)  Arteriosclerotic heart disease (ASHD)  Hyperlipidemia  Cecal lesion  Afib  Status post glaucoma surgery  and cataract  S/P knee surgery  H/O wrist surgery    Medications:  cefoTEtan  IVPB 1 Gram(s) IV Intermittent every 12 hours  metoprolol tartrate 75 milliGRAM(s) Oral two times a day  acetaminophen   IVPB .. 1000 milliGRAM(s) IV Intermittent every 6 hours  HYDROmorphone  Injectable 0.5 milliGRAM(s) IV Push every 10 minutes PRN  HYDROmorphone  Injectable 0.5 milliGRAM(s) IV Push every 4 hours PRN  HYDROmorphone  Injectable 0.2 milliGRAM(s) IV Push every 4 hours PRN  ondansetron Injectable 4 milliGRAM(s) IV Push once PRN  ondansetron Injectable 4 milliGRAM(s) IV Push every 6 hours PRN  atorvastatin 20 milliGRAM(s) Oral at bedtime  lactated ringers. 1000 milliLiter(s) IV Continuous <Continuous>  lactated ringers. 1000 milliLiter(s) IV Continuous <Continuous>      ICU Vital Signs Last 24 Hrs  T(C): 36.4 (28 Dec 2023 12:16), Max: 36.4 (28 Dec 2023 12:16)  T(F): 97.5 (28 Dec 2023 12:16), Max: 97.5 (28 Dec 2023 12:16)  HR: 86 (28 Dec 2023 14:30) (73 - 86)  BP: 150/76 (28 Dec 2023 14:30) (117/72 - 150/76)  BP(mean): --  ABP: --  ABP(mean): --  RR: 12 (28 Dec 2023 14:30) (12 - 20)  SpO2: 100% (28 Dec 2023 14:30) (97% - 100%)    O2 Parameters below as of 28 Dec 2023 13:15  Patient On (Oxygen Delivery Method): nasal cannula  O2 Flow (L/min): 2      I&O's Detail    28 Dec 2023 07:01  -  28 Dec 2023 15:15  --------------------------------------------------------  IN:    Other (mL): 2100 mL  Total IN: 2100 mL    OUT:    Blood Loss (mL): 100 mL    Indwelling Catheter - Urethral (mL): 50 mL    Other (mL): 300 mL  Total OUT: 450 mL    Total NET: 1650 mL      LABS: post op labs pending    CAPILLARY BLOOD GLUCOSE  POCT Blood Glucose.: 121 mg/dL (28 Dec 2023 12:24)    CULTURES:    Physical Examination:    General: No acute distress.  frail appearing  HEENT: Pupils equal, reactive to light.  Symmetric. very dry lips and mouth. Hard of hearing  PULM: Clear to auscultation bilaterally, no crackles or wheezing  CVS: Regular rate and rhythm, no murmurs  ABD: Soft, nondistended, incisional tender, normoactive bowel sounds, NP seal mid line, lap sites with dermabond   EXT: No LE edema,   SKIN: Warm and  dry skin  NEURO: Alert, oriented, interactive, no gross focal deficits    DEVICES:   rizzo    RADIOLOGY: Patient is a 83y old  Female who presents with a chief complaint of     BRIEF HOSPITAL COURSE: 84yo female  with PMHx of afib (on xarelto), HTN and HLD, hx admission in August for anemia, CT imaging revealing cecal lesion, now  presents for scheduled laparoscopic right colectomy     s/p lap R colectomy and partial omentectomy   Intra op omentum adhered to cecum and ascending colon  anastamosis stapled  EBL 100cc, received 2100cc IV fluid.     12/28 pt seen in PACU. c/o abd soreness and dry mouth. pt [place]. in sinus on monitor 80-90s. BP elevated.    PAST MEDICAL & SURGICAL HISTORY:  HTN (hypertension  Glaucoma  OA (osteoarthritis)  Arteriosclerotic heart disease (ASHD)  Hyperlipidemia  Cecal lesion  Afib  Status post glaucoma surgery  and cataract  S/P knee surgery  H/O wrist surgery    Medications:  cefoTEtan  IVPB 1 Gram(s) IV Intermittent every 12 hours  metoprolol tartrate 75 milliGRAM(s) Oral two times a day  acetaminophen   IVPB .. 1000 milliGRAM(s) IV Intermittent every 6 hours  HYDROmorphone  Injectable 0.5 milliGRAM(s) IV Push every 10 minutes PRN  HYDROmorphone  Injectable 0.5 milliGRAM(s) IV Push every 4 hours PRN  HYDROmorphone  Injectable 0.2 milliGRAM(s) IV Push every 4 hours PRN  ondansetron Injectable 4 milliGRAM(s) IV Push once PRN  ondansetron Injectable 4 milliGRAM(s) IV Push every 6 hours PRN  atorvastatin 20 milliGRAM(s) Oral at bedtime  lactated ringers. 1000 milliLiter(s) IV Continuous <Continuous>  lactated ringers. 1000 milliLiter(s) IV Continuous <Continuous>      ICU Vital Signs Last 24 Hrs  T(C): 36.4 (28 Dec 2023 12:16), Max: 36.4 (28 Dec 2023 12:16)  T(F): 97.5 (28 Dec 2023 12:16), Max: 97.5 (28 Dec 2023 12:16)  HR: 86 (28 Dec 2023 14:30) (73 - 86)  BP: 150/76 (28 Dec 2023 14:30) (117/72 - 150/76)  BP(mean): --  ABP: --  ABP(mean): --  RR: 12 (28 Dec 2023 14:30) (12 - 20)  SpO2: 100% (28 Dec 2023 14:30) (97% - 100%)    O2 Parameters below as of 28 Dec 2023 13:15  Patient On (Oxygen Delivery Method): nasal cannula  O2 Flow (L/min): 2      I&O's Detail    28 Dec 2023 07:01  -  28 Dec 2023 15:15  --------------------------------------------------------  IN:    Other (mL): 2100 mL  Total IN: 2100 mL    OUT:    Blood Loss (mL): 100 mL    Indwelling Catheter - Urethral (mL): 50 mL    Other (mL): 300 mL  Total OUT: 450 mL    Total NET: 1650 mL      LABS: post op labs pending    CAPILLARY BLOOD GLUCOSE  POCT Blood Glucose.: 121 mg/dL (28 Dec 2023 12:24)    CULTURES:    Physical Examination:    General: No acute distress.  frail appearing  HEENT: Pupils equal, reactive to light.  Symmetric. very dry lips and mouth. Hard of hearing  PULM: Clear to auscultation bilaterally, no crackles or wheezing  CVS: Regular rate and rhythm, no murmurs  ABD: Soft, nondistended, incisional tender, normoactive bowel sounds, NP seal mid line, lap sites with dermabond   EXT: No LE edema,   SKIN: Warm and  dry skin  NEURO: Alert, oriented, interactive, no gross focal deficits    DEVICES:   rizzo    RADIOLOGY:

## 2023-12-28 NOTE — BRIEF OPERATIVE NOTE - NSICDXBRIEFPREOP_GEN_ALL_CORE_FT
PRE-OP DIAGNOSIS:  Cecal lesion 28-Dec-2023 12:23:17  Kylah Almaguer  Anemia in neoplastic disease 28-Dec-2023 12:23:36  Kylah Almaguer

## 2023-12-28 NOTE — ASU PREOP CHECKLIST - HAIR REMOVAL
Dallin Rivers MD  Division of [facility]  Cell: [phone]  Pager: [phone]  Office: [phone] hair removal not indicated

## 2023-12-28 NOTE — CONSULT NOTE ADULT - NS ATTEND AMEND GEN_ALL_CORE FT
82yo female with PMHx of afib (on xarelto), HTN and HLD, hx admission in August for anemia, CT imaging revealing cecal lesion, now  presents for scheduled laparoscopic right colectomy     s/p lap R colectomy and partial omentectomy   Intra op omentum adhered to cecum and ascending colon  anastamosis stapled  EBL 100cc, received 2100cc IV fluid.       Plan:  SICU Post op  Pain Control  HOLD DOACC  Clears  PPI  Cefotetan  Venodynes  IS

## 2023-12-28 NOTE — PATIENT PROFILE ADULT - FUNCTIONAL SCREEN CURRENT LEVEL: COMMUNICATION, MLM
RN Post-Op/Post-Discharge Care Coordination Note    Ms. Mary Dixon is a 37 year old female who was admitted to the General Surgery service from 8/11-8/12 for duodenitis s/p lap maco 8/1 at an OSH.  Spoke with Patient.    Support  Patient able to care for self independently     Health Status  Fevers/chills: Patient denies any fever or chills.  Nausea/Vomiting: Patient denies nausea/vomiting.  Eating/drinking: Patient is able to eat and drink without any complaints.  Bowel habits: Patient reports having a normal bowel movement.  Drains (DERECK): N/A  Incisions: Patient denies any signs and symptoms of infection..    Pain: patient states she is in quite a bit of pain. She is using Methocarbomol Q6H and Oxycodone in between. Discussed she can also use OTC Tylenol and Ibuprofen Q6H. She may also use a heating pad to her abdomen prn.    Activity/Restrictions  No lifting in excess of 15-20 pounds for 3-4 weeks    Equipment  None    Pathology reviewed with patient:  N/A    Forms/Letters  No    All of her questions were answered including reviewing restrictions, and wound care.  She will call this office if she has any further questions and/or concerns.      Patient is scheduled for follow up with Dr. Diaz 8/17 at 10:30. She is meeting with a PCP tomorrow. She would like a message sent to the GI team to help facilitate an appointment.    Whom and When to Call  Patient acknowledges understanding of how to manage any medication changes and   when to seek medical care.     Patient advised that if after hour medical concerns arise to please call [phone] and choose option 4 to speak to the physician on call.     A copy of this note was routed to the primary surgeon.               0 = understands/communicates without difficulty

## 2023-12-28 NOTE — CONSULT NOTE ADULT - ASSESSMENT
ASSESSMENT  84yo female with PMHx of afib (on xarelto), HTN and HLD, hx admission in August for anemia, CT imaging revealing cecal lesion, now  presents for scheduled laparoscopic right colectomy     s/p lap R colectomy and partial omentectomy   Intra op omentum adhered to cecum and ascending colon  anastamosis stapled  EBL 100cc, received 2100cc IV fluid.     PLAN    Neuro: AAOx 3. pain control prn. IV tylenol, morphine  CV: BP elevated suspect 2/2 pain, discomfort. resume home losartan and lopressor bid. resume xarelto for Afib when ok with surgery.  Pulm: on 2L sating %, trial on room air. encourage incentive spirometer  GI: clear liquids. IV PPI. bowel regimen prn  Nephro: cont IV maintenance fluids. LR decrease rate to 100. monitor I & Os. Trend renal fxn  Endo: check a1c. BGMs q6hr for colon bundle. ISS.   ID: post op IV cefotetan. trend WBC. monitor temps.   Heme: hold chemical DVT ppx until tomorrow as per surgery. Trend H/H. SCDs  PT eval post op  pt states her niece Emy helps her make medical decisions    Dispo: SICU. f/u post op labs. pain control. clear liquids. restart xarelto for Afib when ok with surgery .     Will discuss with Dr. Silvestre, d/w surgery resident.

## 2023-12-28 NOTE — BRIEF OPERATIVE NOTE - OPERATION/FINDINGS
Laparoscopic right colectomy, partial omentectomy. Omentum adhesed to cecum and ascending colon; excised to facilitate removal of cecal lesion. Friable tissue. Stapled anastomosis, Ruel technique.

## 2023-12-28 NOTE — BRIEF OPERATIVE NOTE - NSICDXBRIEFPOSTOP_GEN_ALL_CORE_FT
POST-OP DIAGNOSIS:  Cecal lesion 28-Dec-2023 12:23:49  Kylah Almaguer  Anemia in neoplastic disease 28-Dec-2023 12:24:38  Kylah Almaguer

## 2023-12-29 LAB
GLUCOSE BLDC GLUCOMTR-MCNC: 113 MG/DL — HIGH (ref 70–99)
GLUCOSE BLDC GLUCOMTR-MCNC: 113 MG/DL — HIGH (ref 70–99)
GLUCOSE BLDC GLUCOMTR-MCNC: 115 MG/DL — HIGH (ref 70–99)
GLUCOSE BLDC GLUCOMTR-MCNC: 115 MG/DL — HIGH (ref 70–99)

## 2023-12-29 PROCEDURE — 99233 SBSQ HOSP IP/OBS HIGH 50: CPT

## 2023-12-29 RX ORDER — SODIUM CHLORIDE 9 MG/ML
1000 INJECTION, SOLUTION INTRAVENOUS ONCE
Refills: 0 | Status: COMPLETED | OUTPATIENT
Start: 2023-12-29 | End: 2023-12-29

## 2023-12-29 RX ORDER — HEPARIN SODIUM 5000 [USP'U]/ML
5000 INJECTION INTRAVENOUS; SUBCUTANEOUS EVERY 8 HOURS
Refills: 0 | Status: DISCONTINUED | OUTPATIENT
Start: 2023-12-29 | End: 2023-12-29

## 2023-12-29 RX ORDER — SODIUM,POTASSIUM PHOSPHATES 278-250MG
2 POWDER IN PACKET (EA) ORAL ONCE
Refills: 0 | Status: COMPLETED | OUTPATIENT
Start: 2023-12-29 | End: 2023-12-29

## 2023-12-29 RX ORDER — HEPARIN SODIUM 5000 [USP'U]/ML
5000 INJECTION INTRAVENOUS; SUBCUTANEOUS EVERY 12 HOURS
Refills: 0 | Status: DISCONTINUED | OUTPATIENT
Start: 2023-12-29 | End: 2024-01-01

## 2023-12-29 RX ORDER — METOCLOPRAMIDE HCL 10 MG
10 TABLET ORAL EVERY 8 HOURS
Refills: 0 | Status: DISCONTINUED | OUTPATIENT
Start: 2023-12-29 | End: 2023-12-30

## 2023-12-29 RX ADMIN — ONDANSETRON 4 MILLIGRAM(S): 8 TABLET, FILM COATED ORAL at 10:00

## 2023-12-29 RX ADMIN — ATORVASTATIN CALCIUM 20 MILLIGRAM(S): 80 TABLET, FILM COATED ORAL at 21:47

## 2023-12-29 RX ADMIN — Medication 975 MILLIGRAM(S): at 12:40

## 2023-12-29 RX ADMIN — SODIUM CHLORIDE 75 MILLILITER(S): 9 INJECTION, SOLUTION INTRAVENOUS at 11:49

## 2023-12-29 RX ADMIN — SODIUM CHLORIDE 500 MILLILITER(S): 9 INJECTION, SOLUTION INTRAVENOUS at 22:08

## 2023-12-29 RX ADMIN — Medication 75 MILLIGRAM(S): at 21:47

## 2023-12-29 RX ADMIN — Medication 975 MILLIGRAM(S): at 11:44

## 2023-12-29 RX ADMIN — Medication 975 MILLIGRAM(S): at 06:23

## 2023-12-29 RX ADMIN — ONDANSETRON 4 MILLIGRAM(S): 8 TABLET, FILM COATED ORAL at 16:01

## 2023-12-29 RX ADMIN — Medication 100 GRAM(S): at 00:00

## 2023-12-29 RX ADMIN — LOSARTAN POTASSIUM 100 MILLIGRAM(S): 100 TABLET, FILM COATED ORAL at 11:44

## 2023-12-29 RX ADMIN — SODIUM CHLORIDE 75 MILLILITER(S): 9 INJECTION, SOLUTION INTRAVENOUS at 21:48

## 2023-12-29 RX ADMIN — Medication 2 TABLET(S): at 10:00

## 2023-12-29 RX ADMIN — Medication 75 MILLIGRAM(S): at 00:00

## 2023-12-29 RX ADMIN — ALVIMOPAN 12 MILLIGRAM(S): 12 CAPSULE ORAL at 10:00

## 2023-12-29 RX ADMIN — HEPARIN SODIUM 5000 UNIT(S): 5000 INJECTION INTRAVENOUS; SUBCUTANEOUS at 21:47

## 2023-12-29 RX ADMIN — Medication 975 MILLIGRAM(S): at 18:14

## 2023-12-29 RX ADMIN — Medication 975 MILLIGRAM(S): at 00:00

## 2023-12-29 RX ADMIN — Medication 975 MILLIGRAM(S): at 01:00

## 2023-12-29 RX ADMIN — Medication 75 MILLIGRAM(S): at 10:01

## 2023-12-29 RX ADMIN — HEPARIN SODIUM 5000 UNIT(S): 5000 INJECTION INTRAVENOUS; SUBCUTANEOUS at 10:00

## 2023-12-29 RX ADMIN — Medication 10 MILLIGRAM(S): at 21:47

## 2023-12-29 RX ADMIN — ATORVASTATIN CALCIUM 20 MILLIGRAM(S): 80 TABLET, FILM COATED ORAL at 00:00

## 2023-12-29 NOTE — PHYSICAL THERAPY INITIAL EVALUATION ADULT - GENERAL OBSERVATIONS, REHAB EVAL
IV; rizzo; flowtrons; HM; BP cuff; pulse oxym; pt rec'd in bed supine; HR 71; /52; O2 Sat 94%; RR 25; GLENN Smyth present; pain 5/10; RN Kayley made aware

## 2023-12-29 NOTE — PROGRESS NOTE ADULT - SUBJECTIVE AND OBJECTIVE BOX
Patient is a 83y old  Female who presents with a chief complaint of     BRIEF HOSPITAL COURSE: 82yo female  with PMHx of afib (on xarelto), HTN and HLD, hx admission in August for anemia, CT imaging revealing cecal lesion, now  presents for scheduled laparoscopic right colectomy     s/p lap R colectomy and partial omentectomy   Intra op omentum adhered to cecum and ascending colon  anastamosis stapled  EBL 100cc, received 2100cc IV fluid.     12/28 pt seen in PACU. c/o abd soreness and dry mouth. pt [place]. in sinus on monitor 80-90s. BP elevated.  12/29 pt still c/o abd pain, mild nausea. abd appears more distended. had liquid BM this am.    PAST MEDICAL & SURGICAL HISTORY:  HTN (hypertension  Glaucoma  OA (osteoarthritis)  Arteriosclerotic heart disease (ASHD)  Hyperlipidemia  Cecal lesion  Afib  Status post glaucoma surgery  and cataract  S/P knee surgery  H/O wrist surgery    MEDICATIONS  (STANDING):  acetaminophen     Tablet .. 975 milliGRAM(s) Oral every 6 hours  acetaminophen   IVPB .. 1000 milliGRAM(s) IV Intermittent every 6 hours  alvimopan 12 milliGRAM(s) Oral two times a day  atorvastatin 20 milliGRAM(s) Oral at bedtime  heparin   Injectable 5000 Unit(s) SubCutaneous every 12 hours  lactated ringers. 1000 milliLiter(s) (75 mL/Hr) IV Continuous <Continuous>  losartan 100 milliGRAM(s) Oral daily  metoprolol tartrate 75 milliGRAM(s) Oral two times a day      Vital Signs Last 24 Hrs  T(C): 37 (29 Dec 2023 08:51), Max: 37.1 (29 Dec 2023 06:34)  T(F): 98.6 (29 Dec 2023 08:51), Max: 98.8 (29 Dec 2023 06:34)  HR: 78 (29 Dec 2023 10:00) (66 - 108)  BP: 124/52 (29 Dec 2023 10:00) (98/70 - 150/76)  BP(mean): 74 (29 Dec 2023 10:00) (74 - 105)  RR: 22 (29 Dec 2023 10:00) (12 - 31)  SpO2: 97% (29 Dec 2023 10:00) (96% - 100%)    Parameters below as of 29 Dec 2023 06:00  Patient On (Oxygen Delivery Method): nasal cannula  O2 Flow (L/min): 2    I&O's Detail    28 Dec 2023 07:01  -  29 Dec 2023 07:00  --------------------------------------------------------  IN:    IV PiggyBack: 500 mL    Lactated Ringers: 588 mL    Oral Fluid: 480 mL    Other (mL): 2100 mL  Total IN: 3668 mL    OUT:    Blood Loss (mL): 100 mL    Indwelling Catheter - Urethral (mL): 1400 mL    Other (mL): 300 mL  Total OUT: 1800 mL    Total NET: 1868 mL        LABS:                               10.4   14.06 )-----------( 199      ( 29 Dec 2023 06:40 )             32.3     12-29    142  |  112<H>  |  11  ----------------------------<  133<H>  4.4   |  25  |  0.98    Ca    8.3<L>      29 Dec 2023 06:40  Phos  2.2     12-29  Mg     1.9     12-29    TPro  6.0  /  Alb  2.5<L>  /  TBili  0.3  /  DBili  x   /  AST  20  /  ALT  24  /  AlkPhos  71  12-29      LIVER FUNCTIONS - ( 29 Dec 2023 06:40 )  Alb: 2.5 g/dL / Pro: 6.0 gm/dL / ALK PHOS: 71 U/L / ALT: 24 U/L / AST: 20 U/L / GGT: x             Urinalysis Basic - ( 29 Dec 2023 06:40 )    Color: x / Appearance: x / SG: x / pH: x  Gluc: 133 mg/dL / Ketone: x  / Bili: x / Urobili: x   Blood: x / Protein: x / Nitrite: x   Leuk Esterase: x / RBC: x / WBC x   Sq Epi: x / Non Sq Epi: x / Bacteria: x    CAPILLARY BLOOD GLUCOSE  POCT Blood Glucose.: 121 mg/dL (28 Dec 2023 12:24)    CULTURES:    Physical Examination:    General: No acute distress.  frail appearing  HEENT: Pupils equal, reactive to light.  Symmetric. very dry lips and mouth. Hard of hearing  PULM: Clear to auscultation bilaterally, no crackles or wheezing  CVS: Regular rate and rhythm, no murmurs  ABD: Soft, but appears more distended, incisional tender, normoactive bowel sounds, NP seal mid line, lap sites with dermabond   EXT: No LE edema,   SKIN: Warm and  dry skin  NEURO: Alert, oriented, interactive, no gross focal deficits    DEVICES:   rizzo    RADIOLOGY: Patient is a 83y old  Female who presents with a chief complaint of     BRIEF HOSPITAL COURSE: 84yo female  with PMHx of afib (on xarelto), HTN and HLD, hx admission in August for anemia, CT imaging revealing cecal lesion, now  presents for scheduled laparoscopic right colectomy     s/p lap R colectomy and partial omentectomy   Intra op omentum adhered to cecum and ascending colon  anastamosis stapled  EBL 100cc, received 2100cc IV fluid.     12/28 pt seen in PACU. c/o abd soreness and dry mouth. pt [place]. in sinus on monitor 80-90s. BP elevated.  12/29 pt still c/o abd pain, mild nausea. abd appears more distended. had liquid BM this am.    PAST MEDICAL & SURGICAL HISTORY:  HTN (hypertension  Glaucoma  OA (osteoarthritis)  Arteriosclerotic heart disease (ASHD)  Hyperlipidemia  Cecal lesion  Afib  Status post glaucoma surgery  and cataract  S/P knee surgery  H/O wrist surgery    MEDICATIONS  (STANDING):  acetaminophen     Tablet .. 975 milliGRAM(s) Oral every 6 hours  acetaminophen   IVPB .. 1000 milliGRAM(s) IV Intermittent every 6 hours  alvimopan 12 milliGRAM(s) Oral two times a day  atorvastatin 20 milliGRAM(s) Oral at bedtime  heparin   Injectable 5000 Unit(s) SubCutaneous every 12 hours  lactated ringers. 1000 milliLiter(s) (75 mL/Hr) IV Continuous <Continuous>  losartan 100 milliGRAM(s) Oral daily  metoprolol tartrate 75 milliGRAM(s) Oral two times a day      Vital Signs Last 24 Hrs  T(C): 37 (29 Dec 2023 08:51), Max: 37.1 (29 Dec 2023 06:34)  T(F): 98.6 (29 Dec 2023 08:51), Max: 98.8 (29 Dec 2023 06:34)  HR: 78 (29 Dec 2023 10:00) (66 - 108)  BP: 124/52 (29 Dec 2023 10:00) (98/70 - 150/76)  BP(mean): 74 (29 Dec 2023 10:00) (74 - 105)  RR: 22 (29 Dec 2023 10:00) (12 - 31)  SpO2: 97% (29 Dec 2023 10:00) (96% - 100%)    Parameters below as of 29 Dec 2023 06:00  Patient On (Oxygen Delivery Method): nasal cannula  O2 Flow (L/min): 2    I&O's Detail    28 Dec 2023 07:01  -  29 Dec 2023 07:00  --------------------------------------------------------  IN:    IV PiggyBack: 500 mL    Lactated Ringers: 588 mL    Oral Fluid: 480 mL    Other (mL): 2100 mL  Total IN: 3668 mL    OUT:    Blood Loss (mL): 100 mL    Indwelling Catheter - Urethral (mL): 1400 mL    Other (mL): 300 mL  Total OUT: 1800 mL    Total NET: 1868 mL        LABS:                               10.4   14.06 )-----------( 199      ( 29 Dec 2023 06:40 )             32.3     12-29    142  |  112<H>  |  11  ----------------------------<  133<H>  4.4   |  25  |  0.98    Ca    8.3<L>      29 Dec 2023 06:40  Phos  2.2     12-29  Mg     1.9     12-29    TPro  6.0  /  Alb  2.5<L>  /  TBili  0.3  /  DBili  x   /  AST  20  /  ALT  24  /  AlkPhos  71  12-29      LIVER FUNCTIONS - ( 29 Dec 2023 06:40 )  Alb: 2.5 g/dL / Pro: 6.0 gm/dL / ALK PHOS: 71 U/L / ALT: 24 U/L / AST: 20 U/L / GGT: x             Urinalysis Basic - ( 29 Dec 2023 06:40 )    Color: x / Appearance: x / SG: x / pH: x  Gluc: 133 mg/dL / Ketone: x  / Bili: x / Urobili: x   Blood: x / Protein: x / Nitrite: x   Leuk Esterase: x / RBC: x / WBC x   Sq Epi: x / Non Sq Epi: x / Bacteria: x    CAPILLARY BLOOD GLUCOSE  POCT Blood Glucose.: 121 mg/dL (28 Dec 2023 12:24)    CULTURES:    Physical Examination:    General: No acute distress.  frail appearing  HEENT: Pupils equal, reactive to light.  Symmetric. very dry lips and mouth. Hard of hearing  PULM: Clear to auscultation bilaterally, no crackles or wheezing  CVS: Regular rate and rhythm, no murmurs  ABD: Soft, but appears more distended, incisional tender, normoactive bowel sounds, NP seal mid line, lap sites with dermabond   EXT: No LE edema,   SKIN: Warm and  dry skin  NEURO: Alert, oriented, interactive, no gross focal deficits    DEVICES:   rizzo    RADIOLOGY:

## 2023-12-29 NOTE — PROVIDER CONTACT NOTE (OTHER) - SITUATION
Office is aware that patient is in [facility].  Please fax discharge papers to [phone]. Office is aware that patient is in [facility].  Please fax discharge papers to [phone].

## 2023-12-29 NOTE — PROGRESS NOTE ADULT - ASSESSMENT
83F w/ Cecal mass now POD1 from Rt hemicolectomy  unremarkable recovery so far. No BM/Gas yet    Plan:  - Pain control PRN  - Monitor vitals  - FLD later in the day  - c/w Entereg until ROBF  - Nausea control PRN  - IVF  - TOV  - replete electrolytes  - daily labs  - OOB/PT  - Anticipated DC tomorrow    Plan will be discussed with Colorectal surgery attending, Dr. Potter

## 2023-12-29 NOTE — PROGRESS NOTE ADULT - ATTENDING COMMENTS
S&E  Reports feeling nauseated.  Not taking much clear liquids.  Not yet OOB.  AF  NAD  soft, mild tenderness, distended  Labs reviewed  A/P -  Downgrade to sips/chips for diet.  Can remove rizzo and trial of void.  OOB and ambulating.

## 2023-12-29 NOTE — PROGRESS NOTE ADULT - SUBJECTIVE AND OBJECTIVE BOX
Patient seen and examined by surgical team this morning.   Pain well controlled. Tolerating CLD in small amount  Denies nausea or vomiting, soft abdomen  Awaiting ROBF - no gas nor BM yet  NP seal intact holding suction  Denies fever or chills      ROS: Negative except written above    PHYSICAL EXAM:  - GENERAL: No acute distress.  - EYES: EOMI. Anicteric.  - HENT: Moist mucous membranes. No scleral icterus. No cervical lymphadenopathy.  - LUNGS:  No accessory muscle use.  - CARDIOVASCULAR: Regular rate and rhythm.   - ABDOMEN: Soft, appropriately-tender and non-distended. NP seal in place holding suction No palpable masses.  - EXTREMITIES: No edema. Non-tender.  - SKIN: No rashes or lesions. Warm.  - NEUROLOGIC: No focal neurological deficits. CN II-XII grossly intact, but not individually tested.  - PSYCHIATRIC: Cooperative. Appropriate mood and affect.        Chief complaint:      PMHx:  HTN (hypertension)    Glaucoma    OA (osteoarthritis)    Arteriosclerotic heart disease (ASHD)    Hyperlipidemia    Cecal lesion    Afib        PSHx:  Status post glaucoma surgery    S/P knee surgery    H/O wrist surgery        FHx:  FHx: colon cancer    FH: pancreatic cancer    FH: CAD (coronary artery disease)        Vitals:  T(C): 37.1 ( @ 06:34), Max: 37.1 ( @ 06:34)  HR: 69 ( @ 06:00) (66 - 108)  BP: 141/72 ( @ 06:00) (98/70 - 150/76)  RR: 22 ( @ 06:00) (12 - 31)  SpO2: 96% ( @ 06:00) (96% - 100%)      I&Os     @ 07:01  -   @ 07:00  --------------------------------------------------------  IN:    IV PiggyBack: 500 mL    Lactated Ringers: 588 mL    Oral Fluid: 480 mL    Other (mL): 2100 mL  Total IN: 3668 mL    OUT:    Blood Loss (mL): 100 mL    Indwelling Catheter - Urethral (mL): 1400 mL    Other (mL): 300 mL  Total OUT: 1800 mL    Total NET: 1868 mL        .    Labs:   @ 06:40                    10.4  CBC: 14.06>)-------(<199                     32.3                 142 | 112 | 11    CMP:  ----------------------< 133               4.4 | 25 | 0.98                      Ca:8.3  Phos:2.2  M.9               0.3|      |20        LFTs:  ------|71|-----             -|      |-  - @ 15:43                    11.5  CBC: 12.49>)-------(<171                     35.8                 139 | 109 | 12    CMP:  ----------------------< 149               3.7 | 22 | 1.09                      Ca:8.5  Phos:2.0  M.0               -|      |-        LFTs:  ------|-|-----             -|      |-        Cultures:        Current Inpatient Medications:  acetaminophen     Tablet .. 975 milliGRAM(s) Oral every 6 hours  acetaminophen   IVPB .. 1000 milliGRAM(s) IV Intermittent every 6 hours  alvimopan 12 milliGRAM(s) Oral two times a day  atorvastatin 20 milliGRAM(s) Oral at bedtime  HYDROmorphone  Injectable 0.5 milliGRAM(s) IV Push every 4 hours PRN  lactated ringers. 1000 milliLiter(s) (75 mL/Hr) IV Continuous <Continuous>  losartan 100 milliGRAM(s) Oral daily  metoprolol tartrate 75 milliGRAM(s) Oral two times a day  ondansetron Injectable 4 milliGRAM(s) IV Push every 6 hours PRN  oxyCODONE    IR 5 milliGRAM(s) Oral every 6 hours PRN  potassium phosphate / sodium phosphate Tablet (K-PHOS No. 2) 2 Tablet(s) Oral once

## 2023-12-29 NOTE — PROVIDER CONTACT NOTE (OTHER) - SITUATION
Pt unable to void post catheter removal - bladder scan 144 - pt does not feel any urge to void  abdomen distended with nausea

## 2023-12-29 NOTE — PHYSICAL THERAPY INITIAL EVALUATION ADULT - MODALITIES TREATMENT COMMENTS
pt left in bed supine post Eval @ pt request; bed alarm on; all above lines / monitors intact; callbell in reach; zoraida well; pain 5/10; RN Kayley aware

## 2023-12-30 LAB
ANION GAP SERPL CALC-SCNC: 1 MMOL/L — LOW (ref 5–17)
ANION GAP SERPL CALC-SCNC: 1 MMOL/L — LOW (ref 5–17)
BUN SERPL-MCNC: 14 MG/DL — SIGNIFICANT CHANGE UP (ref 7–23)
BUN SERPL-MCNC: 14 MG/DL — SIGNIFICANT CHANGE UP (ref 7–23)
CALCIUM SERPL-MCNC: 8.2 MG/DL — LOW (ref 8.5–10.1)
CALCIUM SERPL-MCNC: 8.2 MG/DL — LOW (ref 8.5–10.1)
CHLORIDE SERPL-SCNC: 110 MMOL/L — HIGH (ref 96–108)
CHLORIDE SERPL-SCNC: 110 MMOL/L — HIGH (ref 96–108)
CO2 SERPL-SCNC: 28 MMOL/L — SIGNIFICANT CHANGE UP (ref 22–31)
CO2 SERPL-SCNC: 28 MMOL/L — SIGNIFICANT CHANGE UP (ref 22–31)
CREAT SERPL-MCNC: 0.9 MG/DL — SIGNIFICANT CHANGE UP (ref 0.5–1.3)
CREAT SERPL-MCNC: 0.9 MG/DL — SIGNIFICANT CHANGE UP (ref 0.5–1.3)
EGFR: 64 ML/MIN/1.73M2 — SIGNIFICANT CHANGE UP
EGFR: 64 ML/MIN/1.73M2 — SIGNIFICANT CHANGE UP
GLUCOSE BLDC GLUCOMTR-MCNC: 110 MG/DL — HIGH (ref 70–99)
GLUCOSE BLDC GLUCOMTR-MCNC: 110 MG/DL — HIGH (ref 70–99)
GLUCOSE BLDC GLUCOMTR-MCNC: 95 MG/DL — SIGNIFICANT CHANGE UP (ref 70–99)
GLUCOSE BLDC GLUCOMTR-MCNC: 95 MG/DL — SIGNIFICANT CHANGE UP (ref 70–99)
GLUCOSE SERPL-MCNC: 125 MG/DL — HIGH (ref 70–99)
GLUCOSE SERPL-MCNC: 125 MG/DL — HIGH (ref 70–99)
HCT VFR BLD CALC: 33.6 % — LOW (ref 34.5–45)
HCT VFR BLD CALC: 33.6 % — LOW (ref 34.5–45)
HGB BLD-MCNC: 10.7 G/DL — LOW (ref 11.5–15.5)
HGB BLD-MCNC: 10.7 G/DL — LOW (ref 11.5–15.5)
MAGNESIUM SERPL-MCNC: 2 MG/DL — SIGNIFICANT CHANGE UP (ref 1.6–2.6)
MAGNESIUM SERPL-MCNC: 2 MG/DL — SIGNIFICANT CHANGE UP (ref 1.6–2.6)
MCHC RBC-ENTMCNC: 29.2 PG — SIGNIFICANT CHANGE UP (ref 27–34)
MCHC RBC-ENTMCNC: 29.2 PG — SIGNIFICANT CHANGE UP (ref 27–34)
MCHC RBC-ENTMCNC: 31.8 GM/DL — LOW (ref 32–36)
MCHC RBC-ENTMCNC: 31.8 GM/DL — LOW (ref 32–36)
MCV RBC AUTO: 91.6 FL — SIGNIFICANT CHANGE UP (ref 80–100)
MCV RBC AUTO: 91.6 FL — SIGNIFICANT CHANGE UP (ref 80–100)
PHOSPHATE SERPL-MCNC: 2 MG/DL — LOW (ref 2.5–4.5)
PHOSPHATE SERPL-MCNC: 2 MG/DL — LOW (ref 2.5–4.5)
PLATELET # BLD AUTO: 184 K/UL — SIGNIFICANT CHANGE UP (ref 150–400)
PLATELET # BLD AUTO: 184 K/UL — SIGNIFICANT CHANGE UP (ref 150–400)
POTASSIUM SERPL-MCNC: 4.2 MMOL/L — SIGNIFICANT CHANGE UP (ref 3.5–5.3)
POTASSIUM SERPL-MCNC: 4.2 MMOL/L — SIGNIFICANT CHANGE UP (ref 3.5–5.3)
POTASSIUM SERPL-SCNC: 4.2 MMOL/L — SIGNIFICANT CHANGE UP (ref 3.5–5.3)
POTASSIUM SERPL-SCNC: 4.2 MMOL/L — SIGNIFICANT CHANGE UP (ref 3.5–5.3)
RBC # BLD: 3.67 M/UL — LOW (ref 3.8–5.2)
RBC # BLD: 3.67 M/UL — LOW (ref 3.8–5.2)
RBC # FLD: 19.8 % — HIGH (ref 10.3–14.5)
RBC # FLD: 19.8 % — HIGH (ref 10.3–14.5)
SODIUM SERPL-SCNC: 139 MMOL/L — SIGNIFICANT CHANGE UP (ref 135–145)
SODIUM SERPL-SCNC: 139 MMOL/L — SIGNIFICANT CHANGE UP (ref 135–145)
WBC # BLD: 17 K/UL — HIGH (ref 3.8–10.5)
WBC # BLD: 17 K/UL — HIGH (ref 3.8–10.5)
WBC # FLD AUTO: 17 K/UL — HIGH (ref 3.8–10.5)
WBC # FLD AUTO: 17 K/UL — HIGH (ref 3.8–10.5)

## 2023-12-30 PROCEDURE — 71045 X-RAY EXAM CHEST 1 VIEW: CPT | Mod: 26

## 2023-12-30 PROCEDURE — 99233 SBSQ HOSP IP/OBS HIGH 50: CPT

## 2023-12-30 RX ORDER — PANTOPRAZOLE SODIUM 20 MG/1
40 TABLET, DELAYED RELEASE ORAL
Refills: 0 | Status: DISCONTINUED | OUTPATIENT
Start: 2023-12-30 | End: 2024-01-07

## 2023-12-30 RX ORDER — METOPROLOL TARTRATE 50 MG
5 TABLET ORAL EVERY 6 HOURS
Refills: 0 | Status: DISCONTINUED | OUTPATIENT
Start: 2023-12-30 | End: 2024-01-02

## 2023-12-30 RX ORDER — ACETAMINOPHEN 500 MG
1000 TABLET ORAL EVERY 6 HOURS
Refills: 0 | Status: COMPLETED | OUTPATIENT
Start: 2023-12-30 | End: 2023-12-30

## 2023-12-30 RX ORDER — TAMSULOSIN HYDROCHLORIDE 0.4 MG/1
0.4 CAPSULE ORAL AT BEDTIME
Refills: 0 | Status: DISCONTINUED | OUTPATIENT
Start: 2023-12-30 | End: 2023-12-30

## 2023-12-30 RX ORDER — METOCLOPRAMIDE HCL 10 MG
10 TABLET ORAL EVERY 6 HOURS
Refills: 0 | Status: DISCONTINUED | OUTPATIENT
Start: 2023-12-30 | End: 2024-01-02

## 2023-12-30 RX ORDER — ACETAMINOPHEN 500 MG
1000 TABLET ORAL ONCE
Refills: 0 | Status: DISCONTINUED | OUTPATIENT
Start: 2023-12-30 | End: 2024-01-07

## 2023-12-30 RX ADMIN — Medication 5 MILLIGRAM(S): at 11:25

## 2023-12-30 RX ADMIN — Medication 1000 MILLIGRAM(S): at 23:26

## 2023-12-30 RX ADMIN — SODIUM CHLORIDE 75 MILLILITER(S): 9 INJECTION, SOLUTION INTRAVENOUS at 14:32

## 2023-12-30 RX ADMIN — Medication 85 MILLIMOLE(S): at 09:56

## 2023-12-30 RX ADMIN — Medication 5 MILLIGRAM(S): at 23:19

## 2023-12-30 RX ADMIN — Medication 10 MILLIGRAM(S): at 11:25

## 2023-12-30 RX ADMIN — Medication 1000 MILLIGRAM(S): at 14:05

## 2023-12-30 RX ADMIN — Medication 400 MILLIGRAM(S): at 14:05

## 2023-12-30 RX ADMIN — Medication 5 MILLIGRAM(S): at 18:04

## 2023-12-30 RX ADMIN — Medication 10 MILLIGRAM(S): at 23:20

## 2023-12-30 RX ADMIN — HEPARIN SODIUM 5000 UNIT(S): 5000 INJECTION INTRAVENOUS; SUBCUTANEOUS at 23:20

## 2023-12-30 RX ADMIN — Medication 400 MILLIGRAM(S): at 23:19

## 2023-12-30 RX ADMIN — SODIUM CHLORIDE 75 MILLILITER(S): 9 INJECTION, SOLUTION INTRAVENOUS at 00:08

## 2023-12-30 RX ADMIN — PANTOPRAZOLE SODIUM 40 MILLIGRAM(S): 20 TABLET, DELAYED RELEASE ORAL at 23:19

## 2023-12-30 RX ADMIN — HEPARIN SODIUM 5000 UNIT(S): 5000 INJECTION INTRAVENOUS; SUBCUTANEOUS at 11:25

## 2023-12-30 RX ADMIN — Medication 10 MILLIGRAM(S): at 18:04

## 2023-12-30 RX ADMIN — PANTOPRAZOLE SODIUM 40 MILLIGRAM(S): 20 TABLET, DELAYED RELEASE ORAL at 11:25

## 2023-12-30 NOTE — PROGRESS NOTE ADULT - ASSESSMENT
ASSESSMENT  82yo female with PMHx of afib (on xarelto), HTN and HLD, hx admission in August for anemia, CT imaging revealing cecal lesion, now  presents for scheduled laparoscopic right colectomy     s/p lap R colectomy and partial omentectomy   Intra op omentum adhered to cecum and ascending colon  anastamosis stapled  EBL 100cc, received 2100cc IV fluid.     PLAN    Neuro: AAOx 3. pain control prn. IV tylenol, morphine  CV: BP better. losartan and lopressor bid. resume xarelto for Afib when ok with surgery.  Pulm: on room air. encourage incentive spirometer  GI: NPO except meds, candy. IV PPI. bowel regimen prn, NGT to suction  Nephro: cont IV maintenance fluids. LR @ 100 while NPO. monitor I & Os. Trend renal fxn. Siddiqui placed, flomax  Endo: check a1c. BGMs q6hr for colon bundle. ISS.   ID:  trend WBC. monitor temps.   Heme:  DVT ppx - hep sq.  Trend H/H. SCDs  PT eval post op  pt states her niece Emy helps her make medical decisions

## 2023-12-30 NOTE — PROGRESS NOTE ADULT - SUBJECTIVE AND OBJECTIVE BOX
N/V overnight with resultant NGT placement, now feels improved.  Passed BMs at least twice overnight as well.  No spontaneous void after rizzo removal.    MEDICATIONS  (STANDING):  acetaminophen     Tablet .. 975 milliGRAM(s) Oral every 6 hours  acetaminophen   IVPB .. 1000 milliGRAM(s) IV Intermittent every 6 hours  atorvastatin 20 milliGRAM(s) Oral at bedtime  heparin   Injectable 5000 Unit(s) SubCutaneous every 12 hours  lactated ringers. 1000 milliLiter(s) (75 mL/Hr) IV Continuous <Continuous>  losartan 100 milliGRAM(s) Oral daily  metoclopramide Injectable 10 milliGRAM(s) IV Push every 6 hours  metoprolol tartrate Injectable 5 milliGRAM(s) IV Push every 6 hours  sodium phosphate 30 milliMole(s)/500 mL IVPB 30 milliMole(s) IV Intermittent once    MEDICATIONS  (PRN):  HYDROmorphone  Injectable 0.5 milliGRAM(s) IV Push every 4 hours PRN Severe Pain (7 - 10)  ondansetron Injectable 4 milliGRAM(s) IV Push every 6 hours PRN Nausea  oxyCODONE    IR 5 milliGRAM(s) Oral every 6 hours PRN Moderate Pain (4 - 6)    Vital Signs Last 24 Hrs  T(C): 36.7 (29 Dec 2023 21:19), Max: 37.1 (29 Dec 2023 17:42)  T(F): 98.1 (29 Dec 2023 21:19), Max: 98.7 (29 Dec 2023 17:42)  HR: 88 (30 Dec 2023 08:00) (68 - 88)  BP: 126/67 (30 Dec 2023 08:00) (111/65 - 139/59)  BP(mean): 85 (30 Dec 2023 08:00) (72 - 101)  RR: 22 (30 Dec 2023 08:00) (17 - 27)  SpO2: 97% (30 Dec 2023 08:00) (95% - 100%)      I&O's Detail    29 Dec 2023 07:01  -  30 Dec 2023 07:00  --------------------------------------------------------  IN:    Lactated Ringers: 1575 mL    Lactated Ringers Bolus: 1000 mL  Total IN: 2575 mL    OUT:    Indwelling Catheter - Urethral (mL): 500 mL    Intermittent Catheterization - Urethral (mL): 300 mL  Total OUT: 800 mL    Total NET: 1775 mL    NAD  ABD exam : soft, NT, moderate distention                        10.7   17.00 )-----------( 184      ( 30 Dec 2023 05:30 )             33.6     12-30    139  |  110<H>  |  14  ----------------------------<  125<H>  4.2   |  28  |  0.90    Ca    8.2<L>      30 Dec 2023 05:30  Phos  2.0     12-30  Mg     2.0     12-30    TPro  6.0  /  Alb  2.5<L>  /  TBili  0.3  /  DBili  x   /  AST  20  /  ALT  24  /  AlkPhos  71  12-29

## 2023-12-30 NOTE — PROGRESS NOTE ADULT - SUBJECTIVE AND OBJECTIVE BOX
Patient is a 83y old  Female who presents with a chief complaint of     BRIEF HOSPITAL COURSE: 84yo female  with PMHx of afib (on xarelto), HTN and HLD, hx admission in August for anemia, CT imaging revealing cecal lesion, now  presents for scheduled laparoscopic right colectomy     s/p lap R colectomy and partial omentectomy   Intra op omentum adhered to cecum and ascending colon  anastamosis stapled  EBL 100cc, received 2100cc IV fluid.      pt seen in PACU. c/o abd soreness and dry mouth. pt [place]. in sinus on monitor 80-90s. BP elevated.   pt still c/o abd pain, mild nausea. abd appears more distended. had liquid BM this am.  : N/V, NGT placed over night, +BM      PAST MEDICAL & SURGICAL HISTORY:  HTN (hypertension)      Glaucoma      OA (osteoarthritis)      Arteriosclerotic heart disease (ASHD)      Hyperlipidemia      Cecal lesion      Afib      Status post glaucoma surgery  and cataract      S/P knee surgery      H/O wrist surgery          FAMILY HISTORY:  FHx: colon cancer  in brother,     FH: pancreatic cancer  in sister,     FH: CAD (coronary artery disease)  in mother and father, both         Social Hx:    Allergies    No Known Allergies    Intolerances            ICU Vital Signs Last 24 Hrs  T(C): 36.8 (30 Dec 2023 14:17), Max: 37.1 (29 Dec 2023 17:42)  T(F): 98.2 (30 Dec 2023 14:17), Max: 98.7 (29 Dec 2023 17:42)  HR: 89 (30 Dec 2023 14:00) (68 - 89)  BP: 115/50 (30 Dec 2023 14:00) (111/65 - 139/59)  BP(mean): 69 (30 Dec 2023 14:00) (69 - 101)  ABP: --  ABP(mean): --  RR: 17 (30 Dec 2023 14:00) (17 - 25)  SpO2: 98% (30 Dec 2023 14:00) (95% - 98%)            I&O's Summary    29 Dec 2023 07:01  -  30 Dec 2023 07:00  --------------------------------------------------------  IN: 2575 mL / OUT: 800 mL / NET: 1775 mL                              10.7   17.00 )-----------( 184      ( 30 Dec 2023 05:30 )             33.6       12    139  |  110<H>  |  14  ----------------------------<  125<H>  4.2   |  28  |  0.90    Ca    8.2<L>      30 Dec 2023 05:30  Phos  2.0       Mg     2.0         TPro  6.0  /  Alb  2.5<L>  /  TBili  0.3  /  DBili  x   /  AST  20  /  ALT  24  /  AlkPhos  71                  Urinalysis Basic - ( 30 Dec 2023 05:30 )    Color: x / Appearance: x / SG: x / pH: x  Gluc: 125 mg/dL / Ketone: x  / Bili: x / Urobili: x   Blood: x / Protein: x / Nitrite: x   Leuk Esterase: x / RBC: x / WBC x   Sq Epi: x / Non Sq Epi: x / Bacteria: x        MEDICATIONS  (STANDING):  acetaminophen     Tablet .. 975 milliGRAM(s) Oral every 6 hours  acetaminophen   IVPB .. 1000 milliGRAM(s) IV Intermittent every 6 hours  atorvastatin 20 milliGRAM(s) Oral at bedtime  heparin   Injectable 5000 Unit(s) SubCutaneous every 12 hours  lactated ringers. 1000 milliLiter(s) (75 mL/Hr) IV Continuous <Continuous>  losartan 100 milliGRAM(s) Oral daily  metoclopramide Injectable 10 milliGRAM(s) IV Push every 6 hours  metoprolol tartrate Injectable 5 milliGRAM(s) IV Push every 6 hours  pantoprazole  Injectable 40 milliGRAM(s) IV Push two times a day  tamsulosin 0.4 milliGRAM(s) Oral at bedtime    MEDICATIONS  (PRN):  HYDROmorphone  Injectable 0.5 milliGRAM(s) IV Push every 4 hours PRN Severe Pain (7 - 10)  ondansetron Injectable 4 milliGRAM(s) IV Push every 6 hours PRN Nausea  oxyCODONE    IR 5 milliGRAM(s) Oral every 6 hours PRN Moderate Pain (4 - 6)      DVT Prophylaxis: SQH    Advanced Directives:  Discussed with:    Visit Information:    ** Time is exclusive of billed procedures and/or teaching and/or routine family updates.         Patient is a 83y old  Female who presents with a chief complaint of     BRIEF HOSPITAL COURSE: 84yo female  with PMHx of afib (on xarelto), HTN and HLD, hx admission in August for anemia, CT imaging revealing cecal lesion, now  presents for scheduled laparoscopic right colectomy     s/p lap R colectomy and partial omentectomy   Intra op omentum adhered to cecum and ascending colon  anastamosis stapled  EBL 100cc, received 2100cc IV fluid.      pt seen in PACU. c/o abd soreness and dry mouth. pt [place]. in sinus on monitor 80-90s. BP elevated.   pt still c/o abd pain, mild nausea. abd appears more distended. had liquid BM this am.  : N/V, NGT placed over night, +BM      PAST MEDICAL & SURGICAL HISTORY:  HTN (hypertension)      Glaucoma      OA (osteoarthritis)      Arteriosclerotic heart disease (ASHD)      Hyperlipidemia      Cecal lesion      Afib      Status post glaucoma surgery  and cataract      S/P knee surgery      H/O wrist surgery          FAMILY HISTORY:  FHx: colon cancer  in brother,     FH: pancreatic cancer  in sister,     FH: CAD (coronary artery disease)  in mother and father, both         Social Hx:    Allergies    No Known Allergies    Intolerances            ICU Vital Signs Last 24 Hrs  T(C): 36.8 (30 Dec 2023 14:17), Max: 37.1 (29 Dec 2023 17:42)  T(F): 98.2 (30 Dec 2023 14:17), Max: 98.7 (29 Dec 2023 17:42)  HR: 89 (30 Dec 2023 14:00) (68 - 89)  BP: 115/50 (30 Dec 2023 14:00) (111/65 - 139/59)  BP(mean): 69 (30 Dec 2023 14:00) (69 - 101)  ABP: --  ABP(mean): --  RR: 17 (30 Dec 2023 14:00) (17 - 25)  SpO2: 98% (30 Dec 2023 14:00) (95% - 98%)            I&O's Summary    29 Dec 2023 07:01  -  30 Dec 2023 07:00  --------------------------------------------------------  IN: 2575 mL / OUT: 800 mL / NET: 1775 mL                              10.7   17.00 )-----------( 184      ( 30 Dec 2023 05:30 )             33.6       12    139  |  110<H>  |  14  ----------------------------<  125<H>  4.2   |  28  |  0.90    Ca    8.2<L>      30 Dec 2023 05:30  Phos  2.0       Mg     2.0         TPro  6.0  /  Alb  2.5<L>  /  TBili  0.3  /  DBili  x   /  AST  20  /  ALT  24  /  AlkPhos  71                  Urinalysis Basic - ( 30 Dec 2023 05:30 )    Color: x / Appearance: x / SG: x / pH: x  Gluc: 125 mg/dL / Ketone: x  / Bili: x / Urobili: x   Blood: x / Protein: x / Nitrite: x   Leuk Esterase: x / RBC: x / WBC x   Sq Epi: x / Non Sq Epi: x / Bacteria: x        MEDICATIONS  (STANDING):  acetaminophen     Tablet .. 975 milliGRAM(s) Oral every 6 hours  acetaminophen   IVPB .. 1000 milliGRAM(s) IV Intermittent every 6 hours  atorvastatin 20 milliGRAM(s) Oral at bedtime  heparin   Injectable 5000 Unit(s) SubCutaneous every 12 hours  lactated ringers. 1000 milliLiter(s) (75 mL/Hr) IV Continuous <Continuous>  losartan 100 milliGRAM(s) Oral daily  metoclopramide Injectable 10 milliGRAM(s) IV Push every 6 hours  metoprolol tartrate Injectable 5 milliGRAM(s) IV Push every 6 hours  pantoprazole  Injectable 40 milliGRAM(s) IV Push two times a day  tamsulosin 0.4 milliGRAM(s) Oral at bedtime    MEDICATIONS  (PRN):  HYDROmorphone  Injectable 0.5 milliGRAM(s) IV Push every 4 hours PRN Severe Pain (7 - 10)  ondansetron Injectable 4 milliGRAM(s) IV Push every 6 hours PRN Nausea  oxyCODONE    IR 5 milliGRAM(s) Oral every 6 hours PRN Moderate Pain (4 - 6)      DVT Prophylaxis: SQH    Advanced Directives:  Discussed with:    Visit Information:    ** Time is exclusive of billed procedures and/or teaching and/or routine family updates.

## 2023-12-30 NOTE — PROGRESS NOTE ADULT - ASSESSMENT
A/P - Postop ileus and urinary retention    Cont NPO, NGT, IVF.  Reinsert rizzo catheter, begin flomax.  OOB to chair.

## 2023-12-30 NOTE — PROGRESS NOTE ADULT - ASSESSMENT
84yo female with PMHx of afib (on xarelto), HTN and HLD, hx admission in August for anemia, CT imaging revealing cecal lesion, now  presents for scheduled laparoscopic right colectomy, now s/p lap R colectomy and partial omentectomy     Plan:  NEURO: Optimize pain control - analgesia with ATC Tylenol and PRN with Oxycodone, Dilaudid.Monitor mental status closely, avoid neurosuppresants. Serial neurologic assessments.   CV: Maintain goal MAP >65. Lipitor / Cozaar QD. Lopressor q6h for Afib. Keep K~4 and Mg>2 for optimal arrhythmia suppression.  PULM: Satting well on RA, will utilize supplemental O2 PRN to maintain goal SpO2 >88%. Incentive spirometry, Chest PT/Pulmonary toilet, HOB >30'. Albuterol PRN.   GI: NPO. +NGT. Protonix QD. Anti-emetics with Reglan q6h and Zofran PRN. Serial abdominal examinations.   RENAL: Renal function currently WNL. Trend lytes/Scr daily with BMP, Strict I's and O's, goal UOP 0.5 cc/kg/hr, renal dose meds and avoid nephrotoxins. Gentle mIVF with LR 75cc/h.   ENDO: Aggressive glycemic control to limit FS glucose to <180mg/dl.   ID: Afebrile. No concern for infectious process.   HEME: VTE ppx with SQH.     AM labs ordered.     GOC: Full Code.  82yo female with PMHx of afib (on xarelto), HTN and HLD, hx admission in August for anemia, CT imaging revealing cecal lesion, now  presents for scheduled laparoscopic right colectomy, now s/p lap R colectomy and partial omentectomy     Plan:  NEURO: Optimize pain control - analgesia with ATC Tylenol and PRN with Oxycodone, Dilaudid.Monitor mental status closely, avoid neurosuppresants. Serial neurologic assessments.   CV: Maintain goal MAP >65. Lipitor / Cozaar QD. Lopressor q6h for Afib. Keep K~4 and Mg>2 for optimal arrhythmia suppression.  PULM: Satting well on RA, will utilize supplemental O2 PRN to maintain goal SpO2 >88%. Incentive spirometry, Chest PT/Pulmonary toilet, HOB >30'. Albuterol PRN.   GI: NPO. +NGT. Protonix QD. Anti-emetics with Reglan q6h and Zofran PRN. Serial abdominal examinations.   RENAL: Renal function currently WNL. Trend lytes/Scr daily with BMP, Strict I's and O's, goal UOP 0.5 cc/kg/hr, renal dose meds and avoid nephrotoxins. Gentle mIVF with LR 75cc/h.   ENDO: Aggressive glycemic control to limit FS glucose to <180mg/dl.   ID: Afebrile. No concern for infectious process.   HEME: VTE ppx with SQH.     AM labs ordered.     GOC: Full Code.

## 2023-12-30 NOTE — PROGRESS NOTE ADULT - SUBJECTIVE AND OBJECTIVE BOX
Patient is a 83y old  Female who presents with a chief complaint of elective right hemicolectomy (29 Dec 2023 07:54)    BRIEF HOSPITAL COURSE:   84yo female with PMHx of afib (on xarelto), HTN and HLD, hx admission in August for anemia, CT imaging revealing cecal lesion, now  presents for scheduled laparoscopic right colectomy, now s/p lap R colectomy and partial omentectomy     Events last 24 hours:   -Passing flatus/BM.   -Pain controlled. Satting well on RA.  -Afebrile, hemodynamics stable.     PAST MEDICAL & SURGICAL HISTORY:  HTN (hypertension)  Glaucoma  OA (osteoarthritis)  Arteriosclerotic heart disease (ASHD)  Hyperlipidemia  Cecal lesion  Afib  Status post glaucoma surgery  and cataract  S/P knee surgery  H/O wrist surgery    Review of Systems:  CONSTITUTIONAL: No fever, chills, or fatigue  EYES: No eye pain, visual disturbances, or discharge  ENMT:  No difficulty hearing, tinnitus, vertigo; No sinus or throat pain  NECK: No pain or stiffness  RESPIRATORY: No cough, wheezing, chills or hemoptysis; No shortness of breath  CARDIOVASCULAR: No chest pain, palpitations, dizziness, or leg swelling  GASTROINTESTINAL: No abdominal or epigastric pain. No nausea, vomiting, or hematemesis; No diarrhea or constipation. No melena or hematochezia.  GENITOURINARY: No dysuria, frequency, hematuria, or incontinence  NEUROLOGICAL: No headaches, memory loss, loss of strength, numbness, or tremors  SKIN: No itching, burning, rashes, or lesions   MUSCULOSKELETAL: No joint pain or swelling; No muscle, back, or extremity pain  PSYCHIATRIC: No depression, anxiety, mood swings, or difficulty sleeping    Medications:  losartan 100 milliGRAM(s) Oral daily  metoprolol tartrate Injectable 5 milliGRAM(s) IV Push every 6 hours  acetaminophen     Tablet .. 975 milliGRAM(s) Oral every 6 hours  acetaminophen   IVPB .. 1000 milliGRAM(s) IV Intermittent every 6 hours  acetaminophen   IVPB .. 1000 milliGRAM(s) IV Intermittent every 6 hours  HYDROmorphone  Injectable 0.5 milliGRAM(s) IV Push every 4 hours PRN  metoclopramide Injectable 10 milliGRAM(s) IV Push every 6 hours  ondansetron Injectable 4 milliGRAM(s) IV Push every 6 hours PRN  oxyCODONE    IR 5 milliGRAM(s) Oral every 6 hours PRN  heparin   Injectable 5000 Unit(s) SubCutaneous every 12 hours  pantoprazole  Injectable 40 milliGRAM(s) IV Push two times a day  tamsulosin 0.4 milliGRAM(s) Oral at bedtime  atorvastatin 20 milliGRAM(s) Oral at bedtime  lactated ringers. 1000 milliLiter(s) IV Continuous <Continuous>    ICU Vital Signs Last 24 Hrs  T(C): 37.1 (30 Dec 2023 20:18), Max: 37.1 (30 Dec 2023 20:18)  T(F): 98.8 (30 Dec 2023 20:18), Max: 98.8 (30 Dec 2023 20:18)  HR: 86 (30 Dec 2023 20:00) (69 - 89)  BP: 114/48 (30 Dec 2023 18:03) (109/39 - 139/59)  BP(mean): 67 (30 Dec 2023 18:03) (59 - 101)  ABP: --  ABP(mean): --  RR: 21 (30 Dec 2023 20:00) (17 - 24)  SpO2: 98% (30 Dec 2023 20:00) (95% - 98%)  O2 Parameters below as of 30 Dec 2023 20:00  Patient On (Oxygen Delivery Method): room air    I&O's Detail  29 Dec 2023 07:01  -  30 Dec 2023 07:00  --------------------------------------------------------  IN:    Lactated Ringers: 1575 mL    Lactated Ringers Bolus: 1000 mL  Total IN: 2575 mL  OUT:    Indwelling Catheter - Urethral (mL): 500 mL    Intermittent Catheterization - Urethral (mL): 300 mL    Nasogastric/Oral tube (mL): 400 mL  Total OUT: 1200 mL  Total NET: 1375 mL    30 Dec 2023 07:01  -  30 Dec 2023 22:16  --------------------------------------------------------  IN:    IV PiggyBack: 485 mL    Lactated Ringers: 1018 mL  Total IN: 1503 mL  OUT:    Indwelling Catheter - Urethral (mL): 525 mL    Nasogastric/Oral tube (mL): 400 mL  Total OUT: 925 mL  Total NET: 578 mL    LABS:                      10.7   17.00 )-----------( 184      ( 30 Dec 2023 05:30 )             33.6     12-30  139  |  110<H>  |  14  ----------------------------<  125<H>  4.2   |  28  |  0.90    Ca    8.2<L>      30 Dec 2023 05:30  Phos  2.0     12-30  Mg     2.0     12-30  TPro  6.0  /  Alb  2.5<L>  /  TBili  0.3  /  DBili  x   /  AST  20  /  ALT  24  /  AlkPhos  71  12-29    CAPILLARY BLOOD GLUCOSE  POCT Blood Glucose.: 110 mg/dL (30 Dec 2023 00:35)    Urinalysis Basic - ( 30 Dec 2023 05:30 )  Color: x / Appearance: x / SG: x / pH: x  Gluc: 125 mg/dL / Ketone: x  / Bili: x / Urobili: x   Blood: x / Protein: x / Nitrite: x   Leuk Esterase: x / RBC: x / WBC x   Sq Epi: x / Non Sq Epi: x / Bacteria: x    CULTURES:    Physical Examination:  General: Elderly female.   HEENT: PERRL. +NGT.   NECK: Supple.   PULM: Clear to auscultation bilaterally.  CVS: s1/s2.  ABD: Soft, nondistended, nontender, normoactive bowel sounds.  EXT: No edema, nontender.  SKIN: Warm.    RADIOLOGY:     Time spent on this patient encounter, which includes documenting this note in the electronic medical record, was 55 minutes including assessing the presenting problems with associated risks, reviewing the medical record to prepare for the encounter, and meeting face to face with the patient to obtain additional history. I have also performed an appropriate physical exam, made interventions listed and ordered and interpreted appropriate diagnostic studies as documented. To improve communication and patient safety, I have coordinated care with the multidisciplinary team including the bedside nurse, appropriate attending of record and consultants as needed. This time is independent of any procedures performed.    Date of entry of this note is equal to the date of services rendered.

## 2023-12-31 LAB
ANION GAP SERPL CALC-SCNC: 3 MMOL/L — LOW (ref 5–17)
ANION GAP SERPL CALC-SCNC: 3 MMOL/L — LOW (ref 5–17)
BUN SERPL-MCNC: 12 MG/DL — SIGNIFICANT CHANGE UP (ref 7–23)
BUN SERPL-MCNC: 12 MG/DL — SIGNIFICANT CHANGE UP (ref 7–23)
CALCIUM SERPL-MCNC: 7.7 MG/DL — LOW (ref 8.5–10.1)
CALCIUM SERPL-MCNC: 7.7 MG/DL — LOW (ref 8.5–10.1)
CHLORIDE SERPL-SCNC: 111 MMOL/L — HIGH (ref 96–108)
CHLORIDE SERPL-SCNC: 111 MMOL/L — HIGH (ref 96–108)
CO2 SERPL-SCNC: 29 MMOL/L — SIGNIFICANT CHANGE UP (ref 22–31)
CO2 SERPL-SCNC: 29 MMOL/L — SIGNIFICANT CHANGE UP (ref 22–31)
CREAT SERPL-MCNC: 0.81 MG/DL — SIGNIFICANT CHANGE UP (ref 0.5–1.3)
CREAT SERPL-MCNC: 0.81 MG/DL — SIGNIFICANT CHANGE UP (ref 0.5–1.3)
EGFR: 72 ML/MIN/1.73M2 — SIGNIFICANT CHANGE UP
EGFR: 72 ML/MIN/1.73M2 — SIGNIFICANT CHANGE UP
GLUCOSE BLDC GLUCOMTR-MCNC: 67 MG/DL — LOW (ref 70–99)
GLUCOSE BLDC GLUCOMTR-MCNC: 67 MG/DL — LOW (ref 70–99)
GLUCOSE BLDC GLUCOMTR-MCNC: 74 MG/DL — SIGNIFICANT CHANGE UP (ref 70–99)
GLUCOSE BLDC GLUCOMTR-MCNC: 74 MG/DL — SIGNIFICANT CHANGE UP (ref 70–99)
GLUCOSE BLDC GLUCOMTR-MCNC: 98 MG/DL — SIGNIFICANT CHANGE UP (ref 70–99)
GLUCOSE BLDC GLUCOMTR-MCNC: 98 MG/DL — SIGNIFICANT CHANGE UP (ref 70–99)
GLUCOSE SERPL-MCNC: 89 MG/DL — SIGNIFICANT CHANGE UP (ref 70–99)
GLUCOSE SERPL-MCNC: 89 MG/DL — SIGNIFICANT CHANGE UP (ref 70–99)
HCT VFR BLD CALC: 29.1 % — LOW (ref 34.5–45)
HCT VFR BLD CALC: 29.1 % — LOW (ref 34.5–45)
HGB BLD-MCNC: 9.4 G/DL — LOW (ref 11.5–15.5)
HGB BLD-MCNC: 9.4 G/DL — LOW (ref 11.5–15.5)
MAGNESIUM SERPL-MCNC: 1.8 MG/DL — SIGNIFICANT CHANGE UP (ref 1.6–2.6)
MAGNESIUM SERPL-MCNC: 1.8 MG/DL — SIGNIFICANT CHANGE UP (ref 1.6–2.6)
MCHC RBC-ENTMCNC: 29.3 PG — SIGNIFICANT CHANGE UP (ref 27–34)
MCHC RBC-ENTMCNC: 29.3 PG — SIGNIFICANT CHANGE UP (ref 27–34)
MCHC RBC-ENTMCNC: 32.3 GM/DL — SIGNIFICANT CHANGE UP (ref 32–36)
MCHC RBC-ENTMCNC: 32.3 GM/DL — SIGNIFICANT CHANGE UP (ref 32–36)
MCV RBC AUTO: 90.7 FL — SIGNIFICANT CHANGE UP (ref 80–100)
MCV RBC AUTO: 90.7 FL — SIGNIFICANT CHANGE UP (ref 80–100)
PHOSPHATE SERPL-MCNC: 1.9 MG/DL — LOW (ref 2.5–4.5)
PHOSPHATE SERPL-MCNC: 1.9 MG/DL — LOW (ref 2.5–4.5)
PLATELET # BLD AUTO: 175 K/UL — SIGNIFICANT CHANGE UP (ref 150–400)
PLATELET # BLD AUTO: 175 K/UL — SIGNIFICANT CHANGE UP (ref 150–400)
POTASSIUM SERPL-MCNC: 3.3 MMOL/L — LOW (ref 3.5–5.3)
POTASSIUM SERPL-MCNC: 3.3 MMOL/L — LOW (ref 3.5–5.3)
POTASSIUM SERPL-SCNC: 3.3 MMOL/L — LOW (ref 3.5–5.3)
POTASSIUM SERPL-SCNC: 3.3 MMOL/L — LOW (ref 3.5–5.3)
RBC # BLD: 3.21 M/UL — LOW (ref 3.8–5.2)
RBC # BLD: 3.21 M/UL — LOW (ref 3.8–5.2)
RBC # FLD: 19.5 % — HIGH (ref 10.3–14.5)
RBC # FLD: 19.5 % — HIGH (ref 10.3–14.5)
SODIUM SERPL-SCNC: 143 MMOL/L — SIGNIFICANT CHANGE UP (ref 135–145)
SODIUM SERPL-SCNC: 143 MMOL/L — SIGNIFICANT CHANGE UP (ref 135–145)
WBC # BLD: 12.42 K/UL — HIGH (ref 3.8–10.5)
WBC # BLD: 12.42 K/UL — HIGH (ref 3.8–10.5)
WBC # FLD AUTO: 12.42 K/UL — HIGH (ref 3.8–10.5)
WBC # FLD AUTO: 12.42 K/UL — HIGH (ref 3.8–10.5)

## 2023-12-31 PROCEDURE — 71045 X-RAY EXAM CHEST 1 VIEW: CPT | Mod: 26

## 2023-12-31 PROCEDURE — 99233 SBSQ HOSP IP/OBS HIGH 50: CPT

## 2023-12-31 RX ORDER — POTASSIUM CHLORIDE 20 MEQ
10 PACKET (EA) ORAL
Refills: 0 | Status: COMPLETED | OUTPATIENT
Start: 2023-12-31 | End: 2023-12-31

## 2023-12-31 RX ORDER — POTASSIUM PHOSPHATE, MONOBASIC POTASSIUM PHOSPHATE, DIBASIC 236; 224 MG/ML; MG/ML
30 INJECTION, SOLUTION INTRAVENOUS EVERY 6 HOURS
Refills: 0 | Status: COMPLETED | OUTPATIENT
Start: 2023-12-31 | End: 2023-12-31

## 2023-12-31 RX ORDER — ACETAMINOPHEN 500 MG
1000 TABLET ORAL ONCE
Refills: 0 | Status: COMPLETED | OUTPATIENT
Start: 2023-12-31 | End: 2023-12-31

## 2023-12-31 RX ORDER — POTASSIUM PHOSPHATE, MONOBASIC POTASSIUM PHOSPHATE, DIBASIC 236; 224 MG/ML; MG/ML
15 INJECTION, SOLUTION INTRAVENOUS ONCE
Refills: 0 | Status: DISCONTINUED | OUTPATIENT
Start: 2023-12-31 | End: 2023-12-31

## 2023-12-31 RX ORDER — SODIUM CHLORIDE 9 MG/ML
1000 INJECTION, SOLUTION INTRAVENOUS
Refills: 0 | Status: DISCONTINUED | OUTPATIENT
Start: 2023-12-31 | End: 2024-01-01

## 2023-12-31 RX ADMIN — SODIUM CHLORIDE 75 MILLILITER(S): 9 INJECTION, SOLUTION INTRAVENOUS at 05:53

## 2023-12-31 RX ADMIN — PANTOPRAZOLE SODIUM 40 MILLIGRAM(S): 20 TABLET, DELAYED RELEASE ORAL at 23:19

## 2023-12-31 RX ADMIN — Medication 1000 MILLIGRAM(S): at 14:05

## 2023-12-31 RX ADMIN — POTASSIUM PHOSPHATE, MONOBASIC POTASSIUM PHOSPHATE, DIBASIC 83.33 MILLIMOLE(S): 236; 224 INJECTION, SOLUTION INTRAVENOUS at 11:32

## 2023-12-31 RX ADMIN — Medication 5 MILLIGRAM(S): at 17:27

## 2023-12-31 RX ADMIN — Medication 10 MILLIGRAM(S): at 23:19

## 2023-12-31 RX ADMIN — Medication 5 MILLIGRAM(S): at 23:19

## 2023-12-31 RX ADMIN — Medication 10 MILLIGRAM(S): at 11:33

## 2023-12-31 RX ADMIN — HEPARIN SODIUM 5000 UNIT(S): 5000 INJECTION INTRAVENOUS; SUBCUTANEOUS at 10:43

## 2023-12-31 RX ADMIN — POTASSIUM PHOSPHATE, MONOBASIC POTASSIUM PHOSPHATE, DIBASIC 83.33 MILLIMOLE(S): 236; 224 INJECTION, SOLUTION INTRAVENOUS at 17:26

## 2023-12-31 RX ADMIN — Medication 100 MILLIEQUIVALENT(S): at 06:52

## 2023-12-31 RX ADMIN — Medication 10 MILLIGRAM(S): at 17:27

## 2023-12-31 RX ADMIN — HEPARIN SODIUM 5000 UNIT(S): 5000 INJECTION INTRAVENOUS; SUBCUTANEOUS at 23:19

## 2023-12-31 RX ADMIN — Medication 100 MILLIEQUIVALENT(S): at 10:43

## 2023-12-31 RX ADMIN — Medication 400 MILLIGRAM(S): at 05:52

## 2023-12-31 RX ADMIN — PANTOPRAZOLE SODIUM 40 MILLIGRAM(S): 20 TABLET, DELAYED RELEASE ORAL at 10:43

## 2023-12-31 RX ADMIN — Medication 10 MILLIGRAM(S): at 05:29

## 2023-12-31 RX ADMIN — Medication 100 MILLIEQUIVALENT(S): at 09:20

## 2023-12-31 RX ADMIN — Medication 5 MILLIGRAM(S): at 11:33

## 2023-12-31 RX ADMIN — SODIUM CHLORIDE 75 MILLILITER(S): 9 INJECTION, SOLUTION INTRAVENOUS at 09:20

## 2023-12-31 RX ADMIN — Medication 400 MILLIGRAM(S): at 13:47

## 2023-12-31 RX ADMIN — Medication 1000 MILLIGRAM(S): at 06:00

## 2023-12-31 NOTE — PROGRESS NOTE ADULT - SUBJECTIVE AND OBJECTIVE BOX
N/V with resultant NGT placement, now feels improved.  Passed BMs at least twice overnight as well.  No spontaneous void after rizzo removal, straight cath performed, failed TOV again and rizzo replaced       Vital Signs (24 Hrs):  T(C): 37.1 (12-30-23 @ 20:18), Max: 37.1 (12-30-23 @ 20:18)  HR: 79 (12-31-23 @ 00:00) (69 - 89)  BP: 119/45 (12-31-23 @ 00:00) (109/39 - 135/85)  RR: 20 (12-31-23 @ 00:00) (17 - 24)  SpO2: 97% (12-31-23 @ 00:00) (95% - 98%)  Wt(kg): --  Daily     Daily     I&O's Summary    29 Dec 2023 07:01  -  30 Dec 2023 07:00  --------------------------------------------------------  IN: 2575 mL / OUT: 1200 mL / NET: 1375 mL    30 Dec 2023 07:01  -  31 Dec 2023 00:32  --------------------------------------------------------  IN: 1503 mL / OUT: 925 mL / NET: 578 mL      PHYSICAL EXAM:  General: AAOx3, in NAD  HEENT: NC/AT, EOMI  Cardio: S1S2, RRR  Pulm: non labored on RA  Abdomen: soft, approp tenderness, mildly distended, improving       .  LABS:                         10.7   17.00 )-----------( 184      ( 30 Dec 2023 05:30 )             33.6     12-30    139  |  110<H>  |  14  ----------------------------<  125<H>  4.2   |  28  |  0.90    Ca    8.2<L>      30 Dec 2023 05:30  Phos  2.0     12-30  Mg     2.0     12-30    TPro  6.0  /  Alb  2.5<L>  /  TBili  0.3  /  DBili  x   /  AST  20  /  ALT  24  /  AlkPhos  71  12-29      Urinalysis Basic - ( 30 Dec 2023 05:30 )    Color: x / Appearance: x / SG: x / pH: x  Gluc: 125 mg/dL / Ketone: x  / Bili: x / Urobili: x   Blood: x / Protein: x / Nitrite: x   Leuk Esterase: x / RBC: x / WBC x   Sq Epi: x / Non Sq Epi: x / Bacteria: x            RADIOLOGY, EKG & ADDITIONAL TESTS: Reviewed.

## 2023-12-31 NOTE — PROGRESS NOTE ADULT - SUBJECTIVE AND OBJECTIVE BOX
Patient is a 83y old  Female who presents with a chief complaint of elective right hemicolectomy (29 Dec 2023 07:54)    HPI:  84yo female with PMHx of afib (on xarelto), HTN and HLD, hx admission in August for anemia, CT imaging revealing cecal lesion, now  presents for scheduled laparoscopic right colectomy, now s/p lap R colectomy and partial omentectomy     Notified by RN that patient hypoglycemic to 67 on FS. Currently NPO    Allergies: No Known Allergies    PAST MEDICAL & SURGICAL HISTORY:  HTN (hypertension)      Glaucoma      OA (osteoarthritis)      Arteriosclerotic heart disease (ASHD)      Hyperlipidemia      Cecal lesion      Afib      Status post glaucoma surgery  and cataract      S/P knee surgery      H/O wrist surgery        FAMILY HISTORY:  FHx: colon cancer  in brother,     FH: pancreatic cancer  in sister,     FH: CAD (coronary artery disease)  in mother and father, both       SOCIAL HISTORY:    Home Medications:    Review of Systems:  Pertinent positives noted above, all other ROS negative x10 system    T(F): 99 (23 @ 17:27), Max: 99 (23 @ 17:27)  HR: 93 (23 @ 22:00) (75 - 105)  BP: 138/54 (23 @ 22:00) (108/43 - 138/54)  RR: 29 (23 @ 22:00) (17 - 29)  SpO2: 93% (23 @ 22:00)  Wt(kg): --    CAPILLARY BLOOD GLUCOSE      POCT Blood Glucose.: 74 mg/dL (31 Dec 2023 23:09)    I&O's Summary    30 Dec 2023 07:  -  31 Dec 2023 07:00  --------------------------------------------------------  IN: 2410 mL / OUT: 1825 mL / NET: 585 mL    31 Dec 2023 07:  -  31 Dec 2023 23:51  --------------------------------------------------------  IN: 1725 mL / OUT: 975 mL / NET: 750 mL        Physical Exam:     Gen: NAD  Neuro: Awake/alert  CVS: +S1S2  Resp: Tender. +NGT  Abd: soft NT ND  Ext: warm dry no edema  Skin: well perfused    Meds:    losartan 100 milliGRAM(s) Oral daily  metoprolol tartrate Injectable 5 milliGRAM(s) IV Push every 6 hours     atorvastatin 20 milliGRAM(s) Oral at bedtime        acetaminophen   IVPB .. 1000 milliGRAM(s) IV Intermittent every 6 hours  acetaminophen   IVPB .. 1000 milliGRAM(s) IV Intermittent once PRN  HYDROmorphone  Injectable 0.5 milliGRAM(s) IV Push every 4 hours PRN  metoclopramide Injectable 10 milliGRAM(s) IV Push every 6 hours  ondansetron Injectable 4 milliGRAM(s) IV Push every 6 hours PRN  oxyCODONE    IR 5 milliGRAM(s) Oral every 6 hours PRN        heparin   Injectable 5000 Unit(s) SubCutaneous every 12 hours     pantoprazole  Injectable 40 milliGRAM(s) IV Push two times a day        dextrose 5% + lactated ringers. 1000 milliLiter(s) IV Continuous <Continuous>                                    9.4    12.42 )-----------( 175      ( 31 Dec 2023 05:32 )             29.1           143  |  111<H>  |  12  ----------------------------<  89  3.3<L>   |  29  |  0.81    Ca    7.7<L>      31 Dec 2023 05:32  Phos  1.9       Mg     1.8                   Urinalysis Basic - ( 31 Dec 2023 05:32 )    Color: x / Appearance: x / SG: x / pH: x  Gluc: 89 mg/dL / Ketone: x  / Bili: x / Urobili: x   Blood: x / Protein: x / Nitrite: x   Leuk Esterase: x / RBC: x / WBC x   Sq Epi: x / Non Sq Epi: x / Bacteria: x              Radiology:   < from: Xray Chest 1 View- PORTABLE-Routine (Xray Chest 1 View- PORTABLE-Routine in AM.) (23 @ 09:10) >    ACC: [id number] EXAM:  XR CHEST PORTABLE ROUTINE 1V   ORDERED BY: CLAIR POLANCO     ACC: [id number] EXAM:  XR CHEST PORTABLE URGENT 1V   ORDERED BY: SANDRAWILLIAM JOHN     PROCEDURE DATE:  2023          INTERPRETATION:  AP chest on 2023 at2:40 AM. Patient is   short of breath.    Heart possibly enlarged. Lungs remain clear.    An NG tube is been inserted new since August 2 this year.    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    Follow-up AP chest on 2023 at 8:10 AM. 2images.    Minimal hazy density may be developing left base.    IMPRESSION: NG tube inserted. Minimal hazy density developing at left   base.    --- End of Report ---            KAT GONZALEZ MD; Attending Radiologist  This document has been electronically signed. Dec 31 2023  2:06PM    < end of copied text >      Problems  84yo female with PMHx of afib (on xarelto), HTN and HLD, hx admission in August for anemia, CT imaging revealing cecal lesion, now  presents for scheduled laparoscopic right colectomy, now s/p lap R colectomy and partial omentectomy     Hypoglycemic tonight    Assessment/Plan:  -Pain control PRN  -Slowly re-introducing antihypertensives  -Maintain NPO at this time  -Hypoglycemic tonight. Add Dextrose to continuos LR  -AC when cleared by surgery      Time spent on this patient encounter, which includes documenting this note in the electronic medical record, was 42 minutes including assessing the presenting problems with associated risks, reviewing the medical record to prepare for the encounter, and meeting face to face with the patient to obtain additional history.  I have also performed an appropriate physical exam, made interventions listed and ordered and interpreted appropriate diagnostic studies as documented.     To improve communication and patient safety, I have coordinated care with the multidisciplinary team including the bedside nurse, appropriate attending of record and consultants as needed.      Date of entry of this note is equal to the date of services rendered   Patient is a 83y old  Female who presents with a chief complaint of elective right hemicolectomy (29 Dec 2023 07:54)    HPI:  82yo female with PMHx of afib (on xarelto), HTN and HLD, hx admission in August for anemia, CT imaging revealing cecal lesion, now  presents for scheduled laparoscopic right colectomy, now s/p lap R colectomy and partial omentectomy     Notified by RN that patient hypoglycemic to 67 on FS. Currently NPO    Allergies: No Known Allergies    PAST MEDICAL & SURGICAL HISTORY:  HTN (hypertension)      Glaucoma      OA (osteoarthritis)      Arteriosclerotic heart disease (ASHD)      Hyperlipidemia      Cecal lesion      Afib      Status post glaucoma surgery  and cataract      S/P knee surgery      H/O wrist surgery        FAMILY HISTORY:  FHx: colon cancer  in brother,     FH: pancreatic cancer  in sister,     FH: CAD (coronary artery disease)  in mother and father, both       SOCIAL HISTORY:    Home Medications:    Review of Systems:  Pertinent positives noted above, all other ROS negative x10 system    T(F): 99 (23 @ 17:27), Max: 99 (23 @ 17:27)  HR: 93 (23 @ 22:00) (75 - 105)  BP: 138/54 (23 @ 22:00) (108/43 - 138/54)  RR: 29 (23 @ 22:00) (17 - 29)  SpO2: 93% (23 @ 22:00)  Wt(kg): --    CAPILLARY BLOOD GLUCOSE      POCT Blood Glucose.: 74 mg/dL (31 Dec 2023 23:09)    I&O's Summary    30 Dec 2023 07:  -  31 Dec 2023 07:00  --------------------------------------------------------  IN: 2410 mL / OUT: 1825 mL / NET: 585 mL    31 Dec 2023 07:  -  31 Dec 2023 23:51  --------------------------------------------------------  IN: 1725 mL / OUT: 975 mL / NET: 750 mL        Physical Exam:     Gen: NAD  Neuro: Awake/alert  CVS: +S1S2  Resp: Tender. +NGT  Abd: soft NT ND  Ext: warm dry no edema  Skin: well perfused    Meds:    losartan 100 milliGRAM(s) Oral daily  metoprolol tartrate Injectable 5 milliGRAM(s) IV Push every 6 hours     atorvastatin 20 milliGRAM(s) Oral at bedtime        acetaminophen   IVPB .. 1000 milliGRAM(s) IV Intermittent every 6 hours  acetaminophen   IVPB .. 1000 milliGRAM(s) IV Intermittent once PRN  HYDROmorphone  Injectable 0.5 milliGRAM(s) IV Push every 4 hours PRN  metoclopramide Injectable 10 milliGRAM(s) IV Push every 6 hours  ondansetron Injectable 4 milliGRAM(s) IV Push every 6 hours PRN  oxyCODONE    IR 5 milliGRAM(s) Oral every 6 hours PRN        heparin   Injectable 5000 Unit(s) SubCutaneous every 12 hours     pantoprazole  Injectable 40 milliGRAM(s) IV Push two times a day        dextrose 5% + lactated ringers. 1000 milliLiter(s) IV Continuous <Continuous>                                    9.4    12.42 )-----------( 175      ( 31 Dec 2023 05:32 )             29.1           143  |  111<H>  |  12  ----------------------------<  89  3.3<L>   |  29  |  0.81    Ca    7.7<L>      31 Dec 2023 05:32  Phos  1.9       Mg     1.8                   Urinalysis Basic - ( 31 Dec 2023 05:32 )    Color: x / Appearance: x / SG: x / pH: x  Gluc: 89 mg/dL / Ketone: x  / Bili: x / Urobili: x   Blood: x / Protein: x / Nitrite: x   Leuk Esterase: x / RBC: x / WBC x   Sq Epi: x / Non Sq Epi: x / Bacteria: x              Radiology:   < from: Xray Chest 1 View- PORTABLE-Routine (Xray Chest 1 View- PORTABLE-Routine in AM.) (23 @ 09:10) >    ACC: [id number] EXAM:  XR CHEST PORTABLE ROUTINE 1V   ORDERED BY: CLAIR POLANCO     ACC: [id number] EXAM:  XR CHEST PORTABLE URGENT 1V   ORDERED BY: SANDRAWILLIAM JOHN     PROCEDURE DATE:  2023          INTERPRETATION:  AP chest on 2023 at2:40 AM. Patient is   short of breath.    Heart possibly enlarged. Lungs remain clear.    An NG tube is been inserted new since August 2 this year.    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    Follow-up AP chest on 2023 at 8:10 AM. 2images.    Minimal hazy density may be developing left base.    IMPRESSION: NG tube inserted. Minimal hazy density developing at left   base.    --- End of Report ---            KAT GONZALEZ MD; Attending Radiologist  This document has been electronically signed. Dec 31 2023  2:06PM    < end of copied text >      Problems  82yo female with PMHx of afib (on xarelto), HTN and HLD, hx admission in August for anemia, CT imaging revealing cecal lesion, now  presents for scheduled laparoscopic right colectomy, now s/p lap R colectomy and partial omentectomy     Hypoglycemic tonight    Assessment/Plan:  -Pain control PRN  -Slowly re-introducing antihypertensives  -Maintain NPO at this time  -Hypoglycemic tonight. Add Dextrose to continuos LR  -AC when cleared by surgery      Time spent on this patient encounter, which includes documenting this note in the electronic medical record, was 42 minutes including assessing the presenting problems with associated risks, reviewing the medical record to prepare for the encounter, and meeting face to face with the patient to obtain additional history.  I have also performed an appropriate physical exam, made interventions listed and ordered and interpreted appropriate diagnostic studies as documented.     To improve communication and patient safety, I have coordinated care with the multidisciplinary team including the bedside nurse, appropriate attending of record and consultants as needed.      Date of entry of this note is equal to the date of services rendered

## 2023-12-31 NOTE — PROGRESS NOTE ADULT - ASSESSMENT
ASSESSMENT  84yo female with PMHx of afib (on xarelto), HTN and HLD, hx admission in August for anemia, CT imaging revealing cecal lesion, now  presents for scheduled laparoscopic right colectomy     s/p lap R colectomy and partial omentectomy   Intra op omentum adhered to cecum and ascending colon  anastamosis stapled  EBL 100cc, received 2100cc IV fluid.     PLAN    Neuro:  Pain control prn. IV tylenol, morphine  CV: BP better. Hold losartan continue lopressor, xarelto for Afib when ok with surgery.  Pulm: on room air. encourage incentive spirometer  GI: NPO except meds, candy. IV PPI. bowel regimen prn, NGT to suction  Nephro: cont IV maintenance fluids. LR @ 100 while NPO. monitor I & Os. Trend renal fxn. Siddiqui placed, flomax  Endo: check a1c. BGMs q6hr for colon bundle. ISS.   ID:  trend WBC. monitor temps.   Heme:  DVT ppx - hep sq.  Trend H/H. SCDs  PT eval post op  pt states her niece Emy helps her make medical decisions

## 2023-12-31 NOTE — PROGRESS NOTE ADULT - ATTENDING COMMENTS
S&E  No complaints.  Feeling improved.  Passing flatus.  NGT with 950cc out.  AFVSS  NAD   incision CDI, soft, NT, mild distention  Labs reviewed  A.P -   Will keep NPO with NGT for another day given higher output.  Cont IVF.  OOB and ambulating.

## 2023-12-31 NOTE — PROGRESS NOTE ADULT - ASSESSMENT
POD 3 s/p right hemicolectomy post op c/b ileus and urinary retention    Plan:   - Pain control prn   - monitor VS   - NPO   - NGT   - IVF   - monitor bowel function  - OOBTC  - monitor urine output     Will discuss with Dr. Potter

## 2023-12-31 NOTE — PROGRESS NOTE ADULT - SUBJECTIVE AND OBJECTIVE BOX
Patient is a 83y old  Female who presents with a chief complaint of     BRIEF HOSPITAL COURSE: 82yo female  with PMHx of afib (on xarelto), HTN and HLD, hx admission in August for anemia, CT imaging revealing cecal lesion, now  presents for scheduled laparoscopic right colectomy     s/p lap R colectomy and partial omentectomy   Intra op omentum adhered to cecum and ascending colon  anastamosis stapled  EBL 100cc, received 2100cc IV fluid.      pt seen in PACU. c/o abd soreness and dry mouth. pt [place]. in sinus on monitor 80-90s. BP elevated.   pt still c/o abd pain, mild nausea. abd appears more distended. had liquid BM this am.  : N/V, NGT placed over night, +BM  : No events over night, no nausea or vomiting       PAST MEDICAL & SURGICAL HISTORY:  HTN (hypertension)      Glaucoma      OA (osteoarthritis)      Arteriosclerotic heart disease (ASHD)      Hyperlipidemia      Cecal lesion      Afib      Status post glaucoma surgery  and cataract      S/P knee surgery      H/O wrist surgery          FAMILY HISTORY:  FHx: colon cancer  in brother,     FH: pancreatic cancer  in sister,     FH: CAD (coronary artery disease)  in mother and father, both         Social Hx:    Allergies    No Known Allergies    Intolerances            ICU Vital Signs Last 24 Hrs  T(C): 37 (31 Dec 2023 06:25), Max: 37.1 (30 Dec 2023 20:18)  T(F): 98.6 (31 Dec 2023 06:25), Max: 98.8 (30 Dec 2023 20:18)  HR: 86 (31 Dec 2023 13:03) (77 - 105)  BP: 112/51 (31 Dec 2023 13:03) (108/43 - 131/55)  BP(mean): 69 (31 Dec 2023 13:03) (57 - 75)  ABP: --  ABP(mean): --  RR: 19 (31 Dec 2023 13:03) (17 - 24)  SpO2: 97% (31 Dec 2023 13:03) (94% - 98%)    O2 Parameters below as of 31 Dec 2023 13:03  Patient On (Oxygen Delivery Method): room air                I&O's Summary    30 Dec 2023 07:01  -  31 Dec 2023 07:00  --------------------------------------------------------  IN: 2410 mL / OUT: 1825 mL / NET: 585 mL                              9.4    12.42 )-----------( 175      ( 31 Dec 2023 05:32 )             29.1       12    143  |  111<H>  |  12  ----------------------------<  89  3.3<L>   |  29  |  0.81    Ca    7.7<L>      31 Dec 2023 05:32  Phos  1.9       Mg     1.8                       Urinalysis Basic - ( 31 Dec 2023 05:32 )    Color: x / Appearance: x / SG: x / pH: x  Gluc: 89 mg/dL / Ketone: x  / Bili: x / Urobili: x   Blood: x / Protein: x / Nitrite: x   Leuk Esterase: x / RBC: x / WBC x   Sq Epi: x / Non Sq Epi: x / Bacteria: x        MEDICATIONS  (STANDING):  acetaminophen   IVPB .. 1000 milliGRAM(s) IV Intermittent every 6 hours  atorvastatin 20 milliGRAM(s) Oral at bedtime  heparin   Injectable 5000 Unit(s) SubCutaneous every 12 hours  lactated ringers. 1000 milliLiter(s) (75 mL/Hr) IV Continuous <Continuous>  losartan 100 milliGRAM(s) Oral daily  metoclopramide Injectable 10 milliGRAM(s) IV Push every 6 hours  metoprolol tartrate Injectable 5 milliGRAM(s) IV Push every 6 hours  pantoprazole  Injectable 40 milliGRAM(s) IV Push two times a day  potassium phosphate IVPB 30 milliMole(s) IV Intermittent every 6 hours    MEDICATIONS  (PRN):  acetaminophen   IVPB .. 1000 milliGRAM(s) IV Intermittent once PRN Temp greater or equal to 38C (100.4F), Mild Pain (1 - 3)  HYDROmorphone  Injectable 0.5 milliGRAM(s) IV Push every 4 hours PRN Severe Pain (7 - 10)  ondansetron Injectable 4 milliGRAM(s) IV Push every 6 hours PRN Nausea  oxyCODONE    IR 5 milliGRAM(s) Oral every 6 hours PRN Moderate Pain (4 - 6)      DVT Prophylaxis: SQH    Advanced Directives:  Discussed with:    Visit Information:    ** Time is exclusive of billed procedures and/or teaching and/or routine family updates.           Patient is a 83y old  Female who presents with a chief complaint of     BRIEF HOSPITAL COURSE: 84yo female  with PMHx of afib (on xarelto), HTN and HLD, hx admission in August for anemia, CT imaging revealing cecal lesion, now  presents for scheduled laparoscopic right colectomy     s/p lap R colectomy and partial omentectomy   Intra op omentum adhered to cecum and ascending colon  anastamosis stapled  EBL 100cc, received 2100cc IV fluid.      pt seen in PACU. c/o abd soreness and dry mouth. pt [place]. in sinus on monitor 80-90s. BP elevated.   pt still c/o abd pain, mild nausea. abd appears more distended. had liquid BM this am.  : N/V, NGT placed over night, +BM  : No events over night, no nausea or vomiting       PAST MEDICAL & SURGICAL HISTORY:  HTN (hypertension)      Glaucoma      OA (osteoarthritis)      Arteriosclerotic heart disease (ASHD)      Hyperlipidemia      Cecal lesion      Afib      Status post glaucoma surgery  and cataract      S/P knee surgery      H/O wrist surgery          FAMILY HISTORY:  FHx: colon cancer  in brother,     FH: pancreatic cancer  in sister,     FH: CAD (coronary artery disease)  in mother and father, both         Social Hx:    Allergies    No Known Allergies    Intolerances            ICU Vital Signs Last 24 Hrs  T(C): 37 (31 Dec 2023 06:25), Max: 37.1 (30 Dec 2023 20:18)  T(F): 98.6 (31 Dec 2023 06:25), Max: 98.8 (30 Dec 2023 20:18)  HR: 86 (31 Dec 2023 13:03) (77 - 105)  BP: 112/51 (31 Dec 2023 13:03) (108/43 - 131/55)  BP(mean): 69 (31 Dec 2023 13:03) (57 - 75)  ABP: --  ABP(mean): --  RR: 19 (31 Dec 2023 13:03) (17 - 24)  SpO2: 97% (31 Dec 2023 13:03) (94% - 98%)    O2 Parameters below as of 31 Dec 2023 13:03  Patient On (Oxygen Delivery Method): room air                I&O's Summary    30 Dec 2023 07:01  -  31 Dec 2023 07:00  --------------------------------------------------------  IN: 2410 mL / OUT: 1825 mL / NET: 585 mL                              9.4    12.42 )-----------( 175      ( 31 Dec 2023 05:32 )             29.1       12    143  |  111<H>  |  12  ----------------------------<  89  3.3<L>   |  29  |  0.81    Ca    7.7<L>      31 Dec 2023 05:32  Phos  1.9       Mg     1.8                       Urinalysis Basic - ( 31 Dec 2023 05:32 )    Color: x / Appearance: x / SG: x / pH: x  Gluc: 89 mg/dL / Ketone: x  / Bili: x / Urobili: x   Blood: x / Protein: x / Nitrite: x   Leuk Esterase: x / RBC: x / WBC x   Sq Epi: x / Non Sq Epi: x / Bacteria: x        MEDICATIONS  (STANDING):  acetaminophen   IVPB .. 1000 milliGRAM(s) IV Intermittent every 6 hours  atorvastatin 20 milliGRAM(s) Oral at bedtime  heparin   Injectable 5000 Unit(s) SubCutaneous every 12 hours  lactated ringers. 1000 milliLiter(s) (75 mL/Hr) IV Continuous <Continuous>  losartan 100 milliGRAM(s) Oral daily  metoclopramide Injectable 10 milliGRAM(s) IV Push every 6 hours  metoprolol tartrate Injectable 5 milliGRAM(s) IV Push every 6 hours  pantoprazole  Injectable 40 milliGRAM(s) IV Push two times a day  potassium phosphate IVPB 30 milliMole(s) IV Intermittent every 6 hours    MEDICATIONS  (PRN):  acetaminophen   IVPB .. 1000 milliGRAM(s) IV Intermittent once PRN Temp greater or equal to 38C (100.4F), Mild Pain (1 - 3)  HYDROmorphone  Injectable 0.5 milliGRAM(s) IV Push every 4 hours PRN Severe Pain (7 - 10)  ondansetron Injectable 4 milliGRAM(s) IV Push every 6 hours PRN Nausea  oxyCODONE    IR 5 milliGRAM(s) Oral every 6 hours PRN Moderate Pain (4 - 6)      DVT Prophylaxis: SQH    Advanced Directives:  Discussed with:    Visit Information:    ** Time is exclusive of billed procedures and/or teaching and/or routine family updates.

## 2024-01-01 LAB
ANION GAP SERPL CALC-SCNC: 5 MMOL/L — SIGNIFICANT CHANGE UP (ref 5–17)
ANION GAP SERPL CALC-SCNC: 5 MMOL/L — SIGNIFICANT CHANGE UP (ref 5–17)
BUN SERPL-MCNC: 9 MG/DL — SIGNIFICANT CHANGE UP (ref 7–23)
BUN SERPL-MCNC: 9 MG/DL — SIGNIFICANT CHANGE UP (ref 7–23)
CALCIUM SERPL-MCNC: 8 MG/DL — LOW (ref 8.5–10.1)
CALCIUM SERPL-MCNC: 8 MG/DL — LOW (ref 8.5–10.1)
CHLORIDE SERPL-SCNC: 110 MMOL/L — HIGH (ref 96–108)
CHLORIDE SERPL-SCNC: 110 MMOL/L — HIGH (ref 96–108)
CO2 SERPL-SCNC: 25 MMOL/L — SIGNIFICANT CHANGE UP (ref 22–31)
CO2 SERPL-SCNC: 25 MMOL/L — SIGNIFICANT CHANGE UP (ref 22–31)
CREAT SERPL-MCNC: 0.69 MG/DL — SIGNIFICANT CHANGE UP (ref 0.5–1.3)
CREAT SERPL-MCNC: 0.69 MG/DL — SIGNIFICANT CHANGE UP (ref 0.5–1.3)
EGFR: 87 ML/MIN/1.73M2 — SIGNIFICANT CHANGE UP
EGFR: 87 ML/MIN/1.73M2 — SIGNIFICANT CHANGE UP
GLUCOSE SERPL-MCNC: 84 MG/DL — SIGNIFICANT CHANGE UP (ref 70–99)
GLUCOSE SERPL-MCNC: 84 MG/DL — SIGNIFICANT CHANGE UP (ref 70–99)
HCT VFR BLD CALC: 31.4 % — LOW (ref 34.5–45)
HCT VFR BLD CALC: 31.4 % — LOW (ref 34.5–45)
HGB BLD-MCNC: 10.1 G/DL — LOW (ref 11.5–15.5)
HGB BLD-MCNC: 10.1 G/DL — LOW (ref 11.5–15.5)
MAGNESIUM SERPL-MCNC: 1.9 MG/DL — SIGNIFICANT CHANGE UP (ref 1.6–2.6)
MAGNESIUM SERPL-MCNC: 1.9 MG/DL — SIGNIFICANT CHANGE UP (ref 1.6–2.6)
MCHC RBC-ENTMCNC: 29.4 PG — SIGNIFICANT CHANGE UP (ref 27–34)
MCHC RBC-ENTMCNC: 29.4 PG — SIGNIFICANT CHANGE UP (ref 27–34)
MCHC RBC-ENTMCNC: 32.2 GM/DL — SIGNIFICANT CHANGE UP (ref 32–36)
MCHC RBC-ENTMCNC: 32.2 GM/DL — SIGNIFICANT CHANGE UP (ref 32–36)
MCV RBC AUTO: 91.3 FL — SIGNIFICANT CHANGE UP (ref 80–100)
MCV RBC AUTO: 91.3 FL — SIGNIFICANT CHANGE UP (ref 80–100)
PHOSPHATE SERPL-MCNC: 2.2 MG/DL — LOW (ref 2.5–4.5)
PHOSPHATE SERPL-MCNC: 2.2 MG/DL — LOW (ref 2.5–4.5)
PLATELET # BLD AUTO: 193 K/UL — SIGNIFICANT CHANGE UP (ref 150–400)
PLATELET # BLD AUTO: 193 K/UL — SIGNIFICANT CHANGE UP (ref 150–400)
POTASSIUM SERPL-MCNC: 3.7 MMOL/L — SIGNIFICANT CHANGE UP (ref 3.5–5.3)
POTASSIUM SERPL-MCNC: 3.7 MMOL/L — SIGNIFICANT CHANGE UP (ref 3.5–5.3)
POTASSIUM SERPL-SCNC: 3.7 MMOL/L — SIGNIFICANT CHANGE UP (ref 3.5–5.3)
POTASSIUM SERPL-SCNC: 3.7 MMOL/L — SIGNIFICANT CHANGE UP (ref 3.5–5.3)
RBC # BLD: 3.44 M/UL — LOW (ref 3.8–5.2)
RBC # BLD: 3.44 M/UL — LOW (ref 3.8–5.2)
RBC # FLD: 19.6 % — HIGH (ref 10.3–14.5)
RBC # FLD: 19.6 % — HIGH (ref 10.3–14.5)
SODIUM SERPL-SCNC: 140 MMOL/L — SIGNIFICANT CHANGE UP (ref 135–145)
SODIUM SERPL-SCNC: 140 MMOL/L — SIGNIFICANT CHANGE UP (ref 135–145)
WBC # BLD: 12.84 K/UL — HIGH (ref 3.8–10.5)
WBC # BLD: 12.84 K/UL — HIGH (ref 3.8–10.5)
WBC # FLD AUTO: 12.84 K/UL — HIGH (ref 3.8–10.5)
WBC # FLD AUTO: 12.84 K/UL — HIGH (ref 3.8–10.5)

## 2024-01-01 PROCEDURE — 99232 SBSQ HOSP IP/OBS MODERATE 35: CPT

## 2024-01-01 RX ORDER — ENOXAPARIN SODIUM 100 MG/ML
40 INJECTION SUBCUTANEOUS EVERY 24 HOURS
Refills: 0 | Status: DISCONTINUED | OUTPATIENT
Start: 2024-01-01 | End: 2024-01-02

## 2024-01-01 RX ORDER — TAMSULOSIN HYDROCHLORIDE 0.4 MG/1
0.4 CAPSULE ORAL AT BEDTIME
Refills: 0 | Status: DISCONTINUED | OUTPATIENT
Start: 2024-01-01 | End: 2024-01-07

## 2024-01-01 RX ORDER — SODIUM CHLORIDE 9 MG/ML
500 INJECTION, SOLUTION INTRAVENOUS ONCE
Refills: 0 | Status: COMPLETED | OUTPATIENT
Start: 2024-01-01 | End: 2024-01-01

## 2024-01-01 RX ORDER — POTASSIUM PHOSPHATE, MONOBASIC POTASSIUM PHOSPHATE, DIBASIC 236; 224 MG/ML; MG/ML
30 INJECTION, SOLUTION INTRAVENOUS ONCE
Refills: 0 | Status: COMPLETED | OUTPATIENT
Start: 2024-01-01 | End: 2024-01-01

## 2024-01-01 RX ORDER — DEXTROSE MONOHYDRATE, SODIUM CHLORIDE, AND POTASSIUM CHLORIDE 50; .745; 4.5 G/1000ML; G/1000ML; G/1000ML
1000 INJECTION, SOLUTION INTRAVENOUS
Refills: 0 | Status: DISCONTINUED | OUTPATIENT
Start: 2024-01-01 | End: 2024-01-02

## 2024-01-01 RX ADMIN — ENOXAPARIN SODIUM 40 MILLIGRAM(S): 100 INJECTION SUBCUTANEOUS at 09:02

## 2024-01-01 RX ADMIN — PANTOPRAZOLE SODIUM 40 MILLIGRAM(S): 20 TABLET, DELAYED RELEASE ORAL at 23:15

## 2024-01-01 RX ADMIN — Medication 10 MILLIGRAM(S): at 11:10

## 2024-01-01 RX ADMIN — Medication 5 MILLIGRAM(S): at 11:10

## 2024-01-01 RX ADMIN — Medication 10 MILLIGRAM(S): at 17:02

## 2024-01-01 RX ADMIN — SODIUM CHLORIDE 250 MILLILITER(S): 9 INJECTION, SOLUTION INTRAVENOUS at 05:47

## 2024-01-01 RX ADMIN — PANTOPRAZOLE SODIUM 40 MILLIGRAM(S): 20 TABLET, DELAYED RELEASE ORAL at 09:02

## 2024-01-01 RX ADMIN — Medication 10 MILLIGRAM(S): at 05:48

## 2024-01-01 RX ADMIN — POTASSIUM PHOSPHATE, MONOBASIC POTASSIUM PHOSPHATE, DIBASIC 83.33 MILLIMOLE(S): 236; 224 INJECTION, SOLUTION INTRAVENOUS at 09:01

## 2024-01-01 RX ADMIN — DEXTROSE MONOHYDRATE, SODIUM CHLORIDE, AND POTASSIUM CHLORIDE 75 MILLILITER(S): 50; .745; 4.5 INJECTION, SOLUTION INTRAVENOUS at 09:01

## 2024-01-01 RX ADMIN — Medication 5 MILLIGRAM(S): at 05:48

## 2024-01-01 RX ADMIN — Medication 5 MILLIGRAM(S): at 23:15

## 2024-01-01 RX ADMIN — DEXTROSE MONOHYDRATE, SODIUM CHLORIDE, AND POTASSIUM CHLORIDE 75 MILLILITER(S): 50; .745; 4.5 INJECTION, SOLUTION INTRAVENOUS at 05:47

## 2024-01-01 RX ADMIN — Medication 5 MILLIGRAM(S): at 17:02

## 2024-01-01 RX ADMIN — DEXTROSE MONOHYDRATE, SODIUM CHLORIDE, AND POTASSIUM CHLORIDE 75 MILLILITER(S): 50; .745; 4.5 INJECTION, SOLUTION INTRAVENOUS at 17:02

## 2024-01-01 RX ADMIN — Medication 10 MILLIGRAM(S): at 23:16

## 2024-01-01 NOTE — PROGRESS NOTE ADULT - SUBJECTIVE AND OBJECTIVE BOX
Patient is a 83y old  Female who presents with a chief complaint of     BRIEF HOSPITAL COURSE: 84yo female  with PMHx of afib (on xarelto), HTN and HLD, hx admission in August for anemia, CT imaging revealing cecal lesion, now  presents for scheduled laparoscopic right colectomy     s/p lap R colectomy and partial omentectomy   Intra op omentum adhered to cecum and ascending colon  anastamosis stapled  EBL 100cc, received 2100cc IV fluid.      pt seen in PACU. c/o abd soreness and dry mouth. pt [place]. in sinus on monitor 80-90s. BP elevated.   pt still c/o abd pain, mild nausea. abd appears more distended. had liquid BM this am.  : N/V, NGT placed over night, +BM  : No events over night, no nausea or vomiting  11: 500 from the NGT tube over night       PAST MEDICAL & SURGICAL HISTORY:  HTN (hypertension)      Glaucoma      OA (osteoarthritis)      Arteriosclerotic heart disease (ASHD)      Hyperlipidemia      Cecal lesion      Afib      Status post glaucoma surgery  and cataract      S/P knee surgery      H/O wrist surgery          FAMILY HISTORY:  FHx: colon cancer  in brother,     FH: pancreatic cancer  in sister,     FH: CAD (coronary artery disease)  in mother and father, both         Social Hx:    Allergies    No Known Allergies    Intolerances            ICU Vital Signs Last 24 Hrs  T(C): 36.9 (2024 09:18), Max: 37.2 (31 Dec 2023 17:27)  T(F): 98.4 (2024 09:18), Max: 99 (31 Dec 2023 17:27)  HR: 76 (2024 14:00) (75 - 95)  BP: 151/58 (2024 14:00) (118/53 - 156/57)  BP(mean): 81 (2024 14:00) (65 - 107)  ABP: --  ABP(mean): --  RR: 19 (2024 14:00) (18 - 29)  SpO2: 97% (2024 14:00) (93% - 100%)    O2 Parameters below as of 2024 14:00  Patient On (Oxygen Delivery Method): room air                I&O's Summary    31 Dec 2023 07:01  -  2024 07:00  --------------------------------------------------------  IN: 3827 mL / OUT: 2175 mL / NET: 1652 mL    2024 07:01  -  2024 14:47  --------------------------------------------------------  IN: 0 mL / OUT: 2 mL / NET: -2 mL                              10.1   12.84 )-----------( 193      ( 2024 05:49 )             31.4       01-    140  |  110<H>  |  9   ----------------------------<  84  3.7   |  25  |  0.69    Ca    8.0<L>      2024 05:49  Phos  2.2     01-  Mg     1.9                       Urinalysis Basic - ( 2024 05:49 )    Color: x / Appearance: x / SG: x / pH: x  Gluc: 84 mg/dL / Ketone: x  / Bili: x / Urobili: x   Blood: x / Protein: x / Nitrite: x   Leuk Esterase: x / RBC: x / WBC x   Sq Epi: x / Non Sq Epi: x / Bacteria: x        MEDICATIONS  (STANDING):  acetaminophen   IVPB .. 1000 milliGRAM(s) IV Intermittent every 6 hours  atorvastatin 20 milliGRAM(s) Oral at bedtime  dextrose 5% + sodium chloride 0.45% with potassium chloride 40 mEq/L 1000 milliLiter(s) (75 mL/Hr) IV Continuous <Continuous>  enoxaparin Injectable 40 milliGRAM(s) SubCutaneous every 24 hours  losartan 100 milliGRAM(s) Oral daily  metoclopramide Injectable 10 milliGRAM(s) IV Push every 6 hours  metoprolol tartrate Injectable 5 milliGRAM(s) IV Push every 6 hours  pantoprazole  Injectable 40 milliGRAM(s) IV Push two times a day  tamsulosin 0.4 milliGRAM(s) Oral at bedtime    MEDICATIONS  (PRN):  acetaminophen   IVPB .. 1000 milliGRAM(s) IV Intermittent once PRN Temp greater or equal to 38C (100.4F), Mild Pain (1 - 3)  HYDROmorphone  Injectable 0.5 milliGRAM(s) IV Push every 4 hours PRN Severe Pain (7 - 10)  ondansetron Injectable 4 milliGRAM(s) IV Push every 6 hours PRN Nausea  oxyCODONE    IR 5 milliGRAM(s) Oral every 6 hours PRN Moderate Pain (4 - 6)      DVT Prophylaxis:    Advanced Directives:  Discussed with:    Visit Information:    ** Time is exclusive of billed procedures and/or teaching and/or routine family updates.         Patient is a 83y old  Female who presents with a chief complaint of     BRIEF HOSPITAL COURSE: 82yo female  with PMHx of afib (on xarelto), HTN and HLD, hx admission in August for anemia, CT imaging revealing cecal lesion, now  presents for scheduled laparoscopic right colectomy     s/p lap R colectomy and partial omentectomy   Intra op omentum adhered to cecum and ascending colon  anastamosis stapled  EBL 100cc, received 2100cc IV fluid.      pt seen in PACU. c/o abd soreness and dry mouth. pt [place]. in sinus on monitor 80-90s. BP elevated.   pt still c/o abd pain, mild nausea. abd appears more distended. had liquid BM this am.  : N/V, NGT placed over night, +BM  : No events over night, no nausea or vomiting  11: 500 from the NGT tube over night       PAST MEDICAL & SURGICAL HISTORY:  HTN (hypertension)      Glaucoma      OA (osteoarthritis)      Arteriosclerotic heart disease (ASHD)      Hyperlipidemia      Cecal lesion      Afib      Status post glaucoma surgery  and cataract      S/P knee surgery      H/O wrist surgery          FAMILY HISTORY:  FHx: colon cancer  in brother,     FH: pancreatic cancer  in sister,     FH: CAD (coronary artery disease)  in mother and father, both         Social Hx:    Allergies    No Known Allergies    Intolerances            ICU Vital Signs Last 24 Hrs  T(C): 36.9 (2024 09:18), Max: 37.2 (31 Dec 2023 17:27)  T(F): 98.4 (2024 09:18), Max: 99 (31 Dec 2023 17:27)  HR: 76 (2024 14:00) (75 - 95)  BP: 151/58 (2024 14:00) (118/53 - 156/57)  BP(mean): 81 (2024 14:00) (65 - 107)  ABP: --  ABP(mean): --  RR: 19 (2024 14:00) (18 - 29)  SpO2: 97% (2024 14:00) (93% - 100%)    O2 Parameters below as of 2024 14:00  Patient On (Oxygen Delivery Method): room air                I&O's Summary    31 Dec 2023 07:01  -  2024 07:00  --------------------------------------------------------  IN: 3827 mL / OUT: 2175 mL / NET: 1652 mL    2024 07:01  -  2024 14:47  --------------------------------------------------------  IN: 0 mL / OUT: 2 mL / NET: -2 mL                              10.1   12.84 )-----------( 193      ( 2024 05:49 )             31.4       01-    140  |  110<H>  |  9   ----------------------------<  84  3.7   |  25  |  0.69    Ca    8.0<L>      2024 05:49  Phos  2.2     01-  Mg     1.9                       Urinalysis Basic - ( 2024 05:49 )    Color: x / Appearance: x / SG: x / pH: x  Gluc: 84 mg/dL / Ketone: x  / Bili: x / Urobili: x   Blood: x / Protein: x / Nitrite: x   Leuk Esterase: x / RBC: x / WBC x   Sq Epi: x / Non Sq Epi: x / Bacteria: x        MEDICATIONS  (STANDING):  acetaminophen   IVPB .. 1000 milliGRAM(s) IV Intermittent every 6 hours  atorvastatin 20 milliGRAM(s) Oral at bedtime  dextrose 5% + sodium chloride 0.45% with potassium chloride 40 mEq/L 1000 milliLiter(s) (75 mL/Hr) IV Continuous <Continuous>  enoxaparin Injectable 40 milliGRAM(s) SubCutaneous every 24 hours  losartan 100 milliGRAM(s) Oral daily  metoclopramide Injectable 10 milliGRAM(s) IV Push every 6 hours  metoprolol tartrate Injectable 5 milliGRAM(s) IV Push every 6 hours  pantoprazole  Injectable 40 milliGRAM(s) IV Push two times a day  tamsulosin 0.4 milliGRAM(s) Oral at bedtime    MEDICATIONS  (PRN):  acetaminophen   IVPB .. 1000 milliGRAM(s) IV Intermittent once PRN Temp greater or equal to 38C (100.4F), Mild Pain (1 - 3)  HYDROmorphone  Injectable 0.5 milliGRAM(s) IV Push every 4 hours PRN Severe Pain (7 - 10)  ondansetron Injectable 4 milliGRAM(s) IV Push every 6 hours PRN Nausea  oxyCODONE    IR 5 milliGRAM(s) Oral every 6 hours PRN Moderate Pain (4 - 6)      DVT Prophylaxis:    Advanced Directives:  Discussed with:    Visit Information:    ** Time is exclusive of billed procedures and/or teaching and/or routine family updates.

## 2024-01-01 NOTE — PROGRESS NOTE ADULT - ATTENDING COMMENTS
S&E  No complaints.  Passing gas and stool.  NGT output still high at 1100c  AFVSS  NAD  soft, NT, moderate distention  Labs reviewed  A/P -   Cont NPO, NGT, IVF.  If NGT outpt < 1L tomorrow, plan to remove and trial clear liquids.  Cont OOB to chair/ ambulate.

## 2024-01-01 NOTE — PROGRESS NOTE ADULT - ASSESSMENT
84 yo F now POD 4 s/p right colectomy for cecal mass, c/b postop ileus & urinary retention    Plan  F/u NGT output for last 24 hr, consider clamp trial vs removal on AM rounds  Flomax resumed, clamp NGT for administration  Void trial 48 hours after Flomax resumed  If still NPO tomorrow, start PPN  OOB/A  VTE ppx w/ SCDs & Lovenox; resume Xarelto when H/H stable    Will be d/w CRS team 82 yo F now POD 4 s/p right colectomy for cecal mass, c/b postop ileus & urinary retention    Plan  F/u NGT output for last 24 hr, consider clamp trial vs removal on AM rounds  Flomax resumed, clamp NGT for administration  Void trial 48 hours after Flomax resumed  If still NPO tomorrow, start PPN  OOB/A  VTE ppx w/ SCDs & Lovenox; resume Xarelto when H/H stable    Will be d/w CRS team

## 2024-01-01 NOTE — PROGRESS NOTE ADULT - ASSESSMENT
ASSESSMENT  82yo female with PMHx of afib (on xarelto), HTN and HLD, hx admission in August for anemia, CT imaging revealing cecal lesion, now  presents for scheduled laparoscopic right colectomy     s/p lap R colectomy and partial omentectomy   Intra op omentum adhered to cecum and ascending colon  anastamosis stapled  EBL 100cc, received 2100cc IV fluid.     PLAN    Neuro:  Pain control prn. IV tylenol, morphine  CV: BP better. Hold losartan continue lopressor, xarelto for Afib when ok with surgery.  Pulm: on room air. encourage incentive spirometer  GI: NPO except meds, candy. IV PPI. bowel regimen prn, NGT to suction  Nephro: cont IV maintenance fluids. LR @ 100 while NPO. monitor I & Os. Trend renal fxn. Siddiqui placed, flomax  Endo: check a1c. BGMs q6hr for colon bundle. ISS.   ID:  trend WBC. monitor temps.   Heme:  DVT ppx - hep sq.  Trend H/H. SCDs  PT eval post op  pt states her niece Emy helps her make medical decisions

## 2024-01-01 NOTE — PROGRESS NOTE ADULT - SUBJECTIVE AND OBJECTIVE BOX
SURGERY DAILY PROGRESS NOTE:     Subjective:  Patient seen and examined this AM at bedside. No acute events overnight and patient resting comfortably. Liquid BMs overnight. Denies nausea, fever/chills, shortness of breath, chest pain, itchiness. VS reviewed    Objective:    MEDICATIONS  (STANDING):  acetaminophen   IVPB .. 1000 milliGRAM(s) IV Intermittent every 6 hours  atorvastatin 20 milliGRAM(s) Oral at bedtime  dextrose 5% + sodium chloride 0.45% with potassium chloride 40 mEq/L 1000 milliLiter(s) (75 mL/Hr) IV Continuous <Continuous>  heparin   Injectable 5000 Unit(s) SubCutaneous every 12 hours  lactated ringers Bolus 500 milliLiter(s) IV Bolus once  losartan 100 milliGRAM(s) Oral daily  metoclopramide Injectable 10 milliGRAM(s) IV Push every 6 hours  metoprolol tartrate Injectable 5 milliGRAM(s) IV Push every 6 hours  pantoprazole  Injectable 40 milliGRAM(s) IV Push two times a day    MEDICATIONS  (PRN):  acetaminophen   IVPB .. 1000 milliGRAM(s) IV Intermittent once PRN Temp greater or equal to 38C (100.4F), Mild Pain (1 - 3)  HYDROmorphone  Injectable 0.5 milliGRAM(s) IV Push every 4 hours PRN Severe Pain (7 - 10)  ondansetron Injectable 4 milliGRAM(s) IV Push every 6 hours PRN Nausea  oxyCODONE    IR 5 milliGRAM(s) Oral every 6 hours PRN Moderate Pain (4 - 6)      Vital Signs Last 24 Hrs  T(C): 37.2 (31 Dec 2023 17:27), Max: 37.2 (31 Dec 2023 17:27)  T(F): 99 (31 Dec 2023 17:27), Max: 99 (31 Dec 2023 17:27)  HR: 89 (01 Jan 2024 04:00) (75 - 105)  BP: 142/59 (01 Jan 2024 04:00) (110/51 - 143/65)  BP(mean): 83 (01 Jan 2024 04:00) (62 - 107)  RR: 21 (01 Jan 2024 04:00) (17 - 29)  SpO2: 99% (01 Jan 2024 04:00) (93% - 100%)    Parameters below as of 01 Jan 2024 04:00  Patient On (Oxygen Delivery Method): room air          PHYSICAL EXAM   GENERAL: NAD, well developed  HEAD: Atraumatic, normocephalic  EYES: EOMI, PERRLA, conjunctiva and sclera clear  ENT: moist mucous membrane, NGT w/ thin lightly bilious output  NECK: supple, No JVD, midline trachea  CHEST/LUNG: No increased WOB, symmetric excursions, on room air  Heart: RRR ppp, no peripheral edema  ABDOMEN: Round, nondistended, soft, nontender. no organomegaly  EXTREMITIES: Brisk cap refill. no clubbing or cyanosis  NERVOUS SYSTEM: AOx4, speech clear, no neuro-deficits  MSK: full ROM, no deformities  SKIN: warm to touch, no rash or lesions, stable blanching erythema through torso & abdomen       I&O's Detail    30 Dec 2023 07:01  -  31 Dec 2023 07:00  --------------------------------------------------------  IN:    IV PiggyBack: 694 mL    Lactated Ringers: 1716 mL  Total IN: 2410 mL    OUT:    Indwelling Catheter - Urethral (mL): 875 mL    Nasogastric/Oral tube (mL): 950 mL  Total OUT: 1825 mL    Total NET: 585 mL      31 Dec 2023 07:01  -  01 Jan 2024 04:29  --------------------------------------------------------  IN:    IV PiggyBack: 948 mL    Lactated Ringers: 777 mL  Total IN: 1725 mL    OUT:    Indwelling Catheter - Urethral (mL): 375 mL    Nasogastric/Oral tube (mL): 600 mL  Total OUT: 975 mL    Total NET: 750 mL          Daily     Daily     LABS:                        9.4    12.42 )-----------( 175      ( 31 Dec 2023 05:32 )             29.1     12-31    143  |  111<H>  |  12  ----------------------------<  89  3.3<L>   |  29  |  0.81    Ca    7.7<L>      31 Dec 2023 05:32  Phos  1.9     12-31  Mg     1.8     12-31        Urinalysis Basic - ( 31 Dec 2023 05:32 )    Color: x / Appearance: x / SG: x / pH: x  Gluc: 89 mg/dL / Ketone: x  / Bili: x / Urobili: x   Blood: x / Protein: x / Nitrite: x   Leuk Esterase: x / RBC: x / WBC x   Sq Epi: x / Non Sq Epi: x / Bacteria: x        RADIOLOGY & ADDITIONAL STUDIES:    12/30 & 31 CXR  IMPRESSION: NG tube inserted. Minimal hazy density developing at left   base.

## 2024-01-02 LAB
ANION GAP SERPL CALC-SCNC: 4 MMOL/L — LOW (ref 5–17)
ANION GAP SERPL CALC-SCNC: 4 MMOL/L — LOW (ref 5–17)
BUN SERPL-MCNC: 6 MG/DL — LOW (ref 7–23)
BUN SERPL-MCNC: 6 MG/DL — LOW (ref 7–23)
CALCIUM SERPL-MCNC: 8 MG/DL — LOW (ref 8.5–10.1)
CALCIUM SERPL-MCNC: 8 MG/DL — LOW (ref 8.5–10.1)
CHLORIDE SERPL-SCNC: 112 MMOL/L — HIGH (ref 96–108)
CHLORIDE SERPL-SCNC: 112 MMOL/L — HIGH (ref 96–108)
CO2 SERPL-SCNC: 26 MMOL/L — SIGNIFICANT CHANGE UP (ref 22–31)
CO2 SERPL-SCNC: 26 MMOL/L — SIGNIFICANT CHANGE UP (ref 22–31)
CREAT SERPL-MCNC: 0.54 MG/DL — SIGNIFICANT CHANGE UP (ref 0.5–1.3)
CREAT SERPL-MCNC: 0.54 MG/DL — SIGNIFICANT CHANGE UP (ref 0.5–1.3)
EGFR: 92 ML/MIN/1.73M2 — SIGNIFICANT CHANGE UP
EGFR: 92 ML/MIN/1.73M2 — SIGNIFICANT CHANGE UP
GLUCOSE SERPL-MCNC: 111 MG/DL — HIGH (ref 70–99)
GLUCOSE SERPL-MCNC: 111 MG/DL — HIGH (ref 70–99)
HCT VFR BLD CALC: 30.2 % — LOW (ref 34.5–45)
HCT VFR BLD CALC: 30.2 % — LOW (ref 34.5–45)
HGB BLD-MCNC: 9.6 G/DL — LOW (ref 11.5–15.5)
HGB BLD-MCNC: 9.6 G/DL — LOW (ref 11.5–15.5)
MAGNESIUM SERPL-MCNC: 2 MG/DL — SIGNIFICANT CHANGE UP (ref 1.6–2.6)
MAGNESIUM SERPL-MCNC: 2 MG/DL — SIGNIFICANT CHANGE UP (ref 1.6–2.6)
MCHC RBC-ENTMCNC: 29.3 PG — SIGNIFICANT CHANGE UP (ref 27–34)
MCHC RBC-ENTMCNC: 29.3 PG — SIGNIFICANT CHANGE UP (ref 27–34)
MCHC RBC-ENTMCNC: 31.8 GM/DL — LOW (ref 32–36)
MCHC RBC-ENTMCNC: 31.8 GM/DL — LOW (ref 32–36)
MCV RBC AUTO: 92.1 FL — SIGNIFICANT CHANGE UP (ref 80–100)
MCV RBC AUTO: 92.1 FL — SIGNIFICANT CHANGE UP (ref 80–100)
PHOSPHATE SERPL-MCNC: 1.6 MG/DL — LOW (ref 2.5–4.5)
PHOSPHATE SERPL-MCNC: 1.6 MG/DL — LOW (ref 2.5–4.5)
PLATELET # BLD AUTO: 191 K/UL — SIGNIFICANT CHANGE UP (ref 150–400)
PLATELET # BLD AUTO: 191 K/UL — SIGNIFICANT CHANGE UP (ref 150–400)
POTASSIUM SERPL-MCNC: 4.1 MMOL/L — SIGNIFICANT CHANGE UP (ref 3.5–5.3)
POTASSIUM SERPL-MCNC: 4.1 MMOL/L — SIGNIFICANT CHANGE UP (ref 3.5–5.3)
POTASSIUM SERPL-SCNC: 4.1 MMOL/L — SIGNIFICANT CHANGE UP (ref 3.5–5.3)
POTASSIUM SERPL-SCNC: 4.1 MMOL/L — SIGNIFICANT CHANGE UP (ref 3.5–5.3)
RBC # BLD: 3.28 M/UL — LOW (ref 3.8–5.2)
RBC # BLD: 3.28 M/UL — LOW (ref 3.8–5.2)
RBC # FLD: 19.6 % — HIGH (ref 10.3–14.5)
RBC # FLD: 19.6 % — HIGH (ref 10.3–14.5)
SODIUM SERPL-SCNC: 142 MMOL/L — SIGNIFICANT CHANGE UP (ref 135–145)
SODIUM SERPL-SCNC: 142 MMOL/L — SIGNIFICANT CHANGE UP (ref 135–145)
WBC # BLD: 9.66 K/UL — SIGNIFICANT CHANGE UP (ref 3.8–10.5)
WBC # BLD: 9.66 K/UL — SIGNIFICANT CHANGE UP (ref 3.8–10.5)
WBC # FLD AUTO: 9.66 K/UL — SIGNIFICANT CHANGE UP (ref 3.8–10.5)
WBC # FLD AUTO: 9.66 K/UL — SIGNIFICANT CHANGE UP (ref 3.8–10.5)

## 2024-01-02 PROCEDURE — 99233 SBSQ HOSP IP/OBS HIGH 50: CPT

## 2024-01-02 RX ORDER — METOPROLOL TARTRATE 50 MG
75 TABLET ORAL
Refills: 0 | Status: DISCONTINUED | OUTPATIENT
Start: 2024-01-02 | End: 2024-01-04

## 2024-01-02 RX ORDER — SODIUM CHLORIDE 9 MG/ML
1000 INJECTION, SOLUTION INTRAVENOUS
Refills: 0 | Status: DISCONTINUED | OUTPATIENT
Start: 2024-01-02 | End: 2024-01-03

## 2024-01-02 RX ORDER — METOPROLOL TARTRATE 50 MG
5 TABLET ORAL ONCE
Refills: 0 | Status: COMPLETED | OUTPATIENT
Start: 2024-01-02 | End: 2024-01-02

## 2024-01-02 RX ORDER — RIVAROXABAN 15 MG-20MG
15 KIT ORAL
Refills: 0 | Status: DISCONTINUED | OUTPATIENT
Start: 2024-01-02 | End: 2024-01-07

## 2024-01-02 RX ORDER — TAMSULOSIN HYDROCHLORIDE 0.4 MG/1
0.4 CAPSULE ORAL ONCE
Refills: 0 | Status: COMPLETED | OUTPATIENT
Start: 2024-01-02 | End: 2024-01-02

## 2024-01-02 RX ADMIN — DEXTROSE MONOHYDRATE, SODIUM CHLORIDE, AND POTASSIUM CHLORIDE 75 MILLILITER(S): 50; .745; 4.5 INJECTION, SOLUTION INTRAVENOUS at 07:50

## 2024-01-02 RX ADMIN — PANTOPRAZOLE SODIUM 40 MILLIGRAM(S): 20 TABLET, DELAYED RELEASE ORAL at 11:13

## 2024-01-02 RX ADMIN — Medication 5 MILLIGRAM(S): at 05:37

## 2024-01-02 RX ADMIN — RIVAROXABAN 15 MILLIGRAM(S): KIT at 17:22

## 2024-01-02 RX ADMIN — LOSARTAN POTASSIUM 100 MILLIGRAM(S): 100 TABLET, FILM COATED ORAL at 11:13

## 2024-01-02 RX ADMIN — ATORVASTATIN CALCIUM 20 MILLIGRAM(S): 80 TABLET, FILM COATED ORAL at 21:51

## 2024-01-02 RX ADMIN — Medication 5 MILLIGRAM(S): at 14:52

## 2024-01-02 RX ADMIN — Medication 75 MILLIGRAM(S): at 17:21

## 2024-01-02 RX ADMIN — TAMSULOSIN HYDROCHLORIDE 0.4 MILLIGRAM(S): 0.4 CAPSULE ORAL at 11:13

## 2024-01-02 RX ADMIN — Medication 85 MILLIMOLE(S): at 11:28

## 2024-01-02 RX ADMIN — SODIUM CHLORIDE 75 MILLILITER(S): 9 INJECTION, SOLUTION INTRAVENOUS at 11:10

## 2024-01-02 RX ADMIN — Medication 10 MILLIGRAM(S): at 05:37

## 2024-01-02 RX ADMIN — PANTOPRAZOLE SODIUM 40 MILLIGRAM(S): 20 TABLET, DELAYED RELEASE ORAL at 21:51

## 2024-01-02 NOTE — PROGRESS NOTE ADULT - SUBJECTIVE AND OBJECTIVE BOX
Patient is a 83y old  Female who presents with a chief complaint of     BRIEF HOSPITAL COURSE: 82yo female  with PMHx of afib (on xarelto), HTN and HLD, hx admission in August for anemia, CT imaging revealing cecal lesion, now  presents for scheduled laparoscopic right colectomy     s/p lap R colectomy and partial omentectomy   Intra op omentum adhered to cecum and ascending colon  anastamosis stapled  EBL 100cc, received 2100cc IV fluid.     12/28 pt seen in PACU. c/o abd soreness and dry mouth. pt [place]. in sinus on monitor 80-90s. BP elevated.  12/29 pt still c/o abd pain, mild nausea. abd appears more distended. had liquid BM this am.  1/2     PAST MEDICAL & SURGICAL HISTORY:  HTN (hypertension  Glaucoma  OA (osteoarthritis)  Arteriosclerotic heart disease (ASHD)  Hyperlipidemia  Cecal lesion  Afib  Status post glaucoma surgery  and cataract  S/P knee surgery  H/O wrist surgery    MEDICATIONS  (STANDING):  acetaminophen   IVPB .. 1000 milliGRAM(s) IV Intermittent every 6 hours  atorvastatin 20 milliGRAM(s) Oral at bedtime  dextrose 5% + sodium chloride 0.45% with potassium chloride 40 mEq/L 1000 milliLiter(s) (75 mL/Hr) IV Continuous <Continuous>  enoxaparin Injectable 40 milliGRAM(s) SubCutaneous every 24 hours  losartan 100 milliGRAM(s) Oral daily  metoclopramide Injectable 10 milliGRAM(s) IV Push every 6 hours  metoprolol tartrate Injectable 5 milliGRAM(s) IV Push every 6 hours  pantoprazole  Injectable 40 milliGRAM(s) IV Push two times a day  sodium phosphate 30 milliMole(s)/500 mL IVPB 30 milliMole(s) IV Intermittent once  tamsulosin 0.4 milliGRAM(s) Oral at bedtime    Vital Signs Last 24 Hrs  T(C): 36.9 (02 Jan 2024 06:00), Max: 37.1 (01 Jan 2024 15:59)  T(F): 98.4 (02 Jan 2024 06:00), Max: 98.8 (01 Jan 2024 15:59)  HR: 84 (02 Jan 2024 07:00) (76 - 93)  BP: 134/87 (02 Jan 2024 07:00) (116/96 - 158/61)  BP(mean): 101 (02 Jan 2024 07:00) (69 - 101)  RR: 24 (02 Jan 2024 07:00) (19 - 25)  SpO2: 100% (02 Jan 2024 00:00) (97% - 100%)    Parameters below as of 01 Jan 2024 18:07  Patient On (Oxygen Delivery Method): room air        I&O's Detail    01 Jan 2024 07:01  -  02 Jan 2024 07:00  --------------------------------------------------------  IN:    dextrose 5% + sodium chloride 0.45% w/ Additives: 871 mL    IV PiggyBack: 350 mL  Total IN: 1221 mL    OUT:    Indwelling Catheter - Urethral (mL): 1525 mL    Nasogastric/Oral tube (mL): 700 mL  Total OUT: 2225 mL    Total NET: -1004 mL        LABS:                         9.6    9.66  )-----------( 191      ( 02 Jan 2024 05:59 )             30.2     01-02    142  |  112<H>  |  6<L>  ----------------------------<  111<H>  4.1   |  26  |  0.54    Ca    8.0<L>      02 Jan 2024 05:59  Phos  1.6     01-02  Mg     2.0     01-02    Urinalysis Basic - ( 02 Jan 2024 05:59 )    Color: x / Appearance: x / SG: x / pH: x  Gluc: 111 mg/dL / Ketone: x  / Bili: x / Urobili: x   Blood: x / Protein: x / Nitrite: x   Leuk Esterase: x / RBC: x / WBC x   Sq Epi: x / Non Sq Epi: x / Bacteria: x      CULTURES:    Physical Examination:    General: No acute distress.  frail appearing  HEENT: Pupils equal, reactive to light.  Symmetric. very dry lips and mouth. Hard of hearing  PULM: Clear to auscultation bilaterally, no crackles or wheezing  CVS: Regular rate and rhythm, no murmurs  ABD: Soft, but appears more distended, incisional tender, normoactive bowel sounds, NP seal mid line, lap sites with dermabond   EXT: No LE edema,   SKIN: Warm and  dry skin  NEURO: Alert, oriented, interactive, no gross focal deficits    DEVICES:   rizzo  NGT    RADIOLOGY: Patient is a 83y old  Female who presents with a chief complaint of     BRIEF HOSPITAL COURSE: 84yo female  with PMHx of afib (on xarelto), HTN and HLD, hx admission in August for anemia, CT imaging revealing cecal lesion, now  presents for scheduled laparoscopic right colectomy     s/p lap R colectomy and partial omentectomy   Intra op omentum adhered to cecum and ascending colon  anastamosis stapled  EBL 100cc, received 2100cc IV fluid.     12/28 pt seen in PACU. c/o abd soreness and dry mouth. pt [place]. in sinus on monitor 80-90s. BP elevated.  12/29 pt still c/o abd pain, mild nausea. abd appears more distended. had liquid BM this am.  1/2     PAST MEDICAL & SURGICAL HISTORY:  HTN (hypertension  Glaucoma  OA (osteoarthritis)  Arteriosclerotic heart disease (ASHD)  Hyperlipidemia  Cecal lesion  Afib  Status post glaucoma surgery  and cataract  S/P knee surgery  H/O wrist surgery    MEDICATIONS  (STANDING):  acetaminophen   IVPB .. 1000 milliGRAM(s) IV Intermittent every 6 hours  atorvastatin 20 milliGRAM(s) Oral at bedtime  dextrose 5% + sodium chloride 0.45% with potassium chloride 40 mEq/L 1000 milliLiter(s) (75 mL/Hr) IV Continuous <Continuous>  enoxaparin Injectable 40 milliGRAM(s) SubCutaneous every 24 hours  losartan 100 milliGRAM(s) Oral daily  metoclopramide Injectable 10 milliGRAM(s) IV Push every 6 hours  metoprolol tartrate Injectable 5 milliGRAM(s) IV Push every 6 hours  pantoprazole  Injectable 40 milliGRAM(s) IV Push two times a day  sodium phosphate 30 milliMole(s)/500 mL IVPB 30 milliMole(s) IV Intermittent once  tamsulosin 0.4 milliGRAM(s) Oral at bedtime    Vital Signs Last 24 Hrs  T(C): 36.9 (02 Jan 2024 06:00), Max: 37.1 (01 Jan 2024 15:59)  T(F): 98.4 (02 Jan 2024 06:00), Max: 98.8 (01 Jan 2024 15:59)  HR: 84 (02 Jan 2024 07:00) (76 - 93)  BP: 134/87 (02 Jan 2024 07:00) (116/96 - 158/61)  BP(mean): 101 (02 Jan 2024 07:00) (69 - 101)  RR: 24 (02 Jan 2024 07:00) (19 - 25)  SpO2: 100% (02 Jan 2024 00:00) (97% - 100%)    Parameters below as of 01 Jan 2024 18:07  Patient On (Oxygen Delivery Method): room air        I&O's Detail    01 Jan 2024 07:01  -  02 Jan 2024 07:00  --------------------------------------------------------  IN:    dextrose 5% + sodium chloride 0.45% w/ Additives: 871 mL    IV PiggyBack: 350 mL  Total IN: 1221 mL    OUT:    Indwelling Catheter - Urethral (mL): 1525 mL    Nasogastric/Oral tube (mL): 700 mL  Total OUT: 2225 mL    Total NET: -1004 mL        LABS:                         9.6    9.66  )-----------( 191      ( 02 Jan 2024 05:59 )             30.2     01-02    142  |  112<H>  |  6<L>  ----------------------------<  111<H>  4.1   |  26  |  0.54    Ca    8.0<L>      02 Jan 2024 05:59  Phos  1.6     01-02  Mg     2.0     01-02    Urinalysis Basic - ( 02 Jan 2024 05:59 )    Color: x / Appearance: x / SG: x / pH: x  Gluc: 111 mg/dL / Ketone: x  / Bili: x / Urobili: x   Blood: x / Protein: x / Nitrite: x   Leuk Esterase: x / RBC: x / WBC x   Sq Epi: x / Non Sq Epi: x / Bacteria: x      CULTURES:    Physical Examination:    General: No acute distress.  frail appearing  HEENT: Pupils equal, reactive to light.  Symmetric. very dry lips and mouth. Hard of hearing  PULM: Clear to auscultation bilaterally, no crackles or wheezing  CVS: Regular rate and rhythm, no murmurs  ABD: Soft, but appears more distended, incisional tender, normoactive bowel sounds, NP seal mid line, lap sites with dermabond   EXT: No LE edema,   SKIN: Warm and  dry skin  NEURO: Alert, oriented, interactive, no gross focal deficits    DEVICES:   rizzo  NGT    RADIOLOGY: Patient is a 83y old  Female who presents with a chief complaint of     BRIEF HOSPITAL COURSE: 84yo female  with PMHx of afib (on xarelto), HTN and HLD, hx admission in August for anemia, CT imaging revealing cecal lesion, now  presents for scheduled laparoscopic right colectomy     s/p lap R colectomy and partial omentectomy   Intra op omentum adhered to cecum and ascending colon  anastamosis stapled  EBL 100cc, received 2100cc IV fluid.     12/28 pt seen in PACU. c/o abd soreness and dry mouth. pt [place]. in sinus on monitor 80-90s. BP elevated.  12/29 pt still c/o abd pain, mild nausea. abd appears more distended. had liquid BM this am.    1/2 pt feeling ok, NGT removed this am, ambulated with PT. generalized redness, but denies itching, pain, any known drug allergies    PAST MEDICAL & SURGICAL HISTORY:  HTN (hypertension  Glaucoma  OA (osteoarthritis)  Arteriosclerotic heart disease (ASHD)  Hyperlipidemia  Cecal lesion  Afib  Status post glaucoma surgery  and cataract  S/P knee surgery  H/O wrist surgery    MEDICATIONS  (STANDING):  acetaminophen   IVPB .. 1000 milliGRAM(s) IV Intermittent every 6 hours  atorvastatin 20 milliGRAM(s) Oral at bedtime  dextrose 5% + sodium chloride 0.45% with potassium chloride 40 mEq/L 1000 milliLiter(s) (75 mL/Hr) IV Continuous <Continuous>  enoxaparin Injectable 40 milliGRAM(s) SubCutaneous every 24 hours  losartan 100 milliGRAM(s) Oral daily  metoclopramide Injectable 10 milliGRAM(s) IV Push every 6 hours  metoprolol tartrate Injectable 5 milliGRAM(s) IV Push every 6 hours  pantoprazole  Injectable 40 milliGRAM(s) IV Push two times a day  sodium phosphate 30 milliMole(s)/500 mL IVPB 30 milliMole(s) IV Intermittent once  tamsulosin 0.4 milliGRAM(s) Oral at bedtime    Vital Signs Last 24 Hrs  T(C): 36.9 (02 Jan 2024 06:00), Max: 37.1 (01 Jan 2024 15:59)  T(F): 98.4 (02 Jan 2024 06:00), Max: 98.8 (01 Jan 2024 15:59)  HR: 84 (02 Jan 2024 07:00) (76 - 93)  BP: 134/87 (02 Jan 2024 07:00) (116/96 - 158/61)  BP(mean): 101 (02 Jan 2024 07:00) (69 - 101)  RR: 24 (02 Jan 2024 07:00) (19 - 25)  SpO2: 100% (02 Jan 2024 00:00) (97% - 100%)    Parameters below as of 01 Jan 2024 18:07  Patient On (Oxygen Delivery Method): room air        I&O's Detail    01 Jan 2024 07:01  -  02 Jan 2024 07:00  --------------------------------------------------------  IN:    dextrose 5% + sodium chloride 0.45% w/ Additives: 871 mL    IV PiggyBack: 350 mL  Total IN: 1221 mL    OUT:    Indwelling Catheter - Urethral (mL): 1525 mL    Nasogastric/Oral tube (mL): 700 mL  Total OUT: 2225 mL    Total NET: -1004 mL        LABS:                         9.6    9.66  )-----------( 191      ( 02 Jan 2024 05:59 )             30.2     01-02    142  |  112<H>  |  6<L>  ----------------------------<  111<H>  4.1   |  26  |  0.54    Ca    8.0<L>      02 Jan 2024 05:59  Phos  1.6     01-02  Mg     2.0     01-02    Urinalysis Basic - ( 02 Jan 2024 05:59 )    Color: x / Appearance: x / SG: x / pH: x  Gluc: 111 mg/dL / Ketone: x  / Bili: x / Urobili: x   Blood: x / Protein: x / Nitrite: x   Leuk Esterase: x / RBC: x / WBC x   Sq Epi: x / Non Sq Epi: x / Bacteria: x      CULTURES:    Physical Examination:    General: No acute distress.  frail appearing  HEENT: Pupils equal, reactive to light.  Symmetric.  dry lips and mouth. Hard of hearing  PULM: Clear to auscultation bilaterally, no crackles or wheezing  CVS: Regular rate and rhythm, no murmurs  ABD: Soft, but appears distended, incisional tender, normoactive bowel sounds, NP seal mid line, lap sites with dermabond   EXT: No LE edema,   SKIN: Warm and  dry skin, generalized redness?  NEURO: Alert, oriented, interactive, no gross focal deficits    DEVICES:   rizzo  NGT- removed today    RADIOLOGY: Patient is a 83y old  Female who presents with a chief complaint of     BRIEF HOSPITAL COURSE: 84yo female  with PMHx of afib (on xarelto), HTN and HLD, hx admission in August for anemia, CT imaging revealing cecal lesion, now  presents for scheduled laparoscopic right colectomy     s/p lap R colectomy and partial omentectomy   Intra op omentum adhered to cecum and ascending colon  anastamosis stapled  EBL 100cc, received 2100cc IV fluid.     12/28 pt seen in PACU. c/o abd soreness and dry mouth. pt [place]. in sinus on monitor 80-90s. BP elevated.  12/29 pt still c/o abd pain, mild nausea. abd appears more distended. had liquid BM this am.    1/2 pt feeling ok, NGT removed this am, ambulated with PT. generalized redness, but denies itching, pain, any known drug allergies    PAST MEDICAL & SURGICAL HISTORY:  HTN (hypertension  Glaucoma  OA (osteoarthritis)  Arteriosclerotic heart disease (ASHD)  Hyperlipidemia  Cecal lesion  Afib  Status post glaucoma surgery  and cataract  S/P knee surgery  H/O wrist surgery    MEDICATIONS  (STANDING):  acetaminophen   IVPB .. 1000 milliGRAM(s) IV Intermittent every 6 hours  atorvastatin 20 milliGRAM(s) Oral at bedtime  dextrose 5% + sodium chloride 0.45% with potassium chloride 40 mEq/L 1000 milliLiter(s) (75 mL/Hr) IV Continuous <Continuous>  enoxaparin Injectable 40 milliGRAM(s) SubCutaneous every 24 hours  losartan 100 milliGRAM(s) Oral daily  metoclopramide Injectable 10 milliGRAM(s) IV Push every 6 hours  metoprolol tartrate Injectable 5 milliGRAM(s) IV Push every 6 hours  pantoprazole  Injectable 40 milliGRAM(s) IV Push two times a day  sodium phosphate 30 milliMole(s)/500 mL IVPB 30 milliMole(s) IV Intermittent once  tamsulosin 0.4 milliGRAM(s) Oral at bedtime    Vital Signs Last 24 Hrs  T(C): 36.9 (02 Jan 2024 06:00), Max: 37.1 (01 Jan 2024 15:59)  T(F): 98.4 (02 Jan 2024 06:00), Max: 98.8 (01 Jan 2024 15:59)  HR: 84 (02 Jan 2024 07:00) (76 - 93)  BP: 134/87 (02 Jan 2024 07:00) (116/96 - 158/61)  BP(mean): 101 (02 Jan 2024 07:00) (69 - 101)  RR: 24 (02 Jan 2024 07:00) (19 - 25)  SpO2: 100% (02 Jan 2024 00:00) (97% - 100%)    Parameters below as of 01 Jan 2024 18:07  Patient On (Oxygen Delivery Method): room air        I&O's Detail    01 Jan 2024 07:01  -  02 Jan 2024 07:00  --------------------------------------------------------  IN:    dextrose 5% + sodium chloride 0.45% w/ Additives: 871 mL    IV PiggyBack: 350 mL  Total IN: 1221 mL    OUT:    Indwelling Catheter - Urethral (mL): 1525 mL    Nasogastric/Oral tube (mL): 700 mL  Total OUT: 2225 mL    Total NET: -1004 mL        LABS:                         9.6    9.66  )-----------( 191      ( 02 Jan 2024 05:59 )             30.2     01-02    142  |  112<H>  |  6<L>  ----------------------------<  111<H>  4.1   |  26  |  0.54    Ca    8.0<L>      02 Jan 2024 05:59  Phos  1.6     01-02  Mg     2.0     01-02    Urinalysis Basic - ( 02 Jan 2024 05:59 )    Color: x / Appearance: x / SG: x / pH: x  Gluc: 111 mg/dL / Ketone: x  / Bili: x / Urobili: x   Blood: x / Protein: x / Nitrite: x   Leuk Esterase: x / RBC: x / WBC x   Sq Epi: x / Non Sq Epi: x / Bacteria: x      CULTURES:    Physical Examination:    General: No acute distress.  frail appearing  HEENT: Pupils equal, reactive to light.  Symmetric.  dry lips and mouth. Hard of hearing  PULM: Clear to auscultation bilaterally, no crackles or wheezing  CVS: Regular rate and rhythm, no murmurs  ABD: Soft, but appears distended, incisional tender, normoactive bowel sounds, NP seal mid line, lap sites with dermabond   EXT: No LE edema,   SKIN: Warm and  dry skin, generalized redness?  NEURO: Alert, oriented, interactive, no gross focal deficits    DEVICES:   rizzo  NGT- removed today    RADIOLOGY:

## 2024-01-02 NOTE — PROGRESS NOTE ADULT - SUBJECTIVE AND OBJECTIVE BOX
SURGERY DAILY PROGRESS NOTE:     Subjective:  Patient seen and examined this AM at bedside. BM x 3 yesterday. No acute events overnight and patient resting comfortably. Pain controlled. Ambulating to the bathroom. Denies nausea, fever/chills, shortness of breath, chest pain. VS reviewed    Objective:    MEDICATIONS  (STANDING):  acetaminophen   IVPB .. 1000 milliGRAM(s) IV Intermittent every 6 hours  atorvastatin 20 milliGRAM(s) Oral at bedtime  dextrose 5% + sodium chloride 0.45% with potassium chloride 40 mEq/L 1000 milliLiter(s) (75 mL/Hr) IV Continuous <Continuous>  enoxaparin Injectable 40 milliGRAM(s) SubCutaneous every 24 hours  losartan 100 milliGRAM(s) Oral daily  metoclopramide Injectable 10 milliGRAM(s) IV Push every 6 hours  metoprolol tartrate Injectable 5 milliGRAM(s) IV Push every 6 hours  pantoprazole  Injectable 40 milliGRAM(s) IV Push two times a day  tamsulosin 0.4 milliGRAM(s) Oral at bedtime    MEDICATIONS  (PRN):  acetaminophen   IVPB .. 1000 milliGRAM(s) IV Intermittent once PRN Temp greater or equal to 38C (100.4F), Mild Pain (1 - 3)  HYDROmorphone  Injectable 0.5 milliGRAM(s) IV Push every 4 hours PRN Severe Pain (7 - 10)  ondansetron Injectable 4 milliGRAM(s) IV Push every 6 hours PRN Nausea  oxyCODONE    IR 5 milliGRAM(s) Oral every 6 hours PRN Moderate Pain (4 - 6)      Vital Signs Last 24 Hrs  T(C): 37.1 (01 Jan 2024 15:59), Max: 37.1 (01 Jan 2024 15:59)  T(F): 98.8 (01 Jan 2024 15:59), Max: 98.8 (01 Jan 2024 15:59)  HR: 87 (01 Jan 2024 22:00) (76 - 95)  BP: 158/61 (01 Jan 2024 22:00) (116/96 - 158/61)  BP(mean): 87 (01 Jan 2024 22:00) (69 - 101)  RR: 22 (01 Jan 2024 22:00) (18 - 25)  SpO2: 100% (01 Jan 2024 22:00) (97% - 100%)    Parameters below as of 01 Jan 2024 18:07  Patient On (Oxygen Delivery Method): room air          PHYSICAL EXAM   GENERAL: NAD, well developed, obese  HEAD: Atraumatic, normocephalic  EYES: EOMI, PERRLA, conjunctiva and sclera clear  ENT: moist mucous membrane, NGT w/ thin light green output  NECK: supple, No JVD, midline trachea  CHEST/LUNG: No increased WOB, symmetric excursions, on room air  Heart: RRR ppp, no peripheral edema  ABDOMEN: Round, mildly distended without tympany, soft, nontender. NP seal in place with suction.  : Siddiqui with clear light yellow urine in tubing  EXTREMITIES: Brisk cap refill. no clubbing or cyanosis  NERVOUS SYSTEM: AOx4, speech clear, no neuro-deficits  MSK: full ROM, no deformities  SKIN: warm to touch, no rash or lesions, stable blanching erythema through torso & abdomen       I&O's Detail    31 Dec 2023 07:01  -  01 Jan 2024 07:00  --------------------------------------------------------  IN:    IV PiggyBack: 1392 mL    Lactated Ringers: 1935 mL    Lactated Ringers Bolus: 500 mL  Total IN: 3827 mL    OUT:    Indwelling Catheter - Urethral (mL): 1075 mL    Nasogastric/Oral tube (mL): 1100 mL  Total OUT: 2175 mL    Total NET: 1652 mL      01 Jan 2024 07:01  -  02 Jan 2024 00:38  --------------------------------------------------------  IN:    dextrose 5% + sodium chloride 0.45% w/ Additives: 871 mL    IV PiggyBack: 350 mL  Total IN: 1221 mL    OUT:    Indwelling Catheter - Urethral (mL): 525 mL    Nasogastric/Oral tube (mL): 350 mL  Total OUT: 875 mL    Total NET: 346 mL          Daily     Daily     LABS:                        10.1   12.84 )-----------( 193      ( 01 Jan 2024 05:49 )             31.4     01-01    140  |  110<H>  |  9   ----------------------------<  84  3.7   |  25  |  0.69    Ca    8.0<L>      01 Jan 2024 05:49  Phos  2.2     01-01  Mg     1.9     01-01        Urinalysis Basic - ( 01 Jan 2024 05:49 )    Color: x / Appearance: x / SG: x / pH: x  Gluc: 84 mg/dL / Ketone: x  / Bili: x / Urobili: x   Blood: x / Protein: x / Nitrite: x   Leuk Esterase: x / RBC: x / WBC x   Sq Epi: x / Non Sq Epi: x / Bacteria: x

## 2024-01-02 NOTE — PROGRESS NOTE ADULT - ASSESSMENT
82 yo F now POD 5 s/p right colectomy for cecal mass, c/b postop ileus & urinary retention  +Stool & flatus    Plan  F/u NGT output for last 24 hr, will d/c if < 1L  Flomax resumed but patient unable to swallow w/ NGT in place  Void trial 48 hours after Flomax resumed  If still NPO today, start PPN  OOB/A  VTE ppx w/ SCDs & Lovenox; resume Xarelto when H/H stable    Will be d/w CRS team

## 2024-01-02 NOTE — PROGRESS NOTE ADULT - ATTENDING COMMENTS
Patient with 700cc out via NGT, reports flatus/BM.  Siddiqui in place for retention.  Abdomen soft, mildly distended, appropriately tender, no rebound/guarding.  Pain controlled.  LAbs with resolved leukocytosis; hypophosphatemia noted.    -- NGT removed  -- OK for sips of clears, PO medications  -- Continue Siddiqui until tomorrow morning  -- IS, ambulation  -- DVT prophylaxis - can have full AC at this stage now that NGT removed and can take PO meds  -- Appreciate ICU care

## 2024-01-02 NOTE — PROGRESS NOTE ADULT - ASSESSMENT
ASSESSMENT  82yo female with PMHx of afib (on xarelto), HTN and HLD, hx admission in August for anemia, CT imaging revealing cecal lesion, now  presents for scheduled laparoscopic right colectomy     s/p lap R colectomy and partial omentectomy   Intra op omentum adhered to cecum and ascending colon  anastamosis stapled  EBL 100cc, received 2100cc IV fluid.     PLAN    Neuro: AAOx 3. pain control prn. IV tylenol, morphine  CV: BP better. resume xarelto for Afib when ok with surgery.  Pulm: on room air. encourage incentive spirometer  GI: NPO. NGT in place - 350cc overnight. IV PPI.  Nephro: cont IV maintenance fluids while NPO. monitor I & Os. Trend renal fxn. rizzo re -inserted d/t retention, started flomax  Endo: check a1c. BGMs q6hr for colon bundle. ISS.   ID: post op IV cefotetan. trend WBC. monitor temps.   Heme: Hgb stable.  DVT ppx - lovenox sq.  start full dose AC if ok with surgery  PT eval post op    Dispo: SICU. NGT in place. restart xarelto/AC for Afib when ok with surgery .     Will discuss with Dr. Vann ASSESSMENT  84yo female with PMHx of afib (on xarelto), HTN and HLD, hx admission in August for anemia, CT imaging revealing cecal lesion, now  presents for scheduled laparoscopic right colectomy     s/p lap R colectomy and partial omentectomy   Intra op omentum adhered to cecum and ascending colon  anastamosis stapled  EBL 100cc, received 2100cc IV fluid.     PLAN    Neuro: AAOx 3. pain control prn. IV tylenol, morphine  CV: BP better. resume xarelto for Afib when ok with surgery.  Pulm: on room air. encourage incentive spirometer  GI: NPO. NGT in place - 350cc overnight. IV PPI.  Nephro: cont IV maintenance fluids while NPO. monitor I & Os. Trend renal fxn. rizzo re -inserted d/t retention, started flomax  Endo: check a1c. BGMs q6hr for colon bundle. ISS.   ID: post op IV cefotetan. trend WBC. monitor temps.   Heme: Hgb stable.  DVT ppx - lovenox sq.  start full dose AC if ok with surgery  PT eval post op    Dispo: SICU. NGT in place. restart xarelto/AC for Afib when ok with surgery .     Will discuss with Dr. Vann ASSESSMENT  82yo female with PMHx of afib (on xarelto), HTN and HLD, hx admission in August for anemia, CT imaging revealing cecal lesion, now  presents for scheduled laparoscopic right colectomy     s/p lap R colectomy and partial omentectomy   Intra op omentum adhered to cecum and ascending colon  anastamosis stapled  EBL 100cc, received 2100cc IV fluid.     Post op course complicated by   ileus -> NGT placed 12/30 - removed today 1/2  urinary retention -> rizzo replaced 12/30     PLAN    Neuro: AAOx 3. pain control prn. IV tylenol, morphine  CV: BP better. resume PO meds lopressor, losartan. will resume xarelto for Afib, ok with surgery.  Pulm: on room air. encourage incentive spirometer  GI: sips/chips.  NGT removed this am. IV reglan standing. IV PPI.  Nephro: cont LR @75 until taking adequate PO. rizzo re -inserted 12/30 d/t retention, started flomax - trial of void today  ID: post op IV cefotetan. trend WBC. monitor temps. monitor generalized redness.   Heme: Hgb stable.  DVT ppx - lovenox sq.  start xarelto today for Afib ok with surgery  PT eval post op    Dispo: Stable for floor. NGT removed, sips/chips. restart xarelto  for Afib.     Will discuss with Dr. Vann ASSESSMENT  82yo female with PMHx of afib (on xarelto), HTN and HLD, hx admission in August for anemia, CT imaging revealing cecal lesion, now  presents for scheduled laparoscopic right colectomy     s/p lap R colectomy and partial omentectomy   Intra op omentum adhered to cecum and ascending colon  anastamosis stapled  EBL 100cc, received 2100cc IV fluid.     Post op course complicated by   ileus -> NGT placed 12/30 - removed today 1/2  urinary retention -> rizzo replaced 12/30     PLAN    Neuro: AAOx 3. pain control prn. IV tylenol, morphine  CV: BP better. resume PO meds lopressor, losartan. will resume xarelto for Afib, ok with surgery.  Pulm: on room air. encourage incentive spirometer  GI: sips/chips.  NGT removed this am. IV reglan standing. IV PPI.  Nephro: cont LR @75 until taking adequate PO. rizzo re -inserted 12/30 d/t retention, started flomax - trial of void tomorrow.  ID: post op IV cefotetan. trend WBC. monitor temps. monitor generalized redness.   Heme: Hgb stable.  DVT ppx - lovenox sq.  start xarelto today for Afib ok with surgery  PT eval post op    Dispo: Stable for floor if ok with primary team. NGT removed, sips/chips. restart xarelto  for Afib.     Will discuss with Dr. Vann

## 2024-01-02 NOTE — PROGRESS NOTE ADULT - NS ATTEND OPT1A GEN_ALL_CORE
"Nutrition Services: Consult for Low BMI    Past Medical History:   Diagnosis Date   • Dental disorder     poor dentations  broken teeth   • Pain    • Renal failure 2010    Resolved, due to dehydration     Ht: 5' 10\"  Wt: 57kg (125#)- via stand-up scale  BMI: 18.03  Diet: Regular    51 y/o male admitted r/t anemia requiring transfusions, found to be Flu+, with a history of alcohol abuse seen today to assess nutrition status. Pt reports that his appetite is good and his intake of foods has been good since admit, though no PO noted per chart review. The patient denied any recent poor intake of foods or recent weight changes. He stated that he has always been thin. The patient did appear thin per visual observation at bedside, but he did not present with obvious signs of malnutrition. Offered snacks and supplements, pt reports he is not agreeable to supplementation but agreed to snacks. Added high-protein/ high-calorie snacks TID between meals. The pt did not express any further questions or concerns at this time.     Pertinent Labs: Na: 133, K: 3.3, BUN: 7, Cr: 0.45, Albumin: 2.4  Pertinent Meds: Zithromax, Senokot, Thiamine, Theragran, Folvite  Fluids: No IVF noted per MAR  Skin: No skin breakdown noted per MAR  GI: +BM 12/28      PLAN/RECOMMEND -   1) Nutrition rep to see patient daily for meal and snack preferences.  2) Encourage PO of meals and snacks  3) Weekly weights to monitor fluid and nutrition status  4) Obtain supplement order per RD as needed.  5) Documentation of pt's PO to better assess nutrition status    RD following    " History/Exam/Medical decision making

## 2024-01-03 ENCOUNTER — TRANSCRIPTION ENCOUNTER (OUTPATIENT)
Age: 84
End: 2024-01-03

## 2024-01-03 LAB
ANION GAP SERPL CALC-SCNC: 5 MMOL/L — SIGNIFICANT CHANGE UP (ref 5–17)
ANION GAP SERPL CALC-SCNC: 5 MMOL/L — SIGNIFICANT CHANGE UP (ref 5–17)
APPEARANCE UR: CLEAR — SIGNIFICANT CHANGE UP
APPEARANCE UR: CLEAR — SIGNIFICANT CHANGE UP
BASOPHILS # BLD AUTO: 0 K/UL — SIGNIFICANT CHANGE UP (ref 0–0.2)
BASOPHILS # BLD AUTO: 0 K/UL — SIGNIFICANT CHANGE UP (ref 0–0.2)
BASOPHILS NFR BLD AUTO: 0 % — SIGNIFICANT CHANGE UP (ref 0–2)
BASOPHILS NFR BLD AUTO: 0 % — SIGNIFICANT CHANGE UP (ref 0–2)
BILIRUB UR-MCNC: NEGATIVE — SIGNIFICANT CHANGE UP
BILIRUB UR-MCNC: NEGATIVE — SIGNIFICANT CHANGE UP
BUN SERPL-MCNC: 4 MG/DL — LOW (ref 7–23)
BUN SERPL-MCNC: 4 MG/DL — LOW (ref 7–23)
CALCIUM SERPL-MCNC: 7.7 MG/DL — LOW (ref 8.5–10.1)
CALCIUM SERPL-MCNC: 7.7 MG/DL — LOW (ref 8.5–10.1)
CHLORIDE SERPL-SCNC: 111 MMOL/L — HIGH (ref 96–108)
CHLORIDE SERPL-SCNC: 111 MMOL/L — HIGH (ref 96–108)
CO2 SERPL-SCNC: 26 MMOL/L — SIGNIFICANT CHANGE UP (ref 22–31)
CO2 SERPL-SCNC: 26 MMOL/L — SIGNIFICANT CHANGE UP (ref 22–31)
COLOR SPEC: YELLOW — SIGNIFICANT CHANGE UP
COLOR SPEC: YELLOW — SIGNIFICANT CHANGE UP
CREAT SERPL-MCNC: 0.59 MG/DL — SIGNIFICANT CHANGE UP (ref 0.5–1.3)
CREAT SERPL-MCNC: 0.59 MG/DL — SIGNIFICANT CHANGE UP (ref 0.5–1.3)
DIFF PNL FLD: ABNORMAL
DIFF PNL FLD: ABNORMAL
EGFR: 90 ML/MIN/1.73M2 — SIGNIFICANT CHANGE UP
EGFR: 90 ML/MIN/1.73M2 — SIGNIFICANT CHANGE UP
EOSINOPHIL # BLD AUTO: 0.6 K/UL — HIGH (ref 0–0.5)
EOSINOPHIL # BLD AUTO: 0.6 K/UL — HIGH (ref 0–0.5)
EOSINOPHIL NFR BLD AUTO: 8 % — HIGH (ref 0–6)
EOSINOPHIL NFR BLD AUTO: 8 % — HIGH (ref 0–6)
GLUCOSE SERPL-MCNC: 96 MG/DL — SIGNIFICANT CHANGE UP (ref 70–99)
GLUCOSE SERPL-MCNC: 96 MG/DL — SIGNIFICANT CHANGE UP (ref 70–99)
GLUCOSE UR QL: NEGATIVE MG/DL — SIGNIFICANT CHANGE UP
GLUCOSE UR QL: NEGATIVE MG/DL — SIGNIFICANT CHANGE UP
HCT VFR BLD CALC: 29.3 % — LOW (ref 34.5–45)
HCT VFR BLD CALC: 29.3 % — LOW (ref 34.5–45)
HGB BLD-MCNC: 9.5 G/DL — LOW (ref 11.5–15.5)
HGB BLD-MCNC: 9.5 G/DL — LOW (ref 11.5–15.5)
KETONES UR-MCNC: NEGATIVE MG/DL — SIGNIFICANT CHANGE UP
KETONES UR-MCNC: NEGATIVE MG/DL — SIGNIFICANT CHANGE UP
LEUKOCYTE ESTERASE UR-ACNC: ABNORMAL
LEUKOCYTE ESTERASE UR-ACNC: ABNORMAL
LYMPHOCYTES # BLD AUTO: 0.23 K/UL — LOW (ref 1–3.3)
LYMPHOCYTES # BLD AUTO: 0.23 K/UL — LOW (ref 1–3.3)
LYMPHOCYTES # BLD AUTO: 3 % — LOW (ref 13–44)
LYMPHOCYTES # BLD AUTO: 3 % — LOW (ref 13–44)
MAGNESIUM SERPL-MCNC: 2 MG/DL — SIGNIFICANT CHANGE UP (ref 1.6–2.6)
MAGNESIUM SERPL-MCNC: 2 MG/DL — SIGNIFICANT CHANGE UP (ref 1.6–2.6)
MCHC RBC-ENTMCNC: 29.5 PG — SIGNIFICANT CHANGE UP (ref 27–34)
MCHC RBC-ENTMCNC: 29.5 PG — SIGNIFICANT CHANGE UP (ref 27–34)
MCHC RBC-ENTMCNC: 32.4 GM/DL — SIGNIFICANT CHANGE UP (ref 32–36)
MCHC RBC-ENTMCNC: 32.4 GM/DL — SIGNIFICANT CHANGE UP (ref 32–36)
MCV RBC AUTO: 91 FL — SIGNIFICANT CHANGE UP (ref 80–100)
MCV RBC AUTO: 91 FL — SIGNIFICANT CHANGE UP (ref 80–100)
MONOCYTES # BLD AUTO: 0.6 K/UL — SIGNIFICANT CHANGE UP (ref 0–0.9)
MONOCYTES # BLD AUTO: 0.6 K/UL — SIGNIFICANT CHANGE UP (ref 0–0.9)
MONOCYTES NFR BLD AUTO: 8 % — SIGNIFICANT CHANGE UP (ref 2–14)
MONOCYTES NFR BLD AUTO: 8 % — SIGNIFICANT CHANGE UP (ref 2–14)
NEUTROPHILS # BLD AUTO: 6.08 K/UL — SIGNIFICANT CHANGE UP (ref 1.8–7.4)
NEUTROPHILS # BLD AUTO: 6.08 K/UL — SIGNIFICANT CHANGE UP (ref 1.8–7.4)
NEUTROPHILS NFR BLD AUTO: 81 % — HIGH (ref 43–77)
NEUTROPHILS NFR BLD AUTO: 81 % — HIGH (ref 43–77)
NITRITE UR-MCNC: NEGATIVE — SIGNIFICANT CHANGE UP
NITRITE UR-MCNC: NEGATIVE — SIGNIFICANT CHANGE UP
NRBC # BLD: SIGNIFICANT CHANGE UP /100 WBCS (ref 0–0)
NRBC # BLD: SIGNIFICANT CHANGE UP /100 WBCS (ref 0–0)
PH UR: 6.5 — SIGNIFICANT CHANGE UP (ref 5–8)
PH UR: 6.5 — SIGNIFICANT CHANGE UP (ref 5–8)
PHOSPHATE SERPL-MCNC: 2 MG/DL — LOW (ref 2.5–4.5)
PHOSPHATE SERPL-MCNC: 2 MG/DL — LOW (ref 2.5–4.5)
PLATELET # BLD AUTO: 192 K/UL — SIGNIFICANT CHANGE UP (ref 150–400)
PLATELET # BLD AUTO: 192 K/UL — SIGNIFICANT CHANGE UP (ref 150–400)
POTASSIUM SERPL-MCNC: 3.4 MMOL/L — LOW (ref 3.5–5.3)
POTASSIUM SERPL-MCNC: 3.4 MMOL/L — LOW (ref 3.5–5.3)
POTASSIUM SERPL-SCNC: 3.4 MMOL/L — LOW (ref 3.5–5.3)
POTASSIUM SERPL-SCNC: 3.4 MMOL/L — LOW (ref 3.5–5.3)
PROT UR-MCNC: NEGATIVE MG/DL — SIGNIFICANT CHANGE UP
PROT UR-MCNC: NEGATIVE MG/DL — SIGNIFICANT CHANGE UP
RBC # BLD: 3.22 M/UL — LOW (ref 3.8–5.2)
RBC # BLD: 3.22 M/UL — LOW (ref 3.8–5.2)
RBC # FLD: 19.6 % — HIGH (ref 10.3–14.5)
RBC # FLD: 19.6 % — HIGH (ref 10.3–14.5)
SODIUM SERPL-SCNC: 142 MMOL/L — SIGNIFICANT CHANGE UP (ref 135–145)
SODIUM SERPL-SCNC: 142 MMOL/L — SIGNIFICANT CHANGE UP (ref 135–145)
SP GR SPEC: 1.01 — SIGNIFICANT CHANGE UP (ref 1–1.03)
SP GR SPEC: 1.01 — SIGNIFICANT CHANGE UP (ref 1–1.03)
UROBILINOGEN FLD QL: 0.2 MG/DL — SIGNIFICANT CHANGE UP (ref 0.2–1)
UROBILINOGEN FLD QL: 0.2 MG/DL — SIGNIFICANT CHANGE UP (ref 0.2–1)
WBC # BLD: 7.51 K/UL — SIGNIFICANT CHANGE UP (ref 3.8–10.5)
WBC # BLD: 7.51 K/UL — SIGNIFICANT CHANGE UP (ref 3.8–10.5)
WBC # FLD AUTO: 7.51 K/UL — SIGNIFICANT CHANGE UP (ref 3.8–10.5)
WBC # FLD AUTO: 7.51 K/UL — SIGNIFICANT CHANGE UP (ref 3.8–10.5)

## 2024-01-03 RX ORDER — OXYCODONE AND ACETAMINOPHEN 5; 325 MG/1; MG/1
1 TABLET ORAL
Qty: 20 | Refills: 0
Start: 2024-01-03

## 2024-01-03 RX ORDER — SODIUM,POTASSIUM PHOSPHATES 278-250MG
1 POWDER IN PACKET (EA) ORAL ONCE
Refills: 0 | Status: COMPLETED | OUTPATIENT
Start: 2024-01-03 | End: 2024-01-03

## 2024-01-03 RX ORDER — METOPROLOL TARTRATE 50 MG
5 TABLET ORAL ONCE
Refills: 0 | Status: COMPLETED | OUTPATIENT
Start: 2024-01-03 | End: 2024-01-03

## 2024-01-03 RX ORDER — METOPROLOL TARTRATE 50 MG
2.5 TABLET ORAL ONCE
Refills: 0 | Status: COMPLETED | OUTPATIENT
Start: 2024-01-03 | End: 2024-01-03

## 2024-01-03 RX ORDER — SODIUM,POTASSIUM PHOSPHATES 278-250MG
2 POWDER IN PACKET (EA) ORAL ONCE
Refills: 0 | Status: DISCONTINUED | OUTPATIENT
Start: 2024-01-03 | End: 2024-01-03

## 2024-01-03 RX ORDER — POTASSIUM CHLORIDE 20 MEQ
40 PACKET (EA) ORAL ONCE
Refills: 0 | Status: COMPLETED | OUTPATIENT
Start: 2024-01-03 | End: 2024-01-03

## 2024-01-03 RX ADMIN — Medication 75 MILLIGRAM(S): at 07:00

## 2024-01-03 RX ADMIN — SODIUM CHLORIDE 75 MILLILITER(S): 9 INJECTION, SOLUTION INTRAVENOUS at 06:28

## 2024-01-03 RX ADMIN — LOSARTAN POTASSIUM 100 MILLIGRAM(S): 100 TABLET, FILM COATED ORAL at 09:47

## 2024-01-03 RX ADMIN — Medication 2.5 MILLIGRAM(S): at 13:33

## 2024-01-03 RX ADMIN — Medication 1 PACKET(S): at 22:07

## 2024-01-03 RX ADMIN — ATORVASTATIN CALCIUM 20 MILLIGRAM(S): 80 TABLET, FILM COATED ORAL at 22:07

## 2024-01-03 RX ADMIN — RIVAROXABAN 15 MILLIGRAM(S): KIT at 18:27

## 2024-01-03 RX ADMIN — PANTOPRAZOLE SODIUM 40 MILLIGRAM(S): 20 TABLET, DELAYED RELEASE ORAL at 22:07

## 2024-01-03 RX ADMIN — Medication 75 MILLIGRAM(S): at 22:06

## 2024-01-03 RX ADMIN — PANTOPRAZOLE SODIUM 40 MILLIGRAM(S): 20 TABLET, DELAYED RELEASE ORAL at 09:49

## 2024-01-03 RX ADMIN — Medication 40 MILLIEQUIVALENT(S): at 16:47

## 2024-01-03 RX ADMIN — TAMSULOSIN HYDROCHLORIDE 0.4 MILLIGRAM(S): 0.4 CAPSULE ORAL at 22:07

## 2024-01-03 RX ADMIN — Medication 1 PACKET(S): at 06:43

## 2024-01-03 RX ADMIN — Medication 5 MILLIGRAM(S): at 15:54

## 2024-01-03 RX ADMIN — Medication 85 MILLIMOLE(S): at 22:07

## 2024-01-03 NOTE — DIETITIAN NUTRITION RISK NOTIFICATION - ADDITIONAL COMMENTS/DIETITIAN RECOMMENDATIONS
1. Recommend to ADAT to low-fiber once medically feasible as per MD orders to maximize PO intake   2. If unable to advance diet, strongly rec to consult RD for nutrition support recs   3. Add ensure plus high protein BID to optimize PO intake (provides 350 kcal, 20g protein/ shake)   4. Consider obtaining vitamin D 25OH level to assess nutriture   5. Monitor bowel movements, if no BM for >3 days, consider implementing bowel regimen.   6. Consider adding thiamine 100 mg daily 2/2 poor PO intake/ malnutrition and MVI w/ minerals daily to ensure 100% RDA met   7. Confirm goals of care regarding nutrition support   RD will continue to monitor PO intake, labs, hydration, and wt prn.  no

## 2024-01-03 NOTE — DIETITIAN INITIAL EVALUATION ADULT - OTHER INFO
82yo female  with PMHx of afib (on xarelto), HTN and HLD, hx admission in August for anemia, CT imaging revealing cecal lesion, now  presents for scheduled laparoscopic right colectomy. Admit for disorder of intestines. S/p lap R colectomy and partial omentectomy. Intra op omentum adhered to cecum and ascending colon. Anastomosis stapled.    Was NPO x5 days s/p colectomy 2/2 post-op ileus as per surgery, was advanced to clear liquids yesterday and advanced to full liquid diet this afternoon. Upon RD visit this morning, pt was still in clear liquid diet, reported she was tolerating well and observed pt consuming tray of Jello, tea and coffee, consuming <50% ENN x5 days. Reports # x ? time frame, ? poor historian. Unable to obtain bed scale wt 2/2 pt sitting in chair upon RD visit. EMR wt 157# obtained 1/3 by RN, appears accurate. 1+ edema doc'd 1/3 - likely skewing wt and masking further wt loss. Unintentional wt loss 3# / 1.9% x ? time frame - ? clin sig. Appears overweight/obese however NFPE revealed moderate-severe muscle/fat wasting. Recommend to continue to advance diet as tolerated to low-fiber diet once medically feasible as per MD orders to maximize PO intake. Spoke w/ surgical resident this morning w/ recommendation to initiation PPN if to remain on clear liquids, however, did not want PPN as they stated the diet would be advanced, pt now on full liquids. If PO diet unable to be advanced to solid food, strongly suggest to consult RD for nutrition support recs to better meet ENN. Will add ensure plus high protein BID to optimize PO intake (provides 350 kcal, 20g protein/ shake), pt was receptive. See below for other recs.  84yo female  with PMHx of afib (on xarelto), HTN and HLD, hx admission in August for anemia, CT imaging revealing cecal lesion, now  presents for scheduled laparoscopic right colectomy. Admit for disorder of intestines. S/p lap R colectomy and partial omentectomy. Intra op omentum adhered to cecum and ascending colon. Anastomosis stapled.    Was NPO x5 days s/p colectomy 2/2 post-op ileus as per surgery, was advanced to clear liquids yesterday and advanced to full liquid diet this afternoon. Upon RD visit this morning, pt was still in clear liquid diet, reported she was tolerating well and observed pt consuming tray of Jello, tea and coffee, consuming <50% ENN x5 days. Reports # x ? time frame, ? poor historian. Unable to obtain bed scale wt 2/2 pt sitting in chair upon RD visit. EMR wt 157# obtained 1/3 by RN, appears accurate. 1+ edema doc'd 1/3 - likely skewing wt and masking further wt loss. Unintentional wt loss 3# / 1.9% x ? time frame - ? clin sig. Appears overweight/obese however NFPE revealed moderate-severe muscle/fat wasting. Recommend to continue to advance diet as tolerated to low-fiber diet once medically feasible as per MD orders to maximize PO intake. Spoke w/ surgical resident this morning w/ recommendation to initiation PPN if to remain on clear liquids, however, did not want PPN as they stated the diet would be advanced, pt now on full liquids. If PO diet unable to be advanced to solid food, strongly suggest to consult RD for nutrition support recs to better meet ENN. Will add ensure plus high protein BID to optimize PO intake (provides 350 kcal, 20g protein/ shake), pt was receptive. See below for other recs.

## 2024-01-03 NOTE — DIETITIAN INITIAL EVALUATION ADULT - PERTINENT LABORATORY DATA
01-03    142  |  111<H>  |  4<L>  ----------------------------<  96  3.4<L>   |  26  |  0.59    Ca    7.7<L>      03 Jan 2024 05:43  Phos  2.0     01-03  Mg     2.0     01-03    A1C with Estimated Average Glucose Result: 5.3 % (12-19-23 @ 11:08)  A1C with Estimated Average Glucose Result: 5.5 % (08-08-23 @ 08:49)

## 2024-01-03 NOTE — DIETITIAN INITIAL EVALUATION ADULT - NS FNS DIET ORDER
Diet, Full Liquid:   Supplement Feeding Modality:  Oral  Ensure Enlive Cans or Servings Per Day:  1       Frequency:  Three Times a day (01-03-24 @ 12:02)

## 2024-01-03 NOTE — DIETITIAN INITIAL EVALUATION ADULT - ADD RECOMMEND
1. Recommend to ADAT to low-fiber once medically feasible as per MD orders to maximize PO intake   2. If unable to advance diet, strongly rec to consult RD for nutrition support recs   3. Add ensure plus high protein BID to optimize PO intake (provides 350 kcal, 20g protein/ shake)   4. Consider obtaining vitamin D 25OH level to assess nutriture   5. Monitor bowel movements, if no BM for >3 days, consider implementing bowel regimen.   6. Consider adding thiamine 100 mg daily 2/2 poor PO intake/ malnutrition and MVI w/ minerals daily to ensure 100% RDA met   7. Confirm goals of care regarding nutrition support   RD will continue to monitor PO intake, labs, hydration, and wt prn.

## 2024-01-03 NOTE — DISCHARGE NOTE PROVIDER - HOSPITAL COURSE
Pt admitted for elective Right lap hemicolectomy, postop course with ileus, requiring NGT, IVF, bowel rest. Also with urinary retention, needing rizzo insertion, passed trial of void, had return of bowel function, +BM, tolerated diet. Stable vital signs. afebrile. Pt admitted for elective Right lap hemicolectomy, postop course with ileus, requiring NGT, IVF, bowel rest. Also with urinary retention, needing rizzo insertion, passed trial of void, had return of bowel function, +BM, tolerated diet. She was experiencing diarrhea, but tested negative for C.diff. Stable vital signs. afebrile.

## 2024-01-03 NOTE — DISCHARGE NOTE PROVIDER - NSDCFUSCHEDAPPT_GEN_ALL_CORE_FT
Randal Kraus Physician [facility]  CARDIOLOGY [address]  Scheduled Appointment: 02/13/2024     Randal Kraus Physician [facility]  CARDIOLOGY [address]  Scheduled Appointment: 02/13/2024

## 2024-01-03 NOTE — DIETITIAN INITIAL EVALUATION ADULT - ORAL INTAKE PTA/DIET HISTORY
Reports good appetite, eating 3 meals/day, however, ? poor historian as pt appeared confused upon RD interview this morning, ? %ENN consuming PTA.

## 2024-01-03 NOTE — DIETITIAN INITIAL EVALUATION ADULT - REASON FOR ADMISSION
Aquiles Jacobsen is calling to request a refill on the following medication(s):    Last Visit Date (If Applicable):  34/26/1772    Next Visit Date:    Visit date not found    Medication Request:  Requested Prescriptions     Pending Prescriptions Disp Refills    insulin glargine (LANTUS SOLOSTAR) 100 UNIT/ML injection pen 15 mL 0    metoprolol succinate (TOPROL XL) 50 MG extended release tablet 30 tablet 5     Sig: Take 1 tablet by mouth daily    atorvastatin (LIPITOR) 20 MG tablet 30 tablet 2     Sig: Take 1 tablet by mouth daily         Patient stated she is working 16 hour shifts and will schedule a office visit when she get back on her normal work hours . Disorder of intestine

## 2024-01-03 NOTE — DISCHARGE NOTE PROVIDER - CARE PROVIDER_API CALL
Pita Potter  Colon/Rectal Surgery  [address] [place] [address]  Phone: [phone]  Fax: [phone]  Follow Up Time: 2 weeks   Pita Potter  Colon/Rectal Surgery  [address] [address]  Phone: [phone]  Fax: [phone]  Follow Up Time: 2 weeks

## 2024-01-03 NOTE — DISCHARGE NOTE PROVIDER - NSDCCPCAREPLAN_GEN_ALL_CORE_FT
PRINCIPAL DISCHARGE DIAGNOSIS  Diagnosis: Cancer of right colon  Assessment and Plan of Treatment:

## 2024-01-03 NOTE — DISCHARGE NOTE PROVIDER - PROVIDER TOKENS
PROVIDER:[TOKEN:[[id number]],FOLLOWUP:[2 weeks]] PROVIDER:[TOKEN:[[id number]],FOLLOWUP:[2 weeks]]

## 2024-01-03 NOTE — DIETITIAN INITIAL EVALUATION ADULT - PERTINENT MEDS FT
MEDICATIONS  (STANDING):  acetaminophen   IVPB .. 1000 milliGRAM(s) IV Intermittent every 6 hours  atorvastatin 20 milliGRAM(s) Oral at bedtime  losartan 100 milliGRAM(s) Oral daily  metoprolol tartrate 75 milliGRAM(s) Oral two times a day  pantoprazole  Injectable 40 milliGRAM(s) IV Push two times a day  rivaroxaban 15 milliGRAM(s) Oral with dinner  tamsulosin 0.4 milliGRAM(s) Oral at bedtime    MEDICATIONS  (PRN):  acetaminophen   IVPB .. 1000 milliGRAM(s) IV Intermittent once PRN Temp greater or equal to 38C (100.4F), Mild Pain (1 - 3)  HYDROmorphone  Injectable 0.5 milliGRAM(s) IV Push every 4 hours PRN Severe Pain (7 - 10)  ondansetron Injectable 4 milliGRAM(s) IV Push every 6 hours PRN Nausea  oxyCODONE    IR 5 milliGRAM(s) Oral every 6 hours PRN Moderate Pain (4 - 6)

## 2024-01-03 NOTE — PROGRESS NOTE ADULT - SUBJECTIVE AND OBJECTIVE BOX
SURGERY DAILY PROGRESS NOTE:     Subjective:  Patient seen and examined this AM at bedside. Straight cath overnight for 800 mL. Xarelto resumed; AFib w/ RVR intermittently yesterday. Patient resting comfortably. Tolerating clears. +BM. Denies fever/chills, shortness of breath, chest pain. VS reviewed    Objective:    MEDICATIONS  (STANDING):  acetaminophen   IVPB .. 1000 milliGRAM(s) IV Intermittent every 6 hours  atorvastatin 20 milliGRAM(s) Oral at bedtime  lactated ringers. 1000 milliLiter(s) (75 mL/Hr) IV Continuous <Continuous>  losartan 100 milliGRAM(s) Oral daily  metoprolol tartrate 75 milliGRAM(s) Oral two times a day  pantoprazole  Injectable 40 milliGRAM(s) IV Push two times a day  rivaroxaban 15 milliGRAM(s) Oral with dinner  tamsulosin 0.4 milliGRAM(s) Oral at bedtime    MEDICATIONS  (PRN):  acetaminophen   IVPB .. 1000 milliGRAM(s) IV Intermittent once PRN Temp greater or equal to 38C (100.4F), Mild Pain (1 - 3)  HYDROmorphone  Injectable 0.5 milliGRAM(s) IV Push every 4 hours PRN Severe Pain (7 - 10)  ondansetron Injectable 4 milliGRAM(s) IV Push every 6 hours PRN Nausea  oxyCODONE    IR 5 milliGRAM(s) Oral every 6 hours PRN Moderate Pain (4 - 6)      Vital Signs Last 24 Hrs  T(C): 37 (2024 20:09), Max: 37 (2024 20:09)  T(F): 98.6 (2024 20:09), Max: 98.6 (2024 20:09)  HR: 127 (2024 03:00) (79 - 162)  BP: 123/78 (2024 03:00) (90/52 - 154/79)  BP(mean): 94 (2024 03:00) (66 - 109)  RR: 29 (2024 03:00) (13 - 34)  SpO2: 99% (2024 03:00) (97% - 100%)    Parameters below as of 2024 12:00  Patient On (Oxygen Delivery Method): room air          PHYSICAL EXAM   GENERAL: NAD, well developed, obese  HEAD: Atraumatic, normocephalic  EYES: EOMI, PERRLA, conjunctiva and sclera clear  ENT: moist mucous membrane, NGT w/ thin light green output  NECK: supple, No JVD, midline trachea  CHEST/LUNG: No increased WOB, symmetric excursions, on room air  Heart: RRR ppp, no peripheral edema  ABDOMEN: Round, mildly distended without tympany, soft, nontender. NP seal in place with suction.  EXTREMITIES: Brisk cap refill. no clubbing or cyanosis  NERVOUS SYSTEM: AOx4, speech clear, no neuro-deficits  MSK: full ROM, no deformities  SKIN: warm to touch, no rash or lesions, stable blanching erythema through torso & abdomen       I&O's Detail    2024 07:01  -  2024 07:00  --------------------------------------------------------  IN:    dextrose 5% + sodium chloride 0.45% w/ Additives: 871 mL    IV PiggyBack: 350 mL  Total IN: 1221 mL    OUT:    Indwelling Catheter - Urethral (mL): 1525 mL    Nasogastric/Oral tube (mL): 700 mL  Total OUT: 2225 mL    Total NET: -1004 mL      2024 07:01  -  2024 05:53  --------------------------------------------------------  IN:    dextrose 5% + sodium chloride 0.45% w/ Additives: 399 mL    IV PiggyBack: 500 mL  Total IN: 899 mL    OUT:    Indwelling Catheter - Urethral (mL): 1325 mL    Intermittent Catheterization - Urethral (mL): 800 mL    Nasogastric/Oral tube (mL): 175 mL    Voided (mL): 350 mL  Total OUT: 2650 mL    Total NET: -1751 mL          Daily     Daily Weight in k.9 (2024 06:00)    LABS:                        9.6    9.66  )-----------( 191      ( 2024 05:59 )             30.2     01-02    142  |  112<H>  |  6<L>  ----------------------------<  111<H>  4.1   |  26  |  0.54    Ca    8.0<L>      2024 05:59  Phos  1.6     -  Mg     2.0             Urinalysis Basic - ( 2024 05:59 )    Color: x / Appearance: x / SG: x / pH: x  Gluc: 111 mg/dL / Ketone: x  / Bili: x / Urobili: x   Blood: x / Protein: x / Nitrite: x   Leuk Esterase: x / RBC: x / WBC x   Sq Epi: x / Non Sq Epi: x / Bacteria: x

## 2024-01-03 NOTE — PROGRESS NOTE ADULT - ASSESSMENT
84 yo F now POD 6 s/p right colectomy for cecal mass, c/b postop ileus & urinary retention  +Stool & flatus so NGT removed POD 5, tolerating clears  Straight cath x 1 overnight    Plan  Rate control of AFib w/ RVR per ICU  F/u void by 10 AM, continue Flomax  Resumed Xarelto 1/2  Advance to fulls  OOB/A  VTE ppx w/ SCDs & Xarelto    Will be d/w CRS team 82 yo F now POD 6 s/p right colectomy for cecal mass, c/b postop ileus & urinary retention  +Stool & flatus so NGT removed POD 5, tolerating clears  Straight cath x 1 overnight    Plan  Rate control of AFib w/ RVR per ICU  F/u void by 10 AM, continue Flomax  Resumed Xarelto 1/2  Advance to fulls  OOB/A  VTE ppx w/ SCDs & Xarelto    Will be d/w CRS team

## 2024-01-03 NOTE — DISCHARGE NOTE PROVIDER - CARE PROVIDERS DIRECT ADDRESSES
[email or web address]

## 2024-01-03 NOTE — PROGRESS NOTE ADULT - ATTENDING COMMENTS
Patient voiding, tolerating clear liquids though she does not really enjoy the food/drink currently on offer.  Denies N/V and she continues to pass flatus and BM.  Pain controlled.  Abdomen is soft, nondistended, no significant tenderness, no rebound/guarding.  Labs reviewed - Hgb stable at 9.5, no leukocytosis, mildly hypokalemic and hypophosphatemic.  Postvoid residuals are currently minimal per bedside RN.    -- PT re-evaluation  -- Advance to full liquid diet then as tolerated  -- Continue anticoagulation and Lopressor for AFib  -- IS, ambulation  -- Multimodal pain control  -- Could possibly consider discharge tomorrow Patient voiding, tolerating clear liquids though she does not really enjoy the food/drink currently on offer.  Denies N/V and she continues to pass flatus and BM.  Pain controlled.  Abdomen is soft, nondistended, no significant tenderness, no rebound/guarding.  Labs reviewed - Hgb stable at 9.5, no leukocytosis, mildly hypokalemic and hypophosphatemic.  Postvoid residuals are currently minimal per bedside RN.    -- PT re-evaluation  -- Advance to full liquid diet then as tolerated  -- Continue anticoagulation and Lopressor for AFib  -- IS, ambulation  -- Multimodal pain control  -- OK to downgrade to floor with remote telemetry  -- Could possibly consider discharge tomorrow

## 2024-01-03 NOTE — DIETITIAN INITIAL EVALUATION ADULT - NSFNSGIIOFT_GEN_A_CORE
I&O's Detail    02 Jan 2024 07:01  -  03 Jan 2024 07:00  --------------------------------------------------------  IN:    dextrose 5% + sodium chloride 0.45% w/ Additives: 399 mL    IV PiggyBack: 500 mL    Lactated Ringers: 892 mL  Total IN: 1791 mL    OUT:    Indwelling Catheter - Urethral (mL): 1325 mL    Intermittent Catheterization - Urethral (mL): 800 mL    Nasogastric/Oral tube (mL): 175 mL    Voided (mL): 550 mL  Total OUT: 2850 mL    Total NET: -1059 mL      03 Jan 2024 07:01  -  03 Jan 2024 13:20  --------------------------------------------------------  IN:    Lactated Ringers: 342 mL  Total IN: 342 mL    OUT:    Voided (mL): 500 mL  Total OUT: 500 mL    Total NET: -158 mL

## 2024-01-03 NOTE — DISCHARGE NOTE PROVIDER - NSDCMRMEDTOKEN_GEN_ALL_CORE_FT
atorvastatin 20 mg oral tablet: 1 tab(s) orally once a day (at bedtime)  losartan 100 mg oral tablet: 1 orally once a day  metoprolol tartrate 75 mg oral tablet: 1 orally 2 times a day  oxycodone-acetaminophen 5 mg-325 mg oral tablet: 1 tab(s) orally every 6 hours as needed for  moderate pain MDD: 004  PreserVision AREDS 2 oral capsule: orally once a day  rivaroxaban 15 mg oral tablet: 1 tab(s) orally once a day   atorvastatin 20 mg oral tablet: 1 tab(s) orally once a day (at bedtime)  losartan 50 mg oral tablet: 1 tab(s) orally once a day  metoprolol tartrate 100 mg oral tablet: 1 tab(s) orally 2 times a day  oxycodone-acetaminophen 5 mg-325 mg oral tablet: 1 tab(s) orally every 6 hours as needed for  moderate pain MDD: 004  PreserVision AREDS 2 oral capsule: orally once a day  rivaroxaban 15 mg oral tablet: 1 tab(s) orally once a day

## 2024-01-03 NOTE — DIETITIAN INITIAL EVALUATION ADULT - LOSS OF SUBCUTANEOUS FAT
Ms Peyman called in requesting a hospital bed to be sent to  [facility] for the patient. I spoke with Xu Carl and she is agreeable to send it since Dr Paredes is out of the office.   
69 yo male with 7mm L proximal ureteral stone with moderate hydronephrosis
Orbital...

## 2024-01-04 ENCOUNTER — TRANSCRIPTION ENCOUNTER (OUTPATIENT)
Age: 84
End: 2024-01-04

## 2024-01-04 DIAGNOSIS — I48.91 UNSPECIFIED ATRIAL FIBRILLATION: ICD-10-CM

## 2024-01-04 DIAGNOSIS — I50.30 UNSPECIFIED DIASTOLIC (CONGESTIVE) HEART FAILURE: ICD-10-CM

## 2024-01-04 DIAGNOSIS — I10 ESSENTIAL (PRIMARY) HYPERTENSION: ICD-10-CM

## 2024-01-04 LAB
ANION GAP SERPL CALC-SCNC: 5 MMOL/L — SIGNIFICANT CHANGE UP (ref 5–17)
ANION GAP SERPL CALC-SCNC: 5 MMOL/L — SIGNIFICANT CHANGE UP (ref 5–17)
BASOPHILS # BLD AUTO: 0 K/UL — SIGNIFICANT CHANGE UP (ref 0–0.2)
BASOPHILS # BLD AUTO: 0 K/UL — SIGNIFICANT CHANGE UP (ref 0–0.2)
BASOPHILS NFR BLD AUTO: 0 % — SIGNIFICANT CHANGE UP (ref 0–2)
BASOPHILS NFR BLD AUTO: 0 % — SIGNIFICANT CHANGE UP (ref 0–2)
BUN SERPL-MCNC: 6 MG/DL — LOW (ref 7–23)
BUN SERPL-MCNC: 6 MG/DL — LOW (ref 7–23)
CALCIUM SERPL-MCNC: 7.9 MG/DL — LOW (ref 8.5–10.1)
CALCIUM SERPL-MCNC: 7.9 MG/DL — LOW (ref 8.5–10.1)
CHLORIDE SERPL-SCNC: 112 MMOL/L — HIGH (ref 96–108)
CHLORIDE SERPL-SCNC: 112 MMOL/L — HIGH (ref 96–108)
CO2 SERPL-SCNC: 22 MMOL/L — SIGNIFICANT CHANGE UP (ref 22–31)
CO2 SERPL-SCNC: 22 MMOL/L — SIGNIFICANT CHANGE UP (ref 22–31)
CREAT SERPL-MCNC: 0.66 MG/DL — SIGNIFICANT CHANGE UP (ref 0.5–1.3)
CREAT SERPL-MCNC: 0.66 MG/DL — SIGNIFICANT CHANGE UP (ref 0.5–1.3)
EGFR: 88 ML/MIN/1.73M2 — SIGNIFICANT CHANGE UP
EGFR: 88 ML/MIN/1.73M2 — SIGNIFICANT CHANGE UP
EOSINOPHIL # BLD AUTO: 0.91 K/UL — HIGH (ref 0–0.5)
EOSINOPHIL # BLD AUTO: 0.91 K/UL — HIGH (ref 0–0.5)
EOSINOPHIL NFR BLD AUTO: 8 % — HIGH (ref 0–6)
EOSINOPHIL NFR BLD AUTO: 8 % — HIGH (ref 0–6)
GLUCOSE SERPL-MCNC: 105 MG/DL — HIGH (ref 70–99)
GLUCOSE SERPL-MCNC: 105 MG/DL — HIGH (ref 70–99)
HCT VFR BLD CALC: 34.2 % — LOW (ref 34.5–45)
HCT VFR BLD CALC: 34.2 % — LOW (ref 34.5–45)
HGB BLD-MCNC: 11.2 G/DL — LOW (ref 11.5–15.5)
HGB BLD-MCNC: 11.2 G/DL — LOW (ref 11.5–15.5)
LYMPHOCYTES # BLD AUTO: 1.59 K/UL — SIGNIFICANT CHANGE UP (ref 1–3.3)
LYMPHOCYTES # BLD AUTO: 1.59 K/UL — SIGNIFICANT CHANGE UP (ref 1–3.3)
LYMPHOCYTES # BLD AUTO: 14 % — SIGNIFICANT CHANGE UP (ref 13–44)
LYMPHOCYTES # BLD AUTO: 14 % — SIGNIFICANT CHANGE UP (ref 13–44)
MAGNESIUM SERPL-MCNC: 2.1 MG/DL — SIGNIFICANT CHANGE UP (ref 1.6–2.6)
MAGNESIUM SERPL-MCNC: 2.1 MG/DL — SIGNIFICANT CHANGE UP (ref 1.6–2.6)
MCHC RBC-ENTMCNC: 30.1 PG — SIGNIFICANT CHANGE UP (ref 27–34)
MCHC RBC-ENTMCNC: 30.1 PG — SIGNIFICANT CHANGE UP (ref 27–34)
MCHC RBC-ENTMCNC: 32.7 GM/DL — SIGNIFICANT CHANGE UP (ref 32–36)
MCHC RBC-ENTMCNC: 32.7 GM/DL — SIGNIFICANT CHANGE UP (ref 32–36)
MCV RBC AUTO: 91.9 FL — SIGNIFICANT CHANGE UP (ref 80–100)
MCV RBC AUTO: 91.9 FL — SIGNIFICANT CHANGE UP (ref 80–100)
MONOCYTES # BLD AUTO: 0.34 K/UL — SIGNIFICANT CHANGE UP (ref 0–0.9)
MONOCYTES # BLD AUTO: 0.34 K/UL — SIGNIFICANT CHANGE UP (ref 0–0.9)
MONOCYTES NFR BLD AUTO: 3 % — SIGNIFICANT CHANGE UP (ref 2–14)
MONOCYTES NFR BLD AUTO: 3 % — SIGNIFICANT CHANGE UP (ref 2–14)
NEUTROPHILS # BLD AUTO: 8.41 K/UL — HIGH (ref 1.8–7.4)
NEUTROPHILS # BLD AUTO: 8.41 K/UL — HIGH (ref 1.8–7.4)
NEUTROPHILS NFR BLD AUTO: 73 % — SIGNIFICANT CHANGE UP (ref 43–77)
NEUTROPHILS NFR BLD AUTO: 73 % — SIGNIFICANT CHANGE UP (ref 43–77)
NRBC # BLD: SIGNIFICANT CHANGE UP /100 WBCS (ref 0–0)
NRBC # BLD: SIGNIFICANT CHANGE UP /100 WBCS (ref 0–0)
PHOSPHATE SERPL-MCNC: 2.6 MG/DL — SIGNIFICANT CHANGE UP (ref 2.5–4.5)
PHOSPHATE SERPL-MCNC: 2.6 MG/DL — SIGNIFICANT CHANGE UP (ref 2.5–4.5)
PLATELET # BLD AUTO: 228 K/UL — SIGNIFICANT CHANGE UP (ref 150–400)
PLATELET # BLD AUTO: 228 K/UL — SIGNIFICANT CHANGE UP (ref 150–400)
POTASSIUM SERPL-MCNC: 4.3 MMOL/L — SIGNIFICANT CHANGE UP (ref 3.5–5.3)
POTASSIUM SERPL-MCNC: 4.3 MMOL/L — SIGNIFICANT CHANGE UP (ref 3.5–5.3)
POTASSIUM SERPL-SCNC: 4.3 MMOL/L — SIGNIFICANT CHANGE UP (ref 3.5–5.3)
POTASSIUM SERPL-SCNC: 4.3 MMOL/L — SIGNIFICANT CHANGE UP (ref 3.5–5.3)
RBC # BLD: 3.72 M/UL — LOW (ref 3.8–5.2)
RBC # BLD: 3.72 M/UL — LOW (ref 3.8–5.2)
RBC # FLD: 19.9 % — HIGH (ref 10.3–14.5)
RBC # FLD: 19.9 % — HIGH (ref 10.3–14.5)
SODIUM SERPL-SCNC: 139 MMOL/L — SIGNIFICANT CHANGE UP (ref 135–145)
SODIUM SERPL-SCNC: 139 MMOL/L — SIGNIFICANT CHANGE UP (ref 135–145)
WBC # BLD: 11.36 K/UL — HIGH (ref 3.8–10.5)
WBC # BLD: 11.36 K/UL — HIGH (ref 3.8–10.5)
WBC # FLD AUTO: 11.36 K/UL — HIGH (ref 3.8–10.5)
WBC # FLD AUTO: 11.36 K/UL — HIGH (ref 3.8–10.5)

## 2024-01-04 PROCEDURE — 99223 1ST HOSP IP/OBS HIGH 75: CPT

## 2024-01-04 RX ORDER — METOPROLOL TARTRATE 50 MG
50 TABLET ORAL ONCE
Refills: 0 | Status: COMPLETED | OUTPATIENT
Start: 2024-01-04 | End: 2024-01-04

## 2024-01-04 RX ORDER — METOPROLOL TARTRATE 50 MG
100 TABLET ORAL
Refills: 0 | Status: DISCONTINUED | OUTPATIENT
Start: 2024-01-04 | End: 2024-01-07

## 2024-01-04 RX ORDER — SODIUM,POTASSIUM PHOSPHATES 278-250MG
1 POWDER IN PACKET (EA) ORAL ONCE
Refills: 0 | Status: COMPLETED | OUTPATIENT
Start: 2024-01-04 | End: 2024-01-04

## 2024-01-04 RX ORDER — LOSARTAN POTASSIUM 100 MG/1
50 TABLET, FILM COATED ORAL DAILY
Refills: 0 | Status: DISCONTINUED | OUTPATIENT
Start: 2024-01-04 | End: 2024-01-07

## 2024-01-04 RX ORDER — METOPROLOL TARTRATE 50 MG
1 TABLET ORAL
Qty: 0 | Refills: 0 | DISCHARGE
Start: 2024-01-04

## 2024-01-04 RX ORDER — LOSARTAN POTASSIUM 100 MG/1
1 TABLET, FILM COATED ORAL
Qty: 30 | Refills: 0
Start: 2024-01-04 | End: 2024-02-02

## 2024-01-04 RX ORDER — METOPROLOL TARTRATE 50 MG
1 TABLET ORAL
Refills: 0 | DISCHARGE

## 2024-01-04 RX ADMIN — PANTOPRAZOLE SODIUM 40 MILLIGRAM(S): 20 TABLET, DELAYED RELEASE ORAL at 12:03

## 2024-01-04 RX ADMIN — TAMSULOSIN HYDROCHLORIDE 0.4 MILLIGRAM(S): 0.4 CAPSULE ORAL at 22:15

## 2024-01-04 RX ADMIN — RIVAROXABAN 15 MILLIGRAM(S): KIT at 17:41

## 2024-01-04 RX ADMIN — Medication 100 MILLIGRAM(S): at 12:04

## 2024-01-04 RX ADMIN — PANTOPRAZOLE SODIUM 40 MILLIGRAM(S): 20 TABLET, DELAYED RELEASE ORAL at 22:15

## 2024-01-04 RX ADMIN — Medication 1 PACKET(S): at 22:13

## 2024-01-04 RX ADMIN — Medication 50 MILLIGRAM(S): at 22:15

## 2024-01-04 RX ADMIN — LOSARTAN POTASSIUM 50 MILLIGRAM(S): 100 TABLET, FILM COATED ORAL at 12:05

## 2024-01-04 RX ADMIN — ATORVASTATIN CALCIUM 20 MILLIGRAM(S): 80 TABLET, FILM COATED ORAL at 22:14

## 2024-01-04 NOTE — CONSULT NOTE ADULT - SUBJECTIVE AND OBJECTIVE BOX
CHIEF COMPLAINT: None offered at time of visit    HPI: 83 year old woman with a history of atrial fibrillation, non-obstructive CAD with NSTEMI (), HFpEF, HTN admitted on 2023 for elective laparoscopic right colectomy for cecal mass; uncomplicated post-operative course.  Cardiology called for tachycardia (AF with RVR).  Mrs. Caba feels well -- not experiencing palpitations, dyspnea, chest pain, or leg pain.  She is unaware that her HR is elevated. She has ambulated to the bathroom.    PAST MEDICAL & SURGICAL HISTORY:  HTN (hypertension)  Glaucoma  OA (osteoarthritis)  Arteriosclerotic heart disease (ASHD)  Hyperlipidemia  Cecal lesion  Afib  Status post glaucoma surgery and cataract  S/P knee surgery  H/O wrist surgery    SOCIAL HISTORY:   Alcohol: Denied  Smoking: Nonsmoker    FAMILY HISTORY:   colon cancer:  brother,   pancreatic cancer: sister,   coronary artery disease: mother and father,     MEDICATIONS  (STANDING):  acetaminophen   IVPB .. 1000 milliGRAM(s) IV Intermittent every 6 hours  atorvastatin 20 milliGRAM(s) Oral at bedtime  losartan 100 milliGRAM(s) Oral daily  metoprolol tartrate 75 milliGRAM(s) Oral two times a day  pantoprazole  Injectable 40 milliGRAM(s) IV Push two times a day  rivaroxaban 15 milliGRAM(s) Oral with dinner  tamsulosin 0.4 milliGRAM(s) Oral at bedtime    MEDICATIONS  (PRN):  acetaminophen   IVPB .. 1000 milliGRAM(s) IV Intermittent once PRN Temp greater or equal to 38C (100.4F), Mild Pain (1 - 3)  HYDROmorphone  Injectable 0.5 milliGRAM(s) IV Push every 4 hours PRN Severe Pain (7 - 10)  ondansetron Injectable 4 milliGRAM(s) IV Push every 6 hours PRN Nausea  oxyCODONE    IR 5 milliGRAM(s) Oral every 6 hours PRN Moderate Pain (4 - 6)    Allergies:  No Known Allergies    REVIEW OF SYSTEMS:  CONSTITUTIONAL: No fevers or chills  Eyes: No visual changes  NECK: No pain or stiffness  RESPIRATORY: No cough, No shortness of breath  CARDIOVASCULAR: No chest pain or palpitations  GASTROINTESTINAL: Mild / controlled abdominal pain.  GENITOURINARY: No dysuria, frequency or hematuria  NEUROLOGICAL: No numbness.  SKIN: No itching or rash  All other review of systems is negative unless indicated above    VITAL SIGNS:   Vital Signs Last 24 Hrs  T(C): 37.1 (2024 21:06), Max: 37.3 (2024 17:33)  T(F): 98.8 (2024 21:06), Max: 99.1 (2024 17:33)  HR: 101 (2024 21:06) (88 - 123)  BP: 112/76 (2024 21:06) (104/73 - 129/73)  BP(mean): 85 (2024 17:00) (75 - 89)  RR: 19 (2024 21:06) (14 - 24)  SpO2: 98% (2024 21:06) (98% - 100%)  Patient On (Oxygen Delivery Method): room air    PHYSICAL EXAM:  Constitutional: NAD, awake and alert  HEENT:  EOMI,  Pupils round, No oral cyanosis.  Pulmonary: Non-labored, breath sounds are clear bilaterally, No wheezing, rales or rhonchi  Cardiovascular: Irregular, mild tachycardia. No calf tenderness  Gastrointestinal: Bowel Sounds present, soft, nontender.   Lymph: No peripheral edema. No cervical lymphadenopathy.  Neurological: Alert, no focal deficits  Skin: No rashes.  Psych:  Mood & affect appropriate    LABS:                11.2   11.36 )-----------( 228      ( 2024 07:44 )             34.2             139    |  112    |  6      ----------------------------<  105    4.3     |  22     |  0.66     Tele: AF

## 2024-01-04 NOTE — DISCHARGE NOTE NURSING/CASE MANAGEMENT/SOCIAL WORK - PATIENT PORTAL LINK FT
You can access the FollowMyHealth Patient Portal offered by [facility] by registering at the following website: [email or web address]. By joining [employer]’s FollowMyHealth portal, you will also be able to view your health information using other applications (apps) compatible with our system. You can access the FollowMyHealth Patient Portal offered by [facility] by registering at the following website: [email or web address]. By joining [email or web address]’s FollowMyHealth portal, you will also be able to view your health information using other applications (apps) compatible with our system.

## 2024-01-04 NOTE — CONSULT NOTE ADULT - PROBLEM SELECTOR RECOMMENDATION 9
Paroxysmal -- currently in AF with suboptimal rate control; increase metoprolol to 100 mg BID; continue anticoagulation.    ** I discussed case with his outpatient cardiologist, Dr Kraus.

## 2024-01-04 NOTE — PROGRESS NOTE ADULT - ASSESSMENT
82 yo F now POD 7 s/p right colectomy for cecal mass, c/b postop ileus & urinary retention  +Stool & flatus so NGT removed POD 5, tolerating clears  Voiding, tolerating fulls    Plan  Advance to low residue  Rate control of AFib w/ RVR  Resumed Xarelto 1/2  OOB/A  VTE ppx w/ SCDs & Xarelto  Likely home today    Will be d/w CRS team

## 2024-01-04 NOTE — DISCHARGE NOTE NURSING/CASE MANAGEMENT/SOCIAL WORK - NSDCPEFALRISK_GEN_ALL_CORE
For information on Fall & Injury Prevention, visit: [email or web address] OR  [email or web address] OR  https://www.cdc.gov/steadi/patient.html For information on Fall & Injury Prevention, visit: [email or web address]://[email or web address]/[email or web address]-health-[email or web address]-mobility OR  [email or web address]://www.[email or web address]/[email or web address]-[email or web address]-[email or web address]-injury OR  https://www.cdc.gov/steadi/patient.html

## 2024-01-04 NOTE — PROGRESS NOTE ADULT - SUBJECTIVE AND OBJECTIVE BOX
SURGERY DAILY PROGRESS NOTE:     Subjective:  Patient seen and examined this AM at bedside. Voiding. No acute events overnight and patient resting comfortably. Tolerating diet. Denies fever/chills, shortness of breath, chest pain. VS reviewed    Objective:    MEDICATIONS  (STANDING):  acetaminophen   IVPB .. 1000 milliGRAM(s) IV Intermittent every 6 hours  atorvastatin 20 milliGRAM(s) Oral at bedtime  losartan 100 milliGRAM(s) Oral daily  metoprolol tartrate 75 milliGRAM(s) Oral two times a day  pantoprazole  Injectable 40 milliGRAM(s) IV Push two times a day  rivaroxaban 15 milliGRAM(s) Oral with dinner  tamsulosin 0.4 milliGRAM(s) Oral at bedtime    MEDICATIONS  (PRN):  acetaminophen   IVPB .. 1000 milliGRAM(s) IV Intermittent once PRN Temp greater or equal to 38C (100.4F), Mild Pain (1 - 3)  HYDROmorphone  Injectable 0.5 milliGRAM(s) IV Push every 4 hours PRN Severe Pain (7 - 10)  ondansetron Injectable 4 milliGRAM(s) IV Push every 6 hours PRN Nausea  oxyCODONE    IR 5 milliGRAM(s) Oral every 6 hours PRN Moderate Pain (4 - 6)      Vital Signs Last 24 Hrs  T(C): 37.1 (2024 21:06), Max: 37.3 (2024 17:33)  T(F): 98.8 (2024 21:06), Max: 99.1 (2024 17:33)  HR: 101 (2024 21:06) (88 - 123)  BP: 112/76 (2024 21:06) (104/73 - 129/73)  BP(mean): 85 (2024 17:00) (75 - 89)  RR: 19 (2024 21:06) (14 - 24)  SpO2: 98% (2024 21:06) (98% - 100%)    Parameters below as of 2024 21:06  Patient On (Oxygen Delivery Method): room air          PHYSICAL EXAM   GENERAL: NAD, well developed, obese  HEAD: Atraumatic, normocephalic  EYES: EOMI, PERRLA, conjunctiva and sclera clear  ENT: moist mucous membrane  NECK: supple, No JVD, midline trachea  CHEST/LUNG: No increased WOB, symmetric excursions, on room air  Heart: RRR ppp, no peripheral edema  ABDOMEN: Round, mildly distended without tympany, soft, nontender. NP seal in place with suction.  EXTREMITIES: Brisk cap refill. no clubbing or cyanosis  NERVOUS SYSTEM: AOx4, speech clear, no neuro-deficits  MSK: full ROM, no deformities  SKIN: warm to touch, no rash or lesions, stable blanching erythema through torso & abdomen       I&O's Detail    2024 07:01  -  2024 07:00  --------------------------------------------------------  IN:    Lactated Ringers: 342 mL  Total IN: 342 mL    OUT:    Voided (mL): 500 mL  Total OUT: 500 mL    Total NET: -158 mL          Daily     Daily     LABS:                        9.5    7.51  )-----------( 192      ( 2024 05:43 )             29.3     01-03    142  |  111<H>  |  4<L>  ----------------------------<  96  3.4<L>   |  26  |  0.59    Ca    7.7<L>      2024 05:43  Phos  2.0     01-03  Mg     2.0     01-03        Urinalysis Basic - ( 2024 10:47 )    Color: Yellow / Appearance: Clear / S.007 / pH: x  Gluc: x / Ketone: Negative mg/dL  / Bili: Negative / Urobili: 0.2 mg/dL   Blood: x / Protein: Negative mg/dL / Nitrite: Negative   Leuk Esterase: Large / RBC: 2 /HPF / WBC 9 /HPF   Sq Epi: x / Non Sq Epi: 1 /HPF / Bacteria: Many /HPF

## 2024-01-05 LAB
ANION GAP SERPL CALC-SCNC: 6 MMOL/L — SIGNIFICANT CHANGE UP (ref 5–17)
ANION GAP SERPL CALC-SCNC: 6 MMOL/L — SIGNIFICANT CHANGE UP (ref 5–17)
BASOPHILS # BLD AUTO: 0.04 K/UL — SIGNIFICANT CHANGE UP (ref 0–0.2)
BASOPHILS # BLD AUTO: 0.04 K/UL — SIGNIFICANT CHANGE UP (ref 0–0.2)
BASOPHILS NFR BLD AUTO: 0.4 % — SIGNIFICANT CHANGE UP (ref 0–2)
BASOPHILS NFR BLD AUTO: 0.4 % — SIGNIFICANT CHANGE UP (ref 0–2)
BUN SERPL-MCNC: 12 MG/DL — SIGNIFICANT CHANGE UP (ref 7–23)
BUN SERPL-MCNC: 12 MG/DL — SIGNIFICANT CHANGE UP (ref 7–23)
CALCIUM SERPL-MCNC: 7.6 MG/DL — LOW (ref 8.5–10.1)
CALCIUM SERPL-MCNC: 7.6 MG/DL — LOW (ref 8.5–10.1)
CHLORIDE SERPL-SCNC: 111 MMOL/L — HIGH (ref 96–108)
CHLORIDE SERPL-SCNC: 111 MMOL/L — HIGH (ref 96–108)
CO2 SERPL-SCNC: 24 MMOL/L — SIGNIFICANT CHANGE UP (ref 22–31)
CO2 SERPL-SCNC: 24 MMOL/L — SIGNIFICANT CHANGE UP (ref 22–31)
CREAT SERPL-MCNC: 0.78 MG/DL — SIGNIFICANT CHANGE UP (ref 0.5–1.3)
CREAT SERPL-MCNC: 0.78 MG/DL — SIGNIFICANT CHANGE UP (ref 0.5–1.3)
EGFR: 76 ML/MIN/1.73M2 — SIGNIFICANT CHANGE UP
EGFR: 76 ML/MIN/1.73M2 — SIGNIFICANT CHANGE UP
EOSINOPHIL # BLD AUTO: 0.65 K/UL — HIGH (ref 0–0.5)
EOSINOPHIL # BLD AUTO: 0.65 K/UL — HIGH (ref 0–0.5)
EOSINOPHIL NFR BLD AUTO: 6.9 % — HIGH (ref 0–6)
EOSINOPHIL NFR BLD AUTO: 6.9 % — HIGH (ref 0–6)
GLUCOSE SERPL-MCNC: 106 MG/DL — HIGH (ref 70–99)
GLUCOSE SERPL-MCNC: 106 MG/DL — HIGH (ref 70–99)
HCT VFR BLD CALC: 32.7 % — LOW (ref 34.5–45)
HCT VFR BLD CALC: 32.7 % — LOW (ref 34.5–45)
HGB BLD-MCNC: 10.3 G/DL — LOW (ref 11.5–15.5)
HGB BLD-MCNC: 10.3 G/DL — LOW (ref 11.5–15.5)
IMM GRANULOCYTES NFR BLD AUTO: 1.2 % — HIGH (ref 0–0.9)
IMM GRANULOCYTES NFR BLD AUTO: 1.2 % — HIGH (ref 0–0.9)
LYMPHOCYTES # BLD AUTO: 2.25 K/UL — SIGNIFICANT CHANGE UP (ref 1–3.3)
LYMPHOCYTES # BLD AUTO: 2.25 K/UL — SIGNIFICANT CHANGE UP (ref 1–3.3)
LYMPHOCYTES # BLD AUTO: 23.9 % — SIGNIFICANT CHANGE UP (ref 13–44)
LYMPHOCYTES # BLD AUTO: 23.9 % — SIGNIFICANT CHANGE UP (ref 13–44)
MAGNESIUM SERPL-MCNC: 2 MG/DL — SIGNIFICANT CHANGE UP (ref 1.6–2.6)
MAGNESIUM SERPL-MCNC: 2 MG/DL — SIGNIFICANT CHANGE UP (ref 1.6–2.6)
MCHC RBC-ENTMCNC: 29.1 PG — SIGNIFICANT CHANGE UP (ref 27–34)
MCHC RBC-ENTMCNC: 29.1 PG — SIGNIFICANT CHANGE UP (ref 27–34)
MCHC RBC-ENTMCNC: 31.5 GM/DL — LOW (ref 32–36)
MCHC RBC-ENTMCNC: 31.5 GM/DL — LOW (ref 32–36)
MCV RBC AUTO: 92.4 FL — SIGNIFICANT CHANGE UP (ref 80–100)
MCV RBC AUTO: 92.4 FL — SIGNIFICANT CHANGE UP (ref 80–100)
MONOCYTES # BLD AUTO: 0.57 K/UL — SIGNIFICANT CHANGE UP (ref 0–0.9)
MONOCYTES # BLD AUTO: 0.57 K/UL — SIGNIFICANT CHANGE UP (ref 0–0.9)
MONOCYTES NFR BLD AUTO: 6.1 % — SIGNIFICANT CHANGE UP (ref 2–14)
MONOCYTES NFR BLD AUTO: 6.1 % — SIGNIFICANT CHANGE UP (ref 2–14)
NEUTROPHILS # BLD AUTO: 5.8 K/UL — SIGNIFICANT CHANGE UP (ref 1.8–7.4)
NEUTROPHILS # BLD AUTO: 5.8 K/UL — SIGNIFICANT CHANGE UP (ref 1.8–7.4)
NEUTROPHILS NFR BLD AUTO: 61.5 % — SIGNIFICANT CHANGE UP (ref 43–77)
NEUTROPHILS NFR BLD AUTO: 61.5 % — SIGNIFICANT CHANGE UP (ref 43–77)
PHOSPHATE SERPL-MCNC: 2.1 MG/DL — LOW (ref 2.5–4.5)
PHOSPHATE SERPL-MCNC: 2.1 MG/DL — LOW (ref 2.5–4.5)
PLATELET # BLD AUTO: 226 K/UL — SIGNIFICANT CHANGE UP (ref 150–400)
PLATELET # BLD AUTO: 226 K/UL — SIGNIFICANT CHANGE UP (ref 150–400)
POTASSIUM SERPL-MCNC: 4 MMOL/L — SIGNIFICANT CHANGE UP (ref 3.5–5.3)
POTASSIUM SERPL-MCNC: 4 MMOL/L — SIGNIFICANT CHANGE UP (ref 3.5–5.3)
POTASSIUM SERPL-SCNC: 4 MMOL/L — SIGNIFICANT CHANGE UP (ref 3.5–5.3)
POTASSIUM SERPL-SCNC: 4 MMOL/L — SIGNIFICANT CHANGE UP (ref 3.5–5.3)
RBC # BLD: 3.54 M/UL — LOW (ref 3.8–5.2)
RBC # BLD: 3.54 M/UL — LOW (ref 3.8–5.2)
RBC # FLD: 20 % — HIGH (ref 10.3–14.5)
RBC # FLD: 20 % — HIGH (ref 10.3–14.5)
SODIUM SERPL-SCNC: 141 MMOL/L — SIGNIFICANT CHANGE UP (ref 135–145)
SODIUM SERPL-SCNC: 141 MMOL/L — SIGNIFICANT CHANGE UP (ref 135–145)
WBC # BLD: 9.42 K/UL — SIGNIFICANT CHANGE UP (ref 3.8–10.5)
WBC # BLD: 9.42 K/UL — SIGNIFICANT CHANGE UP (ref 3.8–10.5)
WBC # FLD AUTO: 9.42 K/UL — SIGNIFICANT CHANGE UP (ref 3.8–10.5)
WBC # FLD AUTO: 9.42 K/UL — SIGNIFICANT CHANGE UP (ref 3.8–10.5)

## 2024-01-05 PROCEDURE — 71045 X-RAY EXAM CHEST 1 VIEW: CPT | Mod: 26

## 2024-01-05 PROCEDURE — 99233 SBSQ HOSP IP/OBS HIGH 50: CPT

## 2024-01-05 RX ORDER — SODIUM,POTASSIUM PHOSPHATES 278-250MG
1 POWDER IN PACKET (EA) ORAL ONCE
Refills: 0 | Status: COMPLETED | OUTPATIENT
Start: 2024-01-05 | End: 2024-01-05

## 2024-01-05 RX ADMIN — Medication 1 PACKET(S): at 10:37

## 2024-01-05 RX ADMIN — PANTOPRAZOLE SODIUM 40 MILLIGRAM(S): 20 TABLET, DELAYED RELEASE ORAL at 22:03

## 2024-01-05 RX ADMIN — PANTOPRAZOLE SODIUM 40 MILLIGRAM(S): 20 TABLET, DELAYED RELEASE ORAL at 09:28

## 2024-01-05 RX ADMIN — Medication 100 MILLIGRAM(S): at 09:27

## 2024-01-05 RX ADMIN — RIVAROXABAN 15 MILLIGRAM(S): KIT at 17:13

## 2024-01-05 RX ADMIN — TAMSULOSIN HYDROCHLORIDE 0.4 MILLIGRAM(S): 0.4 CAPSULE ORAL at 22:01

## 2024-01-05 RX ADMIN — ATORVASTATIN CALCIUM 20 MILLIGRAM(S): 80 TABLET, FILM COATED ORAL at 22:01

## 2024-01-05 RX ADMIN — LOSARTAN POTASSIUM 50 MILLIGRAM(S): 100 TABLET, FILM COATED ORAL at 09:27

## 2024-01-05 NOTE — PROGRESS NOTE ADULT - SUBJECTIVE AND OBJECTIVE BOX
SURGERY DAILY PROGRESS NOTE:     Subjective:  Patient seen and examined this AM at bedside. Placed on 1 LPM NC overnight for desaturation to 80s x 2. Given half dose of PM metoprolol due to low systolic. Patient resting comfortably. Tolerating solids. Ambulating. Having bowel function. Denies fever/chills, shortness of breath, chest pain. VS reviewed    Objective:    MEDICATIONS  (STANDING):  acetaminophen   IVPB .. 1000 milliGRAM(s) IV Intermittent every 6 hours  atorvastatin 20 milliGRAM(s) Oral at bedtime  losartan 50 milliGRAM(s) Oral daily  metoprolol tartrate 100 milliGRAM(s) Oral two times a day  pantoprazole  Injectable 40 milliGRAM(s) IV Push two times a day  rivaroxaban 15 milliGRAM(s) Oral with dinner  tamsulosin 0.4 milliGRAM(s) Oral at bedtime    MEDICATIONS  (PRN):  acetaminophen   IVPB .. 1000 milliGRAM(s) IV Intermittent once PRN Temp greater or equal to 38C (100.4F), Mild Pain (1 - 3)  ondansetron Injectable 4 milliGRAM(s) IV Push every 6 hours PRN Nausea      Vital Signs Last 24 Hrs  T(C): 36.8 (05 Jan 2024 00:00), Max: 37.4 (04 Jan 2024 16:39)  T(F): 98.3 (05 Jan 2024 00:00), Max: 99.3 (04 Jan 2024 16:39)  HR: 104 (05 Jan 2024 00:00) (101 - 104)  BP: 120/79 (05 Jan 2024 00:00) (102/60 - 120/79)  BP(mean): 72 (04 Jan 2024 21:20) (72 - 72)  RR: 18 (05 Jan 2024 00:00) (17 - 18)  SpO2: 99% (05 Jan 2024 00:00) (99% - 100%)    Parameters below as of 05 Jan 2024 00:00  Patient On (Oxygen Delivery Method): room air          PHYSICAL EXAM   GENERAL: NAD, well developed, obese  HEAD: Atraumatic, normocephalic  EYES: EOMI, PERRLA, conjunctiva and sclera clear  ENT: moist mucous membrane  NECK: supple, No JVD, midline trachea  CHEST/LUNG: No increased WOB, symmetric excursions, on room air  Heart: RRR ppp, no peripheral edema  ABDOMEN: Round, mildly distended without tympany, soft, nontender, incisions c/d/i  EXTREMITIES: Brisk cap refill. no clubbing or cyanosis  NERVOUS SYSTEM: AOx4, speech clear, no neuro-deficits  MSK: full ROM, no deformities  SKIN: warm to touch, no rash or lesions, stable blanching erythema through torso & abdomen     I&O's Detail    03 Jan 2024 07:01  -  04 Jan 2024 07:00  --------------------------------------------------------  IN:    Lactated Ringers: 342 mL  Total IN: 342 mL    OUT:    Voided (mL): 500 mL  Total OUT: 500 mL    Total NET: -158 mL          Daily     Daily     LABS:                        11.2   11.36 )-----------( 228      ( 04 Jan 2024 07:44 )             34.2     01-04    139  |  112<H>  |  6<L>  ----------------------------<  105<H>  4.3   |  22  |  0.66    Ca    7.9<L>      04 Jan 2024 07:44  Phos  2.6     01-04  Mg     2.1     01-04        Urinalysis Basic - ( 04 Jan 2024 07:44 )    Color: x / Appearance: x / SG: x / pH: x  Gluc: 105 mg/dL / Ketone: x  / Bili: x / Urobili: x   Blood: x / Protein: x / Nitrite: x   Leuk Esterase: x / RBC: x / WBC x   Sq Epi: x / Non Sq Epi: x / Bacteria: x

## 2024-01-05 NOTE — PROVIDER CONTACT NOTE (OTHER) - ACTION/TREATMENT ORDERED:
Md made aware. will pass to day team and have pulm consulted
1 L LR bolus over 2 hours  bladder scan at midnight and call back resident with residual bladder volume

## 2024-01-05 NOTE — PROGRESS NOTE ADULT - NS ATTEND AMEND GEN_ALL_CORE FT
82yo female with PMHx of afib (on xarelto), HTN and HLD, hx admission in August for anemia, CT imaging revealing cecal lesion, now  presents for scheduled laparoscopic right colectomy     s/p lap R colectomy and partial omentectomy   Intra op omentum adhered to cecum and ascending colon  anastamosis stapled  EBL 100cc, received 2100cc IV fluid.       Plan:  SICU  Pain control  Continue ARB and lopressor  DOAC when ok with surgery  LR at 100  SQH DVT Prophylaxis
Patient seen and examined, agree with above note  82 y/o female with chronic A.fib on Xarelto   Scheduled for and underwent laparoscopic R colectomy with partial omentectomy on 12/28   Doing ok clinically however later in the day she went into A.fib with RVR  HR responded to metoprolol   Continue Xarelto
Agree with the above.   Afib rate now controlled on increased beta blocker. Stable from cardiac standpoint

## 2024-01-05 NOTE — PROGRESS NOTE ADULT - ASSESSMENT
82 yo F now POD 8 s/p right colectomy for cecal mass, c/b postop ileus & urinary retention  +Stool & flatus so NGT removed POD 5, tolerating clears  Voiding, tolerating fulls    Plan  F/u optimal metoprolol dosing with Cardiology since half dose given last night  Likely clear for d/c today from CRS standpoint  Continue low residue diet  Rate control of AFib w/ RVR  Resumed Xarelto 1/2  OOB/A  VTE ppx w/ SCDs & Xarelto    Will be d/w CRS team

## 2024-01-05 NOTE — PROGRESS NOTE ADULT - ATTENDING COMMENTS
S&E  Did not get discharged yest bc feeling weak. Was not discharged this AM bc of questionable RA sats.  Otherwise, zoraida po. Passing gas and stool.  AF HR 100s  NAD  soft, NTND  Labs reviewed  A/P -   Per cardiology note, pt is optimized from cardiac standpoint and can be d/c with new medication regimen.  02 sats on RA are %.  Plan d/c home tomorrow with home care.

## 2024-01-05 NOTE — PROGRESS NOTE ADULT - SUBJECTIVE AND OBJECTIVE BOX
CHIEF COMPLAINT: None offered at time of visit    HPI: 83 year old woman with a history of atrial fibrillation, non-obstructive CAD with NSTEMI (2020), HFpEF, HTN admitted on 12/28/2023 for elective laparoscopic right colectomy for cecal mass; uncomplicated post-operative course.  Cardiology called for tachycardia (AF with RVR).  Mrs. Caba feels well -- not experiencing palpitations, dyspnea, chest pain, or leg pain.  She is unaware that her HR is elevated. She has ambulated to the bathroom.    01/05/24: no complaints, Tele: Afib 80-100s    MEDICATIONS  (STANDING):  acetaminophen   IVPB .. 1000 milliGRAM(s) IV Intermittent every 6 hours  atorvastatin 20 milliGRAM(s) Oral at bedtime  losartan 100 milliGRAM(s) Oral daily  metoprolol tartrate 75 milliGRAM(s) Oral two times a day  pantoprazole  Injectable 40 milliGRAM(s) IV Push two times a day  rivaroxaban 15 milliGRAM(s) Oral with dinner  tamsulosin 0.4 milliGRAM(s) Oral at bedtime    MEDICATIONS  (PRN):  acetaminophen   IVPB .. 1000 milliGRAM(s) IV Intermittent once PRN Temp greater or equal to 38C (100.4F), Mild Pain (1 - 3)  HYDROmorphone  Injectable 0.5 milliGRAM(s) IV Push every 4 hours PRN Severe Pain (7 - 10)  ondansetron Injectable 4 milliGRAM(s) IV Push every 6 hours PRN Nausea  oxyCODONE    IR 5 milliGRAM(s) Oral every 6 hours PRN Moderate Pain (4 - 6)    Vital Signs Last 24 Hrs  T(C): 36.9 (05 Jan 2024 09:05), Max: 37.4 (04 Jan 2024 16:39)  T(F): 98.4 (05 Jan 2024 09:05), Max: 99.3 (04 Jan 2024 16:39)  HR: 104 (05 Jan 2024 09:05) (101 - 104)  BP: 110/61 (05 Jan 2024 09:05) (102/60 - 132/63)  BP(mean): 71 (05 Jan 2024 09:05) (71 - 72)  RR: 18 (05 Jan 2024 09:05) (17 - 18)  SpO2: 98% (05 Jan 2024 09:05) (98% - 100%)    Parameters below as of 05 Jan 2024 09:05  Patient On (Oxygen Delivery Method): room air      PHYSICAL EXAM:  Constitutional: NAD, awake and alert  HEENT:  EOMI,  Pupils round, No oral cyanosis.  Pulmonary: Non-labored, breath sounds are clear bilaterally, No wheezing, rales or rhonchi  Cardiovascular: Irregular, mild tachycardia. No calf tenderness  Gastrointestinal: Bowel Sounds present, soft, nontender.   Lymph: No peripheral edema. No cervical lymphadenopathy.  Neurological: Alert, no focal deficits  Skin: No rashes.  Psych:  Mood & affect appropriate    LABS:  reviewed                         10.3   9.42  )-----------( 226      ( 05 Jan 2024 08:08 )             32.7     01-05    141  |  111<H>  |  12  ----------------------------<  106<H>  4.0   |  24  |  0.78    Ca    7.6<L>      05 Jan 2024 08:08  Phos  2.1     01-05  Mg     2.0     01-05      - TroponinI hsT:       Tele: AF

## 2024-01-05 NOTE — PROVIDER CONTACT NOTE (OTHER) - SITUATION
overnight patient has desat into 80s twice on roomair and continuous pulse ox monitoring while asleep

## 2024-01-05 NOTE — PROGRESS NOTE ADULT - PROBLEM SELECTOR PLAN 1
·  Problem: Atrial fibrillation.   ·  Recommendation: Paroxysmal -- currently in AF with better rate control after increasing metoprolol to 100 mg BID; continue anticoagulation. ·  Problem: Atrial fibrillation.   ·  Recommendation: Paroxysmal -- currently in AF rate controlled after increasing metoprolol to 100 mg BID; continue anticoagulation.    pt. is stable from cardiac standpoint, will sign off

## 2024-01-06 LAB
ANION GAP SERPL CALC-SCNC: 7 MMOL/L — SIGNIFICANT CHANGE UP (ref 5–17)
ANION GAP SERPL CALC-SCNC: 7 MMOL/L — SIGNIFICANT CHANGE UP (ref 5–17)
BASOPHILS # BLD AUTO: 0.06 K/UL — SIGNIFICANT CHANGE UP (ref 0–0.2)
BASOPHILS # BLD AUTO: 0.06 K/UL — SIGNIFICANT CHANGE UP (ref 0–0.2)
BASOPHILS NFR BLD AUTO: 0.5 % — SIGNIFICANT CHANGE UP (ref 0–2)
BASOPHILS NFR BLD AUTO: 0.5 % — SIGNIFICANT CHANGE UP (ref 0–2)
BUN SERPL-MCNC: 18 MG/DL — SIGNIFICANT CHANGE UP (ref 7–23)
BUN SERPL-MCNC: 18 MG/DL — SIGNIFICANT CHANGE UP (ref 7–23)
CALCIUM SERPL-MCNC: 7.9 MG/DL — LOW (ref 8.5–10.1)
CALCIUM SERPL-MCNC: 7.9 MG/DL — LOW (ref 8.5–10.1)
CHLORIDE SERPL-SCNC: 110 MMOL/L — HIGH (ref 96–108)
CHLORIDE SERPL-SCNC: 110 MMOL/L — HIGH (ref 96–108)
CO2 SERPL-SCNC: 21 MMOL/L — LOW (ref 22–31)
CO2 SERPL-SCNC: 21 MMOL/L — LOW (ref 22–31)
CREAT SERPL-MCNC: 0.83 MG/DL — SIGNIFICANT CHANGE UP (ref 0.5–1.3)
CREAT SERPL-MCNC: 0.83 MG/DL — SIGNIFICANT CHANGE UP (ref 0.5–1.3)
EGFR: 70 ML/MIN/1.73M2 — SIGNIFICANT CHANGE UP
EGFR: 70 ML/MIN/1.73M2 — SIGNIFICANT CHANGE UP
EOSINOPHIL # BLD AUTO: 0.6 K/UL — HIGH (ref 0–0.5)
EOSINOPHIL # BLD AUTO: 0.6 K/UL — HIGH (ref 0–0.5)
EOSINOPHIL NFR BLD AUTO: 5.4 % — SIGNIFICANT CHANGE UP (ref 0–6)
EOSINOPHIL NFR BLD AUTO: 5.4 % — SIGNIFICANT CHANGE UP (ref 0–6)
GLUCOSE SERPL-MCNC: 110 MG/DL — HIGH (ref 70–99)
GLUCOSE SERPL-MCNC: 110 MG/DL — HIGH (ref 70–99)
HCT VFR BLD CALC: 33.3 % — LOW (ref 34.5–45)
HCT VFR BLD CALC: 33.3 % — LOW (ref 34.5–45)
HGB BLD-MCNC: 10.4 G/DL — LOW (ref 11.5–15.5)
HGB BLD-MCNC: 10.4 G/DL — LOW (ref 11.5–15.5)
IMM GRANULOCYTES NFR BLD AUTO: 1.2 % — HIGH (ref 0–0.9)
IMM GRANULOCYTES NFR BLD AUTO: 1.2 % — HIGH (ref 0–0.9)
LYMPHOCYTES # BLD AUTO: 2.57 K/UL — SIGNIFICANT CHANGE UP (ref 1–3.3)
LYMPHOCYTES # BLD AUTO: 2.57 K/UL — SIGNIFICANT CHANGE UP (ref 1–3.3)
LYMPHOCYTES # BLD AUTO: 23.3 % — SIGNIFICANT CHANGE UP (ref 13–44)
LYMPHOCYTES # BLD AUTO: 23.3 % — SIGNIFICANT CHANGE UP (ref 13–44)
MAGNESIUM SERPL-MCNC: 2 MG/DL — SIGNIFICANT CHANGE UP (ref 1.6–2.6)
MAGNESIUM SERPL-MCNC: 2 MG/DL — SIGNIFICANT CHANGE UP (ref 1.6–2.6)
MCHC RBC-ENTMCNC: 29.1 PG — SIGNIFICANT CHANGE UP (ref 27–34)
MCHC RBC-ENTMCNC: 29.1 PG — SIGNIFICANT CHANGE UP (ref 27–34)
MCHC RBC-ENTMCNC: 31.2 GM/DL — LOW (ref 32–36)
MCHC RBC-ENTMCNC: 31.2 GM/DL — LOW (ref 32–36)
MCV RBC AUTO: 93.3 FL — SIGNIFICANT CHANGE UP (ref 80–100)
MCV RBC AUTO: 93.3 FL — SIGNIFICANT CHANGE UP (ref 80–100)
MONOCYTES # BLD AUTO: 0.75 K/UL — SIGNIFICANT CHANGE UP (ref 0–0.9)
MONOCYTES # BLD AUTO: 0.75 K/UL — SIGNIFICANT CHANGE UP (ref 0–0.9)
MONOCYTES NFR BLD AUTO: 6.8 % — SIGNIFICANT CHANGE UP (ref 2–14)
MONOCYTES NFR BLD AUTO: 6.8 % — SIGNIFICANT CHANGE UP (ref 2–14)
NEUTROPHILS # BLD AUTO: 6.93 K/UL — SIGNIFICANT CHANGE UP (ref 1.8–7.4)
NEUTROPHILS # BLD AUTO: 6.93 K/UL — SIGNIFICANT CHANGE UP (ref 1.8–7.4)
NEUTROPHILS NFR BLD AUTO: 62.8 % — SIGNIFICANT CHANGE UP (ref 43–77)
NEUTROPHILS NFR BLD AUTO: 62.8 % — SIGNIFICANT CHANGE UP (ref 43–77)
PHOSPHATE SERPL-MCNC: 1.6 MG/DL — LOW (ref 2.5–4.5)
PHOSPHATE SERPL-MCNC: 1.6 MG/DL — LOW (ref 2.5–4.5)
PLATELET # BLD AUTO: 245 K/UL — SIGNIFICANT CHANGE UP (ref 150–400)
PLATELET # BLD AUTO: 245 K/UL — SIGNIFICANT CHANGE UP (ref 150–400)
POTASSIUM SERPL-MCNC: 3.8 MMOL/L — SIGNIFICANT CHANGE UP (ref 3.5–5.3)
POTASSIUM SERPL-MCNC: 3.8 MMOL/L — SIGNIFICANT CHANGE UP (ref 3.5–5.3)
POTASSIUM SERPL-SCNC: 3.8 MMOL/L — SIGNIFICANT CHANGE UP (ref 3.5–5.3)
POTASSIUM SERPL-SCNC: 3.8 MMOL/L — SIGNIFICANT CHANGE UP (ref 3.5–5.3)
RBC # BLD: 3.57 M/UL — LOW (ref 3.8–5.2)
RBC # BLD: 3.57 M/UL — LOW (ref 3.8–5.2)
RBC # FLD: 20.3 % — HIGH (ref 10.3–14.5)
RBC # FLD: 20.3 % — HIGH (ref 10.3–14.5)
SODIUM SERPL-SCNC: 138 MMOL/L — SIGNIFICANT CHANGE UP (ref 135–145)
SODIUM SERPL-SCNC: 138 MMOL/L — SIGNIFICANT CHANGE UP (ref 135–145)
WBC # BLD: 11.04 K/UL — HIGH (ref 3.8–10.5)
WBC # BLD: 11.04 K/UL — HIGH (ref 3.8–10.5)
WBC # FLD AUTO: 11.04 K/UL — HIGH (ref 3.8–10.5)
WBC # FLD AUTO: 11.04 K/UL — HIGH (ref 3.8–10.5)

## 2024-01-06 RX ORDER — SODIUM,POTASSIUM PHOSPHATES 278-250MG
1 POWDER IN PACKET (EA) ORAL ONCE
Refills: 0 | Status: COMPLETED | OUTPATIENT
Start: 2024-01-06 | End: 2024-01-06

## 2024-01-06 RX ORDER — POTASSIUM PHOSPHATE, MONOBASIC POTASSIUM PHOSPHATE, DIBASIC 236; 224 MG/ML; MG/ML
30 INJECTION, SOLUTION INTRAVENOUS ONCE
Refills: 0 | Status: COMPLETED | OUTPATIENT
Start: 2024-01-06 | End: 2024-01-06

## 2024-01-06 RX ADMIN — RIVAROXABAN 15 MILLIGRAM(S): KIT at 16:55

## 2024-01-06 RX ADMIN — POTASSIUM PHOSPHATE, MONOBASIC POTASSIUM PHOSPHATE, DIBASIC 83.33 MILLIMOLE(S): 236; 224 INJECTION, SOLUTION INTRAVENOUS at 14:50

## 2024-01-06 RX ADMIN — Medication 100 MILLIGRAM(S): at 09:38

## 2024-01-06 RX ADMIN — TAMSULOSIN HYDROCHLORIDE 0.4 MILLIGRAM(S): 0.4 CAPSULE ORAL at 23:35

## 2024-01-06 RX ADMIN — Medication 1 PACKET(S): at 06:19

## 2024-01-06 RX ADMIN — PANTOPRAZOLE SODIUM 40 MILLIGRAM(S): 20 TABLET, DELAYED RELEASE ORAL at 23:37

## 2024-01-06 RX ADMIN — ATORVASTATIN CALCIUM 20 MILLIGRAM(S): 80 TABLET, FILM COATED ORAL at 23:36

## 2024-01-06 RX ADMIN — LOSARTAN POTASSIUM 50 MILLIGRAM(S): 100 TABLET, FILM COATED ORAL at 09:38

## 2024-01-06 RX ADMIN — PANTOPRAZOLE SODIUM 40 MILLIGRAM(S): 20 TABLET, DELAYED RELEASE ORAL at 09:38

## 2024-01-06 RX ADMIN — Medication 100 MILLIGRAM(S): at 23:36

## 2024-01-06 NOTE — PROGRESS NOTE ADULT - ASSESSMENT
84 yo F now POD 9 s/p right colectomy for cecal mass, c/b postop ileus & urinary retention  +Stool & flatus so NGT removed POD 5, tolerating clears  Voiding, tolerating solids    Plan  Cleared for d/c from Cardiology as is rate-controlled  Cleared for d/c today from CRS standpoint  Continue low residue diet  Resumed Xarelto 1/2  OOB/A  VTE ppx w/ SCDs & Xarelto    Will be d/w CRS team 82 yo F now POD 9 s/p right colectomy for cecal mass, c/b postop ileus & urinary retention  +Stool & flatus so NGT removed POD 5, tolerating clears  Voiding, tolerating solids    Plan  Cleared for d/c from Cardiology as is rate-controlled  Cleared for d/c today from CRS standpoint  Continue low residue diet  Resumed Xarelto 1/2  OOB/A  VTE ppx w/ SCDs & Xarelto    Will be d/w CRS team

## 2024-01-06 NOTE — PROGRESS NOTE ADULT - REASON FOR ADMISSION
s/p right colectomy
elective right hemicolectomy
s/p right colectomy
s/p right colectomy

## 2024-01-06 NOTE — PROGRESS NOTE ADULT - SUBJECTIVE AND OBJECTIVE BOX
SURGERY DAILY PROGRESS NOTE:     Subjective:  Patient seen and examined this AM at bedside. No acute events overnight and patient resting comfortably. Tolerating solids. Ambulating. Having bowel function. Denies fever/chills, shortness of breath, chest pain. VS reviewed    Objective:    MEDICATIONS  (STANDING):  acetaminophen   IVPB .. 1000 milliGRAM(s) IV Intermittent every 6 hours  atorvastatin 20 milliGRAM(s) Oral at bedtime  losartan 50 milliGRAM(s) Oral daily  metoprolol tartrate 100 milliGRAM(s) Oral two times a day  pantoprazole  Injectable 40 milliGRAM(s) IV Push two times a day  rivaroxaban 15 milliGRAM(s) Oral with dinner  tamsulosin 0.4 milliGRAM(s) Oral at bedtime    MEDICATIONS  (PRN):  acetaminophen   IVPB .. 1000 milliGRAM(s) IV Intermittent once PRN Temp greater or equal to 38C (100.4F), Mild Pain (1 - 3)  ondansetron Injectable 4 milliGRAM(s) IV Push every 6 hours PRN Nausea      Vital Signs Last 24 Hrs  T(C): 36.9 (05 Jan 2024 21:18), Max: 37.1 (05 Jan 2024 16:30)  T(F): 98.5 (05 Jan 2024 21:18), Max: 98.8 (05 Jan 2024 16:30)  HR: 89 (05 Jan 2024 21:18) (89 - 104)  BP: 100/77 (05 Jan 2024 21:18) (100/77 - 132/63)  BP(mean): 71 (05 Jan 2024 09:05) (71 - 71)  RR: 18 (05 Jan 2024 21:18) (18 - 18)  SpO2: 98% (05 Jan 2024 21:18) (98% - 100%)    Parameters below as of 05 Jan 2024 21:18  Patient On (Oxygen Delivery Method): room air          PHYSICAL EXAM   GENERAL: NAD, well developed, obese  HEAD: Atraumatic, normocephalic  EYES: EOMI, PERRLA, conjunctiva and sclera clear  ENT: moist mucous membrane  NECK: supple, No JVD, midline trachea  CHEST/LUNG: No increased WOB, symmetric excursions, on room air  Heart: Irregularly irregular rate & rhythm ppp, no peripheral edema  ABDOMEN: Round, mildly distended without tympany, soft, nontender, incisions c/d/i  EXTREMITIES: Brisk cap refill. no clubbing or cyanosis  NERVOUS SYSTEM: AOx4, speech clear, no neuro-deficits  MSK: full ROM, no deformities  SKIN: warm to touch, no rash or lesions, stable blanching erythema through torso & abdomen       I&O's Detail    04 Jan 2024 07:01  -  05 Jan 2024 07:00  --------------------------------------------------------  IN:    Oral Fluid: 100 mL  Total IN: 100 mL    OUT:  Total OUT: 0 mL    Total NET: 100 mL          Daily     Daily     LABS:                        10.3   9.42  )-----------( 226      ( 05 Jan 2024 08:08 )             32.7     01-05    141  |  111<H>  |  12  ----------------------------<  106<H>  4.0   |  24  |  0.78    Ca    7.6<L>      05 Jan 2024 08:08  Phos  2.1     01-05  Mg     2.0     01-05        Urinalysis Basic - ( 05 Jan 2024 08:08 )    Color: x / Appearance: x / SG: x / pH: x  Gluc: 106 mg/dL / Ketone: x  / Bili: x / Urobili: x   Blood: x / Protein: x / Nitrite: x   Leuk Esterase: x / RBC: x / WBC x   Sq Epi: x / Non Sq Epi: x / Bacteria: x

## 2024-01-06 NOTE — PROGRESS NOTE ADULT - ATTENDING COMMENTS
Patient seen and examined at bedside this morning  She is doing well overall, having frequent bowel movements  Abdomen soft, ND, NT  Plan to rule out C.diff prior to discharge. Continue supportive care during recovery    Vital Signs Last 24 Hrs  T(C): 36.3 (06 Jan 2024 08:45), Max: 37.1 (05 Jan 2024 16:30)  T(F): 97.4 (06 Jan 2024 08:45), Max: 98.8 (05 Jan 2024 16:30)  HR: 97 (06 Jan 2024 08:45) (89 - 102)  BP: 108/77 (06 Jan 2024 08:45) (100/77 - 116/78)  BP(mean): --  RR: 18 (06 Jan 2024 08:45) (18 - 18)  SpO2: 98% (06 Jan 2024 08:45) (98% - 99%)    Parameters below as of 06 Jan 2024 08:45  Patient On (Oxygen Delivery Method): room air                          10.4   11.04 )-----------( 245      ( 06 Jan 2024 08:11 )             33.3

## 2024-01-07 ENCOUNTER — NON-APPOINTMENT (OUTPATIENT)
Age: 84
End: 2024-01-07

## 2024-01-07 VITALS
SYSTOLIC BLOOD PRESSURE: 102 MMHG | OXYGEN SATURATION: 98 % | HEART RATE: 88 BPM | TEMPERATURE: 98 F | RESPIRATION RATE: 18 BRPM | DIASTOLIC BLOOD PRESSURE: 52 MMHG

## 2024-01-07 LAB
ANION GAP SERPL CALC-SCNC: 7 MMOL/L — SIGNIFICANT CHANGE UP (ref 5–17)
ANION GAP SERPL CALC-SCNC: 7 MMOL/L — SIGNIFICANT CHANGE UP (ref 5–17)
BUN SERPL-MCNC: 14 MG/DL — SIGNIFICANT CHANGE UP (ref 7–23)
BUN SERPL-MCNC: 14 MG/DL — SIGNIFICANT CHANGE UP (ref 7–23)
C DIFF BY PCR RESULT: SIGNIFICANT CHANGE UP
C DIFF BY PCR RESULT: SIGNIFICANT CHANGE UP
CALCIUM SERPL-MCNC: 8.1 MG/DL — LOW (ref 8.5–10.1)
CALCIUM SERPL-MCNC: 8.1 MG/DL — LOW (ref 8.5–10.1)
CHLORIDE SERPL-SCNC: 112 MMOL/L — HIGH (ref 96–108)
CHLORIDE SERPL-SCNC: 112 MMOL/L — HIGH (ref 96–108)
CO2 SERPL-SCNC: 20 MMOL/L — LOW (ref 22–31)
CO2 SERPL-SCNC: 20 MMOL/L — LOW (ref 22–31)
CREAT SERPL-MCNC: 0.87 MG/DL — SIGNIFICANT CHANGE UP (ref 0.5–1.3)
CREAT SERPL-MCNC: 0.87 MG/DL — SIGNIFICANT CHANGE UP (ref 0.5–1.3)
EGFR: 66 ML/MIN/1.73M2 — SIGNIFICANT CHANGE UP
EGFR: 66 ML/MIN/1.73M2 — SIGNIFICANT CHANGE UP
GI PCR PANEL: SIGNIFICANT CHANGE UP
GI PCR PANEL: SIGNIFICANT CHANGE UP
GLUCOSE SERPL-MCNC: 90 MG/DL — SIGNIFICANT CHANGE UP (ref 70–99)
GLUCOSE SERPL-MCNC: 90 MG/DL — SIGNIFICANT CHANGE UP (ref 70–99)
HCT VFR BLD CALC: 31.6 % — LOW (ref 34.5–45)
HCT VFR BLD CALC: 31.6 % — LOW (ref 34.5–45)
HGB BLD-MCNC: 9.9 G/DL — LOW (ref 11.5–15.5)
HGB BLD-MCNC: 9.9 G/DL — LOW (ref 11.5–15.5)
MAGNESIUM SERPL-MCNC: 1.9 MG/DL — SIGNIFICANT CHANGE UP (ref 1.6–2.6)
MAGNESIUM SERPL-MCNC: 1.9 MG/DL — SIGNIFICANT CHANGE UP (ref 1.6–2.6)
MCHC RBC-ENTMCNC: 29.3 PG — SIGNIFICANT CHANGE UP (ref 27–34)
MCHC RBC-ENTMCNC: 29.3 PG — SIGNIFICANT CHANGE UP (ref 27–34)
MCHC RBC-ENTMCNC: 31.3 GM/DL — LOW (ref 32–36)
MCHC RBC-ENTMCNC: 31.3 GM/DL — LOW (ref 32–36)
MCV RBC AUTO: 93.5 FL — SIGNIFICANT CHANGE UP (ref 80–100)
MCV RBC AUTO: 93.5 FL — SIGNIFICANT CHANGE UP (ref 80–100)
PHOSPHATE SERPL-MCNC: 2.2 MG/DL — LOW (ref 2.5–4.5)
PHOSPHATE SERPL-MCNC: 2.2 MG/DL — LOW (ref 2.5–4.5)
PLATELET # BLD AUTO: 282 K/UL — SIGNIFICANT CHANGE UP (ref 150–400)
PLATELET # BLD AUTO: 282 K/UL — SIGNIFICANT CHANGE UP (ref 150–400)
POTASSIUM SERPL-MCNC: 3.9 MMOL/L — SIGNIFICANT CHANGE UP (ref 3.5–5.3)
POTASSIUM SERPL-MCNC: 3.9 MMOL/L — SIGNIFICANT CHANGE UP (ref 3.5–5.3)
POTASSIUM SERPL-SCNC: 3.9 MMOL/L — SIGNIFICANT CHANGE UP (ref 3.5–5.3)
POTASSIUM SERPL-SCNC: 3.9 MMOL/L — SIGNIFICANT CHANGE UP (ref 3.5–5.3)
RBC # BLD: 3.38 M/UL — LOW (ref 3.8–5.2)
RBC # BLD: 3.38 M/UL — LOW (ref 3.8–5.2)
RBC # FLD: 20.5 % — HIGH (ref 10.3–14.5)
RBC # FLD: 20.5 % — HIGH (ref 10.3–14.5)
SODIUM SERPL-SCNC: 139 MMOL/L — SIGNIFICANT CHANGE UP (ref 135–145)
SODIUM SERPL-SCNC: 139 MMOL/L — SIGNIFICANT CHANGE UP (ref 135–145)
WBC # BLD: 12.18 K/UL — HIGH (ref 3.8–10.5)
WBC # BLD: 12.18 K/UL — HIGH (ref 3.8–10.5)
WBC # FLD AUTO: 12.18 K/UL — HIGH (ref 3.8–10.5)
WBC # FLD AUTO: 12.18 K/UL — HIGH (ref 3.8–10.5)

## 2024-01-07 RX ORDER — POTASSIUM PHOSPHATE, MONOBASIC POTASSIUM PHOSPHATE, DIBASIC 236; 224 MG/ML; MG/ML
30 INJECTION, SOLUTION INTRAVENOUS ONCE
Refills: 0 | Status: COMPLETED | OUTPATIENT
Start: 2024-01-07 | End: 2024-01-07

## 2024-01-07 RX ADMIN — RIVAROXABAN 15 MILLIGRAM(S): KIT at 16:39

## 2024-01-07 RX ADMIN — PANTOPRAZOLE SODIUM 40 MILLIGRAM(S): 20 TABLET, DELAYED RELEASE ORAL at 10:25

## 2024-01-07 RX ADMIN — POTASSIUM PHOSPHATE, MONOBASIC POTASSIUM PHOSPHATE, DIBASIC 83.33 MILLIMOLE(S): 236; 224 INJECTION, SOLUTION INTRAVENOUS at 13:00

## 2024-01-07 NOTE — PROGRESS NOTE ADULT - SUBJECTIVE AND OBJECTIVE BOX
Patient seen and examined at bedside this morning  She is doing well overall  Tolerating a diet, denies N/V, having bowel function, pain is controlled    Vital Signs Last 24 Hrs  T(C): 36.8 (07 Jan 2024 10:03), Max: 37.1 (06 Jan 2024 15:45)  T(F): 98.2 (07 Jan 2024 10:03), Max: 98.7 (06 Jan 2024 15:45)  HR: 91 (07 Jan 2024 10:03) (88 - 98)  BP: 99/40 (07 Jan 2024 10:03) (99/40 - 134/76)  BP(mean): --  RR: 18 (07 Jan 2024 10:03) (18 - 18)  SpO2: 100% (07 Jan 2024 10:03) (97% - 100%)    Parameters below as of 07 Jan 2024 10:03  Patient On (Oxygen Delivery Method): room air    GEN: NAD  ABD: Soft, minimal distention, appropriately tender                          9.9    12.18 )-----------( 282      ( 07 Jan 2024 08:36 )             31.6   01-07    139  |  112<H>  |  14  ----------------------------<  90  3.9   |  20<L>  |  0.87    Ca    8.1<L>      07 Jan 2024 08:36  Phos  2.2     01-07  Mg     1.9     01-07

## 2024-01-07 NOTE — PROGRESS NOTE ADULT - PROVIDER SPECIALTY LIST ADULT
Colorectal Surgery
Critical Care
SICU
Critical Care
MICU
SICU
Colorectal Surgery
Critical Care
Critical Care
Cardiology

## 2024-01-07 NOTE — PROGRESS NOTE ADULT - NUTRITIONAL ASSESSMENT
This patient has been assessed with a concern for Malnutrition and has been determined to have a diagnosis/diagnoses of Severe protein-calorie malnutrition.    This patient is being managed with:   Diet Low Fiber-  Entered: Jan 4 2024 10:40AM  
This patient has been assessed with a concern for Malnutrition and has been determined to have a diagnosis/diagnoses of Severe protein-calorie malnutrition.    This patient is being managed with:   Diet Low Fiber-  Entered: Jan 4 2024 10:40AM

## 2024-01-07 NOTE — PROGRESS NOTE ADULT - ASSESSMENT
83F now POD 10 s/p right colectomy for cecal mass, c/b postop ileus & urinary retention. Patient having multiple loose bowel movements. C.diff test pending. Slight increased leukocytosis, will trend for now.  Once results obtained and are negative patient can be discharged home

## 2024-01-09 LAB
SURGICAL PATHOLOGY STUDY: SIGNIFICANT CHANGE UP
SURGICAL PATHOLOGY STUDY: SIGNIFICANT CHANGE UP

## 2024-01-11 DIAGNOSIS — I25.10 ATHEROSCLEROTIC HEART DISEASE OF NATIVE CORONARY ARTERY WITHOUT ANGINA PECTORIS: ICD-10-CM

## 2024-01-11 DIAGNOSIS — E83.39 OTHER DISORDERS OF PHOSPHORUS METABOLISM: ICD-10-CM

## 2024-01-11 DIAGNOSIS — I50.32 CHRONIC DIASTOLIC (CONGESTIVE) HEART FAILURE: ICD-10-CM

## 2024-01-11 DIAGNOSIS — C18.2 MALIGNANT NEOPLASM OF ASCENDING COLON: ICD-10-CM

## 2024-01-11 DIAGNOSIS — Z79.01 LONG TERM (CURRENT) USE OF ANTICOAGULANTS: ICD-10-CM

## 2024-01-11 DIAGNOSIS — M19.90 UNSPECIFIED OSTEOARTHRITIS, UNSPECIFIED SITE: ICD-10-CM

## 2024-01-11 DIAGNOSIS — I11.0 HYPERTENSIVE HEART DISEASE WITH HEART FAILURE: ICD-10-CM

## 2024-01-11 DIAGNOSIS — E78.5 HYPERLIPIDEMIA, UNSPECIFIED: ICD-10-CM
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
## 2024-01-11 DIAGNOSIS — R19.7 DIARRHEA, UNSPECIFIED: ICD-10-CM

## 2024-01-11 DIAGNOSIS — I25.2 OLD MYOCARDIAL INFARCTION: ICD-10-CM

## 2024-01-11 DIAGNOSIS — E87.6 HYPOKALEMIA: ICD-10-CM
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
## 2024-01-11 DIAGNOSIS — I48.20 CHRONIC ATRIAL FIBRILLATION, UNSPECIFIED: ICD-10-CM

## 2024-01-11 DIAGNOSIS — R33.9 RETENTION OF URINE, UNSPECIFIED: ICD-10-CM

## 2024-01-11 DIAGNOSIS — K56.7 ILEUS, UNSPECIFIED: ICD-10-CM

## 2024-01-11 DIAGNOSIS — D63.0 ANEMIA IN NEOPLASTIC DISEASE: ICD-10-CM

## 2024-01-11 DIAGNOSIS — R00.0 TACHYCARDIA, UNSPECIFIED: ICD-10-CM

## 2024-01-11 DIAGNOSIS — Z79.899 OTHER LONG TERM (CURRENT) DRUG THERAPY: ICD-10-CM

## 2024-01-11 DIAGNOSIS — D72.829 ELEVATED WHITE BLOOD CELL COUNT, UNSPECIFIED: ICD-10-CM

## 2024-01-11 DIAGNOSIS — E43 UNSPECIFIED SEVERE PROTEIN-CALORIE MALNUTRITION: ICD-10-CM

## 2024-01-11 DIAGNOSIS — K91.89 OTHER POSTPROCEDURAL COMPLICATIONS AND DISORDERS OF DIGESTIVE SYSTEM: ICD-10-CM

## 2024-01-17 ENCOUNTER — APPOINTMENT (OUTPATIENT)
Dept: COLORECTAL SURGERY | Facility: CLINIC | Age: 84
End: 2024-01-17
Payer: MEDICARE

## 2024-01-17 VITALS
OXYGEN SATURATION: 98 % | HEART RATE: 97 BPM | WEIGHT: 160 LBS | TEMPERATURE: 97 F | HEIGHT: 61 IN | SYSTOLIC BLOOD PRESSURE: 113 MMHG | DIASTOLIC BLOOD PRESSURE: 69 MMHG | RESPIRATION RATE: 15 BRPM | BODY MASS INDEX: 30.21 KG/M2

## 2024-01-17 DIAGNOSIS — Z09 ENCOUNTER FOR FOLLOW-UP EXAMINATION AFTER COMPLETED TREATMENT FOR CONDITIONS OTHER THAN MALIGNANT NEOPLASM: ICD-10-CM

## 2024-01-17 PROCEDURE — 99024 POSTOP FOLLOW-UP VISIT: CPT

## 2024-01-17 NOTE — ASSESSMENT
[FreeTextEntry1] : Ms. Caba presents to the office for follow-up after hospitalization over the summer for iron deficiency anemia with findings of a cecal mass.  Unfortunately, colonoscopy while an inpatient was not able to be completed secondary to a tortuous sigmoid colon in the setting of poor sphincter control which did not allow for adequate insufflation of her colon.  This issue also contributed to failure of diagnostic CT colonography as an outpatient.  I discussed with the patient and her niece the option of having another attempt at colonoscopy to elucidate the nature of this lesion and clear the colon of other polyps versus proceeding directly with surgery.  They would rather proceed directly with surgery and they are aware that there may be residual polyps in the colon, but that the time it takes for these polyps to evolve into cancer may exceed her anticipated life expectancy.  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 We discussed the R/B/A for a laparoscopic right hemicolectomy including preop medical clearance required, mechanical and oral abx bowel prep, duration of procedure including margins for oncologic resection, criterion for hospital discharge, period for postoperative recovery as well as bowel function to be expected after partial colectomy. The potential for anastomotic leak ~2-3% was also detailed. Other risks of surgery such as wound infection, prolonged postop ileus, DVT, PE, pneumonia and MI were detailed.  Patient and niece are understanding of the above and seek to proceed with scheduling.  1/17/24 Here for POV after undergoing on 12/28/23, Lap right colectomy for 5cm villous adenoma with HGD. Doing well postop. Advised return to regular diet. Ideally, should get surveillance colonoscopy in 1 year.  Niece does not plan for her to have followup attempts at surveillance colonoscopy given advanced age. Path report and operative report provided for records. F/u prn.

## 2024-01-17 NOTE — CONSULT LETTER
[Dear  ___] : Dear  [unfilled], [Consult Letter:] : I had the pleasure of evaluating your patient, [unfilled]. [Please see my note below.] : Please see my note below. [Consult Closing:] : Thank you very much for allowing me to participate in the care of this patient.  If you have any questions, please do not hesitate to contact me. [Sincerely,] : Sincerely, [FreeTextEntry3] : Pita Potter MD [DrChacorta  ___] : Dr. FOLEY [DrChacorta ___] : Dr. FOLEY

## 2024-01-17 NOTE — PHYSICAL EXAM
[No Rash or Lesion] : No rash or lesion [Alert] : alert [Oriented to Person] : oriented to person [Oriented to Place] : oriented to place [Oriented to Time] : oriented to time [Calm] : calm [de-identified] : incision CDI, soft, NTND [de-identified] : No apparent distress [de-identified] : Normocephalic atraumatic [de-identified] : Moving all extremities x 4

## 2024-01-17 NOTE — HISTORY OF PRESENT ILLNESS
[FreeTextEntry1] : Ms. Caba presents to the office for consultation, accompanied by her niece, after hospitalization this August for anemia.  At that time, she was recovering from wrist surgery and was found to be very anemic.  CT A/P demonstrated a concerning cecal mass.  During her hospitalization, there was attempts at colonoscopy, but secondary to a tortuous sigmoid colon as well as poor sphincter tone, colonoscopy was unable to be completed and the scope was unable to be passed beyond the sigmoid colon.  Attempts at outpatient CT colonography were also unsuccessful.  In the interim, she has been receiving IV infusions from her hematologist, and repeat CT A/P 11/21/23 continues to demonstrate the cecal mass, no metastatic leasions. CEA 2.2. Given ongoing occult blood loss, continued CT findings of cecal mass, and inability to survey the colon with colonoscopy or CT colonography, recommendation was for lap right colectomy to remove cecal mass that is presumably source of ongoing GIB.  Here for surgical discussion.  1/17/24 Ms. Caba returns to the office for a POV after undergoing on 12/28/23 Lap right colectomy for a 5cm villous adenoma with HGD.  Her hospital course was complicated by a postoperative ileus followed by postobstructive diarrhea.  Since hospital discharge, she has been doing well and has no complaints.  She is tolerating p.o. and is having more formed stools now.

## 2024-01-19 ENCOUNTER — NON-APPOINTMENT (OUTPATIENT)
Age: 84
End: 2024-01-19

## 2024-01-19 ENCOUNTER — APPOINTMENT (OUTPATIENT)
Dept: CARDIOLOGY | Facility: CLINIC | Age: 84
End: 2024-01-19
Payer: MEDICARE

## 2024-01-19 VITALS
HEART RATE: 104 BPM | DIASTOLIC BLOOD PRESSURE: 80 MMHG | BODY MASS INDEX: 30.78 KG/M2 | OXYGEN SATURATION: 100 % | SYSTOLIC BLOOD PRESSURE: 110 MMHG | HEIGHT: 61 IN | WEIGHT: 163 LBS

## 2024-01-19 PROCEDURE — 93000 ELECTROCARDIOGRAM COMPLETE: CPT

## 2024-01-19 PROCEDURE — 99214 OFFICE O/P EST MOD 30 MIN: CPT

## 2024-01-19 RX ORDER — METRONIDAZOLE 500 MG/1
500 TABLET ORAL
Qty: 3 | Refills: 0 | Status: DISCONTINUED | COMMUNITY
Start: 2023-11-30 | End: 2024-01-19

## 2024-01-19 RX ORDER — LOSARTAN POTASSIUM 100 MG/1
100 TABLET, FILM COATED ORAL DAILY
Qty: 90 | Refills: 3 | Status: DISCONTINUED | COMMUNITY
Start: 2022-11-11 | End: 2024-01-19

## 2024-01-19 RX ORDER — NEOMYCIN SULFATE 500 MG/1
500 TABLET ORAL
Qty: 6 | Refills: 0 | Status: DISCONTINUED | COMMUNITY
Start: 2023-11-30 | End: 2024-01-19

## 2024-01-19 NOTE — HISTORY OF PRESENT ILLNESS
[FreeTextEntry1] : 81 y/o  *afib-paroxysmal *CAD-nonobstructive-s/p NSTEMI Mar '20 peak trop 9 *HFpEF *HTN  Last fri marcelo noticed that she is more easily winded w/ daily activities.   A/P:  *Afib -KJ & CV  -CMP, comp, BNP (no prior BNP)  Return 1 week postCV to reevaluate rhythm & symptoms. ============================================ ============================================ *EK20, NSR, NSST changes *Echo : EF 55-60% mild LVH mild diast. dysfx, AV sclerosis, mild-mod TR PASP 36 mmHg. *Echo: 3/30/20, 1-2 + TR,, moderate pulmonary hypertension LVEF 60%. *Cardiac Cath: 3/30/20, Non-obstructive CAD NL V FX *May '22 labs: LDL 74 HDL 65 , . *Aug '20 Hb 11.2 plat 245 LDL 81 HDL 63   Cr 1.12 LFTs WNL CPK 81 TSH 2.39 HbA1c 5.8 (preDM) *BP monitor May '22: ave 114/61, awake 111/60, asleep 120/63  History: Admited for afib Mar 17th '20, d/barrett after 2 days admited 2 days later w/ NSTEMI peak trop 9.73. Followed up w/ andie in clinic until .  Reviewed meds not on asa due to bleeding hx but on xarelto.  ============================================ ============================================ Discharge Note Provider [Charted Location: [facility] [address] [place] [address] 01] [Authored: 2024 13:09]- for Visit: [id number], Complete, Not Revised, Signed in Full, Available to Patient    Hospital Course: Discharge Date	2024 	 Admission Date	28-Dec-2023 07:15 Reason for Admission	right hemicolectomy Hospital Course	 Pt admitted for elective Right lap hemicolectomy, postop course with ileus, requiring NGT, IVF, bowel rest. Also with urinary retention, needing rizzo insertion, passed trial of void, had return of bowel function, +BM, tolerated diet. She was experiencing diarrhea, but tested negative for C.diff. Stable vital signs. afebrile.    Med Reconciliation: Medication Reconciliation Status	Admission Reconciliation is Completed Discharge Reconciliation is Completed Discharge Medications	atorvastatin 20 mg oral tablet: 1 tab(s) orally once a day (at bedtime) losartan 50 mg oral tablet: 1 tab(s) orally once a day metoprolol tartrate 100 mg oral tablet: 1 tab(s) orally 2 times a day oxycodone-acetaminophen 5 mg-325 mg oral tablet: 1 tab(s) orally every 6 hours as needed for  moderate pain MDD: 004 PreserVision AREDS 2 oral capsule: orally once a day rivaroxaban 15 mg oral tablet: 1 tab(s) orally once a day , ,  Care Plan/Procedures: Discharge Diagnoses, Assessment and Plan of Treatment	PRINCIPAL DISCHARGE DIAGNOSIS Diagnosis: Cancer of right colon Assessment and Plan of Treatment: Discharge Procedures, Findings and Treatment	PRINCIPAL PROCEDURE Procedure: Laparoscopic right colectomy Findings and Treatment: Goal(s)	To get better and follow your care plan as instructed.  Follow Up: Care Providers for Follow up (PCP/Outpatient Provider)	Pita Potter Colon/Rectal Surgery [address] [place] [address], [place] [address] Phone: [phone] Fax: [phone] Follow Up Time: 2 weeks Additional Provider Info (For SysAdmin Use Only)	PROVIDER:[TOKEN:[[id number]],FOLLOWUP:[2 weeks]] Care Providers Direct Addresses (For SYSAdmin Use Only)	,[email or web address] NPI number (For SysAdmin Use Only) :	[[id number]] Patient's Scheduled Appointments	Randal Kraus Physician [facility] CARDIOLOGY [address] Scheduled Appointment: 2024 Discharge Diet	Low Fiber Diet Activity	Driving allowed, No heavy lifting/straining, Showering allowed, Stairs allowed, Walking - Indoors allowed, Walking - Outdoors allowed, Follow Instructions Provided by your Surgical Team  Quality Measures: Hospice Patient	No Core Measure Site	Yes Tobacco Usage Within the Last Year	No Does the patient have a principal diagnosis of ischemic stroke, hemorrhagic stroke, or TIA?	No Does the patient have a principal diagnosis of Acute Myocardial Infarction?	No Has the patient had a Percutaneous Coronary Intervention?	No Did the Patient Present With or was Treated for Malnutrition During This Admission	Yes... Details of Malnutrition Diagnosis/Diagnoses	This patient has been assessed with a concern for Malnutrition and was treated during this hospitalization for the following Nutrition diagnosis/diagnoses:   -  2024: Severe protein-calorie malnutrition  Home Health: Discharged with Home Health Care Services?	Yes Face-To-Face Contact	As certified below, I, or a nurse practitioner or physician assistant working with me, had a face-to-face encounter that meets the physician face-to-face encounter requirements. Need for Skilled Services	Rehabilitation services Based on the above findings, the following intermittent skilled services are medically necessary home health services:	Nursing  Physical therapy 	 Home Bound Status	Other, specify... Other Home Bound Status	post surgery Patient Needs Assistance to Leave Residence... Other	none Attending Certification	My signature below certifies that the above stated patient is homebound and upon completion of the Face-To-Face encounter, has the need for intermittent skilled nursing, physical therapy and/or speech or occupational therapy services in their home for their current diagnosis as outlined in their initial plan of care. These services will continue to be monitored by myself or another physician.  Document Complete: Care Provider Seen in Hospital	[facility]	This document is complete and the patient is ready for discharge. For questions about your prescriptions, please call:	[phone] Is this contact telephone number correct?	Yes Attending Attestation Statement	I have personally seen and examined the patient. I have collaborated with and supervised the .	on the discharge service for the patient. I have reviewed and made amendments to the documentation where necessary.   Electronic Signatures: Denny Townsend)   (Signed 2024 13:16) 	Authored: Hospital Course, Med Reconciliation, Care Plan/Procedures, Follow Up, Quality Measures, Document Complete Mari Lucero)   (Signed 2024 13:13) 	Authored: Hospital Course, Care Plan/Procedures, Home Health Bouchra Prather)   (Signed 2024 13:41) 	Authored: Med Reconciliation, Care Plan/Procedures, Quality Measures, Home Health Pita Potter)   (Signed 2024 11:29) 	Co-Signer: Hospital Course, Med Reconciliation, Care Plan/Procedures, Follow Up, Quality Measures, Document Complete  Last Updated: 2024 11:29 by Pita Potter)    =======================================  Progress Note Adult-Colorectal Surgery Attending [Charted Location: [place] [address]] [Authored: 2024 12:14]- for Visit: [id number], Complete, Entered, Signed in Full, Available to Patient  Progress Note:   Provider Specialty	Colorectal Surgery    Subjective and Objective:  Patient seen and examined at bedside this morning She is doing well overall Tolerating a diet, denies N/V, having bowel function, pain is controlled  Vital Signs Last 24 Hrs T(C): 36.8 (2024 10:03), Max: 37.1 (2024 15:45) T(F): 98.2 (2024 10:03), Max: 98.7 (2024 15:45) HR: 91 (2024 10:03) (88 - 98) BP: 99/40 (2024 10:03) (99/40 - 134/76) BP(mean): -- RR: 18 (2024 10:03) (18 - 18) SpO2: 100% (2024 10:03) (97% - 100%)  Parameters below as of 2024 10:03 Patient On (Oxygen Delivery Method): room air  GEN: NAD ABD: Soft, minimal distention, appropriately tender                       9.9   12.18 )-----------( 282      ( 2024 08:36 )            31.6  01-07  139  |  112<H>  |  14 ----------------------------<  90 3.9   |  20<L>  |  0.87  Ca    8.1<L>      2024 08:36 Phos  2.2     - Mg     1.9          Assessment and Plan:   Assessment	 83F now POD 10 s/p right colectomy for cecal mass, c/b postop ileus & urinary retention. Patient having multiple loose bowel movements. C.diff test pending. Slight increased leukocytosis, will trend for now. Once results obtained and are negative patient can be discharged home      Nutritional Assessment:  Nutritional Assessment	This patient has been assessed with a concern for Malnutrition and has been determined to have a diagnosis/diagnoses of Severe protein-calorie malnutrition.  This patient is being managed with:  Diet Low Fiber- Entered: 2024 10:40AM   Electronic Signatures: Latonya Joe)  (Signed 2024 12:18) 	Authored: Progress Note, Subjective and Objective, Assessment and Plan   Last Updated: 2024 12:18 by Latonya Joe)   =======================================   Progress Note: - Provider Specialty	Cardiology     - Subjective and Objective: CHIEF COMPLAINT: None offered at time of visit   HPI: 83 year old woman with a history of atrial fibrillation, non-obstructive CAD with NSTEMI (), HFpEF, HTN admitted on 2023 for elective laparoscopic right colectomy for cecal mass; uncomplicated post-operative course.  Cardiology called for tachycardia (AF with RVR).  Mrs. Caba feels well -- not experiencing palpitations, dyspnea, chest pain, or leg pain.  She is unaware that her HR is elevated. She has ambulated to the bathroom.   24: no complaints, Tele: Afib 80-100s   MEDICATIONS  (STANDING): acetaminophen   IVPB .. 1000 milliGRAM(s) IV Intermittent every 6 hours atorvastatin 20 milliGRAM(s) Oral at bedtime losartan 100 milliGRAM(s) Oral daily metoprolol tartrate 75 milliGRAM(s) Oral two times a day pantoprazole  Injectable 40 milliGRAM(s) IV Push two times a day rivaroxaban 15 milliGRAM(s) Oral with dinner tamsulosin 0.4 milliGRAM(s) Oral at bedtime   MEDICATIONS  (PRN): acetaminophen   IVPB .. 1000 milliGRAM(s) IV Intermittent once PRN Temp greater or equal to 38C (100.4F), Mild Pain (1 - 3) HYDROmorphone  Injectable 0.5 milliGRAM(s) IV Push every 4 hours PRN Severe Pain (7 - 10) ondansetron Injectable 4 milliGRAM(s) IV Push every 6 hours PRN Nausea oxyCODONE    IR 5 milliGRAM(s) Oral every 6 hours PRN Moderate Pain (4 - 6)   Vital Signs Last 24 Hrs T(C): 36.9 (2024 09:05), Max: 37.4 (2024 16:39) T(F): 98.4 (2024 09:05), Max: 99.3 (2024 16:39) HR: 104 (2024 09:05) (101 - 104) BP: 110/61 (2024 09:05) (102/60 - 132/63) BP(mean): 71 (2024 09:05) (71 - 72) RR: 18 (2024 09:05) (17 - 18) SpO2: 98% (2024 09:05) (98% - 100%)   Parameters below as of 2024 09:05 Patient On (Oxygen Delivery Method): room air     PHYSICAL EXAM: Constitutional: NAD, awake and alert HEENT:  EOMI,  Pupils round, No oral cyanosis. Pulmonary: Non-labored, breath sounds are clear bilaterally, No wheezing, rales or rhonchi Cardiovascular: Irregular, mild tachycardia. No calf tenderness Gastrointestinal: Bowel Sounds present, soft, nontender. Lymph: No peripheral edema. No cervical lymphadenopathy. Neurological: Alert, no focal deficits Skin: No rashes. Psych:  Mood & affect appropriate   LABS:  reviewed              10.3 9.42  )-----------( 226      ( 2024 08:08 )            32.7   01-   141  |  111<H>  |  12 ----------------------------<  106<H> 4.0   |  24  |  0.78   Ca    7.6<L>      2024 08:08 Phos  2.1     - Mg     2.0     -     - TroponinI hsT:     Tele: AF      Assessment and Plan: Nutritional Assessment: - Nutritional Assessment	This patient has been assessed with a concern for Malnutrition and has been determined to have a diagnosis/diagnoses of Severe protein-calorie malnutrition.   This patient is being managed with: Diet Low Fiber- Entered: 2024 10:40AM   Problem/Plan - 1: -  Problem: Atrial fibrillation. -  Plan: -  Problem: Atrial fibrillation. -  Recommendation: Paroxysmal -- currently in AF rate controlled after increasing metoprolol to 100 mg BID; continue anticoagulation.   pt. is stable from cardiac standpoint, will sign off.   Problem/Plan - 2: -  Problem: (HFpEF) heart failure with preserved ejection fraction. -  Plan: -  Problem: (HFpEF) heart failure with preserved ejection fraction. -  Recommendation: Stable.   Problem/Plan - 3: -  Problem: Hypertension. -  Plan: -  Problem: Hypertension. -  Recommendation: Controlled; decrease losartan to allow uptitration of metoprolol.   Attestation Statements: Attestation Statements: Attending and PA/NP shared services statement (NON-critical care):   Attending to bill.   I attest my time as attending is greater than 50% of the total combined time spent on qualifying patient care activities by the PA/NP and attending.   I have made amendments to the documentation where necessary. Additional comments: Agree with the above. Afib rate now controlled on increased beta blocker. Stable from cardiac standpoint.     Electronic Signatures: Inez Fernandez (DO)  (Signed 2024 16:54) 	Authored: Attestation Statements 	Co-Signer: Progress Note, Subjective and Objective, Assessment and Plan Roseanna Dunn (NP)  (Signed 2024 13:28) 	Authored: Progress Note, Subjective and Objective, Assessment and Plan     Last Updated: 2024 16:54 by Inez Fernandez (DO)

## 2024-01-25 ENCOUNTER — OUTPATIENT (OUTPATIENT)
Dept: OUTPATIENT SERVICES | Facility: HOSPITAL | Age: 84
LOS: 1 days | Discharge: HOME | End: 2024-01-25
Payer: MEDICARE

## 2024-01-25 VITALS — HEIGHT: 61 IN | WEIGHT: 160.06 LBS | RESPIRATION RATE: 16 BRPM | HEART RATE: 92 BPM | OXYGEN SATURATION: 99 %

## 2024-01-25 VITALS
DIASTOLIC BLOOD PRESSURE: 59 MMHG | RESPIRATION RATE: 16 BRPM | SYSTOLIC BLOOD PRESSURE: 104 MMHG | HEART RATE: 61 BPM | OXYGEN SATURATION: 98 %

## 2024-01-25 DIAGNOSIS — I48.91 UNSPECIFIED ATRIAL FIBRILLATION: ICD-10-CM

## 2024-01-25 DIAGNOSIS — Z98.890 OTHER SPECIFIED POSTPROCEDURAL STATES: Chronic | ICD-10-CM

## 2024-01-25 DIAGNOSIS — Z98.83 FILTERING (VITREOUS) BLEB AFTER GLAUCOMA SURGERY STATUS: Chronic | ICD-10-CM

## 2024-01-25 PROCEDURE — 92960 CARDIOVERSION ELECTRIC EXT: CPT

## 2024-01-25 PROCEDURE — 93325 DOPPLER ECHO COLOR FLOW MAPG: CPT

## 2024-01-25 PROCEDURE — 93320 DOPPLER ECHO COMPLETE: CPT

## 2024-01-25 PROCEDURE — 93312 ECHO TRANSESOPHAGEAL: CPT

## 2024-01-25 PROCEDURE — 93005 ELECTROCARDIOGRAM TRACING: CPT | Mod: XU

## 2024-01-25 PROCEDURE — 93010 ELECTROCARDIOGRAM REPORT: CPT

## 2024-01-25 NOTE — PACU DISCHARGE NOTE - COMMENTS
vss , pt without c/o discomfort, iv d/barrett , d/c teaching done with teach back pt being d/barrett with niдмитрий

## 2024-01-25 NOTE — CHART NOTE - NSCHARTNOTEFT_GEN_A_CORE
TRANSESOPHAGEAL ECHOCARDIOGRAPHY   AND ELECTRICAL CARDIOVERSION PROCEDURE NOTE    Indication: Atrial fibrillation    Sedation:   propofol, see anesthesiology note for details.    Findings:  -No left atrial or left atrial appendage thrombus.  -Normal EF  -Mild-mod MR  -No PFO  (see full report for details).    -After completion of procedure pt was cardioverted at   200 joules x1 & successfully converted to normal sinus rhythm    Complications: None

## 2024-01-26 NOTE — POST DISCHARGE NOTE - DETAILS:
no voicemail left no name on voicemail Post procedure phone call completed; patient understood all discharge paperwork. No questions regarding medications or pain management. MD follow up appointment made. Patient was able to rest when they were discharged. Patient will recommend [facility], no complaints of hospital stay, satisfied with care. Instructed patient to contact provider with any further questions or concerns.

## 2024-01-30 DIAGNOSIS — I48.91 UNSPECIFIED ATRIAL FIBRILLATION: ICD-10-CM

## 2024-02-02 ENCOUNTER — APPOINTMENT (OUTPATIENT)
Dept: CARDIOLOGY | Facility: CLINIC | Age: 84
End: 2024-02-02
Payer: MEDICARE

## 2024-02-02 ENCOUNTER — NON-APPOINTMENT (OUTPATIENT)
Age: 84
End: 2024-02-02

## 2024-02-02 VITALS
OXYGEN SATURATION: 99 % | BODY MASS INDEX: 29.48 KG/M2 | DIASTOLIC BLOOD PRESSURE: 70 MMHG | WEIGHT: 156 LBS | HEART RATE: 89 BPM | SYSTOLIC BLOOD PRESSURE: 110 MMHG

## 2024-02-02 PROCEDURE — 99214 OFFICE O/P EST MOD 30 MIN: CPT

## 2024-02-02 PROCEDURE — 93000 ELECTROCARDIOGRAM COMPLETE: CPT

## 2024-02-02 RX ORDER — LOSARTAN POTASSIUM 50 MG/1
50 TABLET, FILM COATED ORAL DAILY
Qty: 90 | Refills: 3 | Status: ACTIVE | COMMUNITY
Start: 1900-01-01 | End: 1900-01-01

## 2024-02-07 NOTE — PHYSICAL EXAM
[Well Developed] : well developed [Well Nourished] : well nourished [No Acute Distress] : no acute distress [Normal Conjunctiva] : normal conjunctiva [Normal Venous Pressure] : normal venous pressure [No Carotid Bruit] : no carotid bruit [No Murmur] : no murmur [No Gallop] : no gallop [No Rub] : no rub [Clear Lung Fields] : clear lung fields [Good Air Entry] : good air entry [No Respiratory Distress] : no respiratory distress  [Soft] : abdomen soft [Non Tender] : non-tender [No Masses/organomegaly] : no masses/organomegaly [Normal Bowel Sounds] : normal bowel sounds [Normal Gait] : normal gait [No Edema] : no edema [No Cyanosis] : no cyanosis [No Clubbing] : no clubbing [No Varicosities] : no varicosities [No Rash] : no rash [No Skin Lesions] : no skin lesions [Moves all extremities] : moves all extremities [No Focal Deficits] : no focal deficits [Normal Speech] : normal speech [Alert and Oriented] : alert and oriented [Normal memory] : normal memory [de-identified] : irregularly irregular rhythm.

## 2024-02-07 NOTE — HISTORY OF PRESENT ILLNESS
[FreeTextEntry1] : [id number]  JOSEMANUEL REYNOLDS  Dec 15 1940 [phone]   83 y/o  *afib-paroxysmal *CAD-nonobstructive-s/p NSTEMI Mar '20 peak trop 9 *HFpEF *HTN  Niece is present as with most visit, assists w/ history. had KJ & CV 1 week ago. no changes in symptoms. ECG today shows afib rates 50-60s.

## 2024-02-07 NOTE — ASSESSMENT
[FreeTextEntry1] :  A/P:  cont. current meds return in 7 weeks to reevaluate ECG    to see if amio results in chemical cardioversion.

## 2024-02-13 ENCOUNTER — APPOINTMENT (OUTPATIENT)
Dept: CARDIOLOGY | Facility: CLINIC | Age: 84
End: 2024-02-13

## 2024-02-16 NOTE — ED PROVIDER NOTE - CARE PROVIDER_API CALL
PROCEDURES:  Laparoscopic cholecystectomy 16-Feb-2024 17:04:01  Alie Jenkins  
Gloria Palomino  Orthopaedic Surgery  [address] [place] [address]  Phone: [phone]  Fax: [phone]  Follow Up Time: 1-3 Days

## 2024-03-01 NOTE — PATIENT PROFILE ADULT - FUNCTIONAL ASSESSMENT - BASIC MOBILITY 2.
Pt's  stated that pt has seen her abnormal lab results from her lipid panel and would like to know if you can call in a RX for high cholesterol    3 = A little assistance

## 2024-03-04 NOTE — ED PROVIDER NOTE - CRITICAL CARE ATTENDING CONTRIBUTION TO CARE
Refill request    Last seen: 02/15/2024    Next appt: 11/26/2024    Last refill : 01/24/2024    PDMP reviewed  Last dispensed: 01/26/2024   Elements for critical care include direct patient care (not related to procedure), additional history taking, interpretation of diagnostic studies, documentation, consultation with other physicians, consult w/ pt's family directly relating to pts condition

## 2024-03-19 ENCOUNTER — NON-APPOINTMENT (OUTPATIENT)
Age: 84
End: 2024-03-19

## 2024-03-19 ENCOUNTER — APPOINTMENT (OUTPATIENT)
Dept: CARDIOLOGY | Facility: CLINIC | Age: 84
End: 2024-03-19
Payer: MEDICARE

## 2024-03-19 VITALS
HEIGHT: 61 IN | DIASTOLIC BLOOD PRESSURE: 60 MMHG | OXYGEN SATURATION: 99 % | HEART RATE: 53 BPM | BODY MASS INDEX: 30.21 KG/M2 | SYSTOLIC BLOOD PRESSURE: 106 MMHG | WEIGHT: 160 LBS

## 2024-03-19 VITALS — HEART RATE: 88 BPM | SYSTOLIC BLOOD PRESSURE: 124 MMHG | DIASTOLIC BLOOD PRESSURE: 72 MMHG | OXYGEN SATURATION: 98 %

## 2024-03-19 PROCEDURE — 93000 ELECTROCARDIOGRAM COMPLETE: CPT

## 2024-03-19 PROCEDURE — G2211 COMPLEX E/M VISIT ADD ON: CPT

## 2024-03-19 PROCEDURE — 99214 OFFICE O/P EST MOD 30 MIN: CPT

## 2024-03-19 RX ORDER — AMIODARONE HYDROCHLORIDE 200 MG/1
200 TABLET ORAL DAILY
Qty: 90 | Refills: 3 | Status: ACTIVE | COMMUNITY
Start: 2024-01-25 | End: 1900-01-01

## 2024-03-19 RX ORDER — METOPROLOL TARTRATE 50 MG/1
50 TABLET, FILM COATED ORAL TWICE DAILY
Qty: 180 | Refills: 3 | Status: ACTIVE | COMMUNITY
Start: 2022-09-28 | End: 1900-01-01

## 2024-03-19 NOTE — HISTORY OF PRESENT ILLNESS
[FreeTextEntry1] : [id number] JOSEMANUEL REYNOLDS Dec 15 1940 [phone]  81 y/o  *afib-paroxysmal *CAD-nonobstructive-s/p NSTEMI Mar '20 peak trop 9 *HFpEF *HTN  here for followup for afib After about 7 weeks of amio pt converted to sinus rhythm. currently in sinus rhythm in 50s. Niece notes pt has more energy though pt not sure if feels any different.

## 2024-03-19 NOTE — PHYSICAL EXAM
[Well Developed] : well developed [Well Nourished] : well nourished [No Acute Distress] : no acute distress [Normal Conjunctiva] : normal conjunctiva [No Carotid Bruit] : no carotid bruit [Normal Venous Pressure] : normal venous pressure [No Rub] : no rub [No Murmur] : no murmur [No Gallop] : no gallop [No Respiratory Distress] : no respiratory distress  [Good Air Entry] : good air entry [Clear Lung Fields] : clear lung fields [Soft] : abdomen soft [Non Tender] : non-tender [No Masses/organomegaly] : no masses/organomegaly [Normal Bowel Sounds] : normal bowel sounds [Normal Gait] : normal gait [No Edema] : no edema [No Clubbing] : no clubbing [No Cyanosis] : no cyanosis [No Varicosities] : no varicosities [No Rash] : no rash [No Skin Lesions] : no skin lesions [Moves all extremities] : moves all extremities [No Focal Deficits] : no focal deficits [Normal Speech] : normal speech [Alert and Oriented] : alert and oriented [Normal memory] : normal memory [de-identified] : irregularly irregular rhythm.

## 2024-03-19 NOTE — ASSESSMENT
[FreeTextEntry1] :  A/P:  Reduce metoprolol frm 100 to 50 BID discussed risks of amio will cont. for now perhaps for 6-12 months.  Return in 4 months

## 2024-04-19 NOTE — H&P PST ADULT - SPO2 (%)
Flank Pain C/o bilat flank pain and blood in urine for weeks. Scheduled for CT tomorrow. States the pain is too bad to wait.      
99

## 2024-07-23 ENCOUNTER — APPOINTMENT (OUTPATIENT)
Dept: CARDIOLOGY | Facility: CLINIC | Age: 84
End: 2024-07-23
Payer: MEDICARE

## 2024-07-23 ENCOUNTER — NON-APPOINTMENT (OUTPATIENT)
Age: 84
End: 2024-07-23

## 2024-07-23 VITALS
OXYGEN SATURATION: 99 % | WEIGHT: 162 LBS | DIASTOLIC BLOOD PRESSURE: 72 MMHG | HEIGHT: 61 IN | BODY MASS INDEX: 30.58 KG/M2 | SYSTOLIC BLOOD PRESSURE: 120 MMHG | HEART RATE: 66 BPM

## 2024-07-23 PROCEDURE — 93000 ELECTROCARDIOGRAM COMPLETE: CPT

## 2024-07-23 PROCEDURE — 99214 OFFICE O/P EST MOD 30 MIN: CPT

## 2024-07-23 PROCEDURE — G2211 COMPLEX E/M VISIT ADD ON: CPT

## 2024-07-23 NOTE — ASSESSMENT
[FreeTextEntry1] : A/P:  82 y/o  *afib-paroxysmal -CHADS2-VASc5 (>75, HTN, CHF,CAD-nonobstructive) -NSR on amio -cont. amio, xarelto, metoprolol. will likely continue amio for 12 months.  *CAD-nonobstructive-s/p NSTEMI Mar '20 peak trop 9 -cont. ASA, staitn.  *HFpEF -cont. BP control no lasix -consider SGLT2 in future.  *HTN -controlled.  -Return in 4 months eventually will consider d/cing amio in 1 yr.

## 2024-07-23 NOTE — PHYSICAL EXAM

## 2024-07-23 NOTE — HISTORY OF PRESENT ILLNESS
[FreeTextEntry1] : [id number] JOSEMANUEL REYNOLDS Dec 15 1940 [phone]  82 y/o  *afib-paroxysmal *CAD-nonobstructive-s/p NSTEMI Mar '20 peak trop 9 *HFpEF *HTN  no new cardiac symptoms: chest pain, dyspnea, palpitations/syncope. Does ADLS around the house, does laundry no problems.   Meds reviewed: amio 200 dailiy, lipitor 20 qhs, losartan 50 daily, metoprolol tartrate 50 BID xarelto 15 mg daily. Nenando states she missed amio for about 2 weeks then restarted.  ECG today NSR QTc 440s no ischemia. PCP: park (Marcelina retired in Oct '23).  ============================================ ============================================ *EK20, NSR, NSST changes *Echo : EF 55-60% mild LVH mild diast. dysfx, AV sclerosis, mild-mod TR PASP 36 mmHg. *Echo: 3/30/20, 1-2 + TR,, moderate pulmonary hypertension LVEF 60%. *Cardiac Cath: 3/30/20, Non-obstructive CAD NL V FX *May '22 labs: LDL 74 HDL 65 , . *Aug '20 Hb 11.2 plat 245 LDL 81 HDL 63   Cr 1.12 LFTs WNL CPK 81 TSH 2.39 HbA1c 5.8 (preDM) *BP monitor May '22: ave 114/61, awake 111/60, asleep 120/63  *Jaimee Michael present during all of visits. ============================================ ============================================ HISTORY *Admited for afib Mar 17th '20, d/barrett after 2 days admited 2 days later w/ NSTEMI peak trop 9.73. Followed up w/ andie in clinic until .  *Mar '21-initial visit-transferred care from Dr. Han. cindy to evaluate afib burden. *-no afib.  *Aug '21-metoprolo. increased 50 to 75  trial of lasix 20 mg QOD *Dec '21-lasix d/c'd changed to PRN *may '22-  hr BP cuff *-  cuff ave 114/61 *-Echo  normal *May '23-no changes  *-PCP noted anemia Hb 6.6 colonoscopy showed cancer  cleared for hemicolectomy *-after hemicolectomy postop went into afib. rate controlled discharged. saw in clinic  was still in afib. KJ & CV planned *-saw in clinic 1 week after KJ  &CV afib 50-60s in clinic amio load started return in 7 weeks to see if chemically converts *Mar '24-after 7 weeks of amio pt converted to sinus ECG w/ sinus in 50s.

## 2024-07-23 NOTE — HISTORY OF PRESENT ILLNESS
[FreeTextEntry1] : [id number] JOSEMANUEL REYNOLDS Dec 15 1940 [phone]  84 y/o  *afib-paroxysmal *CAD-nonobstructive-s/p NSTEMI Mar '20 peak trop 9 *HFpEF *HTN  no new cardiac symptoms: chest pain, dyspnea, palpitations/syncope. Does ADLS around the house, does laundry no problems.   Meds reviewed: amio 200 dailiy, lipitor 20 qhs, losartan 50 daily, metoprolol tartrate 50 BID xarelto 15 mg daily. Nenando states she missed amio for about 2 weeks then restarted.  ECG today NSR QTc 440s no ischemia. PCP: park (Marcelina retired in Oct '23).  ============================================ ============================================ *EK20, NSR, NSST changes *Echo : EF 55-60% mild LVH mild diast. dysfx, AV sclerosis, mild-mod TR PASP 36 mmHg. *Echo: 3/30/20, 1-2 + TR,, moderate pulmonary hypertension LVEF 60%. *Cardiac Cath: 3/30/20, Non-obstructive CAD NL V FX *May '22 labs: LDL 74 HDL 65 , . *Aug '20 Hb 11.2 plat 245 LDL 81 HDL 63   Cr 1.12 LFTs WNL CPK 81 TSH 2.39 HbA1c 5.8 (preDM) *BP monitor May '22: ave 114/61, awake 111/60, asleep 120/63  *Jaimee Michael present during all of visits. ============================================ ============================================ HISTORY *Admited for afib Mar 17th '20, d/barrett after 2 days admited 2 days later w/ NSTEMI peak trop 9.73. Followed up w/ andie in clinic until .  *Mar '21-initial visit-transferred care from Dr. Han. cindy to evaluate afib burden. *-no afib.  *Aug '21-metoprolo. increased 50 to 75  trial of lasix 20 mg QOD *Dec '21-lasix d/c'd changed to PRN *may '22-  hr BP cuff *-  cuff ave 114/61 *-Echo  normal *May '23-no changes  *-PCP noted anemia Hb 6.6 colonoscopy showed cancer  cleared for hemicolectomy *-after hemicolectomy postop went into afib. rate controlled discharged. saw in clinic  was still in afib. KJ & CV planned *-saw in clinic 1 week after KJ  &CV afib 50-60s in clinic amio load started return in 7 weeks to see if chemically converts *Mar '24-after 7 weeks of amio pt converted to sinus ECG w/ sinus in 50s.

## 2024-07-23 NOTE — PHYSICAL EXAM

## 2024-10-30 RX ORDER — AMOXICILLIN 500 MG
1 CAPSULE ORAL
Refills: 0 | DISCHARGE
Start: 2024-10-30

## 2024-10-31 NOTE — PATIENT PROFILE ADULT - INTERNATIONAL TRAVEL
10/31/2024      HPI:    8-29-14: Patient presents for initial medical evaluation. Patient is followed on a regular basis by Dr. Michael. S/p hospitalization for ARF/Dehydration/ urosepsis, NSTEMI with mildly elevated troponin. S/p normal nuclear stress test with normal LVF. Echo with normal LVF, mild TR, grade I DD. 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  No hx of LE procedures. No hx of LHC. Pt denies chest pain, dyspnea, dyspnea on exertion, change in exercise capacity, fatigue,  nausea, vomiting, diarrhea, constipation, motor weakness, insomnia, weight loss, syncope, dizziness, lightheadedness, palpitations, PND, orthopnea. On Eliquis due to PAF on presentation to ED, currently in NSR. Hx of ablation at [facility], ? For svt.      9-25-24: as above, s/p holter monitor with no signs of atrial fibb. Feeling good overall. Will start to go to cardiac rehab. Pt denies chest pain, dyspnea, dyspnea on exertion, change in exercise capacity, fatigue,  nausea, vomiting, diarrhea, constipation, motor weakness, insomnia, weight loss, syncope, dizziness, lightheadedness, palpitations, PND, orthopnea, or claudication. No bleedign issues on coumadin.      12-19-14: as above, doing well overall. Enjoying cardiac rehab. EKG is in NSR today. Pt denies chest pain, dyspnea, dyspnea on exertion, change in exercise capacity, fatigue,  nausea, vomiting, diarrhea, constipation, motor weakness, insomnia, weight loss, syncope, dizziness, lightheadedness, palpitations, PND, orthopnea, or claudication.     3-19-15: as above, doing well overall. We had stopped coumadin a while back, she would like take Volatren for arthiritis issues.      6-26-15: as above, states she's been very SOB with very minimal exertion, started about a month ago and progressively getting worse. In January she states she was doing 2 hours of exercise and now cant even do 5 minutes.  States her legs have also been swollen and her lasix dose was  No

## 2024-11-07 ENCOUNTER — INPATIENT (INPATIENT)
Facility: HOSPITAL | Age: 84
LOS: 4 days | Discharge: HOME | DRG: 371 | End: 2024-11-12
Attending: HOSPITALIST | Admitting: STUDENT IN AN ORGANIZED HEALTH CARE EDUCATION/TRAINING PROGRAM
Payer: MEDICARE

## 2024-11-07 VITALS
OXYGEN SATURATION: 97 % | DIASTOLIC BLOOD PRESSURE: 55 MMHG | TEMPERATURE: 98 F | RESPIRATION RATE: 18 BRPM | SYSTOLIC BLOOD PRESSURE: 121 MMHG | HEART RATE: 55 BPM

## 2024-11-07 DIAGNOSIS — Z98.890 OTHER SPECIFIED POSTPROCEDURAL STATES: Chronic | ICD-10-CM

## 2024-11-07 DIAGNOSIS — N39.0 URINARY TRACT INFECTION, SITE NOT SPECIFIED: ICD-10-CM

## 2024-11-07 DIAGNOSIS — Z98.83 FILTERING (VITREOUS) BLEB AFTER GLAUCOMA SURGERY STATUS: Chronic | ICD-10-CM

## 2024-11-07 LAB
ALBUMIN SERPL ELPH-MCNC: 3.4 G/DL — SIGNIFICANT CHANGE UP (ref 3.3–5)
ALP SERPL-CCNC: 82 U/L — SIGNIFICANT CHANGE UP (ref 40–120)
ALT FLD-CCNC: 25 U/L — SIGNIFICANT CHANGE UP (ref 12–78)
ANION GAP SERPL CALC-SCNC: 6 MMOL/L — SIGNIFICANT CHANGE UP (ref 5–17)
APPEARANCE UR: CLEAR — SIGNIFICANT CHANGE UP
AST SERPL-CCNC: 18 U/L — SIGNIFICANT CHANGE UP (ref 15–37)
BACTERIA # UR AUTO: NEGATIVE /HPF — SIGNIFICANT CHANGE UP
BASOPHILS # BLD AUTO: 0.05 K/UL — SIGNIFICANT CHANGE UP (ref 0–0.2)
BASOPHILS NFR BLD AUTO: 0.5 % — SIGNIFICANT CHANGE UP (ref 0–2)
BILIRUB SERPL-MCNC: 0.5 MG/DL — SIGNIFICANT CHANGE UP (ref 0.2–1.2)
BILIRUB UR-MCNC: NEGATIVE — SIGNIFICANT CHANGE UP
BUN SERPL-MCNC: 23 MG/DL — SIGNIFICANT CHANGE UP (ref 7–23)
CALCIUM SERPL-MCNC: 9.1 MG/DL — SIGNIFICANT CHANGE UP (ref 8.5–10.1)
CAST: 0 /LPF — SIGNIFICANT CHANGE UP (ref 0–4)
CHLORIDE SERPL-SCNC: 99 MMOL/L — SIGNIFICANT CHANGE UP (ref 96–108)
CO2 SERPL-SCNC: 25 MMOL/L — SIGNIFICANT CHANGE UP (ref 22–31)
COLOR SPEC: YELLOW — SIGNIFICANT CHANGE UP
CREAT SERPL-MCNC: 1.37 MG/DL — HIGH (ref 0.5–1.3)
DIFF PNL FLD: ABNORMAL
EGFR: 39 ML/MIN/1.73M2 — LOW
EOSINOPHIL # BLD AUTO: 0.02 K/UL — SIGNIFICANT CHANGE UP (ref 0–0.5)
EOSINOPHIL NFR BLD AUTO: 0.2 % — SIGNIFICANT CHANGE UP (ref 0–6)
FLUAV AG NPH QL: SIGNIFICANT CHANGE UP
FLUBV AG NPH QL: SIGNIFICANT CHANGE UP
GLUCOSE SERPL-MCNC: 90 MG/DL — SIGNIFICANT CHANGE UP (ref 70–99)
GLUCOSE UR QL: NEGATIVE MG/DL — SIGNIFICANT CHANGE UP
HCT VFR BLD CALC: 44.2 % — SIGNIFICANT CHANGE UP (ref 34.5–45)
HGB BLD-MCNC: 14.4 G/DL — SIGNIFICANT CHANGE UP (ref 11.5–15.5)
IMM GRANULOCYTES NFR BLD AUTO: 0.7 % — SIGNIFICANT CHANGE UP (ref 0–0.9)
KETONES UR-MCNC: NEGATIVE MG/DL — SIGNIFICANT CHANGE UP
LEUKOCYTE ESTERASE UR-ACNC: ABNORMAL
LIDOCAIN IGE QN: 45 U/L — SIGNIFICANT CHANGE UP (ref 13–75)
LYMPHOCYTES # BLD AUTO: 0.49 K/UL — LOW (ref 1–3.3)
LYMPHOCYTES # BLD AUTO: 4.9 % — LOW (ref 13–44)
MAGNESIUM SERPL-MCNC: 2.3 MG/DL — SIGNIFICANT CHANGE UP (ref 1.6–2.6)
MANUAL SMEAR VERIFICATION: SIGNIFICANT CHANGE UP
MCHC RBC-ENTMCNC: 29.1 PG — SIGNIFICANT CHANGE UP (ref 27–34)
MCHC RBC-ENTMCNC: 32.6 G/DL — SIGNIFICANT CHANGE UP (ref 32–36)
MCV RBC AUTO: 89.5 FL — SIGNIFICANT CHANGE UP (ref 80–100)
MONOCYTES # BLD AUTO: 1.14 K/UL — HIGH (ref 0–0.9)
MONOCYTES NFR BLD AUTO: 11.4 % — SIGNIFICANT CHANGE UP (ref 2–14)
NEUTROPHILS # BLD AUTO: 8.2 K/UL — HIGH (ref 1.8–7.4)
NEUTROPHILS NFR BLD AUTO: 82.3 % — HIGH (ref 43–77)
NITRITE UR-MCNC: NEGATIVE — SIGNIFICANT CHANGE UP
PH UR: 6.5 — SIGNIFICANT CHANGE UP (ref 5–8)
PHOSPHATE SERPL-MCNC: 3.1 MG/DL — SIGNIFICANT CHANGE UP (ref 2.5–4.5)
PLAT MORPH BLD: NORMAL — SIGNIFICANT CHANGE UP
PLATELET # BLD AUTO: 217 K/UL — SIGNIFICANT CHANGE UP (ref 150–400)
POTASSIUM SERPL-MCNC: 4.2 MMOL/L — SIGNIFICANT CHANGE UP (ref 3.5–5.3)
POTASSIUM SERPL-SCNC: 4.2 MMOL/L — SIGNIFICANT CHANGE UP (ref 3.5–5.3)
PROT SERPL-MCNC: 7.8 GM/DL — SIGNIFICANT CHANGE UP (ref 6–8.3)
PROT UR-MCNC: NEGATIVE MG/DL — SIGNIFICANT CHANGE UP
RBC # BLD: 4.94 M/UL — SIGNIFICANT CHANGE UP (ref 3.8–5.2)
RBC # FLD: 15 % — HIGH (ref 10.3–14.5)
RBC BLD AUTO: NORMAL — SIGNIFICANT CHANGE UP
RBC CASTS # UR COMP ASSIST: 3 /HPF — SIGNIFICANT CHANGE UP (ref 0–4)
RSV RNA NPH QL NAA+NON-PROBE: SIGNIFICANT CHANGE UP
SARS-COV-2 RNA SPEC QL NAA+PROBE: SIGNIFICANT CHANGE UP
SODIUM SERPL-SCNC: 130 MMOL/L — LOW (ref 135–145)
SP GR SPEC: 1.03 — SIGNIFICANT CHANGE UP (ref 1–1.03)
SQUAMOUS # UR AUTO: 1 /HPF — SIGNIFICANT CHANGE UP (ref 0–5)
TROPONIN I, HIGH SENSITIVITY RESULT: 10.56 NG/L — SIGNIFICANT CHANGE UP
UROBILINOGEN FLD QL: 0.2 MG/DL — SIGNIFICANT CHANGE UP (ref 0.2–1)
WBC # BLD: 9.97 K/UL — SIGNIFICANT CHANGE UP (ref 3.8–10.5)
WBC # FLD AUTO: 9.97 K/UL — SIGNIFICANT CHANGE UP (ref 3.8–10.5)
WBC UR QL: 11 /HPF — HIGH (ref 0–5)

## 2024-11-07 PROCEDURE — 93010 ELECTROCARDIOGRAM REPORT: CPT

## 2024-11-07 PROCEDURE — 97162 PT EVAL MOD COMPLEX 30 MIN: CPT | Mod: GP

## 2024-11-07 PROCEDURE — 84100 ASSAY OF PHOSPHORUS: CPT

## 2024-11-07 PROCEDURE — 80048 BASIC METABOLIC PNL TOTAL CA: CPT

## 2024-11-07 PROCEDURE — 82150 ASSAY OF AMYLASE: CPT

## 2024-11-07 PROCEDURE — 83880 ASSAY OF NATRIURETIC PEPTIDE: CPT

## 2024-11-07 PROCEDURE — 82306 VITAMIN D 25 HYDROXY: CPT

## 2024-11-07 PROCEDURE — 87045 FECES CULTURE AEROBIC BACT: CPT

## 2024-11-07 PROCEDURE — 82140 ASSAY OF AMMONIA: CPT

## 2024-11-07 PROCEDURE — 74177 CT ABD & PELVIS W/CONTRAST: CPT | Mod: 26,MC

## 2024-11-07 PROCEDURE — 87040 BLOOD CULTURE FOR BACTERIA: CPT

## 2024-11-07 PROCEDURE — 97116 GAIT TRAINING THERAPY: CPT | Mod: GP

## 2024-11-07 PROCEDURE — 83690 ASSAY OF LIPASE: CPT

## 2024-11-07 PROCEDURE — 99285 EMERGENCY DEPT VISIT HI MDM: CPT

## 2024-11-07 PROCEDURE — 97530 THERAPEUTIC ACTIVITIES: CPT | Mod: GP

## 2024-11-07 PROCEDURE — 36415 COLL VENOUS BLD VENIPUNCTURE: CPT

## 2024-11-07 PROCEDURE — 87507 IADNA-DNA/RNA PROBE TQ 12-25: CPT

## 2024-11-07 PROCEDURE — 70450 CT HEAD/BRAIN W/O DYE: CPT | Mod: 26,MC

## 2024-11-07 PROCEDURE — 82746 ASSAY OF FOLIC ACID SERUM: CPT

## 2024-11-07 PROCEDURE — 80053 COMPREHEN METABOLIC PANEL: CPT

## 2024-11-07 PROCEDURE — 85027 COMPLETE CBC AUTOMATED: CPT

## 2024-11-07 PROCEDURE — 71045 X-RAY EXAM CHEST 1 VIEW: CPT | Mod: 26

## 2024-11-07 PROCEDURE — 84145 PROCALCITONIN (PCT): CPT

## 2024-11-07 PROCEDURE — 83605 ASSAY OF LACTIC ACID: CPT

## 2024-11-07 PROCEDURE — 85025 COMPLETE CBC W/AUTO DIFF WBC: CPT

## 2024-11-07 PROCEDURE — 83735 ASSAY OF MAGNESIUM: CPT

## 2024-11-07 PROCEDURE — 74018 RADEX ABDOMEN 1 VIEW: CPT

## 2024-11-07 PROCEDURE — 84484 ASSAY OF TROPONIN QUANT: CPT

## 2024-11-07 PROCEDURE — 82607 VITAMIN B-12: CPT

## 2024-11-07 PROCEDURE — 99223 1ST HOSP IP/OBS HIGH 75: CPT

## 2024-11-07 PROCEDURE — 82728 ASSAY OF FERRITIN: CPT

## 2024-11-07 PROCEDURE — 84443 ASSAY THYROID STIM HORMONE: CPT

## 2024-11-07 PROCEDURE — 82550 ASSAY OF CK (CPK): CPT

## 2024-11-07 PROCEDURE — 86140 C-REACTIVE PROTEIN: CPT

## 2024-11-07 RX ORDER — RIVAROXABAN 20 MG/1
15 TABLET, FILM COATED ORAL
Refills: 0 | Status: DISCONTINUED | OUTPATIENT
Start: 2024-11-07 | End: 2024-11-12

## 2024-11-07 RX ORDER — SODIUM CHLORIDE 9 MG/ML
500 INJECTION, SOLUTION INTRAMUSCULAR; INTRAVENOUS; SUBCUTANEOUS ONCE
Refills: 0 | Status: COMPLETED | OUTPATIENT
Start: 2024-11-07 | End: 2024-11-07

## 2024-11-07 RX ORDER — METOPROLOL TARTRATE 50 MG
50 TABLET ORAL
Refills: 0 | Status: DISCONTINUED | OUTPATIENT
Start: 2024-11-07 | End: 2024-11-08

## 2024-11-07 RX ORDER — ACETAMINOPHEN 500 MG
650 TABLET ORAL EVERY 6 HOURS
Refills: 0 | Status: DISCONTINUED | OUTPATIENT
Start: 2024-11-07 | End: 2024-11-12

## 2024-11-07 RX ORDER — POLYETHYLENE GLYCOL 3350 17 G/17G
17 POWDER, FOR SOLUTION ORAL DAILY
Refills: 0 | Status: DISCONTINUED | OUTPATIENT
Start: 2024-11-07 | End: 2024-11-11

## 2024-11-07 RX ORDER — CEFTRIAXONE SODIUM 10 G
1000 VIAL (EA) INJECTION ONCE
Refills: 0 | Status: COMPLETED | OUTPATIENT
Start: 2024-11-07 | End: 2024-11-07

## 2024-11-07 RX ORDER — AMIODARONE HCL 200 MG
200 TABLET ORAL DAILY
Refills: 0 | Status: DISCONTINUED | OUTPATIENT
Start: 2024-11-07 | End: 2024-11-12

## 2024-11-07 RX ORDER — ACYCLOVIR 400 MG
800 TABLET ORAL
Refills: 0 | Status: DISCONTINUED | OUTPATIENT
Start: 2024-11-07 | End: 2024-11-09

## 2024-11-07 RX ORDER — DIPHENHYDRAMINE HCL 12.5MG/5ML
25 ELIXIR ORAL ONCE
Refills: 0 | Status: COMPLETED | OUTPATIENT
Start: 2024-11-07 | End: 2024-11-07

## 2024-11-07 RX ORDER — ACYCLOVIR 400 MG
750 TABLET ORAL ONCE
Refills: 0 | Status: COMPLETED | OUTPATIENT
Start: 2024-11-07 | End: 2024-11-07

## 2024-11-07 RX ORDER — SENNA 187 MG
2 TABLET ORAL AT BEDTIME
Refills: 0 | Status: DISCONTINUED | OUTPATIENT
Start: 2024-11-07 | End: 2024-11-11

## 2024-11-07 RX ORDER — CEFTRIAXONE SODIUM 10 G
1000 VIAL (EA) INJECTION ONCE
Refills: 0 | Status: DISCONTINUED | OUTPATIENT
Start: 2024-11-07 | End: 2024-11-07

## 2024-11-07 RX ORDER — MELATONIN 5 MG
3 TABLET ORAL AT BEDTIME
Refills: 0 | Status: DISCONTINUED | OUTPATIENT
Start: 2024-11-07 | End: 2024-11-12

## 2024-11-07 RX ORDER — ONDANSETRON HYDROCHLORIDE 2 MG/ML
4 INJECTION, SOLUTION INTRAMUSCULAR; INTRAVENOUS EVERY 8 HOURS
Refills: 0 | Status: DISCONTINUED | OUTPATIENT
Start: 2024-11-07 | End: 2024-11-09

## 2024-11-07 RX ORDER — MAGNESIUM, ALUMINUM HYDROXIDE 200-200 MG
30 TABLET,CHEWABLE ORAL EVERY 4 HOURS
Refills: 0 | Status: DISCONTINUED | OUTPATIENT
Start: 2024-11-07 | End: 2024-11-12

## 2024-11-07 RX ADMIN — SODIUM CHLORIDE 500 MILLILITER(S): 9 INJECTION, SOLUTION INTRAMUSCULAR; INTRAVENOUS; SUBCUTANEOUS at 12:01

## 2024-11-07 RX ADMIN — RIVAROXABAN 15 MILLIGRAM(S): 20 TABLET, FILM COATED ORAL at 17:53

## 2024-11-07 RX ADMIN — Medication 20 MILLIGRAM(S): at 23:00

## 2024-11-07 RX ADMIN — Medication 25 MILLIGRAM(S): at 14:43

## 2024-11-07 RX ADMIN — SODIUM CHLORIDE 500 MILLILITER(S): 9 INJECTION, SOLUTION INTRAMUSCULAR; INTRAVENOUS; SUBCUTANEOUS at 14:44

## 2024-11-07 RX ADMIN — Medication 800 MILLIGRAM(S): at 21:10

## 2024-11-07 RX ADMIN — Medication 2 TABLET(S): at 23:00

## 2024-11-07 RX ADMIN — Medication 1000 MILLIGRAM(S): at 16:12

## 2024-11-07 RX ADMIN — Medication 165 MILLIGRAM(S): at 14:43

## 2024-11-07 NOTE — ED STATDOCS - PROGRESS NOTE DETAILS
Mookie Jo for attending Dr. Garcia  83 year old female with PMHx of A-fib, MI, HTN, HLD and cecal lesion presenting to the ED in a wheelchair with niece c/o weakness, RLQ abdominal pain, nausea, diarrhea (multiple times a day) and vomiting x1 since Tuesday morning. Niece states pt completed one week course of amoxicillin that was started last Wednesday s/p tooth extraction this Tuesday morning (Of note pt only took two pills a day instead of three). Pt has been tolerating PO intake but has had reduced appetite. Niece also states pt has a rash on her back and underneath her breast. Pt normally ambulates with a cane at baseline when away from home but has been using around the house recently due to weakness. Pt is now having difficultly ambulating on her own. Will send to [facility] for further workup and evaluation.

## 2024-11-07 NOTE — ED PROVIDER NOTE - WR ORDER ID 1
Yes, did have a full set of labs in February so I do not need everything.  But do need a lipid panel for hyperlipidemia and CMP for chronic renal insufficiency stage IIIb, and an A1c for diagnosis of hyperglycemia.  Please order, thank you    Lab Results   Component Value Date    HGBA1C 5.9 (H) 05/11/2021        [id number]

## 2024-11-07 NOTE — ED PROVIDER NOTE - PHYSICAL EXAMINATION
General: Patient in no acute distress, AAOX3.   HENMT: NC/AT, no nasal congestion, MMM  Neck: supple  CVS: regular rate and rhythm, no murmur  Resp: Good air entry bilaterally, No wheeze/rhonchi.  Abd: Soft  non distended, +bowel sounds, No guarding, rebound tenderness. RLQ TTP  Ext: FROM in all ext, 2+ pulses throughout, cap refill<2 sec.  BACK: no midline tenderness, no stepoffs, no CVA ttp  NEURO: no focal deficit, gross motor and sensory intact throughout, gait stable.   SKIN: Macular papular 5x5 cm rash right mid back and under right breast. No discharge.

## 2024-11-07 NOTE — ED PROVIDER NOTE - PROGRESS NOTE DETAILS
Na 130. s/p 1 L IVF. Patient's  rash concerning for herpes zoster.  Will treat with acyclovir.. urine positive for UTI , s/p abx, will admit.   This patient is being admitted as an inpatient and the hospital admission will be required for at least 2 midnights.  This admission and its relevant details have been verbally relayed to the Admitting Team.

## 2024-11-07 NOTE — ED ADULT NURSE NOTE - DOES PATIENT HAVE ADVANCE DIRECTIVE
BPA 4 and 16 used   Caregiver called to report the following:     -chills and temp 101.7 started a few hours ago  -on IV abx for UTI   -hx of kidney Txp   -urinating fine   -denies difficulty breathing, cp   -advised to report to ED   -on call provider notified and advised pt report to ED  -cg states ok      Reason for Disposition   Transplant patient (e.g., kidney, liver, lung, heart)    Protocols used: FEVER-A-AH      
No

## 2024-11-07 NOTE — ED ADULT NURSE NOTE - HOW PATIENT ADDRESSED, PROFILE
Spoke with mother and she reports that patient started having a fever last night- got up to 101.  This morning temp is 100.2  Wondering if he should have COVID testing done.  Plays sports.  Advised mother to check with sports program. They may require testing.  Testing site information given.   Caroline

## 2024-11-07 NOTE — ED PROVIDER NOTE - CLINICAL SUMMARY MEDICAL DECISION MAKING FREE TEXT BOX
84 y/o female with a PMHx of Afib, ASHD, cecal lesion, glaucoma, HTN, HLD, OA presents to the ED c/o generalized weakness, RLQ pain, n/v/d and rash. Will r/o colitis vs diverticulitis vs ACS vs electrolyte abnormality vs allergic reaction. Will get labs, imaging, meds, reassess.

## 2024-11-07 NOTE — ED ADULT TRIAGE NOTE - AVIAN FLU SYMPTOMS
“You can access the Follow[facility] Patient Portal, offered by [facility], by registering with the following website: [email or web address]”
No

## 2024-11-07 NOTE — H&P ADULT - ASSESSMENT
83 year old woman with a history of atrial fibrillation, non-obstructive CAD with NSTEMI (2020), HFpEF, HTN presenting to the ED in a wheelchair with niece c/o weakness, RLQ abdominal pain, nausea, diarrhea (multiple times a day) and vomiting x1 pt poor historian found to have shingles + EILEEN     #Diarrhea   Pt poor historian so unclear if she was actually having diarrhea but has recent abx use. CT notable for interval partial right hemicolectomy with ileocolic anastomosis and constipated right colon and colonic diverticulosis without diverticulitis.   -can consider c diff, gi pcr if diarrhea resume but based on imaging pt actually has constipation   -start bowel reg: miralax + senna     #EILEEN   #UTI  Pt with EILEEN baseline Cr 0.87 Cr on admission 1.37 and Na of 130 likely pre-renal. Ua notable for leuks and blood   - cw ctx   - hold losartan 50 mg     #HFpEF EF 55% (1/2024)  #Afib   #HLD  - cw home amiodarone   - cw home rivaroxaban 15 mg   - cw home metoprolol 50 mg bid   - cw home atorvastatin     #Shingles   Unclear for how long pt had the rash but given age >65 can treat with oral acycolvir   - s/p acyclovir 750 mg IV  - start acyclovir 800 mg five times a day for 7 days     #Diet: Regular   #DVT ppx: full ac as above   #Code: FULL  #NOK: HCP niдмитрий Workman

## 2024-11-07 NOTE — H&P ADULT - HISTORY OF PRESENT ILLNESS
HPI:  83 year old woman with a history of atrial fibrillation, non-obstructive CAD with NSTEMI (), HFpEF, HTN presenting to the ED in a wheelchair with niece c/o weakness, RLQ abdominal pain, nausea, diarrhea (multiple times a day) and vomiting x1 since Tuesday morning. Niece states pt completed one week course of amoxicillin that was started last Wednesday s/p tooth extraction this Tuesday morning (Of note pt only took two pills a day instead of three). Pt has been tolerating PO intake but has had reduced appetite. Niece also states pt has a rash on her back and underneath her breast. Pt normally ambulates with a cane at baseline when away from home but has been using around the house recently due to weakness. Pt is now having difficultly ambulating on her own. Pt is poor historian so most of this information provided from chart review. Pt does report some weakness and fatigue prior to coming in back pain in the area of the rash where she was found to have shingles.     In ED   Vitals T 98.5 /55 HR 55 RR 18 Spo2 97%   CBC 9.97|14.4|44.2|217   |4.2|99|25|23|1.37|90  UA + blood leuks  CTH: No evidence of acute intracranial hemorrhage or hydrocephalus. Atrophy   with white matter ischemic changes.  CT Abdomen and pelvis: Interval partial right hemicolectomy with ileocolic anastomosis.   Constipated right colon. Colonic diverticulosis without diverticulitis. 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  EKG sinus bradycardia qtc 445      PAST MEDICAL & SURGICAL HISTORY:  HTN (hypertension)  Glaucoma  OA (osteoarthritis)  Arteriosclerotic heart disease (ASHD)  Hyperlipidemia  Cecal lesion  Afib  Status post glaucoma surgery  and cataract  S/P knee surgery  H/O wrist surgery        FAMILY HISTORY:  FHx: colon cancer  in brother,     FH: pancreatic cancer  in sister,     FH: CAD (coronary artery disease)  in mother and father, both       Social History:  denies any alcohol/illicit drug use    Allergies    No Known Allergies    Intolerances        MEDICATIONS  (STANDING):  acyclovir   Oral Tab/Cap 800 milliGRAM(s) Oral five times a day  aMIOdarone    Tablet 200 milliGRAM(s) Oral daily  atorvastatin 20 milliGRAM(s) Oral at bedtime  metoprolol tartrate 50 milliGRAM(s) Oral two times a day  polyethylene glycol 3350 17 Gram(s) Oral daily  rivaroxaban 15 milliGRAM(s) Oral with dinner  senna 2 Tablet(s) Oral at bedtime    MEDICATIONS  (PRN):  acetaminophen     Tablet .. 650 milliGRAM(s) Oral every 6 hours PRN Temp greater or equal to 38C (100.4F), Mild Pain (1 - 3)  aluminum hydroxide/magnesium hydroxide/simethicone Suspension 30 milliLiter(s) Oral every 4 hours PRN Dyspepsia  melatonin 3 milliGRAM(s) Oral at bedtime PRN Insomnia  ondansetron Injectable 4 milliGRAM(s) IV Push every 8 hours PRN Nausea and/or Vomiting      ROS:  General:  No fevers, chills, or unexplained weight loss  Skin: No rash or bothersome skin lesions  Musculoskeletal: No arthalgias, myalgias or joint swelling  Eyes: No visual changes or eye pain  Ears: No hearing loss , otorrhea or ear pain  Nose, Mouth, Throat: No nasal congestion, rhinorrhea, oral lesions, postnasal drip or sore throat  Cardio: No chest pain or palpitations. no lower extremity edema. no syncope. no claudication.   Respiratory: No cough, shortness of breath or wheezing   GI: vomiting   : No urinary frequency, hematuria, incontinence, or dysuria  Neurologic: No headaches, parasthesias, confusion, dysarthria or gait instability  Psychiatric:  No anxiety or depression  Lymphatic:  No easy bruising, easy bleeding or swollen glands  Allergic: No itching, sneezing , watery eyes, clear rhinorrhea or recurrent infections    PEx  T(C): 36.9 (24 @ 10:11), Max: 36.9 (24 @ 10:11)  HR: 55 (24 @ 10:11) (55 - 55)  BP: 121/55 (24 @ 10:11) (121/55 - 121/55)  RR: 18 (24 @ 10:11) (18 - 18)  SpO2: 97% (24 @ 10:11) (97% - 97%)  Wt(kg): --  General:     no acute distress, no identifying marks , scars, or tattoos.  Skin: no rash or prominent lesions  Head: normocephalic, atraumatic     Sinuses: non-tender  Nose: no external lesions, mucosa non-inflamed, septum and turbinates normal  Throat: no erythema, exudates or lesions.  Neck: Supple without lymphadenopathy. Thyroid no thyromegaly, no palpable thyroid nodules, no palpable nodules or masses, carotid arteries no bruits.   Breasts: No palpable masses or lesions.  Heart: RRR, no murmur or gallop.  Normal S1, S2.  No S3, S4.   Lungs: CTA bilaterally, no wheezes, rhonchi, rales.  Breathing unlabored.   Chest wall: Normal insp   Abdomen:  Soft, NT/ND, normal bowel sounds, no HSM, no masses.  No peritoneal signs.   Back: crusted over rash in T7/T6 Dermatome  : Exam normal.  no inguinal hernias.  Extremities: No deformities, clubbing, cyanosis, or edema.  Musculoskeletal: Normal gait and station. No decreased range of motion, instability, atrophy or abnormal strength or tone in the head, neck, spine, ribs, pelvis or extremities.   Neurologic: CN 2-12 normal. Sensation to pain, touch and proprioception normal. DTRs normal in upper and lower extremities. No pathologic reflexes.  Motor normal.  Psychiatric: Oriented X3, intact recent and remote memory, judgement and insight, normal mood and affect.                          14.4   9.97  )-----------( 217      ( 2024 10:52 )             44.2     11-    130[L]  |  99  |  23  ----------------------------<  90  4.2   |  25  |  1.37[H]    Ca    9.1      2024 10:52  Phos  3.1     11-  Mg     2.3     11-07    TPro  7.8  /  Alb  3.4  /  TBili  0.5  /  DBili  x   /  AST  18  /  ALT  25  /  AlkPhos  82  11-07    CAPILLARY BLOOD GLUCOSE          Urinalysis Basic - ( 2024 14:26 )    Color: Yellow / Appearance: Clear / S.028 / pH: x  Gluc: x / Ketone: Negative mg/dL  / Bili: Negative / Urobili: 0.2 mg/dL   Blood: x / Protein: Negative mg/dL / Nitrite: Negative   Leuk Esterase: Moderate / RBC: 3 /HPF / WBC 11 /HPF   Sq Epi: x / Non Sq Epi: 1 /HPF / Bacteria: Negative /HPF

## 2024-11-07 NOTE — ED ADULT NURSE NOTE - NSFALLRISKINTERV_ED_ALL_ED

## 2024-11-07 NOTE — ED STATDOCS - NS_ ATTENDINGSCRIBEDETAILS _ED_A_ED_FT
I, Nurys Garcia MD, performed the initial face to face bedside interview with this patient regarding history of present illness and determined that the patient should be seen in the main ED.  The history, was documented by the scribe in my presence and I attest to the accuracy of the documentation.

## 2024-11-07 NOTE — ED ADULT TRIAGE NOTE - CHIEF COMPLAINT QUOTE
Pt BIB wheelchair c/o weakness, RLQ pain, n/v, dyspnea on exertion since Tuesday morning. Niece (caretaker) states pt finished course of amoxicillin for tooth extraction on Tuesday morning. Hx of afib and MI.

## 2024-11-07 NOTE — PHARMACOTHERAPY INTERVENTION NOTE - COMMENTS
Medication history complete, reviewed medication with patients elmer Quevedo at [phone] and confirmed with Vinod.

## 2024-11-07 NOTE — ED PROVIDER NOTE - OBJECTIVE STATEMENT
82 y/o female with a PMHx of Afib, ASHD, cecal lesion, glaucoma, HTN, HLD, OA presents to the ED c/o generalized weakness, RLQ pain, n/v/d and rash. Pt has been on Amoxicillin for a tooth infection. Pt then noticed rash under her breast and right mid back area. Pt took Benadryl without improvement. Pt also r/o RLQ pain, generalized weakness and n/v/d. Daughter reports pt requiring her cane more often than baseline and pt could not get out of bed today. Denies numbness, weakness, HA, fevers, dizziness. No other complaints at this time.

## 2024-11-07 NOTE — ED ADULT NURSE NOTE - OBJECTIVE STATEMENT
Pt brought to the ED by elmer for weakness and shortness of breath on exertion. Pt had a tooth extraction last Wednesday and was on Amoxicillin, which was completed on Tuesday. Niece states that pt has a rash under her right breast and on the right side of her back. Pt complains of progressive weakness over the past week and niece noticed that yesterday that she was having shortness of breath with exertion. Pt walks with a cane at baseline. Unsure of whether she has fallen in recent months. Pt poor historian. Pt lives by herself. Elmer checks on her frequently. Pt with hx afib and as per elmer, discussion with Dr. Lockhart in July was that she was to be checked this month and that they were possibly going to be taking her off of the amiodarone.

## 2024-11-08 LAB
ANION GAP SERPL CALC-SCNC: 7 MMOL/L — SIGNIFICANT CHANGE UP (ref 5–17)
BUN SERPL-MCNC: 31 MG/DL — HIGH (ref 7–23)
CALCIUM SERPL-MCNC: 9.4 MG/DL — SIGNIFICANT CHANGE UP (ref 8.5–10.1)
CHLORIDE SERPL-SCNC: 101 MMOL/L — SIGNIFICANT CHANGE UP (ref 96–108)
CO2 SERPL-SCNC: 23 MMOL/L — SIGNIFICANT CHANGE UP (ref 22–31)
CREAT SERPL-MCNC: 1.72 MG/DL — HIGH (ref 0.5–1.3)
EGFR: 29 ML/MIN/1.73M2 — LOW
GLUCOSE SERPL-MCNC: 130 MG/DL — HIGH (ref 70–99)
HCT VFR BLD CALC: 43.9 % — SIGNIFICANT CHANGE UP (ref 34.5–45)
HGB BLD-MCNC: 14.8 G/DL — SIGNIFICANT CHANGE UP (ref 11.5–15.5)
LACTATE SERPL-SCNC: 2.1 MMOL/L — HIGH (ref 0.7–2)
LACTATE SERPL-SCNC: 2.7 MMOL/L — HIGH (ref 0.7–2)
MCHC RBC-ENTMCNC: 29.6 PG — SIGNIFICANT CHANGE UP (ref 27–34)
MCHC RBC-ENTMCNC: 33.7 G/DL — SIGNIFICANT CHANGE UP (ref 32–36)
MCV RBC AUTO: 87.8 FL — SIGNIFICANT CHANGE UP (ref 80–100)
PLATELET # BLD AUTO: 188 K/UL — SIGNIFICANT CHANGE UP (ref 150–400)
POTASSIUM SERPL-MCNC: 5 MMOL/L — SIGNIFICANT CHANGE UP (ref 3.5–5.3)
POTASSIUM SERPL-SCNC: 5 MMOL/L — SIGNIFICANT CHANGE UP (ref 3.5–5.3)
RBC # BLD: 5 M/UL — SIGNIFICANT CHANGE UP (ref 3.8–5.2)
RBC # FLD: 15.4 % — HIGH (ref 10.3–14.5)
SODIUM SERPL-SCNC: 131 MMOL/L — LOW (ref 135–145)
WBC # BLD: 17.13 K/UL — HIGH (ref 3.8–10.5)
WBC # FLD AUTO: 17.13 K/UL — HIGH (ref 3.8–10.5)

## 2024-11-08 PROCEDURE — 99233 SBSQ HOSP IP/OBS HIGH 50: CPT

## 2024-11-08 RX ORDER — SODIUM CHLORIDE 9 MG/ML
1000 INJECTION, SOLUTION INTRAMUSCULAR; INTRAVENOUS; SUBCUTANEOUS
Refills: 0 | Status: DISCONTINUED | OUTPATIENT
Start: 2024-11-08 | End: 2024-11-09

## 2024-11-08 RX ORDER — SODIUM CHLORIDE 9 MG/ML
1000 INJECTION, SOLUTION INTRAMUSCULAR; INTRAVENOUS; SUBCUTANEOUS ONCE
Refills: 0 | Status: COMPLETED | OUTPATIENT
Start: 2024-11-08 | End: 2024-11-08

## 2024-11-08 RX ORDER — LACTULOSE 10 G/15 ML
20 SOLUTION, ORAL ORAL ONCE
Refills: 0 | Status: COMPLETED | OUTPATIENT
Start: 2024-11-08 | End: 2024-11-08

## 2024-11-08 RX ORDER — CEFTRIAXONE SODIUM 10 G
1000 VIAL (EA) INJECTION EVERY 24 HOURS
Refills: 0 | Status: COMPLETED | OUTPATIENT
Start: 2024-11-08 | End: 2024-11-10

## 2024-11-08 RX ORDER — NYSTATIN 100000 U/G
1 POWDER TOPICAL
Refills: 0 | Status: DISCONTINUED | OUTPATIENT
Start: 2024-11-08 | End: 2024-11-12

## 2024-11-08 RX ORDER — METOPROLOL TARTRATE 50 MG
25 TABLET ORAL
Refills: 0 | Status: DISCONTINUED | OUTPATIENT
Start: 2024-11-08 | End: 2024-11-12

## 2024-11-08 RX ADMIN — Medication 20 GRAM(S): at 16:10

## 2024-11-08 RX ADMIN — RIVAROXABAN 15 MILLIGRAM(S): 20 TABLET, FILM COATED ORAL at 17:56

## 2024-11-08 RX ADMIN — Medication 200 MILLIGRAM(S): at 11:28

## 2024-11-08 RX ADMIN — ONDANSETRON HYDROCHLORIDE 4 MILLIGRAM(S): 2 INJECTION, SOLUTION INTRAMUSCULAR; INTRAVENOUS at 18:17

## 2024-11-08 RX ADMIN — Medication 25 MILLIGRAM(S): at 11:28

## 2024-11-08 RX ADMIN — Medication 800 MILLIGRAM(S): at 12:32

## 2024-11-08 RX ADMIN — Medication 800 MILLIGRAM(S): at 00:40

## 2024-11-08 RX ADMIN — Medication 25 MILLIGRAM(S): at 21:32

## 2024-11-08 RX ADMIN — SODIUM CHLORIDE 75 MILLILITER(S): 9 INJECTION, SOLUTION INTRAMUSCULAR; INTRAVENOUS; SUBCUTANEOUS at 21:35

## 2024-11-08 RX ADMIN — Medication 2 TABLET(S): at 21:32

## 2024-11-08 RX ADMIN — SODIUM CHLORIDE 1000 MILLILITER(S): 9 INJECTION, SOLUTION INTRAMUSCULAR; INTRAVENOUS; SUBCUTANEOUS at 12:32

## 2024-11-08 RX ADMIN — Medication 800 MILLIGRAM(S): at 21:21

## 2024-11-08 RX ADMIN — Medication 800 MILLIGRAM(S): at 08:31

## 2024-11-08 RX ADMIN — NYSTATIN 1 APPLICATION(S): 100000 POWDER TOPICAL at 21:37

## 2024-11-08 RX ADMIN — SODIUM CHLORIDE 1000 MILLILITER(S): 9 INJECTION, SOLUTION INTRAMUSCULAR; INTRAVENOUS; SUBCUTANEOUS at 16:41

## 2024-11-08 RX ADMIN — POLYETHYLENE GLYCOL 3350 17 GRAM(S): 17 POWDER, FOR SOLUTION ORAL at 11:27

## 2024-11-08 RX ADMIN — Medication 20 MILLIGRAM(S): at 21:32

## 2024-11-08 RX ADMIN — Medication 1000 MILLIGRAM(S): at 12:32

## 2024-11-08 RX ADMIN — Medication 800 MILLIGRAM(S): at 17:56

## 2024-11-08 NOTE — PATIENT PROFILE ADULT - FALL HARM RISK - HARM RISK INTERVENTIONS

## 2024-11-08 NOTE — ED ADULT NURSE REASSESSMENT NOTE - NS ED NURSE REASSESS COMMENT FT1
Received patient AxOx4, niece at bedside, patient with no stated concerns. Resting comfortably in stretcher. VSS, contact precautions maintained for shingles rash noted to mid right back and under right breast. No drainage observed. Area open to air. Medicated per MD order, patient without BM at present time. Niece and patient updated on plan of care, all questions and concerns addressed, will continue to monitor.

## 2024-11-08 NOTE — PROGRESS NOTE ADULT - SUBJECTIVE AND OBJECTIVE BOX
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 Niece states pt completed one week course of amoxicillin that was started last Wednesday s/p tooth extraction this Tuesday morning Pt has been tolerating PO intake but has had reduced appetite. Pt has a rash on her back and underneath her breast .  Pt normally ambulates with a cane at baseline when away from home but has been using around the house recently due to weakness. Pt is now having difficultly ambulating on her own. Pt is poor historian so most of this information provided from chart review. Pt does report some weakness and fatigue prior to coming in back pain in the area of the rash where she was found to have shingles.     In ED  Vitals T 98.5 /55 HR 55 RR 18 Spo2 97%  CBC 9.97|14.4|44.2|217  |4.2|99|25|23|1.37|90 UA + blood leuks CTH: No evidence of acute intracranial hemorrhage or hydrocephalus. Atrophy  with white matter ischemic changes. CT Abdomen and pelvis: Interval partial right hemicolectomy with ileocolic anastomosis.  Constipated right colon. Colonic diverticulosis without diverticulitis. 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 EKG sinus bradycardia qtc 445    11/8 -   Patient seen and examined at bedside earlier today, + abdominal pain, denies diarrhea, denies cp, dyspnea, poor historian,  tolerating diet    Review of system- Rest of the review of system are negative except mentioned in HPI     Vital sings reviewed for last 24 h  T(C): 36.7 (11-08-24 @ 10:57), Max: 37.5 (11-07-24 @ 20:05)  HR: 73 (11-08-24 @ 10:57) (71 - 80)  BP: 121/84 (11-08-24 @ 10:57) (100/53 - 131/51)  RR: 17 (11-08-24 @ 10:57) (16 - 18)  SpO2: 91% (11-08-24 @ 10:57) (91% - 98%)    Physical exam:   General : NAD, appear to be of stated age , well groomed   NERVOUS SYSTEM:  Alert & Oriented X2, non- focal exam  HEAD:  Atraumatic, Normocephalic  EYES: EOMI, PERRLA, conjunctiva and sclera clear  HEENT: Moist mucous membranes, Supple neck , No JVD  CHEST: Clear to auscultation bilaterally; No rales, no rhonchi, no wheezing  HEART: Regular rate and rhythm; No murmurs, no rubs or gallops  ABDOMEN: tense, diffuse tenderness, + distended; Bowel sounds present, some right sided  guarding , no peritoneal irritation   GENITOURINARY- Voiding, no suprapubic tenderness  EXTREMITIES:  2+ Peripheral Pulses, No clubbing, cyanosis,   edema  MUSCULOSKELETAL:- No muscle tenderness, Muscle tone normal, No joint tenderness, no Joint swelling,  Joint ROM –normal   SKIN - macular papular rash on the back , no lesion    Labs radiologic and other test : all reviewed and interpreted :                         14.8   17.13 )-----------( 188      ( 08 Nov 2024 06:37 )             43.9     11-08    131[L]  |  101  |  31[H]  ----------------------------<  130[H]  5.0   |  23  |  1.72[H]    Ca    9.4      08 Nov 2024 06:37  Phos  3.1     11-07  Mg     2.3     11-07    TPro  7.8  /  Alb  3.4  /  TBili  0.5  /  DBili  x   /  AST  18  /  ALT  25  /  AlkPhos  82  11-07     CT Abdomen and Pelvis w/ IV Cont (11.07.24 @ 11:33) >  FINDINGS:  LOWER CHEST: Stable left lobe cyst. Scattered hypodensities too small to   characterize.    LIVER: Within normal limits.  BILE DUCTS: Normal caliber.  GALLBLADDER: Within normal limits.  SPLEEN: Within normal limits.  PANCREAS: Fatty atrophy.  ADRENALS: Within normal limits.  KIDNEYS/URETERS: Bilateral hypodensities too small to characterize. No   hydronephrosis.    BLADDER: Within normal limits.  REPRODUCTIVE ORGANS: Unremarkable uterus.    BOWEL: No bowel obstruction. Interval partial right hemicolectomy with   ileocolic anastomosis. Moderate stool burden in the ascending colon.   Sigmoid diverticulosis without diverticulitis. 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 No associated edema. Cecal sutures.  PERITONEUM/RETROPERITONEUM: Within normal limits.  VESSELS: Within normal limits.  LYMPH NODES: No lymphadenopathy.  ABDOMINAL WALL: Within normal limits.  BONES: Degenerative changes. Stable right pubic body sclerotic bone   island.    IMPRESSION:  Interval partial right hemicolectomy with ileocolic anastomosis.   Constipated right colon.    Colonic diverticulosis without diverticulitis. 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            RECENT CULTURES:      Cardiac testing : reviewed   EKG - sinus bradycardia , no ischemic changes    Procedures :     Devices:     Current medications:  acetaminophen     Tablet .. 650 milliGRAM(s) Oral every 6 hours PRN  acyclovir   Oral Tab/Cap 800 milliGRAM(s) Oral five times a day  aluminum hydroxide/magnesium hydroxide/simethicone Suspension 30 milliLiter(s) Oral every 4 hours PRN  aMIOdarone    Tablet 200 milliGRAM(s) Oral daily  atorvastatin 20 milliGRAM(s) Oral at bedtime  cefTRIAXone Injectable. 1000 milliGRAM(s) IV Push every 24 hours  lactulose Syrup 20 Gram(s) Oral once  melatonin 3 milliGRAM(s) Oral at bedtime PRN  metoprolol tartrate 25 milliGRAM(s) Oral two times a day  ondansetron Injectable 4 milliGRAM(s) IV Push every 8 hours PRN  polyethylene glycol 3350 17 Gram(s) Oral daily  rivaroxaban 15 milliGRAM(s) Oral with dinner  senna 2 Tablet(s) Oral at bedtime  sodium chloride 0.9% Bolus 1000 milliLiter(s) IV Bolus once  sodium chloride 0.9%. 1000 milliLiter(s) IV Continuous <Continuous>

## 2024-11-08 NOTE — PROGRESS NOTE ADULT - ASSESSMENT
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 Niece states pt completed one week course of amoxicillin that was started last Wednesday s/p tooth extraction this Tuesday morning Pt has been tolerating PO intake but has had reduced appetite. Pt has a rash on her back and underneath her breast .  Pt normally ambulates with a cane at baseline when away from home but has been using around the house recently due to weakness. Pt is now having difficultly ambulating on her own. Pt is poor historian so most of this information provided from chart review. Pt does report some weakness and fatigue prior to coming in back pain in the area of the rash where she was found to have shingles.     In ED  Vitals T 98.5 /55 HR 55 RR 18 Spo2 97%  CBC 9.97|14.4|44.2|217  |4.2|99|25|23|1.37|90 UA + blood leuks CTH: No evidence of acute intracranial hemorrhage or hydrocephalus. Atrophy  with white matter ischemic changes. CT Abdomen and pelvis: Interval partial right hemicolectomy with ileocolic anastomosis.  Constipated right colon. Colonic diverticulosis without diverticulitis. 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   - CT notable for interval partial right hemicolectomy with ileocolic anastomosis and constipated right colon and colonic diverticulosis without diverticulitis.   - diarrhea stopped   - bowel reg: miralax + senna, lactulose x 1 dose  - colorectal surgeon consult     # Leukocytosis, Lactic acidosis , probable UTI can not r/o abdominal acute process   - CT abd w contrast   - IV fluids, c/w ceftriaxone  - colorectal consult  - lactate 2.7--> 2.1   - pan  cultures pending   - ID consult      #EILEEN   # Hypovolumic hyponatremia   -  baseline Cr 0.87 Cr on admission 1.37   - and Na of 130 --> 131   - c/w IV fluids  - hold losartan    # Back rash and under breast rash likely fungal in nature can not r/o Shingles   - Unclear for how long pt had the rash but given age >65 can treat with oral acyclovir   - no pain which is unusual for shingles  - s/p acyclovir 750 mg IV   - c/w  po acyclovir 800 mg five times a day for 7 days   - ID consult   - nystatin powder qd       #HFpEF EF 55% (1/2024)  #Afib  with bradycardia to 54   - cw home amiodarone   - cw home rivaroxaban 15 mg   - cw home metoprolol 50 mg bid --> 25 bid due to bradycardia  - primary cardiology Dr. Lockhart     #HLD  - cw home atorvastatin     # Unsteady gait , generalized weakness, OA s/p knee surgeies  # h/o falls   - PT evaluation  - check orthostatics   - uses cane  - check vit D, B12, folate      #Diet: Regular   #DVT ppx:  Xarelto   #Code: FULL code   #NOK: [facility] elmer Quevedo [phone] updated 11/8/24    Dispo - IV fluids, IV abx, ID and colorectal surgery evaluation

## 2024-11-08 NOTE — ED ADULT NURSE REASSESSMENT NOTE - NS ED NURSE REASSESS COMMENT FT1
Rounding completed. PT repositioned in bed. PT assisted with ordering breakfast. Contact Isolation in place. Call bell in reach. No other needs or concerns at this time.

## 2024-11-08 NOTE — ED ADULT NURSE REASSESSMENT NOTE - NS ED NURSE REASSESS COMMENT FT1
Patient resting in bed, eating dinner. Patient appears short of breath at rest, wheezing auscultated to left lower lobe. Patient with episode of vomiting x 1 while eating. Cleaned, prn zofran given. Patient with improvement to shortness of breath, however, MD Willson aware and will come assess patient. Vital signs stable. Denies pain at present, patient updated on admission status and plan of care. All questions addressed and answered. Comfort and safety measures maintained. Will continue to monitor.

## 2024-11-09 LAB
24R-OH-CALCIDIOL SERPL-MCNC: 22.2 NG/ML — LOW (ref 30–80)
ALBUMIN SERPL ELPH-MCNC: 2.6 G/DL — LOW (ref 3.3–5)
ALP SERPL-CCNC: 73 U/L — SIGNIFICANT CHANGE UP (ref 40–120)
ALT FLD-CCNC: 25 U/L — SIGNIFICANT CHANGE UP (ref 12–78)
AMMONIA BLD-MCNC: 18 UMOL/L — SIGNIFICANT CHANGE UP (ref 11–32)
AMYLASE P1 CFR SERPL: 30 U/L — SIGNIFICANT CHANGE UP (ref 25–115)
ANION GAP SERPL CALC-SCNC: 8 MMOL/L — SIGNIFICANT CHANGE UP (ref 5–17)
AST SERPL-CCNC: 22 U/L — SIGNIFICANT CHANGE UP (ref 15–37)
BASOPHILS # BLD AUTO: 0.03 K/UL — SIGNIFICANT CHANGE UP (ref 0–0.2)
BASOPHILS NFR BLD AUTO: 0.3 % — SIGNIFICANT CHANGE UP (ref 0–2)
BILIRUB SERPL-MCNC: 0.4 MG/DL — SIGNIFICANT CHANGE UP (ref 0.2–1.2)
BUN SERPL-MCNC: 30 MG/DL — HIGH (ref 7–23)
CALCIUM SERPL-MCNC: 8.7 MG/DL — SIGNIFICANT CHANGE UP (ref 8.5–10.1)
CHLORIDE SERPL-SCNC: 106 MMOL/L — SIGNIFICANT CHANGE UP (ref 96–108)
CK SERPL-CCNC: 66 U/L — SIGNIFICANT CHANGE UP (ref 26–192)
CO2 SERPL-SCNC: 21 MMOL/L — LOW (ref 22–31)
CREAT SERPL-MCNC: 1.1 MG/DL — SIGNIFICANT CHANGE UP (ref 0.5–1.3)
CRP SERPL-MCNC: 132 MG/ML — HIGH (ref 0–5)
CULTURE RESULTS: SIGNIFICANT CHANGE UP
EGFR: 50 ML/MIN/1.73M2 — LOW
EOSINOPHIL # BLD AUTO: 0.27 K/UL — SIGNIFICANT CHANGE UP (ref 0–0.5)
EOSINOPHIL NFR BLD AUTO: 2.3 % — SIGNIFICANT CHANGE UP (ref 0–6)
FERRITIN SERPL-MCNC: 368 NG/ML — HIGH (ref 13–330)
FOLATE SERPL-MCNC: 12.9 NG/ML — SIGNIFICANT CHANGE UP
GLUCOSE SERPL-MCNC: 113 MG/DL — HIGH (ref 70–99)
HCT VFR BLD CALC: 41.5 % — SIGNIFICANT CHANGE UP (ref 34.5–45)
HGB BLD-MCNC: 13.6 G/DL — SIGNIFICANT CHANGE UP (ref 11.5–15.5)
IMM GRANULOCYTES NFR BLD AUTO: 0.4 % — SIGNIFICANT CHANGE UP (ref 0–0.9)
LACTATE SERPL-SCNC: 1.1 MMOL/L — SIGNIFICANT CHANGE UP (ref 0.7–2)
LIDOCAIN IGE QN: 23 U/L — SIGNIFICANT CHANGE UP (ref 13–75)
LYMPHOCYTES # BLD AUTO: 0.25 K/UL — LOW (ref 1–3.3)
LYMPHOCYTES # BLD AUTO: 2.1 % — LOW (ref 13–44)
MAGNESIUM SERPL-MCNC: 2.3 MG/DL — SIGNIFICANT CHANGE UP (ref 1.6–2.6)
MCHC RBC-ENTMCNC: 29.4 PG — SIGNIFICANT CHANGE UP (ref 27–34)
MCHC RBC-ENTMCNC: 32.8 G/DL — SIGNIFICANT CHANGE UP (ref 32–36)
MCV RBC AUTO: 89.8 FL — SIGNIFICANT CHANGE UP (ref 80–100)
MONOCYTES # BLD AUTO: 0.42 K/UL — SIGNIFICANT CHANGE UP (ref 0–0.9)
MONOCYTES NFR BLD AUTO: 3.5 % — SIGNIFICANT CHANGE UP (ref 2–14)
NEUTROPHILS # BLD AUTO: 10.89 K/UL — HIGH (ref 1.8–7.4)
NEUTROPHILS NFR BLD AUTO: 91.4 % — HIGH (ref 43–77)
NT-PROBNP SERPL-SCNC: 632 PG/ML — HIGH (ref 0–450)
PHOSPHATE SERPL-MCNC: 3.6 MG/DL — SIGNIFICANT CHANGE UP (ref 2.5–4.5)
PLATELET # BLD AUTO: 158 K/UL — SIGNIFICANT CHANGE UP (ref 150–400)
POTASSIUM SERPL-MCNC: 4.2 MMOL/L — SIGNIFICANT CHANGE UP (ref 3.5–5.3)
POTASSIUM SERPL-SCNC: 4.2 MMOL/L — SIGNIFICANT CHANGE UP (ref 3.5–5.3)
PROCALCITONIN SERPL-MCNC: 2.73 NG/ML — HIGH (ref 0.02–0.1)
PROT SERPL-MCNC: 6.7 GM/DL — SIGNIFICANT CHANGE UP (ref 6–8.3)
RBC # BLD: 4.62 M/UL — SIGNIFICANT CHANGE UP (ref 3.8–5.2)
RBC # FLD: 15.6 % — HIGH (ref 10.3–14.5)
SODIUM SERPL-SCNC: 135 MMOL/L — SIGNIFICANT CHANGE UP (ref 135–145)
SPECIMEN SOURCE: SIGNIFICANT CHANGE UP
TROPONIN I, HIGH SENSITIVITY RESULT: 8.45 NG/L — SIGNIFICANT CHANGE UP
TSH SERPL-MCNC: 0.99 UU/ML — SIGNIFICANT CHANGE UP (ref 0.34–4.82)
VIT B12 SERPL-MCNC: 586 PG/ML — SIGNIFICANT CHANGE UP (ref 232–1245)
WBC # BLD: 11.91 K/UL — HIGH (ref 3.8–10.5)
WBC # FLD AUTO: 11.91 K/UL — HIGH (ref 3.8–10.5)

## 2024-11-09 PROCEDURE — 99232 SBSQ HOSP IP/OBS MODERATE 35: CPT

## 2024-11-09 PROCEDURE — 74018 RADEX ABDOMEN 1 VIEW: CPT | Mod: 26

## 2024-11-09 PROCEDURE — 99222 1ST HOSP IP/OBS MODERATE 55: CPT

## 2024-11-09 RX ORDER — CHOLECALCIFEROL (VITAMIN D3) 625 MCG
2000 CAPSULE ORAL AT BEDTIME
Refills: 0 | Status: DISCONTINUED | OUTPATIENT
Start: 2024-11-09 | End: 2024-11-12

## 2024-11-09 RX ORDER — DIPHENHYDRAMINE HCL 12.5MG/5ML
25 ELIXIR ORAL ONCE
Refills: 0 | Status: COMPLETED | OUTPATIENT
Start: 2024-11-09 | End: 2024-11-09

## 2024-11-09 RX ORDER — ONDANSETRON HYDROCHLORIDE 2 MG/ML
4 INJECTION, SOLUTION INTRAMUSCULAR; INTRAVENOUS EVERY 6 HOURS
Refills: 0 | Status: DISCONTINUED | OUTPATIENT
Start: 2024-11-09 | End: 2024-11-12

## 2024-11-09 RX ORDER — PETROLATUM 987.89 MG/G
1 OINTMENT TOPICAL DAILY
Refills: 0 | Status: DISCONTINUED | OUTPATIENT
Start: 2024-11-09 | End: 2024-11-12

## 2024-11-09 RX ORDER — HYDROCORTISONE 1 %
1 OINTMENT (GRAM) TOPICAL
Refills: 0 | Status: DISCONTINUED | OUTPATIENT
Start: 2024-11-09 | End: 2024-11-12

## 2024-11-09 RX ORDER — DIPHENHYDRAMINE HCL 12.5MG/5ML
25 ELIXIR ORAL EVERY 12 HOURS
Refills: 0 | Status: DISCONTINUED | OUTPATIENT
Start: 2024-11-09 | End: 2024-11-12

## 2024-11-09 RX ADMIN — Medication 1 APPLICATION(S): at 17:57

## 2024-11-09 RX ADMIN — Medication 2 TABLET(S): at 21:40

## 2024-11-09 RX ADMIN — NYSTATIN 1 APPLICATION(S): 100000 POWDER TOPICAL at 10:27

## 2024-11-09 RX ADMIN — Medication 25 MILLIGRAM(S): at 10:27

## 2024-11-09 RX ADMIN — Medication 200 MILLIGRAM(S): at 10:28

## 2024-11-09 RX ADMIN — Medication 25 MILLIGRAM(S): at 21:40

## 2024-11-09 RX ADMIN — NYSTATIN 1 APPLICATION(S): 100000 POWDER TOPICAL at 21:39

## 2024-11-09 RX ADMIN — POLYETHYLENE GLYCOL 3350 17 GRAM(S): 17 POWDER, FOR SOLUTION ORAL at 10:27

## 2024-11-09 RX ADMIN — Medication 20 MILLIGRAM(S): at 21:40

## 2024-11-09 RX ADMIN — Medication 800 MILLIGRAM(S): at 08:43

## 2024-11-09 RX ADMIN — Medication 1000 MILLIGRAM(S): at 16:00

## 2024-11-09 RX ADMIN — RIVAROXABAN 15 MILLIGRAM(S): 20 TABLET, FILM COATED ORAL at 17:37

## 2024-11-09 RX ADMIN — Medication 2000 UNIT(S): at 21:40

## 2024-11-09 RX ADMIN — Medication 800 MILLIGRAM(S): at 01:01

## 2024-11-09 NOTE — DIETITIAN INITIAL EVALUATION ADULT - PERTINENT MEDS FT
MEDICATIONS  (STANDING):  aMIOdarone    Tablet 200 milliGRAM(s) Oral daily  atorvastatin 20 milliGRAM(s) Oral at bedtime  cefTRIAXone Injectable. 1000 milliGRAM(s) IV Push every 24 hours  metoprolol tartrate 25 milliGRAM(s) Oral two times a day  nystatin Powder 1 Application(s) Topical two times a day  polyethylene glycol 3350 17 Gram(s) Oral daily  rivaroxaban 15 milliGRAM(s) Oral with dinner  senna 2 Tablet(s) Oral at bedtime    MEDICATIONS  (PRN):  acetaminophen     Tablet .. 650 milliGRAM(s) Oral every 6 hours PRN Temp greater or equal to 38C (100.4F), Mild Pain (1 - 3)  aluminum hydroxide/magnesium hydroxide/simethicone Suspension 30 milliLiter(s) Oral every 4 hours PRN Dyspepsia  hydrocortisone 1% Cream 1 Application(s) Topical two times a day PRN Rash and/or Itching  melatonin 3 milliGRAM(s) Oral at bedtime PRN Insomnia  ondansetron Injectable 4 milliGRAM(s) IV Push every 8 hours PRN Nausea and/or Vomiting

## 2024-11-09 NOTE — DIETITIAN INITIAL EVALUATION ADULT - NUTRITION DIAGNOSIS
"7/24/2018 5:30-7:30pm D. Patient participated in the Intensive Outpatient Problem Gambling group. Group participated in a guided meditation, DM3.  Introductions and Check-in were done, a group member returned from [facility] and the group welcomed her back.  Pt shared highs and lows for the week and any struggles with recovery.  Pt shared his wife's concern about him flying home to see family and her not wanting to be left home alone. Tuesday group completed the Weekly Group Inventory, DM4, asking about gambling and suicidal ideation during the past week.  Patient did not report suicidal ideation or gambling this week, DM3: No identified risk. Patient did report concern about upcoming court date on 08/22/18. Group completed the \"Enneagram Personality Testing, DM3.  Patient looked personality traits for their number and how we all come with personalities that are different than others.  Knowing that someone else may deal with things differently not due to being wrong, but they see things differently.   Patient did not have an assignment to present but offered feedback to those that presented.   Group closed with reading on \"Confidence\", DM4.  I. Writer facilitated discussion.  A. Pt seemed to be working a plan of recovery, benefiting from giving feedback to peers, and building a strong recovery. P. Pt. Implement information into recovery and continue working on assignments.     Mora Mak MS, [facility], [facility]  " yes...

## 2024-11-09 NOTE — DIETITIAN INITIAL EVALUATION ADULT - ADD RECOMMEND
1) Liberalize diet to regular to maximize caloric and nutrient intake.   2) Encourage protein-rich foods, maximize food preferences   3) Add Ensure + HP shake BID to optimize nutritional needs (provides 350 kcal, 20g protein/ shake)   4) Obtain weekly wt to track/trend changes   5) MVI w/ minerals daily to ensure 100% RDA met   6) Confirm goals of care regarding nutrition support   RD will continue to monitor PO intake, labs, hydration, and wt prn.

## 2024-11-09 NOTE — DIETITIAN INITIAL EVALUATION ADULT - NSFNSGIASSESSMENTFT_GEN_A_CORE
Last BM noted 11/9, no diarrhea noted, noted with fecal incontinence  Bowel regimen in place - senna, polyethylene glycol

## 2024-11-09 NOTE — DIETITIAN INITIAL EVALUATION ADULT - ORAL INTAKE PTA/DIET HISTORY
Unable to obtain history, patient presented as confused.  Per H&P patient niece reports nausea and diarrhea since Tuesday, 1 episode of emesis.  Likely meeting <50% ENN just PTA

## 2024-11-09 NOTE — DIETITIAN INITIAL EVALUATION ADULT - NS FNS DIET ORDER
Diet, Regular:   Supplement Feeding Modality:  Oral  Ensure Plus High Protein Cans or Servings Per Day:  1       Frequency:  Two Times a day (11-09-24 @ 10:50) [Pending Verification By Attending]  Diet, DASH/TLC:   Sodium & Cholesterol Restricted (11-07-24 @ 14:58) [Active]

## 2024-11-09 NOTE — CONSULT NOTE ADULT - SUBJECTIVE AND OBJECTIVE BOX
83 year old woman with a history of atrial fibrillation, non-obstructive CAD with NSTEMI (), HFpEF, HTN presenting to the ED in a wheelchair with niece c/o weakness, RLQ abdominal pain, nausea, diarrhea (multiple times a day) and vomiting x1 since Tuesday morning. Niece states pt completed one week course of amoxicillin that was started last Wednesday s/p tooth extraction this Tuesday morning (Of note pt only took two pills a day instead of three). Pt has been tolerating PO intake but has had reduced appetite. CT scan of the A/P showed possible colo-colonic fistula at the rectosigmoid area, thus colorectal surgery consulted. Patient is a poor historian. Currently still complains of diarrhea and nausea. denies fevers or chills. Denies abdominal pain or bloating. No other complains at this time      ROS: Negative except written above    PHYSICAL EXAM:  - GENERAL: No acute distress.  - EYES: EOMI. Anicteric.  - HENT: Moist mucous membranes. No scleral icterus. No cervical lymphadenopathy.  - LUNGS:  No accessory muscle use.  - CARDIOVASCULAR: Regular rate and rhythm.   - ABDOMEN: Soft, non-tender and non-distended. No palpable masses.  - EXTREMITIES: No edema. Non-tender.  - SKIN: No rashes or lesions. Warm.  - NEUROLOGIC: No focal neurological deficits. CN II-XII grossly intact, but not individually tested.  - PSYCHIATRIC: Cooperative. Appropriate mood and affect.      Chief complaint:      PMHx:  HTN (hypertension)    Glaucoma    OA (osteoarthritis)    Arteriosclerotic heart disease (ASHD)    Hyperlipidemia    Cecal lesion    Afib        PSHx:  Status post glaucoma surgery    S/P knee surgery    H/O wrist surgery        FHx:  FHx: colon cancer    FH: pancreatic cancer    FH: CAD (coronary artery disease)        Vitals:  T(C): 36.8 ( @ 09:17), Max: 37 ( @ 23:00)  HR: 73 ( @ :17) (68 - 74)  BP: 121/91 ( @ 09:17) (111/87 - 136/73)  RR: 18 ( @ 09:17) (17 - 18)  SpO2: 94% (:17) (91% - 97%)      I&Os    .    Labs:   @ 07:35                    13.6  CBC: 11.91>)-------(<158                     41.5                 135 | 106 | 30    CMP:  ----------------------< 113               4.2 | 21 | 1.10                      Ca:8.7  Phos:3.6  M.3               0.4|      |22        LFTs:  ------|73|-----             -|      |-   @ 06:37                    14.8  CBC: 17.13>)-------(<188                     43.9                 131 | 101 | 31    CMP:  ----------------------< 130               5.0 | 23 | 1.72                      Ca:9.4  Phos:-  Mg:-               -|      |-        LFTs:  ------|-|-----             -|      |-        Cultures:    Urinalysis with Rflx Culture (collected 24 @ 14:26)    Culture - Urine (collected 24 @ 14:26)  Source: .Urine None  Final Report (24 @ 04:49):    >=3 organisms. Probable collection contamination.          Current Inpatient Medications:  acetaminophen     Tablet .. 650 milliGRAM(s) Oral every 6 hours PRN  acyclovir   Oral Tab/Cap 800 milliGRAM(s) Oral five times a day  aluminum hydroxide/magnesium hydroxide/simethicone Suspension 30 milliLiter(s) Oral every 4 hours PRN  aMIOdarone    Tablet 200 milliGRAM(s) Oral daily  atorvastatin 20 milliGRAM(s) Oral at bedtime  cefTRIAXone Injectable. 1000 milliGRAM(s) IV Push every 24 hours  melatonin 3 milliGRAM(s) Oral at bedtime PRN  metoprolol tartrate 25 milliGRAM(s) Oral two times a day  nystatin Powder 1 Application(s) Topical two times a day  ondansetron Injectable 4 milliGRAM(s) IV Push every 8 hours PRN  polyethylene glycol 3350 17 Gram(s) Oral daily  rivaroxaban 15 milliGRAM(s) Oral with dinner  senna 2 Tablet(s) Oral at bedtime      < from: CT Abdomen and Pelvis w/ IV Cont (24 @ 11:33) >  Interval partial right hemicolectomy with ileocolic anastomosis.   Constipated right colon.    Colonic diverticulosis without diverticulitis. 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    < end of copied text >

## 2024-11-09 NOTE — PROGRESS NOTE ADULT - SUBJECTIVE AND OBJECTIVE BOX
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 Niece states pt completed one week course of amoxicillin that was started last Wednesday s/p tooth extraction this Tuesday morning Pt has been tolerating PO intake but has had reduced appetite. Pt has a rash on her back and underneath her breast .  Pt normally ambulates with a cane at baseline when away from home but has been using around the house recently due to weakness. Pt is now having difficultly ambulating on her own. Pt is poor historian so most of this information provided from chart review. Pt does report some weakness and fatigue prior to coming in back pain in the area of the rash where she was found to have shingles.     In ED  Vitals T 98.5 /55 HR 55 RR 18 Spo2 97%  CBC 9.97|14.4|44.2|217  |4.2|99|25|23|1.37|90 UA + blood leuks CTH: No evidence of acute intracranial hemorrhage or hydrocephalus. Atrophy  with white matter ischemic changes. CT Abdomen and pelvis: Interval partial right hemicolectomy with ileocolic anastomosis.  Constipated right colon. Colonic diverticulosis without diverticulitis. 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 EKG sinus bradycardia qtc 445    11/8 -   Patient seen and examined at bedside earlier today, + abdominal pain, denies diarrhea, denies cp, dyspnea, poor historian,  tolerating diet  11/9 - + diffuse abdominal pain, lose bm in am, denies cp, dyspnea, + diffuse rash , poor po intake    Review of system- Rest of the review of system are negative except mentioned in HPI     Vital sings reviewed for last 24 h  T(C): 36.7 (11-09-24 @ 16:06), Max: 37 (11-08-24 @ 23:00)  T(F): 98.1 (11-09-24 @ 16:06), Max: 98.6 (11-08-24 @ 23:00)  HR: 68 (11-09-24 @ 16:06) (68 - 74)  BP: 138/65 (11-09-24 @ 16:06) (111/87 - 138/65)  RR: 18 (11-09-24 @ 16:06) (18 - 18)  SpO2: 94% (11-09-24 @ 16:06) (94% - 97%)    Physical exam:   General : NAD, appear to be of stated age , well groomed   NERVOUS SYSTEM:  Alert & Oriented X2, non- focal exam  HEAD:  Atraumatic, Normocephalic  EYES: EOMI, PERRLA, conjunctiva and sclera clear  HEENT: Moist mucous membranes, Supple neck , No JVD  CHEST: Clear to auscultation bilaterally; No rales, no rhonchi, no wheezing  HEART: Regular rate and rhythm; No murmurs, no rubs or gallops  ABDOMEN: tense, diffuse tenderness, + distended; Bowel sounds present, some right sided  guarding , no peritoneal irritation   GENITOURINARY- Voiding, no suprapubic tenderness  EXTREMITIES:  2+ Peripheral Pulses, No clubbing, cyanosis,   edema  MUSCULOSKELETAL:- No muscle tenderness, Muscle tone normal, No joint tenderness, no Joint swelling,  Joint ROM –normal   SKIN - macular papular rash on the back , no lesion    Labs radiologic and other test : all reviewed and interpreted :                           13.6   11.91 )-----------( 158      ( 09 Nov 2024 07:35 )             41.5     11-09    135  |  106  |  30[H]  ----------------------------<  113[H]  4.2   |  21[L]  |  1.10    Ca    8.7      09 Nov 2024 07:35  Phos  3.6     11-09  Mg     2.3     11-09    TPro  6.7  /  Alb  2.6[L]  /  TBili  0.4  /  DBili  x   /  AST  22  /  ALT  25  /  AlkPhos  73  11-09        LIVER FUNCTIONS - ( 09 Nov 2024 07:35 )  Alb: 2.6 g/dL / Pro: 6.7 gm/dL / ALK PHOS: 73 U/L / ALT: 25 U/L / AST: 22 U/L / GGT: x                CT Abdomen and Pelvis w/ IV Cont (11.07.24 @ 11:33) >  FINDINGS:  LOWER CHEST: Stable left lobe cyst. Scattered hypodensities too small to   characterize.    LIVER: Within normal limits.  BILE DUCTS: Normal caliber.  GALLBLADDER: Within normal limits.  SPLEEN: Within normal limits.  PANCREAS: Fatty atrophy.  ADRENALS: Within normal limits.  KIDNEYS/URETERS: Bilateral hypodensities too small to characterize. No   hydronephrosis.    BLADDER: Within normal limits.  REPRODUCTIVE ORGANS: Unremarkable uterus.    BOWEL: No bowel obstruction. Interval partial right hemicolectomy with   ileocolic anastomosis. Moderate stool burden in the ascending colon.   Sigmoid diverticulosis without diverticulitis. 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 No associated edema. Cecal sutures.  PERITONEUM/RETROPERITONEUM: Within normal limits.  VESSELS: Within normal limits.  LYMPH NODES: No lymphadenopathy.  ABDOMINAL WALL: Within normal limits.  BONES: Degenerative changes. Stable right pubic body sclerotic bone   island.    IMPRESSION:  Interval partial right hemicolectomy with ileocolic anastomosis.   Constipated right colon.    Colonic diverticulosis without diverticulitis. 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            RECENT CULTURES:      Cardiac testing : reviewed   EKG - sinus bradycardia , no ischemic changes    Procedures :     Devices:     Current medications:  acetaminophen     Tablet .. 650 milliGRAM(s) Oral every 6 hours PRN  acyclovir   Oral Tab/Cap 800 milliGRAM(s) Oral five times a day  aluminum hydroxide/magnesium hydroxide/simethicone Suspension 30 milliLiter(s) Oral every 4 hours PRN  aMIOdarone    Tablet 200 milliGRAM(s) Oral daily  atorvastatin 20 milliGRAM(s) Oral at bedtime  cefTRIAXone Injectable. 1000 milliGRAM(s) IV Push every 24 hours  lactulose Syrup 20 Gram(s) Oral once  melatonin 3 milliGRAM(s) Oral at bedtime PRN  metoprolol tartrate 25 milliGRAM(s) Oral two times a day  ondansetron Injectable 4 milliGRAM(s) IV Push every 8 hours PRN  polyethylene glycol 3350 17 Gram(s) Oral daily  rivaroxaban 15 milliGRAM(s) Oral with dinner  senna 2 Tablet(s) Oral at bedtime  sodium chloride 0.9% Bolus 1000 milliLiter(s) IV Bolus once  sodium chloride 0.9%. 1000 milliLiter(s) IV Continuous <Continuous>

## 2024-11-09 NOTE — CONSULT NOTE ADULT - ATTENDING COMMENTS
Patient seen and examined at bedside this morning  Currently denies N/V but reports no appetite and feeling bloated  Abdomen is soft, distended, nontender  A/P: Recommending to keep at clear liquids at this time, AXR, GI PCR, rule out C.diff, follow up bowel function and abdominal distention    Vital Signs Last 24 Hrs  T(C): 36.8 (09 Nov 2024 09:17), Max: 37 (08 Nov 2024 23:00)  T(F): 98.2 (09 Nov 2024 09:17), Max: 98.6 (08 Nov 2024 23:00)  HR: 70 (09 Nov 2024 10:22) (68 - 74)  BP: 133/65 (09 Nov 2024 10:22) (111/87 - 136/73)  BP(mean): --  RR: 18 (09 Nov 2024 09:17) (18 - 18)  SpO2: 94% (09 Nov 2024 09:17) (94% - 97%)    Parameters below as of 09 Nov 2024 09:17  Patient On (Oxygen Delivery Method): room air                          13.6   11.91 )-----------( 158      ( 09 Nov 2024 07:35 )             41.5

## 2024-11-09 NOTE — CONSULT NOTE ADULT - ASSESSMENT
83F w/ diarrhea, nausea and emesis. called for CT findings of stable sigmoid colocolonic fistula  Benign abdomen.    Plan:   - DRAFT      Plan will be discussed with Colorectal surgery attending, Dr. Joe   83F w/ diarrhea, nausea and emesis. called for CT findings of stable sigmoid colocolonic fistula  Benign abdomen. passing gas/BMs. multiple diarrhea    Plan:   - C diff, GI PCR  - daily labs  - JUN  - Pain control PRN  - CRS team will follow along  - Rest of the care as per primary team      Plan will be discussed with Colorectal surgery attending, Dr. Joe

## 2024-11-09 NOTE — DIETITIAN INITIAL EVALUATION ADULT - PERTINENT LABORATORY DATA
11-09    135  |  106  |  30[H]  ----------------------------<  113[H]  4.2   |  21[L]  |  1.10    Ca    8.7      09 Nov 2024 07:35  Phos  3.6     11-09  Mg     2.3     11-09    TPro  6.7  /  Alb  2.6[L]  /  TBili  0.4  /  DBili  x   /  AST  22  /  ALT  25  /  AlkPhos  73  11-09  A1C with Estimated Average Glucose Result: 5.3 % (12-19-23 @ 11:08)

## 2024-11-09 NOTE — CONSULT NOTE ADULT - ASSESSMENT
82 y/o woman with h/o atrial fibrillation, non-obstructive CAD with NSTEMI (2020), HFpEF, HTN was admitted on 11/8 for increased weakness, RLQ abdominal pain, nausea, diarrhea (multiple times a day) and vomiting x1 for last 3 days PTA. Niece states pt completed one week course of amoxicillin that was started last Wednesday s/p tooth extraction this Tuesday morning. Pt has been tolerating PO intake but has had reduced appetite. Niece also states pt has a rash on her back and underneath her breasts. Pt normally ambulates with a cane at baseline when away from home but has been using around the house recently due to weakness. Pt is poor historian. Pt does report some weakness, fatigue and back pain in the area of the rash and concern about shingles was raised. In ER she received ceftriaxone and acyclovir.     #Pyuria  #Probable UTI  #skin rash  #Leukocytosis  #metabolic encephalopathy   -obtain BC x 2, urine c/s  -start ceftriaxone 1 gm IV qd  -reason for abx use and side effects reviewed with patient; monitor BMP   -old chart reviewed to assess prior cultures  -monitor temps  -f/u CBC  -supportive care  2. Other issues:   -care per medicine    Clinical team may change from intravenous to oral antibiotics when the following criteria are met:   1. Patient is clinically improving/stable       a)	Improved signs and symptoms of infection from initial presentation       b)	Afebrile for 24 hours       c)	Leukocytosis trending towards normal range   2. Patient is tolerating oral intake   3. Initial/repeat blood cultures are negative    When above criteria met may change iv antibiotics to an oral regimen  Cannot advise changing to oral antibiotic therapy until culture sensitivity is available.     84 y/o woman with h/o atrial fibrillation, non-obstructive CAD with NSTEMI (2020), HFpEF, HTN was admitted on 11/8 for increased weakness, RLQ abdominal pain, nausea, diarrhea (multiple times a day) and vomiting x1 for last 3 days PTA. Niece states pt completed one week course of amoxicillin that was started last Wednesday s/p tooth extraction this Tuesday morning. Pt has been tolerating PO intake but has had reduced appetite. Niece also states pt has a rash on her back and underneath her breasts. Pt normally ambulates with a cane at baseline when away from home but has been using around the house recently due to weakness. Pt is poor historian. Pt does report some weakness, fatigue and back pain in the area of the rash and concern about shingles was raised. In ER she received ceftriaxone and acyclovir.     #Pyuria  #Probable UTI  #Diffuse macular skin rash ?cause ?allergic rash  #Leukocytosis  #metabolic encephalopathy   -doubt shingles - no dermatomal pattern, no vesicles noted  -obtain BC x 2, urine c/s  -start ceftriaxone 1 gm IV qd  -d/c acyclovir  -reason for abx use and side effects reviewed with patient; monitor BMP   -old chart reviewed to assess prior cultures  -no need for airborne isolation  -monitor temps  -f/u CBC  -supportive care  2. Other issues:   -care per medicine    d/w Dr. Ohara    Clinical team may change from intravenous to oral antibiotics when the following criteria are met:   1. Patient is clinically improving/stable       a)	Improved signs and symptoms of infection from initial presentation       b)	Afebrile for 24 hours       c)	Leukocytosis trending towards normal range   2. Patient is tolerating oral intake   3. Initial/repeat blood cultures are negative    When above criteria met may change iv antibiotics to an oral regimen  Cannot advise changing to oral antibiotic therapy until culture sensitivity is available.

## 2024-11-09 NOTE — CONSULT NOTE ADULT - SUBJECTIVE AND OBJECTIVE BOX
Patient is a 83y old  Female who presents with a chief complaint of Urinary tract infection    HPI:  82 y/o woman with h/o atrial fibrillation, non-obstructive CAD with NSTEMI (), HFpEF, HTN was admitted on  for increased weakness, RLQ abdominal pain, nausea, diarrhea (multiple times a day) and vomiting x1 for last 3 days PTA. Niece states pt completed one week course of amoxicillin that was started last Wednesday s/p tooth extraction this Tuesday morning. Pt has been tolerating PO intake but has had reduced appetite. Niece also states pt has a rash on her back and underneath her breasts. Pt normally ambulates with a cane at baseline when away from home but has been using around the house recently due to weakness. Pt is poor historian. Pt does report some weakness, fatigue and back pain in the area of the rash and concern about shingles was raised. In ER she received ceftriaxone and acyclovir.     PAST MEDICAL & SURGICAL HISTORY:  HTN (hypertension)  Glaucoma  OA (osteoarthritis)  Arteriosclerotic heart disease (ASHD)  Hyperlipidemia  Cecal lesion  Afib  Status post glaucoma surgery  and cataract  S/P knee surgery  H/O wrist surgery    Meds: per reconciliation sheet, noted below  MEDICATIONS  (STANDING):  aMIOdarone    Tablet 200 milliGRAM(s) Oral daily  atorvastatin 20 milliGRAM(s) Oral at bedtime  cefTRIAXone Injectable. 1000 milliGRAM(s) IV Push every 24 hours  metoprolol tartrate 25 milliGRAM(s) Oral two times a day  nystatin Powder 1 Application(s) Topical two times a day  polyethylene glycol 3350 17 Gram(s) Oral daily  rivaroxaban 15 milliGRAM(s) Oral with dinner  senna 2 Tablet(s) Oral at bedtime    MEDICATIONS  (PRN):  acetaminophen     Tablet .. 650 milliGRAM(s) Oral every 6 hours PRN Temp greater or equal to 38C (100.4F), Mild Pain (1 - 3)  aluminum hydroxide/magnesium hydroxide/simethicone Suspension 30 milliLiter(s) Oral every 4 hours PRN Dyspepsia  hydrocortisone 1% Cream 1 Application(s) Topical two times a day PRN Rash and/or Itching  melatonin 3 milliGRAM(s) Oral at bedtime PRN Insomnia  ondansetron Injectable 4 milliGRAM(s) IV Push every 8 hours PRN Nausea and/or Vomiting    Allergies    No Known Allergies    Intolerances      Social: no smoking, no alcohol, no illegal drugs; no recent travel, no exposure to TB  FAMILY HISTORY:  FHx: colon cancer in brother,   FH: pancreatic cancer in sister,   FH: CAD (coronary artery disease) in mother and father, both     ROS: the patient is confused, limited   All other systems reviewed and are negative    Vital Signs Last 24 Hrs  T(C): 36.8 (2024 09:17), Max: 37 (2024 23:00)  T(F): 98.2 (2024 09:17), Max: 98.6 (2024 23:00)  HR: 70 (2024 10:22) (68 - 74)  BP: 133/65 (2024 10:22) (111/87 - 136/73)  BP(mean): --  RR: 18 (2024 09:17) (18 - 18)  SpO2: 94% (2024 09:17) (94% - 97%)    Parameters below as of 2024 09:17  Patient On (Oxygen Delivery Method): room air    PE:    Constitutional:  No acute distress  HEENT: NC/AT, EOMI, PERRLA, conjunctivae clear; ears and nose atraumatic; pharynx benign  Neck: supple; thyroid not palpable  Back: no tenderness  Respiratory: respiratory effort normal; clear to auscultation  Cardiovascular: S1S2 regular, no murmurs  Abdomen: soft, not tender, not distended, positive BS; no liver or spleen organomegaly  Genitourinary: no suprapubic tenderness  Lymphatic: no LN palpable  Musculoskeletal: no muscle tenderness, no joint swelling or tenderness  Extremities: no pedal edema  Neurological/ Psychiatric: AxOx3, judgement and insight normal; moving all extremities  Skin: no rashes; no palpable lesions    Labs: all available labs reviewed                        13.6   11.91 )-----------( 158      ( 2024 07:35 )             41.5     11    135  |  106  |  30[H]  ----------------------------<  113[H]  4.2   |  21[L]  |  1.10    Ca    8.7      2024 07:35  Phos  3.6       Mg     2.3         TPro  6.7  /  Alb  2.6[L]  /  TBili  0.4  /  DBili  x   /  AST  22  /  ALT  25  /  AlkPhos  73       LIVER FUNCTIONS - ( 2024 07:35 )  Alb: 2.6 g/dL / Pro: 6.7 gm/dL / ALK PHOS: 73 U/L / ALT: 25 U/L / AST: 22 U/L / GGT: x           Urinalysis with Rflx Culture (collected 2024 14:26)    Culture - Urine (collected 2024 14:26)  Source: .Urine None  Final Report (2024 04:49):    >=3 organisms. Probable collection contamination.    Radiology: all available radiological tests reviewed    Advanced directives addressed: full resuscitation Patient is a 83y old  Female who presents with a chief complaint of Urinary tract infection    HPI:  84 y/o woman with h/o atrial fibrillation, non-obstructive CAD with NSTEMI (), HFpEF, HTN was admitted on  for increased weakness, RLQ abdominal pain, nausea, diarrhea (multiple times a day) and vomiting x1 for last 3 days PTA. Niece states pt completed one week course of amoxicillin that was started last Wednesday s/p tooth extraction this Tuesday morning. Pt has been tolerating PO intake but has had reduced appetite. Niece also states pt has a rash on her back and underneath her breasts. Pt normally ambulates with a cane at baseline when away from home but has been using around the house recently due to weakness. Pt is poor historian. Pt does report some weakness, fatigue and back pain in the area of the rash and concern about shingles was raised. In ER she received ceftriaxone and acyclovir.     PAST MEDICAL & SURGICAL HISTORY:  HTN (hypertension)  Glaucoma  OA (osteoarthritis)  Arteriosclerotic heart disease (ASHD)  Hyperlipidemia  Cecal lesion  Afib  Status post glaucoma surgery  and cataract  S/P knee surgery  H/O wrist surgery    Meds: per reconciliation sheet, noted below  MEDICATIONS  (STANDING):  aMIOdarone    Tablet 200 milliGRAM(s) Oral daily  atorvastatin 20 milliGRAM(s) Oral at bedtime  cefTRIAXone Injectable. 1000 milliGRAM(s) IV Push every 24 hours  metoprolol tartrate 25 milliGRAM(s) Oral two times a day  nystatin Powder 1 Application(s) Topical two times a day  polyethylene glycol 3350 17 Gram(s) Oral daily  rivaroxaban 15 milliGRAM(s) Oral with dinner  senna 2 Tablet(s) Oral at bedtime    MEDICATIONS  (PRN):  acetaminophen     Tablet .. 650 milliGRAM(s) Oral every 6 hours PRN Temp greater or equal to 38C (100.4F), Mild Pain (1 - 3)  aluminum hydroxide/magnesium hydroxide/simethicone Suspension 30 milliLiter(s) Oral every 4 hours PRN Dyspepsia  hydrocortisone 1% Cream 1 Application(s) Topical two times a day PRN Rash and/or Itching  melatonin 3 milliGRAM(s) Oral at bedtime PRN Insomnia  ondansetron Injectable 4 milliGRAM(s) IV Push every 8 hours PRN Nausea and/or Vomiting    Allergies    No Known Allergies    Intolerances      Social: no smoking, no alcohol, no illegal drugs; no recent travel, no exposure to TB  FAMILY HISTORY:  FHx: colon cancer in brother,   FH: pancreatic cancer in sister,   FH: CAD (coronary artery disease) in mother and father, both     ROS: the patient is confused, limited   All other systems reviewed and are negative    Vital Signs Last 24 Hrs  T(C): 36.8 (2024 09:17), Max: 37 (2024 23:00)  T(F): 98.2 (2024 09:17), Max: 98.6 (2024 23:00)  HR: 70 (2024 10:22) (68 - 74)  BP: 133/65 (2024 10:22) (111/87 - 136/73)  BP(mean): --  RR: 18 (2024 09:17) (18 - 18)  SpO2: 94% (2024 09:17) (94% - 97%)    Parameters below as of 2024 09:17  Patient On (Oxygen Delivery Method): room air    PE:    Constitutional:  No acute distress  HEENT: NC/AT, EOMI, PERRLA, conjunctivae clear; ears and nose atraumatic; pharynx benign  Neck: supple; thyroid not palpable  Back: no tenderness  Respiratory: respiratory effort normal; clear to auscultation  Cardiovascular: S1S2 regular, no murmurs  Abdomen: soft, not tender, not distended, positive BS; no liver or spleen organomegaly  Genitourinary: no suprapubic tenderness  Lymphatic: no LN palpable  Musculoskeletal: no muscle tenderness, no joint swelling or tenderness  Extremities: no pedal edema  Neurological/ Psychiatric: AxOx3, judgement and insight normal; moving all extremities  Skin: diffuse macular rashes over entire body; no vesicles     Labs: all available labs reviewed                        13.6   11.91 )-----------( 158      ( 2024 07:35 )             41.5     11    135  |  106  |  30[H]  ----------------------------<  113[H]  4.2   |  21[L]  |  1.10    Ca    8.7      2024 07:35  Phos  3.6       Mg     2.3         TPro  6.7  /  Alb  2.6[L]  /  TBili  0.4  /  DBili  x   /  AST  22  /  ALT  25  /  AlkPhos  73  11     LIVER FUNCTIONS - ( 2024 07:35 )  Alb: 2.6 g/dL / Pro: 6.7 gm/dL / ALK PHOS: 73 U/L / ALT: 25 U/L / AST: 22 U/L / GGT: x           Urinalysis with Rflx Culture (collected 2024 14:26)    Culture - Urine (collected 2024 14:26)  Source: .Urine None  Final Report (2024 04:49):    >=3 organisms. Probable collection contamination.    Radiology: all available radiological tests reviewed    Advanced directives addressed: full resuscitation

## 2024-11-09 NOTE — DIETITIAN INITIAL EVALUATION ADULT - OTHER INFO
83 year old woman with a history of atrial fibrillation, non-obstructive CAD with NSTEMI (2020), HFpEF, HTN presenting to the ED in a wheelchair with niece c/o weakness, RLQ abdominal pain, nausea, diarrhea (multiple times a day) and vomiting x1 since Tuesday morning. Niece states pt completed one week course of amoxicillin that was started last Wednesday s/p tooth extraction this Tuesday morning (Of note pt only took two pills a day instead of three). Pt has been tolerating PO intake but has had reduced appetite. Niece also states pt has a rash on her back and underneath her breast. Pt normally ambulates with a cane at baseline when away from home but has been using around the house recently due to weakness. Pt is now having difficultly ambulating on her own. Pt is poor historian so most of this information provided from chart review. Pt does report some weakness and fatigue prior to coming in back pain in the area of the rash where she was found to have shingles.   Urinary tract infection    Known to nutrition services with criteria for severe PCM met on 1/3/24. Prescribed a DASH/TLC diet at . Recommend to liberalize diet to regular to maximize caloric and nutrient intake.  Add Ensure + HP shake BID to optimize nutritional needs (provides 350 kcal, 20g protein/ shake). Based on bed scale wt obtained by RD on 11/9 pt wt is 161.4#. Noted with BL 1+ ankle edema, could be masking losses, skewing appearance.   Weight history reviewed: 1/3/24 157# - gain of 4.4#/2.7% x 11 mo, not clin sig.  Appears overweight however NFPE revealed moderate muscle/fat wasting.  See recommendations below

## 2024-11-09 NOTE — PROGRESS NOTE ADULT - ASSESSMENT
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 Niece states pt completed one week course of amoxicillin that was started last Wednesday s/p tooth extraction this Tuesday morning Pt has been tolerating PO intake but has had reduced appetite. Pt has a rash on her back and underneath her breast .  Pt normally ambulates with a cane at baseline when away from home but has been using around the house recently due to weakness. Pt is now having difficultly ambulating on her own. Pt is poor historian so most of this information provided from chart review. Pt does report some weakness and fatigue prior to coming in back pain in the area of the rash where she was found to have shingles.     In ED  Vitals T 98.5 /55 HR 55 RR 18 Spo2 97%  CBC 9.97|14.4|44.2|217  |4.2|99|25|23|1.37|90 UA + blood leuks CTH: No evidence of acute intracranial hemorrhage or hydrocephalus. Atrophy  with white matter ischemic changes. CT Abdomen and pelvis: Interval partial right hemicolectomy with ileocolic anastomosis.  Constipated right colon. Colonic diverticulosis without diverticulitis. 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   - CT notable for interval partial right hemicolectomy with ileocolic anastomosis and constipated right colon and colonic diverticulosis without diverticulitis.   - diarrhea stopped   - bowel reg: miralax + senna, lactulose x 1 dose  - colorectal surgeon consult   - GI PCR and c diff  - clear liquid diet  - Xray of abdomen    # Leukocytosis, Lactic acidosis , probable UTI can not r/o abdominal acute process   - CT abd w contrast   - IV fluids, c/w ceftriaxone  - colorectal consult  - lactate 2.7--> 2.1 --> 1.1  - pan  cultures - blood neg, urine - contaminated  - ID consult      #EILEEN , resolved  # Hypovolumic hyponatremia , resolved  -  baseline Cr 0.87 Cr on admission 1.37   - Creatinine Trend: 1.10<--, 1.72<--, 1.37<--  - and Na of 130 --> 131 --> 135  - s/p  IV fluids  - hold losartan    # Diffuse maculopapular rash doubt shingles , drug reaction vs post-viral  - Unclear for how long pt had the rash but given age >65 can treat with oral acyclovir   - no pain which is unusual for shingles  - s/p acyclovir 750 mg IV , - stop   po acyclovir 800 mg    - ID consult   - nystatin powder qd   - benadryl prn       #HFpEF EF 55% (1/2024)  #Afib  with bradycardia to 54   - cw home amiodarone   - cw home rivaroxaban 15 mg   - cw home metoprolol 50 mg bid --> 25 bid due to bradycardia  - primary cardiology Dr. Lockhart     #HLD  - cw home atorvastatin     # Unsteady gait , generalized weakness, OA s/p knee surgeies  # h/o falls   - PT evaluation  - check orthostatics   - uses cane  - check vit D, B12, folate noted    # Vitamin D deficiency   - replace    # Dry skin   - aquaphor      #Diet: Regular   #DVT ppx:  Xarelto   #Code: FULL code   #NOK: HCP elmer Quevedo [phone] updated 11/8/24    Dispo - IV abx, ID and colorectal surgery evaluation

## 2024-11-10 LAB
ALBUMIN SERPL ELPH-MCNC: 2.6 G/DL — LOW (ref 3.3–5)
ALP SERPL-CCNC: 64 U/L — SIGNIFICANT CHANGE UP (ref 40–120)
ALT FLD-CCNC: 40 U/L — SIGNIFICANT CHANGE UP (ref 12–78)
ANION GAP SERPL CALC-SCNC: 7 MMOL/L — SIGNIFICANT CHANGE UP (ref 5–17)
AST SERPL-CCNC: 24 U/L — SIGNIFICANT CHANGE UP (ref 15–37)
BASOPHILS # BLD AUTO: 0.01 K/UL — SIGNIFICANT CHANGE UP (ref 0–0.2)
BASOPHILS NFR BLD AUTO: 0.1 % — SIGNIFICANT CHANGE UP (ref 0–2)
BILIRUB SERPL-MCNC: 0.4 MG/DL — SIGNIFICANT CHANGE UP (ref 0.2–1.2)
BUN SERPL-MCNC: 31 MG/DL — HIGH (ref 7–23)
CALCIUM SERPL-MCNC: 8.8 MG/DL — SIGNIFICANT CHANGE UP (ref 8.5–10.1)
CHLORIDE SERPL-SCNC: 111 MMOL/L — HIGH (ref 96–108)
CO2 SERPL-SCNC: 21 MMOL/L — LOW (ref 22–31)
CREAT SERPL-MCNC: 1.16 MG/DL — SIGNIFICANT CHANGE UP (ref 0.5–1.3)
CRP SERPL-MCNC: 69.1 MG/ML — HIGH (ref 0–5)
DACRYOCYTES BLD QL SMEAR: SLIGHT — SIGNIFICANT CHANGE UP
EGFR: 47 ML/MIN/1.73M2 — LOW
EOSINOPHIL # BLD AUTO: 0.44 K/UL — SIGNIFICANT CHANGE UP (ref 0–0.5)
EOSINOPHIL NFR BLD AUTO: 5.1 % — SIGNIFICANT CHANGE UP (ref 0–6)
GLUCOSE SERPL-MCNC: 94 MG/DL — SIGNIFICANT CHANGE UP (ref 70–99)
HCT VFR BLD CALC: 41.4 % — SIGNIFICANT CHANGE UP (ref 34.5–45)
HGB BLD-MCNC: 13.5 G/DL — SIGNIFICANT CHANGE UP (ref 11.5–15.5)
IMM GRANULOCYTES NFR BLD AUTO: 0.6 % — SIGNIFICANT CHANGE UP (ref 0–0.9)
LYMPHOCYTES # BLD AUTO: 0.53 K/UL — LOW (ref 1–3.3)
LYMPHOCYTES # BLD AUTO: 6.1 % — LOW (ref 13–44)
MAGNESIUM SERPL-MCNC: 2.4 MG/DL — SIGNIFICANT CHANGE UP (ref 1.6–2.6)
MANUAL SMEAR VERIFICATION: SIGNIFICANT CHANGE UP
MCHC RBC-ENTMCNC: 29.4 PG — SIGNIFICANT CHANGE UP (ref 27–34)
MCHC RBC-ENTMCNC: 32.6 G/DL — SIGNIFICANT CHANGE UP (ref 32–36)
MCV RBC AUTO: 90.2 FL — SIGNIFICANT CHANGE UP (ref 80–100)
MONOCYTES # BLD AUTO: 0.51 K/UL — SIGNIFICANT CHANGE UP (ref 0–0.9)
MONOCYTES NFR BLD AUTO: 5.9 % — SIGNIFICANT CHANGE UP (ref 2–14)
NEUTROPHILS # BLD AUTO: 7.09 K/UL — SIGNIFICANT CHANGE UP (ref 1.8–7.4)
NEUTROPHILS NFR BLD AUTO: 82.2 % — HIGH (ref 43–77)
NT-PROBNP SERPL-SCNC: 664 PG/ML — HIGH (ref 0–450)
OVALOCYTES BLD QL SMEAR: SLIGHT — SIGNIFICANT CHANGE UP
PHOSPHATE SERPL-MCNC: 2.9 MG/DL — SIGNIFICANT CHANGE UP (ref 2.5–4.5)
PLAT MORPH BLD: NORMAL — SIGNIFICANT CHANGE UP
PLATELET # BLD AUTO: 188 K/UL — SIGNIFICANT CHANGE UP (ref 150–400)
POIKILOCYTOSIS BLD QL AUTO: SLIGHT — SIGNIFICANT CHANGE UP
POTASSIUM SERPL-MCNC: 4.2 MMOL/L — SIGNIFICANT CHANGE UP (ref 3.5–5.3)
POTASSIUM SERPL-SCNC: 4.2 MMOL/L — SIGNIFICANT CHANGE UP (ref 3.5–5.3)
PROT SERPL-MCNC: 6.4 GM/DL — SIGNIFICANT CHANGE UP (ref 6–8.3)
RBC # BLD: 4.59 M/UL — SIGNIFICANT CHANGE UP (ref 3.8–5.2)
RBC # FLD: 15.7 % — HIGH (ref 10.3–14.5)
RBC BLD AUTO: ABNORMAL
SODIUM SERPL-SCNC: 139 MMOL/L — SIGNIFICANT CHANGE UP (ref 135–145)
WBC # BLD: 8.63 K/UL — SIGNIFICANT CHANGE UP (ref 3.8–10.5)
WBC # FLD AUTO: 8.63 K/UL — SIGNIFICANT CHANGE UP (ref 3.8–10.5)

## 2024-11-10 PROCEDURE — 99232 SBSQ HOSP IP/OBS MODERATE 35: CPT

## 2024-11-10 PROCEDURE — 99233 SBSQ HOSP IP/OBS HIGH 50: CPT

## 2024-11-10 RX ADMIN — Medication 200 MILLIGRAM(S): at 11:59

## 2024-11-10 RX ADMIN — Medication 1000 MILLIGRAM(S): at 16:45

## 2024-11-10 RX ADMIN — NYSTATIN 1 APPLICATION(S): 100000 POWDER TOPICAL at 21:36

## 2024-11-10 RX ADMIN — Medication 25 MILLIGRAM(S): at 12:00

## 2024-11-10 RX ADMIN — PETROLATUM 1 APPLICATION(S): 987.89 OINTMENT TOPICAL at 12:02

## 2024-11-10 RX ADMIN — Medication 20 MILLIGRAM(S): at 21:36

## 2024-11-10 RX ADMIN — POLYETHYLENE GLYCOL 3350 17 GRAM(S): 17 POWDER, FOR SOLUTION ORAL at 11:59

## 2024-11-10 RX ADMIN — Medication 2 TABLET(S): at 21:36

## 2024-11-10 RX ADMIN — RIVAROXABAN 15 MILLIGRAM(S): 20 TABLET, FILM COATED ORAL at 16:45

## 2024-11-10 RX ADMIN — NYSTATIN 1 APPLICATION(S): 100000 POWDER TOPICAL at 11:59

## 2024-11-10 RX ADMIN — Medication 2000 UNIT(S): at 21:36

## 2024-11-10 NOTE — PROGRESS NOTE ADULT - ASSESSMENT
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 pt tolerated her breakfast of CLD   will advance her diet to full liquid diet today   if pt does not tolerate diet again today, will repeat CT abd/pelvis this time with po contrast   suspect abd pain in RLQ may be related to adhesions as during her colectomy, pt had omental adhesions - also during her colonoscopy, she had a very tortuous colon - correlates with ct abd on admission with ascending constipation   if tolerates diet, then transition to low fiber diet   with normal white count, <3 BM per day, doubt c diff - will dc order  f/u read of KUB    # Leukocytosis, Lactic acidosis , probable UTI can not r/o abdominal acute process   leukocytosis improving with ceftriaxone - continue   urine culture > 3 organisms - contaminated  blood culture NGx 24 hours    #EILEEN , resolved  # Hypovolumic hyponatremia , resolved  -  baseline Cr 0.87 Cr on admission 1.37   - Creatinine Trend: 1.16<--1.10<--, 1.72<--, 1.37<--  - and Na of 130 --> 131 --> 135-->139  - s/p  IV fluids  bps ok without losartan - restart if necessary             # Diffuse abdominal pain associated with nausea, poor po intake   # Constipation   # 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 pt tolerated her breakfast of CLD   will advance her diet to full liquid diet today   if pt does not tolerate diet again today, will repeat CT abd/pelvis this time with po contrast   suspect abd pain in RLQ may be related to adhesions as during her colectomy, pt had omental adhesions - also during her colonoscopy, she had a very tortuous colon - correlates with ct abd on admission with ascending constipation   if tolerates diet, then transition to low fiber diet   with normal white count, <3 BM per day, doubt c diff or infectious diarrheal illness - will dc order   f/u read of KUB - bladder was distended on KUB but bladder scan zero    # Leukocytosis, Lactic acidosis , probable UTI can not r/o abdominal acute process   leukocytosis improving with ceftriaxone - continue   urine culture > 3 organisms - contaminated - empiric treatment with 3 doses of ceftriaxone then stop  blood culture NGx 24 hours    #EILEEN , resolved  # Hypovolumic hyponatremia , resolved  -  baseline Cr 0.87 Cr on admission 1.37   - Creatinine Trend: 1.16<--1.10<--, 1.72<--, 1.37<--  - and Na of 130 --> 131 --> 135-->139  - s/p  IV fluids  bps ok without losartan - restart if necessary    #morbiliiform rash   diffuse   pt received amoxicillin prior to admission for tooth extraction - likely the cause - penicillin added to allergy list   no vesicular eruptions to suggest shingles - acyclovir dced  expect improvement with time   if itchy, PRN benedryl    #HFpEF EF 55% (1/2024)  #Afib  with bradycardia to 54   - cw home amiodarone   - cw home rivaroxaban 15 mg   - cw home metoprolol 50 mg bid --> 25 bid due to bradycardia  - primary cardiology Dr. Lockhart     #HLD  - cw home atorvastatin     # Unsteady gait , generalized weakness, OA s/p knee surgeies  # h/o falls   - PT evaluation  - check orthostatics   - uses cane  - vit D, B12, folate noted    # Vitamin D deficiency   - replace    # Dry skin   - aquaphor    #Diet: Regular   #DVT ppx:  Xarelto - confirmed dose with pharmacy  #Code: FULL code

## 2024-11-10 NOTE — PROGRESS NOTE ADULT - SUBJECTIVE AND OBJECTIVE BOX
Date of service: 11-10-24 @ 12:25    Lying in bed in NAD  Had nausea  No fever  Rash is improving    ROS: no fever or chills; denies dizziness, no HA, no SOB or cough, no abdominal pain, no diarrhea or constipation; no dysuria, no legs pain, no rashes    MEDICATIONS  (STANDING):  aMIOdarone    Tablet 200 milliGRAM(s) Oral daily  AQUAPHOR (petrolatum Ointment) 1 Application(s) Topical daily  atorvastatin 20 milliGRAM(s) Oral at bedtime  cefTRIAXone Injectable. 1000 milliGRAM(s) IV Push every 24 hours  cholecalciferol 2000 Unit(s) Oral at bedtime  metoprolol tartrate 25 milliGRAM(s) Oral two times a day  nystatin Powder 1 Application(s) Topical two times a day  polyethylene glycol 3350 17 Gram(s) Oral daily  rivaroxaban 15 milliGRAM(s) Oral with dinner  senna 2 Tablet(s) Oral at bedtime    Vital Signs Last 24 Hrs  T(C): 36.6 (10 Nov 2024 08:05), Max: 36.7 (09 Nov 2024 16:06)  T(F): 97.9 (10 Nov 2024 08:05), Max: 98.1 (09 Nov 2024 16:06)  HR: 75 (10 Nov 2024 12:01) (64 - 75)  BP: 116/75 (10 Nov 2024 12:01) (116/75 - 138/65)  BP(mean): --  RR: 18 (10 Nov 2024 08:05) (18 - 18)  SpO2: 98% (10 Nov 2024 08:05) (94% - 98%)    Parameters below as of 10 Nov 2024 08:05  Patient On (Oxygen Delivery Method): room air     Physical exam:    Constitutional:  No acute distress  HEENT: NC/AT, EOMI, PERRLA, conjunctivae clear; ears and nose atraumatic; pharynx benign  Neck: supple; thyroid not palpable  Back: no tenderness  Respiratory: respiratory effort normal; clear to auscultation  Cardiovascular: S1S2 regular, no murmurs  Abdomen: soft, not tender, not distended, positive BS; no liver or spleen organomegaly  Genitourinary: no suprapubic tenderness  Lymphatic: no LN palpable  Musculoskeletal: no muscle tenderness, no joint swelling or tenderness  Extremities: no pedal edema  Neurological/ Psychiatric: AxOx3, judgement and insight normal; moving all extremities  Skin: diffuse macular rashes over entire body; no vesicles     Labs: all available labs reviewed                        13.6   11.91 )-----------( 158      ( 09 Nov 2024 07:35 )             41.5     11-09    135  |  106  |  30[H]  ----------------------------<  113[H]  4.2   |  21[L]  |  1.10    Ca    8.7      09 Nov 2024 07:35  Phos  3.6     11-09  Mg     2.3     11-09    TPro  6.7  /  Alb  2.6[L]  /  TBili  0.4  /  DBili  x   /  AST  22  /  ALT  25  /  AlkPhos  73  11-09     LIVER FUNCTIONS - ( 09 Nov 2024 07:35 )  Alb: 2.6 g/dL / Pro: 6.7 gm/dL / ALK PHOS: 73 U/L / ALT: 25 U/L / AST: 22 U/L / GGT: x           Urinalysis with Rflx Culture (collected 07 Nov 2024 14:26)    Culture - Urine (collected 07 Nov 2024 14:26)  Source: .Urine None  Final Report (09 Nov 2024 04:49):    >=3 organisms. Probable collection contamination.    Radiology: all available radiological tests reviewed    Advanced directives addressed: full resuscitation

## 2024-11-10 NOTE — PROGRESS NOTE ADULT - SUBJECTIVE AND OBJECTIVE BOX
HOSPITALIST ATTENDING PROGRESS NOTE    Chart and meds reviewed.  Patient seen and examined.    CC: abd pain    Subjective: abd pain not intermittent; tolerated clears this morning but did vomit last night; has watery bm this morning;     All other systems reviewed and found to be negative with the exception of what has been described above.    MEDICATIONS  (STANDING):  aMIOdarone    Tablet 200 milliGRAM(s) Oral daily  AQUAPHOR (petrolatum Ointment) 1 Application(s) Topical daily  atorvastatin 20 milliGRAM(s) Oral at bedtime  cefTRIAXone Injectable. 1000 milliGRAM(s) IV Push every 24 hours  cholecalciferol 2000 Unit(s) Oral at bedtime  metoprolol tartrate 25 milliGRAM(s) Oral two times a day  nystatin Powder 1 Application(s) Topical two times a day  polyethylene glycol 3350 17 Gram(s) Oral daily  rivaroxaban 15 milliGRAM(s) Oral with dinner  senna 2 Tablet(s) Oral at bedtime    MEDICATIONS  (PRN):  acetaminophen     Tablet .. 650 milliGRAM(s) Oral every 6 hours PRN Temp greater or equal to 38C (100.4F), Mild Pain (1 - 3)  aluminum hydroxide/magnesium hydroxide/simethicone Suspension 30 milliLiter(s) Oral every 4 hours PRN Dyspepsia  diphenhydrAMINE 25 milliGRAM(s) Oral every 12 hours PRN Rash and/or Itching  hydrocortisone 1% Cream 1 Application(s) Topical two times a day PRN Rash and/or Itching  melatonin 3 milliGRAM(s) Oral at bedtime PRN Insomnia  ondansetron Injectable 4 milliGRAM(s) IV Push every 6 hours PRN Nausea and/or Vomiting      VITALS:  T(F): 97.9 (11-10-24 @ 08:05), Max: 98.1 (11-09-24 @ 16:06)  HR: 64 (11-10-24 @ 08:05) (64 - 72)  BP: 134/50 (11-10-24 @ 08:05) (116/80 - 138/65)  RR: 18 (11-10-24 @ 08:05) (18 - 18)  SpO2: 98% (11-10-24 @ 08:05) (94% - 98%)  Wt(kg): --    I&O's Summary      CAPILLARY BLOOD GLUCOSE          PHYSICAL EXAM:  Gen: NAD  HEENT:  pupils equal and reactive, EOMI, no oropharyngeal lesions, erythema, exudates, oral thrush  NECK:   supple, no carotid bruits, no palpable lymph nodes, no thyromegaly  CV:  +S1, +S2, regular, no murmurs or rubs  RESP:   lungs clear to auscultation bilaterally, no wheezing, rales, rhonchi, good air entry bilaterally  BREAST:  not examined  GI:  BS+ tense abdomen, distended, no rebound or guarding; tenderness worse in RLQ  RECTAL:  not examined  :  not examined  MSK:   normal muscle tone, no atrophy, no rigidity, no contractions  EXT:  no clubbing, no cyanosis, no edema, no calf pain, swelling or erythema  VASCULAR:  pulses equal and symmetric in the upper and lower extremities  NEURO:  AAOX3, no focal neurological deficits, follows all commands, able to move extremities spontaneously    LABS:                            13.5   8.63  )-----------( 188      ( 10 Nov 2024 07:27 )             41.4     11-10    139  |  111[H]  |  31[H]  ----------------------------<  94  4.2   |  21[L]  |  1.16    Ca    8.8      10 Nov 2024 07:27  Phos  2.9     11-10  Mg     2.4     11-10    TPro  6.4  /  Alb  2.6[L]  /  TBili  0.4  /  DBili  x   /  AST  24  /  ALT  40  /  AlkPhos  64  11-10        LIVER FUNCTIONS - ( 10 Nov 2024 07:27 )  Alb: 2.6 g/dL / Pro: 6.4 gm/dL / ALK PHOS: 64 U/L / ALT: 40 U/L / AST: 24 U/L / GGT: x             Urinalysis Basic - ( 10 Nov 2024 07:27 )    Color: x / Appearance: x / SG: x / pH: x  Gluc: 94 mg/dL / Ketone: x  / Bili: x / Urobili: x   Blood: x / Protein: x / Nitrite: x   Leuk Esterase: x / RBC: x / WBC x   Sq Epi: x / Non Sq Epi: x / Bacteria: x              CULTURES: reviewed     HOSPITALIST ATTENDING PROGRESS NOTE    Chart and meds reviewed.  Patient seen and examined.    CC: abd pain    Subjective: abd pain not intermittent; tolerated clears this morning but did vomit last night; has watery bm this morning;     All other systems reviewed and found to be negative with the exception of what has been described above.    MEDICATIONS  (STANDING):  aMIOdarone    Tablet 200 milliGRAM(s) Oral daily  AQUAPHOR (petrolatum Ointment) 1 Application(s) Topical daily  atorvastatin 20 milliGRAM(s) Oral at bedtime  cefTRIAXone Injectable. 1000 milliGRAM(s) IV Push every 24 hours  cholecalciferol 2000 Unit(s) Oral at bedtime  metoprolol tartrate 25 milliGRAM(s) Oral two times a day  nystatin Powder 1 Application(s) Topical two times a day  polyethylene glycol 3350 17 Gram(s) Oral daily  rivaroxaban 15 milliGRAM(s) Oral with dinner  senna 2 Tablet(s) Oral at bedtime    MEDICATIONS  (PRN):  acetaminophen     Tablet .. 650 milliGRAM(s) Oral every 6 hours PRN Temp greater or equal to 38C (100.4F), Mild Pain (1 - 3)  aluminum hydroxide/magnesium hydroxide/simethicone Suspension 30 milliLiter(s) Oral every 4 hours PRN Dyspepsia  diphenhydrAMINE 25 milliGRAM(s) Oral every 12 hours PRN Rash and/or Itching  hydrocortisone 1% Cream 1 Application(s) Topical two times a day PRN Rash and/or Itching  melatonin 3 milliGRAM(s) Oral at bedtime PRN Insomnia  ondansetron Injectable 4 milliGRAM(s) IV Push every 6 hours PRN Nausea and/or Vomiting      VITALS:  T(F): 97.9 (11-10-24 @ 08:05), Max: 98.1 (11-09-24 @ 16:06)  HR: 64 (11-10-24 @ 08:05) (64 - 72)  BP: 134/50 (11-10-24 @ 08:05) (116/80 - 138/65)  RR: 18 (11-10-24 @ 08:05) (18 - 18)  SpO2: 98% (11-10-24 @ 08:05) (94% - 98%)  Wt(kg): --    I&O's Summary      CAPILLARY BLOOD GLUCOSE          PHYSICAL EXAM:  Gen: NAD  HEENT:  pupils equal and reactive, EOMI, no oropharyngeal lesions, erythema, exudates, oral thrush  NECK:   supple, no carotid bruits, no palpable lymph nodes, no thyromegaly  CV:  +S1, +S2, regular, no murmurs or rubs  RESP:   lungs clear to auscultation bilaterally, no wheezing, rales, rhonchi, good air entry bilaterally  BREAST:  not examined  GI:  BS+ tense abdomen, distended, no rebound or guarding; tenderness worse in RLQ  RECTAL:  not examined  :  not examined  MSK:   normal muscle tone, no atrophy, no rigidity, no contractions  EXT:  no clubbing, no cyanosis, no edema, no calf pain, swelling or erythema  VASCULAR:  pulses equal and symmetric in the upper and lower extremities  NEURO:  AAOX3, no focal neurological deficits, follows all commands, able to move extremities spontaneously  skin - diffuse maculopapular rash     LABS:                            13.5   8.63  )-----------( 188      ( 10 Nov 2024 07:27 )             41.4     11-10    139  |  111[H]  |  31[H]  ----------------------------<  94  4.2   |  21[L]  |  1.16    Ca    8.8      10 Nov 2024 07:27  Phos  2.9     11-10  Mg     2.4     11-10    TPro  6.4  /  Alb  2.6[L]  /  TBili  0.4  /  DBili  x   /  AST  24  /  ALT  40  /  AlkPhos  64  11-10        LIVER FUNCTIONS - ( 10 Nov 2024 07:27 )  Alb: 2.6 g/dL / Pro: 6.4 gm/dL / ALK PHOS: 64 U/L / ALT: 40 U/L / AST: 24 U/L / GGT: x             Urinalysis Basic - ( 10 Nov 2024 07:27 )    Color: x / Appearance: x / SG: x / pH: x  Gluc: 94 mg/dL / Ketone: x  / Bili: x / Urobili: x   Blood: x / Protein: x / Nitrite: x   Leuk Esterase: x / RBC: x / WBC x   Sq Epi: x / Non Sq Epi: x / Bacteria: x              CULTURES: reviewed

## 2024-11-10 NOTE — PROGRESS NOTE ADULT - SUBJECTIVE AND OBJECTIVE BOX
Pt seen at bedside, resting comfortable  still ongoing diffuse abdominal pain, lose bm in am, poor po intake        Vitals:  T(C): 36.7 ( @ 21:37), Max: 36.8 ( @ 09:17)  HR: 72 ( @ :37) (68 - 73)  BP: 116/80 ( @ 21:37) (116/80 - 138/65)  RR: 18 (:37) (18 - 18)  SpO2: 95% ( @ :37) (94% - 95%)      Physical Exam:   GENERAL: No acute distress.  - LUNGS:  No accessory muscle use.  - CARDIOVASCULAR: Regular rate and rhythm.   - ABDOMEN: Soft, distended, midlly tender, no R.G  - EXTREMITIES: No edema. Non-tender.         07:35                    13.6  CBC: 11.91>)-------(<158                     41.5                 135 | 106 | 30    CMP:  ----------------------< 113               4.2 | 21 | 1.10                      Ca:8.7  Phos:3.6  M.3               0.4|      |22        LFTs:  ------|73|-----             -|      |-      Culture - Blood (collected 24 @ 11:18)  Source: .Blood BLOOD  Preliminary Report (24 @ 16:01):    No growth at 24 hours    Culture - Blood (collected 24 @ 11:16)  Source: .Blood BLOOD  Preliminary Report (24 @ 16:01):    No growth at 24 hours    Urinalysis with Rflx Culture (collected 24 @ 14:26)    Culture - Urine (collected 24 @ 14:26)  Source: .Urine None  Final Report (24 @ 04:49):    >=3 organisms. Probable collection contamination.      Current Inpatient Medications:  acetaminophen     Tablet .. 650 milliGRAM(s) Oral every 6 hours PRN  aluminum hydroxide/magnesium hydroxide/simethicone Suspension 30 milliLiter(s) Oral every 4 hours PRN  aMIOdarone    Tablet 200 milliGRAM(s) Oral daily  AQUAPHOR (petrolatum Ointment) 1 Application(s) Topical daily  atorvastatin 20 milliGRAM(s) Oral at bedtime  cefTRIAXone Injectable. 1000 milliGRAM(s) IV Push every 24 hours  cholecalciferol 2000 Unit(s) Oral at bedtime  diphenhydrAMINE 25 milliGRAM(s) Oral every 12 hours PRN  hydrocortisone 1% Cream 1 Application(s) Topical two times a day PRN  melatonin 3 milliGRAM(s) Oral at bedtime PRN  metoprolol tartrate 25 milliGRAM(s) Oral two times a day  nystatin Powder 1 Application(s) Topical two times a day  ondansetron Injectable 4 milliGRAM(s) IV Push every 6 hours PRN  polyethylene glycol 3350 17 Gram(s) Oral daily  rivaroxaban 15 milliGRAM(s) Oral with dinner  senna 2 Tablet(s) Oral at bedtime

## 2024-11-10 NOTE — PROGRESS NOTE ADULT - ASSESSMENT
83F w/ diarrhea, nausea and emesis. 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

## 2024-11-10 NOTE — PROGRESS NOTE ADULT - ASSESSMENT
82 y/o woman with h/o atrial fibrillation, non-obstructive CAD with NSTEMI (2020), HFpEF, HTN was admitted on 11/8 for increased weakness, RLQ abdominal pain, nausea, diarrhea (multiple times a day) and vomiting x1 for last 3 days PTA. Niece states pt completed one week course of amoxicillin that was started last Wednesday s/p tooth extraction this Tuesday morning. Pt has been tolerating PO intake but has had reduced appetite. Niece also states pt has a rash on her back and underneath her breasts. Pt normally ambulates with a cane at baseline when away from home but has been using around the house recently due to weakness. Pt is poor historian. Pt does report some weakness, fatigue and back pain in the area of the rash and concern about shingles was raised. In ER she received ceftriaxone and acyclovir.     #Pyuria  #Probable UTI  #Diffuse macular skin rash; likely allergic rash to amoxicillin  #Leukocytosis  #metabolic encephalopathy   -doubt shingles - no dermatomal pattern, no vesicles noted  -obtain BC x 2, urine c/s  -start ceftriaxone 1 gm IV qd # 2  -tolerating abx well so far; no side effects noted  -continue abx coverage  -monitor temps  -f/u CBC  -supportive care  2. Other issues:   -care per medicine    d/w Dr. Amezcua    Clinical team may change from intravenous to oral antibiotics when the following criteria are met:   1. Patient is clinically improving/stable       a)	Improved signs and symptoms of infection from initial presentation       b)	Afebrile for 24 hours       c)	Leukocytosis trending towards normal range   2. Patient is tolerating oral intake   3. Initial/repeat blood cultures are negative    When above criteria met may change iv antibiotics to an oral regimen  Cannot advise changing to oral antibiotic therapy until culture sensitivity is available.

## 2024-11-11 LAB
ANION GAP SERPL CALC-SCNC: 6 MMOL/L — SIGNIFICANT CHANGE UP (ref 5–17)
BUN SERPL-MCNC: 36 MG/DL — HIGH (ref 7–23)
CALCIUM SERPL-MCNC: 8.8 MG/DL — SIGNIFICANT CHANGE UP (ref 8.5–10.1)
CHLORIDE SERPL-SCNC: 109 MMOL/L — HIGH (ref 96–108)
CO2 SERPL-SCNC: 22 MMOL/L — SIGNIFICANT CHANGE UP (ref 22–31)
CREAT SERPL-MCNC: 1.18 MG/DL — SIGNIFICANT CHANGE UP (ref 0.5–1.3)
EGFR: 46 ML/MIN/1.73M2 — LOW
GI PCR PANEL: DETECTED
GLUCOSE SERPL-MCNC: 98 MG/DL — SIGNIFICANT CHANGE UP (ref 70–99)
HCT VFR BLD CALC: 41.2 % — SIGNIFICANT CHANGE UP (ref 34.5–45)
HGB BLD-MCNC: 13.3 G/DL — SIGNIFICANT CHANGE UP (ref 11.5–15.5)
MCHC RBC-ENTMCNC: 29 PG — SIGNIFICANT CHANGE UP (ref 27–34)
MCHC RBC-ENTMCNC: 32.3 G/DL — SIGNIFICANT CHANGE UP (ref 32–36)
MCV RBC AUTO: 90 FL — SIGNIFICANT CHANGE UP (ref 80–100)
PLATELET # BLD AUTO: 197 K/UL — SIGNIFICANT CHANGE UP (ref 150–400)
POTASSIUM SERPL-MCNC: 4 MMOL/L — SIGNIFICANT CHANGE UP (ref 3.5–5.3)
POTASSIUM SERPL-SCNC: 4 MMOL/L — SIGNIFICANT CHANGE UP (ref 3.5–5.3)
RBC # BLD: 4.58 M/UL — SIGNIFICANT CHANGE UP (ref 3.8–5.2)
RBC # FLD: 15.4 % — HIGH (ref 10.3–14.5)
SALMONELLA DNA STL QL NAA+PROBE: DETECTED
SODIUM SERPL-SCNC: 137 MMOL/L — SIGNIFICANT CHANGE UP (ref 135–145)
WBC # BLD: 5.58 K/UL — SIGNIFICANT CHANGE UP (ref 3.8–10.5)
WBC # FLD AUTO: 5.58 K/UL — SIGNIFICANT CHANGE UP (ref 3.8–10.5)

## 2024-11-11 PROCEDURE — 99232 SBSQ HOSP IP/OBS MODERATE 35: CPT

## 2024-11-11 RX ADMIN — Medication 200 MILLIGRAM(S): at 10:33

## 2024-11-11 RX ADMIN — ONDANSETRON HYDROCHLORIDE 4 MILLIGRAM(S): 2 INJECTION, SOLUTION INTRAMUSCULAR; INTRAVENOUS at 13:06

## 2024-11-11 RX ADMIN — Medication 25 MILLIGRAM(S): at 22:12

## 2024-11-11 RX ADMIN — Medication 20 MILLIGRAM(S): at 22:12

## 2024-11-11 RX ADMIN — PETROLATUM 1 APPLICATION(S): 987.89 OINTMENT TOPICAL at 12:35

## 2024-11-11 RX ADMIN — RIVAROXABAN 15 MILLIGRAM(S): 20 TABLET, FILM COATED ORAL at 17:35

## 2024-11-11 RX ADMIN — Medication 1 APPLICATION(S): at 22:12

## 2024-11-11 RX ADMIN — Medication 25 MILLIGRAM(S): at 10:33

## 2024-11-11 RX ADMIN — NYSTATIN 1 APPLICATION(S): 100000 POWDER TOPICAL at 10:33

## 2024-11-11 RX ADMIN — Medication 2000 UNIT(S): at 22:12

## 2024-11-11 RX ADMIN — NYSTATIN 1 APPLICATION(S): 100000 POWDER TOPICAL at 22:12

## 2024-11-11 NOTE — PHYSICAL THERAPY INITIAL EVALUATION ADULT - ACTIVE RANGE OF MOTION EXAMINATION, REHAB EVAL
except B shoulder limited to roughly 90 degrees flexion, L > R/no Active ROM deficits were identified

## 2024-11-11 NOTE — PROGRESS NOTE ADULT - SUBJECTIVE AND OBJECTIVE BOX
Patient seen and examined at the bedside this AM.  Patient vomited overnight, last BM was 2 days ago.         PAST MEDICAL & SURGICAL HISTORY:  HTN (hypertension)      Glaucoma      OA (osteoarthritis)      Arteriosclerotic heart disease (ASHD)      Hyperlipidemia      Cecal lesion      Afib      Status post glaucoma surgery  and cataract      S/P knee surgery      H/O wrist surgery          MEDICATIONS  (STANDING):  aMIOdarone    Tablet 200 milliGRAM(s) Oral daily  AQUAPHOR (petrolatum Ointment) 1 Application(s) Topical daily  atorvastatin 20 milliGRAM(s) Oral at bedtime  cholecalciferol 2000 Unit(s) Oral at bedtime  metoprolol tartrate 25 milliGRAM(s) Oral two times a day  nystatin Powder 1 Application(s) Topical two times a day  polyethylene glycol 3350 17 Gram(s) Oral daily  rivaroxaban 15 milliGRAM(s) Oral with dinner  senna 2 Tablet(s) Oral at bedtime    MEDICATIONS  (PRN):  acetaminophen     Tablet .. 650 milliGRAM(s) Oral every 6 hours PRN Temp greater or equal to 38C (100.4F), Mild Pain (1 - 3)  aluminum hydroxide/magnesium hydroxide/simethicone Suspension 30 milliLiter(s) Oral every 4 hours PRN Dyspepsia  diphenhydrAMINE 25 milliGRAM(s) Oral every 12 hours PRN Rash and/or Itching  hydrocortisone 1% Cream 1 Application(s) Topical two times a day PRN Rash and/or Itching  melatonin 3 milliGRAM(s) Oral at bedtime PRN Insomnia  ondansetron Injectable 4 milliGRAM(s) IV Push every 6 hours PRN Nausea and/or Vomiting      Allergies    penicillin (Rash (Severe))    Intolerances        SOCIAL HISTORY:    FAMILY HISTORY:  FHx: colon cancer  in brother,     FH: pancreatic cancer  in sister,     FH: CAD (coronary artery disease)  in mother and father, both       Physical Exam:   GENERAL: No acute distress.  - LUNGS:  No accessory muscle use.  - CARDIOVASCULAR: Regular rate and rhythm.   - ABDOMEN: Soft, distended, tympanic, mildly tender, no R.G  - EXTREMITIES: No edema. Non-tender.        Vital Signs Last 24 Hrs  T(C): 36.9 (10 Nov 2024 20:54), Max: 36.9 (10 Nov 2024 20:54)  T(F): 98.4 (10 Nov 2024 20:54), Max: 98.4 (10 Nov 2024 20:54)  HR: 74 (10 Nov 2024 20:54) (61 - 75)  BP: 118/54 (10 Nov 2024 20:54) (116/75 - 134/50)  BP(mean): --  RR: 18 (10 Nov 2024 20:54) (18 - 18)  SpO2: 96% (10 Nov 2024 20:54) (95% - 98%)    Parameters below as of 10 Nov 2024 20:54  Patient On (Oxygen Delivery Method): room air        I&O's Summary          LABS:                        13.5   8.63  )-----------( 188      ( 10 Nov 2024 07:27 )             41.4     11-10    139  |  111[H]  |  31[H]  ----------------------------<  94  4.2   |  21[L]  |  1.16    Ca    8.8      10 Nov 2024 07:27  Phos  2.9     11-10  Mg     2.4     11-10    TPro  6.4  /  Alb  2.6[L]  /  TBili  0.4  /  DBili  x   /  AST  24  /  ALT  40  /  AlkPhos  64  11-10      Urinalysis Basic - ( 10 Nov 2024 07:27 )    Color: x / Appearance: x / SG: x / pH: x  Gluc: 94 mg/dL / Ketone: x  / Bili: x / Urobili: x   Blood: x / Protein: x / Nitrite: x   Leuk Esterase: x / RBC: x / WBC x   Sq Epi: x / Non Sq Epi: x / Bacteria: x      CAPILLARY BLOOD GLUCOSE        LIVER FUNCTIONS - ( 10 Nov 2024 07:27 )  Alb: 2.6 g/dL / Pro: 6.4 gm/dL / ALK PHOS: 64 U/L / ALT: 40 U/L / AST: 24 U/L / GGT: x             Cultures:      RADIOLOGY & ADDITIONAL STUDIES:

## 2024-11-11 NOTE — PROGRESS NOTE ADULT - SUBJECTIVE AND OBJECTIVE BOX
Date of service: 11-11-24 @ 14:01    Lying in bed in NAD  No further dysuria  Rash is subisding    ROS: no fever or chills; denies dizziness, no HA, no SOB or cough, no abdominal pain, no diarrhea or constipation; no dysuria, no legs pain    MEDICATIONS  (STANDING):  aMIOdarone    Tablet 200 milliGRAM(s) Oral daily  AQUAPHOR (petrolatum Ointment) 1 Application(s) Topical daily  atorvastatin 20 milliGRAM(s) Oral at bedtime  cholecalciferol 2000 Unit(s) Oral at bedtime  metoprolol tartrate 25 milliGRAM(s) Oral two times a day  nystatin Powder 1 Application(s) Topical two times a day  rivaroxaban 15 milliGRAM(s) Oral with dinner    Vital Signs Last 24 Hrs  T(C): 36.6 (11 Nov 2024 07:40), Max: 36.9 (10 Nov 2024 20:54)  T(F): 97.9 (11 Nov 2024 07:40), Max: 98.4 (10 Nov 2024 20:54)  HR: 91 (11 Nov 2024 07:40) (61 - 91)  BP: 142/66 (11 Nov 2024 07:40) (118/54 - 142/66)  BP(mean): --  RR: 18 (11 Nov 2024 07:40) (18 - 18)  SpO2: 98% (11 Nov 2024 07:40) (95% - 98%)    Parameters below as of 11 Nov 2024 07:40  Patient On (Oxygen Delivery Method): room air     Physical exam:    Constitutional:  No acute distress  HEENT: NC/AT, EOMI, PERRLA, conjunctivae clear; ears and nose atraumatic; pharynx benign  Neck: supple; thyroid not palpable  Back: no tenderness  Respiratory: respiratory effort normal; clear to auscultation  Cardiovascular: S1S2 regular, no murmurs  Abdomen: soft, not tender, not distended, positive BS; no liver or spleen organomegaly  Genitourinary: no suprapubic tenderness  Lymphatic: no LN palpable  Musculoskeletal: no muscle tenderness, no joint swelling or tenderness  Extremities: no pedal edema  Neurological/ Psychiatric: AxOx3, judgement and insight normal; moving all extremities  Skin: diffuse macular rashes over entire body; no vesicles     Labs: reviewed                        13.3   5.58  )-----------( 197      ( 11 Nov 2024 07:28 )             41.2     11-11    137  |  109[H]  |  36[H]  ----------------------------<  98  4.0   |  22  |  1.18    Ca    8.8      11 Nov 2024 07:28  Phos  2.9     11-10  Mg     2.4     11-10    TPro  6.4  /  Alb  2.6[L]  /  TBili  0.4  /  DBili  x   /  AST  24  /  ALT  40  /  AlkPhos  64  11-10    C-Reactive Protein: 69.1 mg/mL (11-10-24 @ 07:27)  C-Reactive Protein: 132.0 mg/mL (11-09-24 @ 07:35)  Ferritin: 368 ng/mL (11-09-24 @ 07:35)                        13.6   11.91 )-----------( 158      ( 09 Nov 2024 07:35 )             41.5     11-09    135  |  106  |  30[H]  ----------------------------<  113[H]  4.2   |  21[L]  |  1.10    Ca    8.7      09 Nov 2024 07:35  Phos  3.6     11-09  Mg     2.3     11-09    TPro  6.7  /  Alb  2.6[L]  /  TBili  0.4  /  DBili  x   /  AST  22  /  ALT  25  /  AlkPhos  73  11-09     LIVER FUNCTIONS - ( 09 Nov 2024 07:35 )  Alb: 2.6 g/dL / Pro: 6.7 gm/dL / ALK PHOS: 73 U/L / ALT: 25 U/L / AST: 22 U/L / GGT: x           Urinalysis with Rflx Culture (collected 07 Nov 2024 14:26)    Culture - Urine (collected 07 Nov 2024 14:26)  Source: .Urine None  Final Report (09 Nov 2024 04:49):    >=3 organisms. Probable collection contamination.    Radiology: all available radiological tests reviewed    Advanced directives addressed: full resuscitation

## 2024-11-11 NOTE — PROGRESS NOTE ADULT - SUBJECTIVE AND OBJECTIVE BOX
HOSPITALIST ATTENDING PROGRESS NOTE    Chart and meds reviewed.  Patient seen and examined.    CC: n/v/d    Subjective: rash improving. tolerated full liquid diet; hesitant to eat solids; had watery bm overnight - collected and sent for gi pcr    All other systems reviewed and found to be negative with the exception of what has been described above.    MEDICATIONS  (STANDING):  aMIOdarone    Tablet 200 milliGRAM(s) Oral daily  AQUAPHOR (petrolatum Ointment) 1 Application(s) Topical daily  atorvastatin 20 milliGRAM(s) Oral at bedtime  cholecalciferol 2000 Unit(s) Oral at bedtime  metoprolol tartrate 25 milliGRAM(s) Oral two times a day  nystatin Powder 1 Application(s) Topical two times a day  rivaroxaban 15 milliGRAM(s) Oral with dinner    MEDICATIONS  (PRN):  acetaminophen     Tablet .. 650 milliGRAM(s) Oral every 6 hours PRN Temp greater or equal to 38C (100.4F), Mild Pain (1 - 3)  aluminum hydroxide/magnesium hydroxide/simethicone Suspension 30 milliLiter(s) Oral every 4 hours PRN Dyspepsia  diphenhydrAMINE 25 milliGRAM(s) Oral every 12 hours PRN Rash and/or Itching  hydrocortisone 1% Cream 1 Application(s) Topical two times a day PRN Rash and/or Itching  melatonin 3 milliGRAM(s) Oral at bedtime PRN Insomnia  ondansetron Injectable 4 milliGRAM(s) IV Push every 6 hours PRN Nausea and/or Vomiting      VITALS:  T(F): 97.9 (11-11-24 @ 07:40), Max: 98.4 (11-10-24 @ 20:54)  HR: 91 (11-11-24 @ 07:40) (74 - 91)  BP: 142/66 (11-11-24 @ 07:40) (118/54 - 142/66)  RR: 18 (11-11-24 @ 07:40) (18 - 18)  SpO2: 98% (11-11-24 @ 07:40) (96% - 98%)  Wt(kg): --    I&O's Summary      CAPILLARY BLOOD GLUCOSE          PHYSICAL EXAM:  Gen: NAD  HEENT:  pupils equal and reactive, EOMI, no oropharyngeal lesions, erythema, exudates, oral thrush  NECK:   supple, no carotid bruits, no palpable lymph nodes, no thyromegaly  CV:  +S1, +S2, regular, no murmurs or rubs  RESP:   lungs clear to auscultation bilaterally, no wheezing, rales, rhonchi, good air entry bilaterally  BREAST:  not examined  GI:  BS+ tense abdomen, distended, no tenderness to palpation today  RECTAL:  not examined  :  not examined  MSK:   normal muscle tone, no atrophy, no rigidity, no contractions  EXT:  no clubbing, no cyanosis, no edema, no calf pain, swelling or erythema  VASCULAR:  pulses equal and symmetric in the upper and lower extremities  NEURO:  AAOX3, no focal neurological deficits, follows all commands, able to move extremities spontaneously  skin - diffuse maculopapular rash -improving    LABS:                            13.3   5.58  )-----------( 197      ( 11 Nov 2024 07:28 )             41.2     11-11    137  |  109[H]  |  36[H]  ----------------------------<  98  4.0   |  22  |  1.18    Ca    8.8      11 Nov 2024 07:28  Phos  2.9     11-10  Mg     2.4     11-10    TPro  6.4  /  Alb  2.6[L]  /  TBili  0.4  /  DBili  x   /  AST  24  /  ALT  40  /  AlkPhos  64  11-10        LIVER FUNCTIONS - ( 10 Nov 2024 07:27 )  Alb: 2.6 g/dL / Pro: 6.4 gm/dL / ALK PHOS: 64 U/L / ALT: 40 U/L / AST: 24 U/L / GGT: x             Urinalysis Basic - ( 11 Nov 2024 07:28 )    Color: x / Appearance: x / SG: x / pH: x  Gluc: 98 mg/dL / Ketone: x  / Bili: x / Urobili: x   Blood: x / Protein: x / Nitrite: x   Leuk Esterase: x / RBC: x / WBC x   Sq Epi: x / Non Sq Epi: x / Bacteria: x              CULTURES:      Additional results/Imaging, I have personally reviewed:    Telemetry, personally reviewed:

## 2024-11-11 NOTE — PHYSICAL THERAPY INITIAL EVALUATION ADULT - GAIT TRAINING, PT EVAL
Patient will ambulate 200ft with RW support and navigate up/down 4 steps with Supervision by discharge.

## 2024-11-11 NOTE — PROGRESS NOTE ADULT - NUTRITIONAL ASSESSMENT
This patient has been assessed with a concern for Malnutrition and has been determined to have a diagnosis/diagnoses of Moderate protein-calorie malnutrition.    This patient is being managed with:   Diet Clear Liquid-  Entered: Nov 9 2024  1:31PM    The following pending diet order is being considered for treatment of Moderate protein-calorie malnutrition:  Diet Regular-  Supplement Feeding Modality:  Oral  Ensure Plus High Protein Cans or Servings Per Day:  1       Frequency:  Two Times a day  Entered: Nov 9 2024 10:50AM  
This patient has been assessed with a concern for Malnutrition and has been determined to have a diagnosis/diagnoses of Moderate protein-calorie malnutrition.    This patient is being managed with:   Diet Full Liquid-  Entered: Nov 10 2024 11:56AM    The following pending diet order is being considered for treatment of Moderate protein-calorie malnutrition:  Diet Regular-  Supplement Feeding Modality:  Oral  Ensure Plus High Protein Cans or Servings Per Day:  1       Frequency:  Two Times a day  Entered: Nov 9 2024 10:50AM  
This patient has been assessed with a concern for Malnutrition and has been determined to have a diagnosis/diagnoses of Moderate protein-calorie malnutrition.    This patient is being managed with:   Diet Full Liquid-  Entered: Nov 10 2024 11:56AM    The following pending diet order is being considered for treatment of Moderate protein-calorie malnutrition:  Diet Regular-  Supplement Feeding Modality:  Oral  Ensure Plus High Protein Cans or Servings Per Day:  1       Frequency:  Two Times a day  Entered: Nov 9 2024 10:50AM  
This patient has been assessed with a concern for Malnutrition and has been determined to have a diagnosis/diagnoses of Moderate protein-calorie malnutrition.    This patient is being managed with:   Diet Low Fiber-  Entered: Nov 11 2024  9:22AM    The following pending diet order is being considered for treatment of Moderate protein-calorie malnutrition:  Diet Regular-  Supplement Feeding Modality:  Oral  Ensure Plus High Protein Cans or Servings Per Day:  1       Frequency:  Two Times a day  Entered: Nov 9 2024 10:50AM

## 2024-11-11 NOTE — PHYSICAL THERAPY INITIAL EVALUATION ADULT - ADDITIONAL COMMENTS
Patient lives alone but her niece Emy is supportive by visiting  her, shopping for food for her, and taking her to all appointments. 3-4 RAFAEL, 2 story home.

## 2024-11-11 NOTE — PHYSICAL THERAPY INITIAL EVALUATION ADULT - PERTINENT HX OF CURRENT PROBLEM, REHAB EVAL
83 year old woman with a history of atrial fibrillation, non-obstructive CAD with NSTEMI (2020), HFpEF, HTN presenting to the ED in a wheelchair with niece c/o weakness, RLQ abdominal pain, nausea, diarrhea (multiple times a day) and vomiting. Pt does report some weakness and fatigue prior to coming in back pain in the area of the rash where she was found to have shingles.

## 2024-11-11 NOTE — PROGRESS NOTE ADULT - ASSESSMENT
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  would favor resolving SBO/LBO but cannot rule out gastroenteritis - f/u gi pcr  doubt c diff with normal white count and <3 BMs per day  hold any further laxatives  follow up on po tolerance ofd regular diet     # Leukocytosis, Lactic acidosis , probable UTI can not r/o abdominal acute process   leukocytosis improving with ceftriaxone - continue   urine culture > 3 organisms - contaminated - empiric treatment with 3 doses of ceftriaxone then stop  blood culture NGx 24 hours    #EILEEN , resolved  # Hypovolumic hyponatremia , resolved  -  baseline Cr 0.87 Cr on admission 1.37   - Creatinine Trend: 1.16<--1.10<--, 1.72<--, 1.37<--  - and Na of 130 --> 131 --> 135-->139  - s/p  IV fluids  bps ok without losartan - restart if necessary    #morbiliiform rash   diffuse   pt received amoxicillin prior to admission for tooth extraction - likely the cause - penicillin added to allergy list   no vesicular eruptions to suggest shingles - acyclovir dced  expect improvement with time   if itchy, PRN benedryl    #HFpEF EF 55% (1/2024)  #Afib  with bradycardia to 54   - cw home amiodarone   - cw home rivaroxaban 15 mg   - cw home metoprolol 50 mg bid --> 25 bid due to bradycardia  - primary cardiology Dr. 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  would favor resolving SBO/LBO but cannot rule out gastroenteritis - f/u gi pcr  doubt c diff with normal white count and <3 BMs per day  hold any further laxatives    # Leukocytosis, Lactic acidosis , probable UTI can not r/o abdominal acute process   leukocytosis improving with ceftriaxone - continue   urine culture > 3 organisms - contaminated - empiric treatment with 3 doses of ceftriaxone then stop  blood culture NGx 72 hours    #EILEEN , resolved  # Hypovolumic hyponatremia , resolved  -  baseline Cr 0.87 Cr on admission 1.37   - Creatinine Trend: 1.16<--1.10<--, 1.72<--, 1.37<--  - and Na of 130 --> 131 --> 135-->139  - s/p  IV fluids  bps ok without losartan - restart if necessary    #morbiliiform rash   diffuse   pt received amoxicillin prior to admission for tooth extraction - likely the cause - penicillin added to allergy list   no vesicular eruptions to suggest shingles - acyclovir dced  expect improvement with time   if itchy, PRN benedryl    #HFpEF EF 55% (1/2024)  #Afib  with bradycardia to 54   - cw home amiodarone   - cw home rivaroxaban 15 mg   - cw home metoprolol 50 mg bid --> 25 bid due to bradycardia  - primary cardiology Dr. Lockhart     #HLD  - cw home atorvastatin     # Unsteady gait , generalized weakness, OA s/p knee surgeies  # h/o falls   - PT evaluation  - uses cane  - vit D, B12, folate noted    # Vitamin D deficiency   - replace    # Dry skin   - aquaphor    #Diet: Regular   #DVT ppx:  Xarelto - confirmed dose with pharmacy  #Code: FULL code     DIspo: KIM vs HHPT - possible dispo next 24 hours if tolerating regular diet and diarrhea improving

## 2024-11-11 NOTE — PROGRESS NOTE ADULT - TIME BILLING
Time spent  coordinating the patient's care. This includes reviewing documentation pertinent to this admission, results and imaging. Further tests, medications, and procedures have been ordered as indicated. Laboratory results and the plan of care were communicated to the patient. Discussed care plan with consultants including CRS. Supporting documentation was completed and added to the patient's chart.
Time spent  coordinating the patient's care. This includes reviewing documentation pertinent to this admission, results and imaging. Further tests, medications, and procedures have been ordered as indicated. Laboratory results and the plan of care were communicated to the patient. Discussed care plan with consultants including ID. Supporting documentation was completed and added to the patient's chart.

## 2024-11-11 NOTE — PROGRESS NOTE ADULT - ASSESSMENT
84 y/o woman with h/o atrial fibrillation, non-obstructive CAD with NSTEMI (2020), HFpEF, HTN was admitted on 11/8 for increased weakness, RLQ abdominal pain, nausea, diarrhea (multiple times a day) and vomiting x1 for last 3 days PTA. Niece states pt completed one week course of amoxicillin that was started last Wednesday s/p tooth extraction this Tuesday morning. Pt has been tolerating PO intake but has had reduced appetite. Niece also states pt has a rash on her back and underneath her breasts. Pt normally ambulates with a cane at baseline when away from home but has been using around the house recently due to weakness. Pt is poor historian. Pt does report some weakness, fatigue and back pain in the area of the rash and concern about shingles was raised. In ER she received ceftriaxone and acyclovir.     #Pyuria  #Probable UTI  #Diffuse macular skin rash; likely allergic rash to amoxicillin  #Leukocytosis  #metabolic encephalopathy resolving  -doubt shingles - no dermatomal pattern, no vesicles noted  -BC x 2, urine c/s noted   -on ceftriaxone 1 gm IV qd # 3  -tolerating abx well so far; no side effects noted  -complete abx coverage  -monitor temps  -f/u CBC  -supportive care  2. Other issues:   -care per medicine    d/w Dr. Amezcua

## 2024-11-11 NOTE — PROGRESS NOTE ADULT - ASSESSMENT
83F w/ diarrhea, nausea and emesis. Called for CT findings of stable sigmoid colocolonic fistula  Vomited yesterday. Leukocytosis resolved.     Recs:  - CLD   - f/u GI PCR and C diff  - follow up bowel function  - serial abdominal exam   Rest od care per primary

## 2024-11-11 NOTE — PHYSICAL THERAPY INITIAL EVALUATION ADULT - GENERAL OBSERVATIONS, REHAB EVAL
The Pt was received on 5S in bed supine, calm, cooperative, and willing to participate with PT. Pt responded well to functional mobility trng, ambulation tx, and thera ex review. Required MIN A x 1 for mobility and sit to stand. CGA/MIN A x 1 to ambulate 75ft with RW support. Assisted into bedside chair and adjusted for comfort, +alarm. The Pt was in NAD at end of tx.  Call bell, tray, and phone in place and within reach. NSG made aware.

## 2024-11-12 ENCOUNTER — TRANSCRIPTION ENCOUNTER (OUTPATIENT)
Age: 84
End: 2024-11-12

## 2024-11-12 VITALS
RESPIRATION RATE: 18 BRPM | DIASTOLIC BLOOD PRESSURE: 92 MMHG | SYSTOLIC BLOOD PRESSURE: 118 MMHG | OXYGEN SATURATION: 98 % | TEMPERATURE: 98 F | HEART RATE: 63 BPM

## 2024-11-12 LAB
ANION GAP SERPL CALC-SCNC: 4 MMOL/L — LOW (ref 5–17)
BUN SERPL-MCNC: 35 MG/DL — HIGH (ref 7–23)
CALCIUM SERPL-MCNC: 8.1 MG/DL — LOW (ref 8.5–10.1)
CHLORIDE SERPL-SCNC: 107 MMOL/L — SIGNIFICANT CHANGE UP (ref 96–108)
CO2 SERPL-SCNC: 24 MMOL/L — SIGNIFICANT CHANGE UP (ref 22–31)
CREAT SERPL-MCNC: 1.17 MG/DL — SIGNIFICANT CHANGE UP (ref 0.5–1.3)
EGFR: 47 ML/MIN/1.73M2 — LOW
GLUCOSE SERPL-MCNC: 95 MG/DL — SIGNIFICANT CHANGE UP (ref 70–99)
HCT VFR BLD CALC: 37.3 % — SIGNIFICANT CHANGE UP (ref 34.5–45)
HGB BLD-MCNC: 12.4 G/DL — SIGNIFICANT CHANGE UP (ref 11.5–15.5)
MCHC RBC-ENTMCNC: 29.7 PG — SIGNIFICANT CHANGE UP (ref 27–34)
MCHC RBC-ENTMCNC: 33.2 G/DL — SIGNIFICANT CHANGE UP (ref 32–36)
MCV RBC AUTO: 89.2 FL — SIGNIFICANT CHANGE UP (ref 80–100)
PLATELET # BLD AUTO: 175 K/UL — SIGNIFICANT CHANGE UP (ref 150–400)
POTASSIUM SERPL-MCNC: 4.1 MMOL/L — SIGNIFICANT CHANGE UP (ref 3.5–5.3)
POTASSIUM SERPL-SCNC: 4.1 MMOL/L — SIGNIFICANT CHANGE UP (ref 3.5–5.3)
RBC # BLD: 4.18 M/UL — SIGNIFICANT CHANGE UP (ref 3.8–5.2)
RBC # FLD: 15.2 % — HIGH (ref 10.3–14.5)
SODIUM SERPL-SCNC: 135 MMOL/L — SIGNIFICANT CHANGE UP (ref 135–145)
WBC # BLD: 6.29 K/UL — SIGNIFICANT CHANGE UP (ref 3.8–10.5)
WBC # FLD AUTO: 6.29 K/UL — SIGNIFICANT CHANGE UP (ref 3.8–10.5)

## 2024-11-12 PROCEDURE — 99239 HOSP IP/OBS DSCHRG MGMT >30: CPT

## 2024-11-12 PROCEDURE — 99232 SBSQ HOSP IP/OBS MODERATE 35: CPT | Mod: GC

## 2024-11-12 RX ORDER — CARBAMIDE PEROXIDE 65 MG/ML
5 LIQUID AURICULAR (OTIC)
Refills: 0 | Status: DISCONTINUED | OUTPATIENT
Start: 2024-11-12 | End: 2024-11-12

## 2024-11-12 RX ORDER — CARBAMIDE PEROXIDE 65 MG/ML
5 LIQUID AURICULAR (OTIC)
Qty: 1 | Refills: 0
Start: 2024-11-12 | End: 2024-11-16

## 2024-11-12 RX ADMIN — NYSTATIN 1 APPLICATION(S): 100000 POWDER TOPICAL at 09:13

## 2024-11-12 RX ADMIN — RIVAROXABAN 15 MILLIGRAM(S): 20 TABLET, FILM COATED ORAL at 17:29

## 2024-11-12 RX ADMIN — Medication 200 MILLIGRAM(S): at 09:13

## 2024-11-12 RX ADMIN — PETROLATUM 1 APPLICATION(S): 987.89 OINTMENT TOPICAL at 12:18

## 2024-11-12 RX ADMIN — Medication 25 MILLIGRAM(S): at 09:13

## 2024-11-12 NOTE — DISCHARGE NOTE NURSING/CASE MANAGEMENT/SOCIAL WORK - FINANCIAL ASSISTANCE
[facility] provides services at a reduced cost to those who are determined to be eligible through [facility]’s financial assistance program. Information regarding [facility]’s financial assistance program can be found by going to [email or web address] or by calling [phone].

## 2024-11-12 NOTE — PROGRESS NOTE ADULT - PROVIDER SPECIALTY LIST ADULT
Colorectal Surgery
Hospitalist
Infectious Disease
Hospitalist
Colorectal Surgery
Colorectal Surgery
Hospitalist
Infectious Disease
Hospitalist

## 2024-11-12 NOTE — DISCHARGE NOTE PROVIDER - NSDCCPCAREPLAN_GEN_ALL_CORE_FT
PRINCIPAL DISCHARGE DIAGNOSIS  Diagnosis: Gastroenteritis  Assessment and Plan of Treatment: you were diagnosed with salmnoella gastroenteritis. your symptoms of diarrhea, nausea, and vomitting will resolve over the course of the next 1 week or so. Please follow up with your primary care physician within 1 week of discharge

## 2024-11-12 NOTE — DISCHARGE NOTE PROVIDER - NSDCPNSUBOBJ_GEN_ALL_CORE
HOSPITALIST ATTENDING PROGRESS NOTE    Chart and meds reviewed.  Patient seen and examined.    CC: diarrhea    Subjective: abd pain resolved - diarrhea improving; no na or vomitting overnight; complains of right ear achea associated with right sided headache    All other systems reviewed and found to be negative with the exception of what has been described above.    MEDICATIONS  (STANDING):  aMIOdarone    Tablet 200 milliGRAM(s) Oral daily  AQUAPHOR (petrolatum Ointment) 1 Application(s) Topical daily  atorvastatin 20 milliGRAM(s) Oral at bedtime  carbamide peroxide Otic Solution 5 Drop(s) Right Ear two times a day  cholecalciferol 2000 Unit(s) Oral at bedtime  metoprolol tartrate 25 milliGRAM(s) Oral two times a day  nystatin Powder 1 Application(s) Topical two times a day  rivaroxaban 15 milliGRAM(s) Oral with dinner    MEDICATIONS  (PRN):  acetaminophen     Tablet .. 650 milliGRAM(s) Oral every 6 hours PRN Temp greater or equal to 38C (100.4F), Mild Pain (1 - 3)  aluminum hydroxide/magnesium hydroxide/simethicone Suspension 30 milliLiter(s) Oral every 4 hours PRN Dyspepsia  diphenhydrAMINE 25 milliGRAM(s) Oral every 12 hours PRN Rash and/or Itching  hydrocortisone 1% Cream 1 Application(s) Topical two times a day PRN Rash and/or Itching  melatonin 3 milliGRAM(s) Oral at bedtime PRN Insomnia  ondansetron Injectable 4 milliGRAM(s) IV Push every 6 hours PRN Nausea and/or Vomiting      VITALS:  T(F): 97.8 (11-12-24 @ 09:10), Max: 98.3 (11-11-24 @ 15:57)  HR: 65 (11-12-24 @ 09:10) (65 - 66)  BP: 126/68 (11-12-24 @ 09:10) (120/50 - 134/62)  RR: 18 (11-12-24 @ 09:10) (17 - 18)  SpO2: 98% (11-12-24 @ 09:10) (98% - 100%)  Wt(kg): --    I&O's Summary      CAPILLARY BLOOD GLUCOSE          PHYSICAL EXAM:  Gen: NAD  HEENT:  visualized with otoscope - right side with impacted cerumen; left side OM visualized and normal  NECK:   supple, no carotid bruits, no palpable lymph nodes, no thyromegaly  CV:  +S1, +S2, regular, no murmurs or rubs  RESP:   lungs clear to auscultation bilaterally, no wheezing, rales, rhonchi, good air entry bilaterally  BREAST:  not examined  GI:  abdomen soft, non-tender, non-distended, normal BS, no bruits, no abdominal masses, no palpable masses  RECTAL:  not examined  :  not examined  MSK:   normal muscle tone, no atrophy, no rigidity, no contractions  EXT:  no clubbing, no cyanosis, no edema, no calf pain, swelling or erythema  VASCULAR:  pulses equal and symmetric in the upper and lower extremities  NEURO:  AAOX3, no focal neurological deficits, follows all commands, able to move extremities spontaneously  SKIN:  rash significantly improved    LABS:                            12.4   6.29  )-----------( 175      ( 12 Nov 2024 06:33 )             37.3     11-12    135  |  107  |  35[H]  ----------------------------<  95  4.1   |  24  |  1.17    Ca    8.1[L]      12 Nov 2024 06:33              Urinalysis Basic - ( 12 Nov 2024 06:33 )    Color: x / Appearance: x / SG: x / pH: x  Gluc: 95 mg/dL / Ketone: x  / Bili: x / Urobili: x   Blood: x / Protein: x / Nitrite: x   Leuk Esterase: x / RBC: x / WBC x   Sq Epi: x / Non Sq Epi: x / Bacteria: x              CULTURES:reviewed

## 2024-11-12 NOTE — DISCHARGE NOTE PROVIDER - CARE PROVIDER_API CALL
Favian Cerda  Family Medicine  [address] [place] [id number]  Phone: [phone]  Fax: [phone]  Follow Up Time: 1 week

## 2024-11-12 NOTE — PROGRESS NOTE ADULT - ASSESSMENT
83F w/ diarrhea, nausea and emesis. Called for CT findings of stable sigmoid colocolonic fistula  Vomited yesterday. Leukocytosis resolved.     Recs:  - CLD   - f/u GI PCR  - follow up bowel function & Quantity  - serial abdominal exam   - Rest of care per primary     Plan will be discussed with Colorectal surgical attendings and team members

## 2024-11-12 NOTE — PROGRESS NOTE ADULT - SUBJECTIVE AND OBJECTIVE BOX
Patient seen and examined at the bedside this AM.  patient passing loose stool. abd soft non tender.  No acute events happened overnight         Physical Exam:   GENERAL: No acute distress.  - LUNGS:  No accessory muscle use.  - CARDIOVASCULAR: Regular rate and rhythm.   - ABDOMEN: Soft, distended, tympanic, mildly tender, no R.G  - EXTREMITIES: No edema. Non-tender.      Chief complaint:      PMHx:  HTN (hypertension)    Glaucoma    OA (osteoarthritis)    Arteriosclerotic heart disease (ASHD)    Hyperlipidemia    Cecal lesion    Afib        PSHx:  Status post glaucoma surgery    S/P knee surgery    H/O wrist surgery        FHx:  FHx: colon cancer    FH: pancreatic cancer    FH: CAD (coronary artery disease)        Vitals:  T(C): 36.6 (11-12 @ 09:10), Max: 36.8 (11-11 @ 15:57)  HR: 65 (11-12 @ 09:10) (65 - 66)  BP: 126/68 (11-12 @ 09:10) (120/50 - 134/62)  RR: 18 (11-12 @ 09:10) (17 - 18)  SpO2: 98% (11-12 @ 09:10) (98% - 100%)      I&Os    .    Labs:  11-12 @ 06:33                    12.4  CBC: 6.29>)-------(<175                     37.3                 135 | 107 | 35    CMP:  ----------------------< 95               4.1 | 24 | 1.17                      Ca:8.1  Phos:-  Mg:-               -|      |-        LFTs:  ------|-|-----             -|      |-  11-11 @ 07:28                    13.3  CBC: 5.58>)-------(<197                     41.2                 137 | 109 | 36    CMP:  ----------------------< 98               4.0 | 22 | 1.18                      Ca:8.8  Phos:-  Mg:-               -|      |-        LFTs:  ------|-|-----             -|      |-        Cultures:    Culture - Blood (collected 11-08-24 @ 11:18)  Source: .Blood BLOOD  Preliminary Report (11-11-24 @ 16:01):    No growth at 72 Hours    Culture - Blood (collected 11-08-24 @ 11:16)  Source: .Blood BLOOD  Preliminary Report (11-11-24 @ 16:01):    No growth at 72 Hours    Urinalysis with Rflx Culture (collected 11-07-24 @ 14:26)    Culture - Urine (collected 11-07-24 @ 14:26)  Source: .Urine None  Final Report (11-09-24 @ 04:49):    >=3 organisms. Probable collection contamination.          Current Inpatient Medications:  acetaminophen     Tablet .. 650 milliGRAM(s) Oral every 6 hours PRN  aluminum hydroxide/magnesium hydroxide/simethicone Suspension 30 milliLiter(s) Oral every 4 hours PRN  aMIOdarone    Tablet 200 milliGRAM(s) Oral daily  AQUAPHOR (petrolatum Ointment) 1 Application(s) Topical daily  atorvastatin 20 milliGRAM(s) Oral at bedtime  cholecalciferol 2000 Unit(s) Oral at bedtime  diphenhydrAMINE 25 milliGRAM(s) Oral every 12 hours PRN  hydrocortisone 1% Cream 1 Application(s) Topical two times a day PRN  melatonin 3 milliGRAM(s) Oral at bedtime PRN  metoprolol tartrate 25 milliGRAM(s) Oral two times a day  nystatin Powder 1 Application(s) Topical two times a day  ondansetron Injectable 4 milliGRAM(s) IV Push every 6 hours PRN  rivaroxaban 15 milliGRAM(s) Oral with dinner

## 2024-11-12 NOTE — DISCHARGE NOTE PROVIDER - HOSPITAL COURSE
83 year old woman with a history of atrial fibrillation, non-obstructive CAD with NSTEMI (2020), HFpEF, HTN presenting to the ED in a wheelchair with niece c/o weakness, RLQ abdominal pain, nausea, diarrhea (multiple times a day) and vomiting x1 since Tuesday morning. Niece states pt completed one week course of amoxicillin that was started last Wednesday s/p tooth extraction. CT abd with constipated right colon and stable colocolonic fistula at rectosigmoid junction. CRS consulted and no acute surgical intervention recommended. I discussed case with CRS who did not believe that there was an SBO or LBO. pt started on CLD and advanced to low fiber diet. pt had intermittent diarrhea. GI PCR sent and positive for salmonella. when asked if she at anything out of the ordinary prior to arrival, she said she may have eaten 3 day old macaroni and cheese. leukocytosis improved. pt had no abd pain or vomitting upon dc. stable for dc. 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  GI PCR positive for salmonella - likely from old macaroni and cheese she ate prior to arrival  pt denies abd pain this morning  tolerating reg diet   no vomitting overnight   stable for dc    # Leukocytosis, Lactic acidosis , probable UTI can not r/o abdominal acute process   leukocytosis improving with ceftriaxone - continue   urine culture > 3 organisms - contaminated - empiric treatment with 3 doses of ceftriaxone then stop  blood culture NGx 72 hours    #EILEEN , resolved  # Hypovolumic hyponatremia , resolved  -  baseline Cr 0.87 Cr on admission 1.37   - Creatinine Trend: 1.16<--1.10<--, 1.72<--, 1.37<--  - and Na of 130 --> 131 --> 135-->139  - s/p  IV fluids  bps ok without losartan - restart if necessary    #morbiliiform rash   much improved  pt received amoxicillin prior to admission for tooth extraction - likely the cause - penicillin added to allergy list   no vesicular eruptions to suggest shingles - acyclovir dced  expect improvement with time   if itchy, PRN benedryl    #HFpEF EF 55% (1/2024)  #Afib  with bradycardia to 54   - cw home amiodarone   - cw home rivaroxaban 15 mg   - cw home metoprolol 50 mg bid --> 25 bid due to bradycardia  - primary cardiology Dr. Lockhart     #HLD  - cw home atorvastatin     # Unsteady gait , generalized weakness, OA s/p knee surgeies  # h/o falls   - PT evaluation  - uses cane  - vit D, B12, folate noted    # Vitamin D deficiency   - replace    # Dry skin   - aquaphor    #right ear ache  visualized with otoscope   ear wax impaction   debrox sent to pharmacy    #Diet: Regular   #DVT ppx:  Xarelto - confirmed dose with pharmacy  #Code: FULL code

## 2024-11-12 NOTE — DISCHARGE NOTE NURSING/CASE MANAGEMENT/SOCIAL WORK - NSDCPEFALRISK_GEN_ALL_CORE
For information on Fall & Injury Prevention, visit: [email or web address] [place]  [email or web address] [place]  https://www.cdc.gov/steadi/patient.html

## 2024-11-12 NOTE — PROGRESS NOTE ADULT - ATTENDING COMMENTS
Patient seen and examined at bedside this morning  Tolerating liquids, currently denies N/V, feeling bloated, reports having bowel movements  Abdomen is soft, mildly distended, nontender  A/P: Advance diet as tolerating, antibiotics per ID, follow up GI PCR, continue supportive care    Vital Signs Last 24 Hrs  T(C): 36.6 (10 Nov 2024 08:05), Max: 36.7 (09 Nov 2024 16:06)  T(F): 97.9 (10 Nov 2024 08:05), Max: 98.1 (09 Nov 2024 16:06)  HR: 75 (10 Nov 2024 12:01) (64 - 75)  BP: 116/75 (10 Nov 2024 12:01) (116/75 - 138/65)  BP(mean): --  RR: 18 (10 Nov 2024 08:05) (18 - 18)  SpO2: 98% (10 Nov 2024 08:05) (94% - 98%)    Parameters below as of 10 Nov 2024 08:05  Patient On (Oxygen Delivery Method): room air                          13.5   8.63  )-----------( 188      ( 10 Nov 2024 07:27 )             41.4
Patient seen and examined. Has a colo-colonic  fistula related to diverticula disease and is asymptomatic from it. Has no abdominal pain currently. GI symptoms likely related to salmonella infection. Tolerating diet and no surgery intervention required.

## 2024-11-12 NOTE — DISCHARGE NOTE PROVIDER - DETAILS OF MALNUTRITION DIAGNOSIS/DIAGNOSES
This patient has been assessed with a concern for Malnutrition and was treated during this hospitalization for the following Nutrition diagnosis/diagnoses:     -  11/09/2024: Moderate protein-calorie malnutrition

## 2024-11-12 NOTE — DISCHARGE NOTE PROVIDER - NSDCMRMEDTOKEN_GEN_ALL_CORE_FT
amiodarone 200 mg oral tablet: 1 tab(s) orally once a day  atorvastatin 20 mg oral tablet: 1 tab(s) orally once a day (at bedtime)  Debrox 6.5% otic solution: 5 drop(s) in the right ear 2 times a day  losartan 50 mg oral tablet: 1 tab(s) orally once a day  metoprolol tartrate 50 mg oral tablet: 1 tab(s) orally 2 times a day  PreserVision AREDS 2 oral capsule: 1 cap(s) orally 2 times a day  rivaroxaban 15 mg oral tablet: 1 tab(s) orally once a day

## 2024-11-12 NOTE — DISCHARGE NOTE NURSING/CASE MANAGEMENT/SOCIAL WORK - PATIENT PORTAL LINK FT
You can access the FollowMyHealth Patient Portal offered by [facility] by registering at the following website: [email or web address]. By joining [facility]’s FollowMyHealth portal, you will also be able to view your health information using other applications (apps) compatible with our system.

## 2024-11-12 NOTE — DISCHARGE NOTE PROVIDER - NSDCFUSCHEDAPPT_GEN_ALL_CORE_FT
Randal Kraus Physician [facility]  CARDIOLOGY [address]  Scheduled Appointment: 11/26/2024

## 2024-11-13 ENCOUNTER — INPATIENT (INPATIENT)
Facility: HOSPITAL | Age: 84
LOS: 3 days | Discharge: NURSING, REHAB OR LONG-TERM CARE | DRG: 373 | End: 2024-11-17
Attending: HOSPITALIST | Admitting: STUDENT IN AN ORGANIZED HEALTH CARE EDUCATION/TRAINING PROGRAM
Payer: MEDICARE

## 2024-11-13 VITALS
DIASTOLIC BLOOD PRESSURE: 60 MMHG | TEMPERATURE: 98 F | OXYGEN SATURATION: 97 % | SYSTOLIC BLOOD PRESSURE: 91 MMHG | RESPIRATION RATE: 18 BRPM | HEART RATE: 79 BPM

## 2024-11-13 DIAGNOSIS — I10 ESSENTIAL (PRIMARY) HYPERTENSION: ICD-10-CM

## 2024-11-13 DIAGNOSIS — I48.91 UNSPECIFIED ATRIAL FIBRILLATION: ICD-10-CM

## 2024-11-13 DIAGNOSIS — A02.0 SALMONELLA ENTERITIS: ICD-10-CM

## 2024-11-13 DIAGNOSIS — Z98.890 OTHER SPECIFIED POSTPROCEDURAL STATES: Chronic | ICD-10-CM

## 2024-11-13 DIAGNOSIS — Z98.83 FILTERING (VITREOUS) BLEB AFTER GLAUCOMA SURGERY STATUS: Chronic | ICD-10-CM

## 2024-11-13 DIAGNOSIS — R79.89 OTHER SPECIFIED ABNORMAL FINDINGS OF BLOOD CHEMISTRY: ICD-10-CM

## 2024-11-13 LAB
ALBUMIN SERPL ELPH-MCNC: 2.8 G/DL — LOW (ref 3.3–5)
ALP SERPL-CCNC: 68 U/L — SIGNIFICANT CHANGE UP (ref 40–120)
ALT FLD-CCNC: 53 U/L — SIGNIFICANT CHANGE UP (ref 12–78)
ANION GAP SERPL CALC-SCNC: 4 MMOL/L — LOW (ref 5–17)
APPEARANCE UR: ABNORMAL
AST SERPL-CCNC: 34 U/L — SIGNIFICANT CHANGE UP (ref 15–37)
BACTERIA # UR AUTO: ABNORMAL /HPF
BASE EXCESS BLDV CALC-SCNC: -3.5 MMOL/L — LOW (ref -2–3)
BASOPHILS # BLD AUTO: 0.04 K/UL — SIGNIFICANT CHANGE UP (ref 0–0.2)
BASOPHILS NFR BLD AUTO: 0.5 % — SIGNIFICANT CHANGE UP (ref 0–2)
BILIRUB SERPL-MCNC: 0.4 MG/DL — SIGNIFICANT CHANGE UP (ref 0.2–1.2)
BILIRUB UR-MCNC: NEGATIVE — SIGNIFICANT CHANGE UP
BUN SERPL-MCNC: 36 MG/DL — HIGH (ref 7–23)
CALCIUM SERPL-MCNC: 8.7 MG/DL — SIGNIFICANT CHANGE UP (ref 8.5–10.1)
CAST: 5 /LPF — HIGH (ref 0–4)
CHLORIDE SERPL-SCNC: 109 MMOL/L — HIGH (ref 96–108)
CO2 SERPL-SCNC: 24 MMOL/L — SIGNIFICANT CHANGE UP (ref 22–31)
COLOR SPEC: YELLOW — SIGNIFICANT CHANGE UP
CREAT SERPL-MCNC: 1.21 MG/DL — SIGNIFICANT CHANGE UP (ref 0.5–1.3)
CULTURE RESULTS: SIGNIFICANT CHANGE UP
DIFF PNL FLD: ABNORMAL
EGFR: 45 ML/MIN/1.73M2 — LOW
EOSINOPHIL # BLD AUTO: 0.12 K/UL — SIGNIFICANT CHANGE UP (ref 0–0.5)
EOSINOPHIL NFR BLD AUTO: 1.5 % — SIGNIFICANT CHANGE UP (ref 0–6)
GAS PNL BLDV: SIGNIFICANT CHANGE UP
GLUCOSE SERPL-MCNC: 100 MG/DL — HIGH (ref 70–99)
GLUCOSE UR QL: NEGATIVE MG/DL — SIGNIFICANT CHANGE UP
HCO3 BLDV-SCNC: 23 MMOL/L — SIGNIFICANT CHANGE UP (ref 22–29)
HCT VFR BLD CALC: 38.8 % — SIGNIFICANT CHANGE UP (ref 34.5–45)
HGB BLD-MCNC: 12.8 G/DL — SIGNIFICANT CHANGE UP (ref 11.5–15.5)
HYALINE CASTS # UR AUTO: PRESENT
IMM GRANULOCYTES NFR BLD AUTO: 2 % — HIGH (ref 0–0.9)
KETONES UR-MCNC: NEGATIVE MG/DL — SIGNIFICANT CHANGE UP
LACTATE SERPL-SCNC: 1.2 MMOL/L — SIGNIFICANT CHANGE UP (ref 0.7–2)
LEUKOCYTE ESTERASE UR-ACNC: ABNORMAL
LIDOCAIN IGE QN: 47 U/L — SIGNIFICANT CHANGE UP (ref 13–75)
LYMPHOCYTES # BLD AUTO: 0.87 K/UL — LOW (ref 1–3.3)
LYMPHOCYTES # BLD AUTO: 10.8 % — LOW (ref 13–44)
MCHC RBC-ENTMCNC: 29.6 PG — SIGNIFICANT CHANGE UP (ref 27–34)
MCHC RBC-ENTMCNC: 33 G/DL — SIGNIFICANT CHANGE UP (ref 32–36)
MCV RBC AUTO: 89.6 FL — SIGNIFICANT CHANGE UP (ref 80–100)
MONOCYTES # BLD AUTO: 0.78 K/UL — SIGNIFICANT CHANGE UP (ref 0–0.9)
MONOCYTES NFR BLD AUTO: 9.7 % — SIGNIFICANT CHANGE UP (ref 2–14)
NEUTROPHILS # BLD AUTO: 6.05 K/UL — SIGNIFICANT CHANGE UP (ref 1.8–7.4)
NEUTROPHILS NFR BLD AUTO: 75.5 % — SIGNIFICANT CHANGE UP (ref 43–77)
NITRITE UR-MCNC: NEGATIVE — SIGNIFICANT CHANGE UP
PCO2 BLDV: 44 MMHG — HIGH (ref 39–42)
PH BLDV: 7.32 — SIGNIFICANT CHANGE UP (ref 7.32–7.43)
PH UR: 5.5 — SIGNIFICANT CHANGE UP (ref 5–8)
PLATELET # BLD AUTO: 183 K/UL — SIGNIFICANT CHANGE UP (ref 150–400)
PO2 BLDV: 36 MMHG — SIGNIFICANT CHANGE UP (ref 25–45)
POTASSIUM SERPL-MCNC: 3.5 MMOL/L — SIGNIFICANT CHANGE UP (ref 3.5–5.3)
POTASSIUM SERPL-SCNC: 3.5 MMOL/L — SIGNIFICANT CHANGE UP (ref 3.5–5.3)
PROT SERPL-MCNC: 6.8 GM/DL — SIGNIFICANT CHANGE UP (ref 6–8.3)
PROT UR-MCNC: 30 MG/DL
RBC # BLD: 4.33 M/UL — SIGNIFICANT CHANGE UP (ref 3.8–5.2)
RBC # FLD: 15 % — HIGH (ref 10.3–14.5)
RBC CASTS # UR COMP ASSIST: 18 /HPF — HIGH (ref 0–4)
SAO2 % BLDV: 62 % — LOW (ref 67–88)
SODIUM SERPL-SCNC: 137 MMOL/L — SIGNIFICANT CHANGE UP (ref 135–145)
SP GR SPEC: 1.02 — SIGNIFICANT CHANGE UP (ref 1–1.03)
SPECIMEN SOURCE: SIGNIFICANT CHANGE UP
SQUAMOUS # UR AUTO: 12 /HPF — HIGH (ref 0–5)
TROPONIN I, HIGH SENSITIVITY RESULT: 307.91 NG/L — HIGH
TROPONIN I, HIGH SENSITIVITY RESULT: 579.64 NG/L — HIGH
UROBILINOGEN FLD QL: 0.2 MG/DL — SIGNIFICANT CHANGE UP (ref 0.2–1)
WBC # BLD: 8.02 K/UL — SIGNIFICANT CHANGE UP (ref 3.8–10.5)
WBC # FLD AUTO: 8.02 K/UL — SIGNIFICANT CHANGE UP (ref 3.8–10.5)
WBC UR QL: 43 /HPF — HIGH (ref 0–5)

## 2024-11-13 PROCEDURE — 87186 SC STD MICRODIL/AGAR DIL: CPT

## 2024-11-13 PROCEDURE — 85025 COMPLETE CBC W/AUTO DIFF WBC: CPT

## 2024-11-13 PROCEDURE — 36415 COLL VENOUS BLD VENIPUNCTURE: CPT

## 2024-11-13 PROCEDURE — 78452 HT MUSCLE IMAGE SPECT MULT: CPT

## 2024-11-13 PROCEDURE — 72125 CT NECK SPINE W/O DYE: CPT | Mod: 26,MC

## 2024-11-13 PROCEDURE — 93005 ELECTROCARDIOGRAM TRACING: CPT

## 2024-11-13 PROCEDURE — 93880 EXTRACRANIAL BILAT STUDY: CPT

## 2024-11-13 PROCEDURE — A9500: CPT

## 2024-11-13 PROCEDURE — 80048 BASIC METABOLIC PNL TOTAL CA: CPT

## 2024-11-13 PROCEDURE — 87086 URINE CULTURE/COLONY COUNT: CPT

## 2024-11-13 PROCEDURE — 97530 THERAPEUTIC ACTIVITIES: CPT | Mod: GP

## 2024-11-13 PROCEDURE — 71045 X-RAY EXAM CHEST 1 VIEW: CPT | Mod: 26

## 2024-11-13 PROCEDURE — 93017 CV STRESS TEST TRACING ONLY: CPT

## 2024-11-13 PROCEDURE — 84484 ASSAY OF TROPONIN QUANT: CPT

## 2024-11-13 PROCEDURE — 99285 EMERGENCY DEPT VISIT HI MDM: CPT

## 2024-11-13 PROCEDURE — 97166 OT EVAL MOD COMPLEX 45 MIN: CPT | Mod: GO

## 2024-11-13 PROCEDURE — 81001 URINALYSIS AUTO W/SCOPE: CPT

## 2024-11-13 PROCEDURE — 99223 1ST HOSP IP/OBS HIGH 75: CPT

## 2024-11-13 PROCEDURE — 93010 ELECTROCARDIOGRAM REPORT: CPT | Mod: 76

## 2024-11-13 PROCEDURE — 82962 GLUCOSE BLOOD TEST: CPT

## 2024-11-13 PROCEDURE — 97116 GAIT TRAINING THERAPY: CPT | Mod: GP

## 2024-11-13 PROCEDURE — 70450 CT HEAD/BRAIN W/O DYE: CPT | Mod: 26,MC

## 2024-11-13 PROCEDURE — 97162 PT EVAL MOD COMPLEX 30 MIN: CPT | Mod: GP

## 2024-11-13 PROCEDURE — 93306 TTE W/DOPPLER COMPLETE: CPT

## 2024-11-13 RX ORDER — ONDANSETRON HYDROCHLORIDE 4 MG/1
4 TABLET, FILM COATED ORAL EVERY 8 HOURS
Refills: 0 | Status: DISCONTINUED | OUTPATIENT
Start: 2024-11-13 | End: 2024-11-17

## 2024-11-13 RX ORDER — MAGNESIUM, ALUMINUM HYDROXIDE 200-225/5
30 SUSPENSION, ORAL (FINAL DOSE FORM) ORAL EVERY 4 HOURS
Refills: 0 | Status: DISCONTINUED | OUTPATIENT
Start: 2024-11-13 | End: 2024-11-17

## 2024-11-13 RX ORDER — ACETAMINOPHEN, DIPHENHYDRAMINE HCL, PHENYLEPHRINE HCL 325; 25; 5 MG/1; MG/1; MG/1
3 TABLET ORAL AT BEDTIME
Refills: 0 | Status: DISCONTINUED | OUTPATIENT
Start: 2024-11-13 | End: 2024-11-17

## 2024-11-13 RX ORDER — METOPROLOL TARTRATE 100 MG/1
25 TABLET, FILM COATED ORAL
Refills: 0 | Status: DISCONTINUED | OUTPATIENT
Start: 2024-11-13 | End: 2024-11-17

## 2024-11-13 RX ORDER — RIVAROXABAN 10 MG/1
15 TABLET, FILM COATED ORAL ONCE
Refills: 0 | Status: COMPLETED | OUTPATIENT
Start: 2024-11-13 | End: 2024-11-13

## 2024-11-13 RX ORDER — AMIODARONE HYDROCHLORIDE 200 MG/1
200 TABLET ORAL DAILY
Refills: 0 | Status: DISCONTINUED | OUTPATIENT
Start: 2024-11-13 | End: 2024-11-17

## 2024-11-13 RX ORDER — CHOLESTEROL/SOYBEAN OIL/C/E 60-200-80
1 POWDER (GRAM) ORAL
Refills: 0 | DISCHARGE

## 2024-11-13 RX ORDER — AMIODARONE HCL 200 MG
1 TABLET ORAL
Refills: 0 | DISCHARGE

## 2024-11-13 RX ORDER — SODIUM CHLORIDE 9 MG/ML
1000 INJECTION, SOLUTION INTRAMUSCULAR; INTRAVENOUS; SUBCUTANEOUS ONCE
Refills: 0 | Status: COMPLETED | OUTPATIENT
Start: 2024-11-13 | End: 2024-11-13

## 2024-11-13 RX ORDER — ACETAMINOPHEN 500MG 500 MG/1
650 TABLET, COATED ORAL EVERY 6 HOURS
Refills: 0 | Status: DISCONTINUED | OUTPATIENT
Start: 2024-11-13 | End: 2024-11-17

## 2024-11-13 RX ORDER — METOPROLOL TARTRATE 100 MG/1
50 TABLET, FILM COATED ORAL
Refills: 0 | Status: DISCONTINUED | OUTPATIENT
Start: 2024-11-13 | End: 2024-11-13

## 2024-11-13 RX ADMIN — Medication 20 MILLIGRAM(S): at 11:04

## 2024-11-13 RX ADMIN — SODIUM CHLORIDE 1000 MILLILITER(S): 9 INJECTION, SOLUTION INTRAMUSCULAR; INTRAVENOUS; SUBCUTANEOUS at 09:18

## 2024-11-13 RX ADMIN — Medication 324 MILLIGRAM(S): at 11:03

## 2024-11-13 RX ADMIN — AMIODARONE HYDROCHLORIDE 200 MILLIGRAM(S): 200 TABLET ORAL at 11:03

## 2024-11-13 RX ADMIN — RIVAROXABAN 15 MILLIGRAM(S): 10 TABLET, FILM COATED ORAL at 11:03

## 2024-11-13 RX ADMIN — METOPROLOL TARTRATE 50 MILLIGRAM(S): 100 TABLET, FILM COATED ORAL at 11:02

## 2024-11-13 RX ADMIN — METOPROLOL TARTRATE 25 MILLIGRAM(S): 100 TABLET, FILM COATED ORAL at 21:08

## 2024-11-13 NOTE — ED ADULT NURSE REASSESSMENT NOTE - NS ED NURSE REASSESS COMMENT FT1
Pt received A&Ox3-slightly forgetful. Pt resting comfortably in bed-denies pain. Pt completely undressed-no active stool noted, dried stool noted to bottom and top of thighs. Some IED noted. Shearing noted to upper middle back with some opening present. Also redness and blanchable to buttock. Some type of blister? sore? noted to left buttock. Pt cleaned and complete bed changed performed. Pt ordered lunch and given medications. Isolation in place from previous + Salmonella and MD to order cdiff. Pt a little unclear as to last diarrhea. Bed assignment pending.

## 2024-11-13 NOTE — ED ADULT NURSE REASSESSMENT NOTE - NS ED NURSE REASSESS COMMENT FT1
Pt finished lunch-niece at bedside. Pt boosted and repositioned. Placed on bed pan to void-UA collected and sent. No BM today. Isolation precautions in place, cdiff pending collection. Bed assignment pending.

## 2024-11-13 NOTE — H&P ADULT - HISTORY OF PRESENT ILLNESS
HPI:   83 year old woman with a history of atrial fibrillation, non-obstructive CAD with NSTEMI (2020), HFpEF, HTN BIBEMS after 3 falls at home. Patient recently discharged from hospital to home 24. Pt was admitted from - for diarrhea GI pcr was + for salmonella. Pt able to tolerate regular diet with resolution of symptoms so she was discharged home. Pt poor historian w/ conflicting reports stated she has had diarrhea yesterday but at other times states it has been a few days since she had diarrhea. She denies any LOC and reports only mechanical falls at home. Reports possible head strike but CT head negative in the ED.         PAST MEDICAL & SURGICAL HISTORY:  HTN (hypertension)  Glaucoma  OA (osteoarthritis)  Arteriosclerotic heart disease (ASHD)  Hyperlipidemia  Cecal lesion  Afib  Status post glaucoma surgery  and cataract  S/P knee surgery  H/O wrist surgery        FAMILY HISTORY:  FHx: colon cancer  in brother,     FH: pancreatic cancer  in sister,     FH: CAD (coronary artery disease)  in mother and father, both       Social History:      Allergies    penicillin (Rash (Severe))    Intolerances        MEDICATIONS  (STANDING):  aMIOdarone    Tablet 200 milliGRAM(s) Oral daily  atorvastatin 20 milliGRAM(s) Oral at bedtime  metoprolol tartrate 25 milliGRAM(s) Oral two times a day    MEDICATIONS  (PRN):  acetaminophen     Tablet .. 650 milliGRAM(s) Oral every 6 hours PRN Temp greater or equal to 38C (100.4F), Mild Pain (1 - 3)  aluminum hydroxide/magnesium hydroxide/simethicone Suspension 30 milliLiter(s) Oral every 4 hours PRN Dyspepsia  melatonin 3 milliGRAM(s) Oral at bedtime PRN Insomnia  ondansetron Injectable 4 milliGRAM(s) IV Push every 8 hours PRN Nausea and/or Vomiting      ROS:  General:  No fevers, chills, or unexplained weight loss  Skin: No rash or bothersome skin lesions  Musculoskeletal: No arthalgias, myalgias or joint swelling  Eyes: No visual changes or eye pain  Ears: No hearing loss , otorrhea or ear pain  Nose, Mouth, Throat: No nasal congestion, rhinorrhea, oral lesions, postnasal drip or sore throat  Cardio: No chest pain or palpitations. no lower extremity edema. no syncope. no claudication.   Respiratory: No cough, shortness of breath or wheezing   GI: No diarrhea, constipation, blood in stools, abdominal pain, vomiting or heartburn  : No urinary frequency, hematuria, incontinence, or dysuria  Neurologic: No headaches, parasthesias, confusion, dysarthria or gait instability  Psychiatric:  No anxiety or depression  Lymphatic:  No easy bruising, easy bleeding or swollen glands  Allergic: No itching, sneezing , watery eyes, clear rhinorrhea or recurrent infections    PEx  T(C): 36.6 (24 @ 08:20), Max: 36.6 (24 @ 08:20)  HR: 76 (24 @ 11:05) (63 - 79)  BP: 167/81 (24 @ 11:05) (91/60 - 167/81)  RR: 16 (24 @ 11:05) (16 - 18)  SpO2: 96% (24 @ 11:05) (96% - 98%)  Wt(kg): --  General:     no acute distress, no identifying marks , scars, or tattoos.  Skin: no rash or prominent lesions  Head: normocephalic, atraumatic     Sinuses: non-tender  Nose: no external lesions, mucosa non-inflamed, septum and turbinates normal  Throat: no erythema, exudates or lesions.  Neck: Supple without lymphadenopathy. Thyroid no thyromegaly, no palpable thyroid nodules, no palpable nodules or masses, carotid arteries no bruits.   Breasts: No palpable masses or lesions.  Heart: RRR, no murmur or gallop.  Normal S1, S2.  No S3, S4.   Lungs: CTA bilaterally, no wheezes, rhonchi, rales.  Breathing unlabored.   Chest wall: Normal insp   Abdomen:  Soft, NT/ND, normal bowel sounds, no HSM, no masses.  No peritoneal signs.   Back: spine normal without deformity or tenderness.  Normal ROM   : Exam normal.  no inguinal hernias.  Extremities: No deformities, clubbing, cyanosis, or edema.  Musculoskeletal: Normal gait and station. No decreased range of motion, instability, atrophy or abnormal strength or tone in the head, neck, spine, ribs, pelvis or extremities.   Neurologic: CN 2-12 normal. Sensation to pain, touch and proprioception normal. DTRs normal in upper and lower extremities. No pathologic reflexes.  Motor normal.  Psychiatric: Oriented X3, intact recent and remote memory, judgement and insight, normal mood and affect.                          12.8   8.02  )-----------( 183      ( 2024 09:04 )             38.8         137  |  109[H]  |  36[H]  ----------------------------<  100[H]  3.5   |  24  |  1.21    Ca    8.7      2024 09:04    TPro  6.8  /  Alb  2.8[L]  /  TBili  0.4  /  DBili  x   /  AST  34  /  ALT  53  /  AlkPhos  68      CAPILLARY BLOOD GLUCOSE      POCT Blood Glucose.: 93 mg/dL (2024 08:19)      Urinalysis Basic - ( 2024 09:04 )    Color: x / Appearance: x / SG: x / pH: x  Gluc: 100 mg/dL / Ketone: x  / Bili: x / Urobili: x   Blood: x / Protein: x / Nitrite: x   Leuk Esterase: x / RBC: x / WBC x   Sq Epi: x / Non Sq Epi: x / Bacteria: x      Culture Results:   Culture in progress ( @ 11:00)      Radiology/Imaging, I have personally reviewed:      CT HEAD:  1.  No evidence of acute intracranialhemorrhage or midline shift.  2.  Chronic small vessel disease.    CT CERVICAL SPINE:  1.  No evidence of acute osseous fracture or talha dislocation.  2.  Degenerative changes of the cervical spine.  3.  Atherosclerotic calcification about the bilateral carotid bulbs.   Recommend nonemergent Doppler ultrasound for further evaluation of flow   dynamics.       HPI:   83 year old woman with a history of atrial fibrillation, non-obstructive CAD with NSTEMI (2020), HFpEF, HTN BIBEMS after 3 falls at home. Patient recently discharged from hospital to home 24. Pt was admitted from - for diarrhea GI pcr was + for salmonella. Pt able to tolerate regular diet with resolution of symptoms so she was discharged home. Pt poor historian w/ conflicting reports stated she has had diarrhea yesterday but at other times states it has been a few days since she had diarrhea. She denies any LOC and reports only mechanical falls at home. Reports possible head strike but CT head negative in the ED.         PAST MEDICAL & SURGICAL HISTORY:  HTN (hypertension)  Glaucoma  OA (osteoarthritis)  Arteriosclerotic heart disease (ASHD)  Hyperlipidemia  Cecal lesion  Afib  Status post glaucoma surgery  and cataract  S/P knee surgery  H/O wrist surgery        FAMILY HISTORY:  FHx: colon cancer  in brother,     FH: pancreatic cancer  in sister,     FH: CAD (coronary artery disease)  in mother and father, both       Social History:      Allergies    penicillin (Rash (Severe))    Intolerances        MEDICATIONS  (STANDING):  aMIOdarone    Tablet 200 milliGRAM(s) Oral daily  atorvastatin 20 milliGRAM(s) Oral at bedtime  metoprolol tartrate 25 milliGRAM(s) Oral two times a day    MEDICATIONS  (PRN):  acetaminophen     Tablet .. 650 milliGRAM(s) Oral every 6 hours PRN Temp greater or equal to 38C (100.4F), Mild Pain (1 - 3)  aluminum hydroxide/magnesium hydroxide/simethicone Suspension 30 milliLiter(s) Oral every 4 hours PRN Dyspepsia  melatonin 3 milliGRAM(s) Oral at bedtime PRN Insomnia  ondansetron Injectable 4 milliGRAM(s) IV Push every 8 hours PRN Nausea and/or Vomiting      ROS:  General:  No fevers, chills, or unexplained weight loss  Skin: No rash or bothersome skin lesions  Musculoskeletal: No arthalgias, myalgias or joint swelling  Eyes: No visual changes or eye pain  Ears: No hearing loss , otorrhea or ear pain  Nose, Mouth, Throat: No nasal congestion, rhinorrhea, oral lesions, postnasal drip or sore throat  Cardio: No chest pain or palpitations. no lower extremity edema. no syncope. no claudication.   Respiratory: No cough, shortness of breath or wheezing   GI: No diarrhea, constipation, blood in stools, abdominal pain, vomiting or heartburn  : No urinary frequency, hematuria, incontinence, or dysuria  Neurologic: No headaches, parasthesias, confusion, dysarthria or gait instability  Psychiatric:  No anxiety or depression  Lymphatic:  No easy bruising, easy bleeding or swollen glands  Allergic: No itching, sneezing , watery eyes, clear rhinorrhea or recurrent infections    PEx  T(C): 36.6 (24 @ 08:20), Max: 36.6 (24 @ 08:20)  HR: 76 (24 @ 11:05) (63 - 79)  BP: 167/81 (24 @ 11:05) (91/60 - 167/)  RR: 16 (24 @ 11:05) (16 - 18)  SpO2: 96% (24 @ 11:05) (96% - 98%)  Wt(kg): --  General:     no acute distress, no identifying marks , scars, or tattoos.  Skin: no rash or prominent lesions  Head: normocephalic, atraumatic     Sinuses: non-tender  Nose: no external lesions, mucosa non-inflamed, septum and turbinates normal  Throat: no erythema, exudates or lesions.  Neck: Supple without lymphadenopathy. Thyroid no thyromegaly, no palpable thyroid nodules, no palpable nodules or masses, carotid arteries no bruits.   Heart: RRR, no murmur or gallop.  Normal S1, S2.  No S3, S4.   Lungs: CTA bilaterally, no wheezes, rhonchi, rales.  Breathing unlabored.   Chest wall: Normal insp   Abdomen:  Soft, NT/ND, normal bowel sounds, no HSM, no masses.  No peritoneal signs.   Back: spine normal without deformity or tenderness.  Normal ROM   Extremities: No deformities, clubbing, cyanosis, or edema.  Musculoskeletal: Normal gait and station. No decreased range of motion, instability, atrophy or abnormal strength or tone in the head, neck, spine, ribs, pelvis or extremities.   Neurologic: CN 2-12 normal. Sensation to pain, touch and proprioception normal. DTRs normal in upper and lower extremities. No pathologic reflexes.  Motor normal.  Psychiatric: Oriented X3, intact recent and remote memory, judgement and insight, normal mood and affect.                          12.8   8.02  )-----------( 183      ( 2024 09:04 )             38.8         137  |  109[H]  |  36[H]  ----------------------------<  100[H]  3.5   |  24  |  1.21    Ca    8.7      2024 09:04    TPro  6.8  /  Alb  2.8[L]  /  TBili  0.4  /  DBili  x   /  AST  34  /  ALT  53  /  AlkPhos  68      CAPILLARY BLOOD GLUCOSE      POCT Blood Glucose.: 93 mg/dL (2024 08:19)      Urinalysis Basic - ( 2024 09:04 )    Color: x / Appearance: x / SG: x / pH: x  Gluc: 100 mg/dL / Ketone: x  / Bili: x / Urobili: x   Blood: x / Protein: x / Nitrite: x   Leuk Esterase: x / RBC: x / WBC x   Sq Epi: x / Non Sq Epi: x / Bacteria: x      Culture Results:   Culture in progress ( @ 11:00)      Radiology/Imaging, I have personally reviewed:      CT HEAD:  1.  No evidence of acute intracranial hemorrhage or midline shift.  2.  Chronic small vessel disease.    CT CERVICAL SPINE:  1.  No evidence of acute osseous fracture or talha dislocation.  2.  Degenerative changes of the cervical spine.  3.  Atherosclerotic calcification about the bilateral carotid bulbs.   Recommend nonemergent Doppler ultrasound for further evaluation of flow   dynamics.    EKG: first degree AV block

## 2024-11-13 NOTE — ED ADULT NURSE NOTE - NSFALLHARMRISKINTERV_ED_ALL_ED

## 2024-11-13 NOTE — ED PROVIDER NOTE - CLINICAL SUMMARY MEDICAL DECISION MAKING FREE TEXT BOX
pt dc yesterday form  for salmonella, had 3 falls and called 911 for  fall then they brought her here, no c/o chest pain or sob, just fatigue and gen weakness. pt states she fell on her left arm and denies pain, no deformity or accymosis  will get labs including trop, lactate r/o acs, dehydration causing wekaness      pt with elevated troponin , no pain, sob ekg unchnageddiology consult, tba pt dc yesterday form  for salmonella, had 3 falls and called 911 for  fall then they brought her here, no c/o chest pain or sob, just fatigue and gen weakness. pt states she fell on her left arm and denies pain, no deformity or accymosis  will get labs including trop, lactate r/o acs, dehydration causing wekaness      pt with elevated troponin , no pain, sob ekg unchnageddiology consult, tba    dw cardiology Dr. Lockhart, aspirin, trend troponins

## 2024-11-13 NOTE — H&P ADULT - ASSESSMENT
83 year old woman with a history of atrial fibrillation, non-obstructive CAD with NSTEMI (2020), HFpEF, HTN BIBEMS after 3 falls at home recently admitted here 11/7-11/12 found to have +salmonella in GI pcr.      #GI PCR + salmonella   Unclear if pt continues to have diarrhea  - monitor stool outpt   - trend fever, wbc curve  - will check c diff as recently on abx   - consider ID consult if diarrhea persists     # Unsteady gait  # OA s/p knee surgeies  # h/o falls   - PT, OT eval     #HFpEF EF 55% (1/2024)  #Afib  with bradycardia to 54   - cw home amiodarone   - cw home rivaroxaban 15 mg   - cw home metoprolol 25 mg bid   - primary cardiology Dr. Lockhart     #HLD  - cw home atorvastatin           83 year old woman with a history of atrial fibrillation, non-obstructive CAD with NSTEMI (2020), HFpEF, HTN BIBEMS after 3 falls at home recently admitted here 11/7-11/12 found to have +salmonella in GI pcr.      #GI PCR + salmonella   Unclear if pt continues to have diarrhea  - monitor stool outpt   - trend fever, wbc curve  - will check c diff as recently on abx   - consider ID consult if diarrhea persists     # Unsteady gait  # OA s/p knee surgeies  # h/o falls   - PT, OT eval     #Troponemia   Likely 2/2 to demand   - troponin 579.64 on admission   - s/p asa 325 mg   - f/u repeat trop   - trend q6 until plateau or downtrending    #HFpEF EF 55% (1/2024)  #Afib  with bradycardia to 54   - cw home amiodarone   - cw home rivaroxaban 15 mg   - cw home metoprolol 25 mg bid   - primary cardiology Dr. Lockhart     #HLD  - cw home atorvastatin           83 year old woman with a history of atrial fibrillation, non-obstructive CAD with NSTEMI (2020), HFpEF, HTN BIBEMS after 3 falls at home recently admitted here 11/7-11/12 found to have +salmonella in GI pcr.      #GI PCR + salmonella   Unclear if pt continues to have diarrhea  - monitor stool outpt   - trend fever, wbc curve  - will check c diff as recently on abx   - consider ID consult if diarrhea persists     # Unsteady gait  # OA s/p knee surgeies  # h/o falls   - PT, OT eval     #Troponemia   Likely 2/2 to demand EKG w/o ST elevations or T wave inversions  - troponin 579.64 on admission   - s/p asa 325 mg   - f/u repeat trop   - trend q6 until plateau or downtrending    #HFpEF EF 55% (1/2024)  #Afib  with bradycardia to 54   - cw home amiodarone   - cw home rivaroxaban 15 mg   - cw home metoprolol 25 mg bid   - primary cardiology Dr. Lockhart     #HLD  - cw home atorvastatin          [phone]  83 year old woman with a history of atrial fibrillation, non-obstructive CAD with NSTEMI (2020), HFpEF, HTN BIBEMS after 3 falls at home recently admitted here 11/7-11/12 found to have +salmonella in GI pcr.      #GI PCR + salmonella   Unclear if pt continues to have diarrhea  - monitor stool outpt   - trend fever, wbc curve  - will check c diff as recently on abx   - consider ID consult if diarrhea persists     # Unsteady gait  # OA s/p knee surgeies  # h/o falls   - PT, OT eval     #Troponemia   Likely 2/2 to demand EKG w/o ST elevations or T wave inversions  - troponin 579.64 on admission   - s/p asa 325 mg   - f/u repeat trop   - trend q6 until plateau or downtrending    #HFpEF EF 55% (1/2024)  #Afib  with bradycardia to 54   - cw home amiodarone   - cw home rivaroxaban 15 mg   - cw home metoprolol 25 mg bid   - primary cardiology Dr. Lockhart     #HLD  - cw home atorvastatin     #UTI?   UA dirty notable for blood leuks bacteria   - pt denies any symptoms of uti   - f/u urine culture

## 2024-11-13 NOTE — ED PROVIDER NOTE - PROGRESS NOTE DETAILS
Mookie Arreola for attending Dr. Brantley  11/9/24 negative troponin; today 549 troponin, ekg shows nonspecific T wave abnormality, NSR 70, 1st degree AV block, no chest pain, will call. spoke with Dr. Thomas pt tba aawaiting cardiology consult pt still has no cp or sob. JOSE Chaves DO spoke with cardiology Dr. Kraus and trend troponins, aspirin B Anastacio DO

## 2024-11-13 NOTE — ED ADULT TRIAGE NOTE - CHIEF COMPLAINT QUOTE
Pt BIBEMS from home c/o weakness and  3 falls in last 24 hours. Pt states she was d/[facility]rett from  [facility] for salmonella. Reports still having diarrhea and feeling weak, Denies head strike during falls, states she fell on her left arm. On baby asa. allergy to pncn.

## 2024-11-13 NOTE — ED ADULT NURSE REASSESSMENT NOTE - NS ED NURSE REASSESS COMMENT FT1
Report given to 3E. Pt placed on bed pan multiple times, no BM. Lab called and made aware of trop that has been pending for several hours-to be drawn on the floor. Pt put in for transport

## 2024-11-13 NOTE — ED PROVIDER NOTE - OBJECTIVE STATEMENT
83 year old woman with a history of atrial fibrillation, non-obstructive CAD with NSTEMI (2020), HFpEF, HTN biba discharged yesterday from  for salmonella. pt feels very weak and has fallen 3 times since discharge yesterday no loc, Pt c/o continued diarrhea with weakness

## 2024-11-13 NOTE — ED PROVIDER NOTE - CARE PLAN
Principal Discharge DX:	Salmonella gastroenteritis  Secondary Diagnosis:	NSTEMI, initial episode of care   1

## 2024-11-13 NOTE — ED ADULT NURSE REASSESSMENT NOTE - NS ED NURSE REASSESS COMMENT FT1
Pt assisted to commode to have a BM-unable to go at this time. Pt boosted and repositioned in bed. Pt has no needs at this time. UA resulted, MD made aware.

## 2024-11-13 NOTE — ED PROVIDER NOTE - PHYSICAL EXAMINATION
Gen:  disheveled, stool under fingernails   Head:  NC/AT  HEENT: pupils perrl,no pharyngeal erythema, uvula midline  Cardiac: S1S2, RRR  Abd: Soft, non tender  Resp: No distress, CTA   musculoskeletal:: no deformities, no swelling, strength +5/+5  Skin: warm and dry as visualized, no rashes  Neuro: IRIS, aao x 4  Psych:alert, cooperative, appropriate mood and affect for situation

## 2024-11-13 NOTE — ED ADULT NURSE NOTE - OBJECTIVE STATEMENT
Pt presents to ER c/o multiple falls, weakness and diarrhea. Pt was discharged from  yesterday after being admitted for salmonella. Pt reports diarrhea continues; denies blood in stool. Pt reports she feels weak and loses her balance and falls. Pt reports 3 falls in the past 24 hrs. Pt reports she lost her balance this morning and fell backwards, striking head on floor. Denies LOC. Dried blood around mouth. AO x 3 oriented to baseline, normal breathing pattern with no difficulty.

## 2024-11-13 NOTE — ED ADULT NURSE NOTE - AS PAIN REST
3 (mild pain) Paramedian Forehead Flap Text: A decision was made to reconstruct the defect utilizing an interpolation axial flap and a staged reconstruction.  A telfa template was made of the defect.  This telfa template was then used to outline the paramedian forehead pedicle flap.  The donor area for the pedicle flap was then injected with anesthesia.  The flap was excised through the skin and subcutaneous tissue down to the layer of the underlying musculature.  The pedicle flap was carefully excised within this deep plane to maintain its blood supply.  The edges of the donor site were undermined.   The donor site was closed in a primary fashion.  The pedicle was then rotated into position and sutured.  Once the tube was sutured into place, adequate blood supply was confirmed with blanching and refill.  The pedicle was then wrapped with xeroform gauze and dressed appropriately with a telfa and gauze bandage to ensure continued blood supply and protect the attached pedicle.

## 2024-11-13 NOTE — PATIENT PROFILE ADULT - FALL HARM RISK - HARM RISK INTERVENTIONS

## 2024-11-13 NOTE — ED ADULT NURSE NOTE - NS PRO AD ANY ON CHART
[address] [facility] [address] [address] EMERGENCY DEPT  EMERGENCY MEDICINE     Pt Name: Yessy Trimble  MRN: [id number]  Mauriciogfmartin 1998  Date of evaluation: 9/2/2022  PCP:    No primary care provider on file. Provider: VICTOR HUGO William CNP    CHIEF COMPLAINT       Chief Complaint   Patient presents with    Concern For COVID-19           HISTORY OF PRESENT ILLNESS    Yessy Trimble is a 22 y.o. male patient that presents to ER with concern for COVID-19. Patient states he has been experiencing has been ill for the past several days with cough, chest congestion, generalized body aches, nausea and fatigue. Patient denies actual chest pain or shortness of breath. Patient states he has been taking DayQuil with minimal relief    Triage notes and Nursing notes were reviewed by myself. Any discrepancies are addressed above. PAST MEDICAL HISTORY     Past Medical History:   Diagnosis Date    ADHD (attention deficit hyperactivity disorder)        SURGICAL HISTORY       History reviewed. No pertinent surgical history. CURRENT MEDICATIONS       Previous Medications    ALBUTEROL SULFATE  (90 BASE) MCG/ACT INHALER    Inhale 2 puffs into the lungs every 4 hours as needed for Wheezing    SERTRALINE (ZOLOFT) 50 MG TABLET    Take 1 tablet by mouth daily    TRAZODONE (DESYREL) 50 MG TABLET    Take 0.5 tablets by mouth nightly as needed for Sleep       ALLERGIES       No Known Allergies    FAMILY HISTORY       History reviewed. No pertinent family history.      SOCIAL HISTORY       Social History     Socioeconomic History    Marital status: Single     Spouse name: None    Number of children: None    Years of education: None    Highest education level: None   Tobacco Use    Smoking status: Every Day     Packs/day: 0.50     Years: 0.00     Pack years: 0.00     Types: Cigarettes    Smokeless tobacco: Never    Tobacco comments:     patient does not want to quit   Vaping Use    Vaping Use: Never used   Substance and Sexual Activity Alcohol use: Not Currently     Comment: rare    Drug use: Yes     Types: Marijuana Aaliyah Lux)    Sexual activity: Not Currently       REVIEW OF SYSTEMS     Review of Systems   Constitutional:  Positive for fatigue. Negative for appetite change, chills, fever and unexpected weight change. HENT:  Positive for congestion. Negative for ear pain, rhinorrhea and sinus pressure. Eyes:  Negative for pain and visual disturbance. Respiratory:  Positive for cough. Negative for shortness of breath and wheezing. Cardiovascular:  Negative for chest pain, palpitations and leg swelling. Gastrointestinal:  Positive for nausea. Negative for abdominal pain, blood in stool, constipation, diarrhea and vomiting. Genitourinary:  Negative for dysuria, frequency and hematuria. Musculoskeletal:  Positive for myalgias. Negative for arthralgias and joint swelling. Skin:  Negative for rash. Neurological:  Negative for dizziness, syncope, weakness, light-headedness and headaches. Hematological:  Does not bruise/bleed easily. Except as noted above the remainder of the review of systems was reviewed and is negative. SCREENINGS        [place] Coma Scale  Eye Opening: Spontaneous  Best Verbal Response: Oriented  Best Motor Response: Obeys commands  Patricio Coma Scale Score: 15               PHYSICAL EXAM    (up to 7 for level 4, 8 or more for level 5)     ED Triage Vitals [02/19/22 1512]   BP Temp Temp Source Pulse Resp SpO2 Height Weight   (!) 134/95 98.2 °F (36.8 °C) Oral 124 16 100 % -- --       Physical Exam  Vitals and nursing note reviewed. Constitutional:       General: He is not in acute distress. Appearance: He is well-developed. He is not diaphoretic. HENT:      Head: Normocephalic and atraumatic. Eyes:      Conjunctiva/sclera: Conjunctivae normal.      Pupils: Pupils are equal, round, and reactive to light. Cardiovascular:      Rate and Rhythm: Normal rate and regular rhythm.       Heart sounds: Normal heart sounds. No murmur heard. No gallop. Pulmonary:      Effort: Pulmonary effort is normal. No respiratory distress. Breath sounds: Normal breath sounds. No wheezing or rales. Abdominal:      General: Bowel sounds are normal.      Palpations: Abdomen is soft. Musculoskeletal:         General: Normal range of motion. Cervical back: Normal range of motion. Skin:     General: Skin is warm and dry. Neurological:      Mental Status: He is alert and oriented to person, place, and time. DIAGNOSTIC RESULTS     EKG:(none if blank)  All EKGs are interpreted by the Emergency Department Physician who either signs or Co-signs this chart in the absence of a cardiologist.        RADIOLOGY: (none if blank)   I directly visualized the following images and reviewed the radiologist interpretations. Interpretation per the Radiologist below, if available at the time of this note:  No orders to display       LABS:  Labs Reviewed   COVID-19, RAPID - Abnormal; Notable for the following components:       Result Value    SARS-CoV-2, NAAT DETECTED (*)     All other components within normal limits   RAPID INFLUENZA A/B ANTIGENS       All other labs were within normal range or not returned as of this dictation. Please note, any cultures that may have been sent were not resulted at the time of this patient visit. EMERGENCY DEPARTMENT COURSE and Medical Decision Making:     Vitals:    Vitals:    09/02/22 1228   BP: 115/76   Pulse: 78   Resp: 18   Temp: 98.5 °F (36.9 °C)   TempSrc: Oral   SpO2: 99%   Weight: 119 lb (54 kg)   Height: 5' 7\" (1.702 m)       PROCEDURES: (None if blank)  Procedures       MDM     Amount and/or Complexity of Data Reviewed  Clinical lab tests: ordered and reviewed    Risk of Complications, Morbidity, and/or Mortality  Presenting problems: minimal  Diagnostic procedures: low  Management options: low      Patient presents to ER with concern for COVID-19.   Differential diagnosis include not limited to COVID-19, influenza or other viral illness. Patient does test positive for COVID-19. Patient will be discharged home with symptomatic treatment. Patient follow-up with primary care provider. Patient instructed to return to ER for worsening symptoms, chest pain, inability to keep liquids down, inability to urinate for greater than 8 hours or difficulty breathing. Follow-up with your primary care provider. May take Tylenol or ibuprofen as needed for pain or fever.       ED Medications administered this visit:    Medications   ibuprofen (ADVIL;MOTRIN) tablet 800 mg (800 mg Oral Given 9/2/22 1324)   ondansetron (ZOFRAN-ODT) disintegrating tablet 4 mg (4 mg Oral Given 9/2/22 1324)         FINAL IMPRESSION      1. COVID-19          DISPOSITION/PLAN   DISPOSITION Decision To Discharge 09/02/2022 01:43:53 PM      PATIENT REFERRED TO:  [address] [facility]"" [address] [address] EMERGENCY DEPT  [address]  [phone]    If symptoms worsen    DISCHARGE MEDICATIONS:  New Prescriptions    IBUPROFEN (ADVIL;MOTRIN) 600 MG TABLET    Take 1 tablet by mouth 4 times daily as needed for Pain    ONDANSETRON (ZOFRAN ODT) 4 MG DISINTEGRATING TABLET    Place 1 tablet under the tongue every 8 hours as needed for Nausea              VICTOR HUGO Gandhi CNP (electronically signed)           VICTOR HUGO Gandhi CNP  09/02/22 2254 No

## 2024-11-13 NOTE — PHARMACOTHERAPY INTERVENTION NOTE - COMMENTS
Medication history complete, patient was discharged from  on 11/12, reviewed and confirmed medication with discharge list of meds.

## 2024-11-14 ENCOUNTER — RESULT REVIEW (OUTPATIENT)
Age: 84
End: 2024-11-14

## 2024-11-14 DIAGNOSIS — I48.20 CHRONIC ATRIAL FIBRILLATION, UNSPECIFIED: ICD-10-CM

## 2024-11-14 LAB
ANION GAP SERPL CALC-SCNC: 4 MMOL/L — LOW (ref 5–17)
BASOPHILS # BLD AUTO: 0.05 K/UL — SIGNIFICANT CHANGE UP (ref 0–0.2)
BASOPHILS NFR BLD AUTO: 0.7 % — SIGNIFICANT CHANGE UP (ref 0–2)
BUN SERPL-MCNC: 25 MG/DL — HIGH (ref 7–23)
CALCIUM SERPL-MCNC: 8 MG/DL — LOW (ref 8.5–10.1)
CHLORIDE SERPL-SCNC: 114 MMOL/L — HIGH (ref 96–108)
CO2 SERPL-SCNC: 23 MMOL/L — SIGNIFICANT CHANGE UP (ref 22–31)
CREAT SERPL-MCNC: 1.06 MG/DL — SIGNIFICANT CHANGE UP (ref 0.5–1.3)
EGFR: 52 ML/MIN/1.73M2 — LOW
EOSINOPHIL # BLD AUTO: 0.25 K/UL — SIGNIFICANT CHANGE UP (ref 0–0.5)
EOSINOPHIL NFR BLD AUTO: 3.4 % — SIGNIFICANT CHANGE UP (ref 0–6)
GLUCOSE SERPL-MCNC: 108 MG/DL — HIGH (ref 70–99)
HCT VFR BLD CALC: 38.4 % — SIGNIFICANT CHANGE UP (ref 34.5–45)
HGB BLD-MCNC: 12.6 G/DL — SIGNIFICANT CHANGE UP (ref 11.5–15.5)
IMM GRANULOCYTES NFR BLD AUTO: 2 % — HIGH (ref 0–0.9)
LYMPHOCYTES # BLD AUTO: 0.93 K/UL — LOW (ref 1–3.3)
LYMPHOCYTES # BLD AUTO: 12.7 % — LOW (ref 13–44)
MCHC RBC-ENTMCNC: 29.7 PG — SIGNIFICANT CHANGE UP (ref 27–34)
MCHC RBC-ENTMCNC: 32.8 G/DL — SIGNIFICANT CHANGE UP (ref 32–36)
MCV RBC AUTO: 90.6 FL — SIGNIFICANT CHANGE UP (ref 80–100)
MONOCYTES # BLD AUTO: 0.84 K/UL — SIGNIFICANT CHANGE UP (ref 0–0.9)
MONOCYTES NFR BLD AUTO: 11.4 % — SIGNIFICANT CHANGE UP (ref 2–14)
NEUTROPHILS # BLD AUTO: 5.13 K/UL — SIGNIFICANT CHANGE UP (ref 1.8–7.4)
NEUTROPHILS NFR BLD AUTO: 69.8 % — SIGNIFICANT CHANGE UP (ref 43–77)
PLATELET # BLD AUTO: 200 K/UL — SIGNIFICANT CHANGE UP (ref 150–400)
POTASSIUM SERPL-MCNC: 4 MMOL/L — SIGNIFICANT CHANGE UP (ref 3.5–5.3)
POTASSIUM SERPL-SCNC: 4 MMOL/L — SIGNIFICANT CHANGE UP (ref 3.5–5.3)
RBC # BLD: 4.24 M/UL — SIGNIFICANT CHANGE UP (ref 3.8–5.2)
RBC # FLD: 15.4 % — HIGH (ref 10.3–14.5)
SODIUM SERPL-SCNC: 141 MMOL/L — SIGNIFICANT CHANGE UP (ref 135–145)
WBC # BLD: 7.35 K/UL — SIGNIFICANT CHANGE UP (ref 3.8–10.5)
WBC # FLD AUTO: 7.35 K/UL — SIGNIFICANT CHANGE UP (ref 3.8–10.5)

## 2024-11-14 PROCEDURE — 99233 SBSQ HOSP IP/OBS HIGH 50: CPT

## 2024-11-14 PROCEDURE — 93010 ELECTROCARDIOGRAM REPORT: CPT

## 2024-11-14 PROCEDURE — 93306 TTE W/DOPPLER COMPLETE: CPT | Mod: 26

## 2024-11-14 PROCEDURE — 99232 SBSQ HOSP IP/OBS MODERATE 35: CPT | Mod: FS

## 2024-11-14 RX ORDER — LOSARTAN POTASSIUM 100 MG/1
50 TABLET, FILM COATED ORAL DAILY
Refills: 0 | Status: DISCONTINUED | OUTPATIENT
Start: 2024-11-14 | End: 2024-11-17

## 2024-11-14 RX ORDER — RIVAROXABAN 10 MG/1
15 TABLET, FILM COATED ORAL
Refills: 0 | Status: DISCONTINUED | OUTPATIENT
Start: 2024-11-14 | End: 2024-11-17

## 2024-11-14 RX ORDER — CIPROFLOXACIN HCL 750 MG
500 TABLET ORAL EVERY 12 HOURS
Refills: 0 | Status: DISCONTINUED | OUTPATIENT
Start: 2024-11-14 | End: 2024-11-17

## 2024-11-14 RX ADMIN — METOPROLOL TARTRATE 25 MILLIGRAM(S): 100 TABLET, FILM COATED ORAL at 21:32

## 2024-11-14 RX ADMIN — Medication 500 MILLIGRAM(S): at 21:32

## 2024-11-14 RX ADMIN — RIVAROXABAN 15 MILLIGRAM(S): 10 TABLET, FILM COATED ORAL at 16:54

## 2024-11-14 RX ADMIN — Medication 500 MILLIGRAM(S): at 11:43

## 2024-11-14 RX ADMIN — AMIODARONE HYDROCHLORIDE 200 MILLIGRAM(S): 200 TABLET ORAL at 11:43

## 2024-11-14 RX ADMIN — METOPROLOL TARTRATE 25 MILLIGRAM(S): 100 TABLET, FILM COATED ORAL at 11:43

## 2024-11-14 RX ADMIN — LOSARTAN POTASSIUM 50 MILLIGRAM(S): 100 TABLET, FILM COATED ORAL at 11:52

## 2024-11-14 RX ADMIN — Medication 20 MILLIGRAM(S): at 21:32

## 2024-11-14 NOTE — PHYSICAL THERAPY INITIAL EVALUATION ADULT - GAIT DISTANCE, PT EVAL
10 steps to chair, limited by feeling like she has to have a BM, but resolved in sitting/bed to chair

## 2024-11-14 NOTE — OCCUPATIONAL THERAPY INITIAL EVALUATION ADULT - ADL RETRAINING, OT EVAL
Pt will perform LE dressing with moderate assist in 3-5 tx sessions. Pt will perform toileting with moderate assist in 3-5 tx sessions.

## 2024-11-14 NOTE — PHYSICAL THERAPY INITIAL EVALUATION ADULT - NSPTDMEREC_GEN_A_CORE
pt states she has a RW at home, Pt will requires a rolling walker at home due to their dx of difficulty walking to help complete their MRADL's./rolling walker

## 2024-11-14 NOTE — OCCUPATIONAL THERAPY INITIAL EVALUATION ADULT - PERTINENT HX OF CURRENT PROBLEM, REHAB EVAL
As per chart review, 84 y/o F, BIBEMS after 3 falls at home. Pt discharged from hospital to home 11/12/24-admitted from 11/7-11/12 for diarrhea GI pcr was + for salmonella. Pt able to tolerate regular diet with resolution of symptoms so she was discharged home. Pt poor historian w/ conflicting reports stated she has had diarrhea yesterday but at other times states it has been a few days since she had diarrhea. She denies any LOC and reports only mechanical falls at home. Reports possible head strike but CT head negative in the ED. H/O atrial fibrillation, non-obstructive CAD with NSTEMI (2020), HFpEF, HTN.

## 2024-11-14 NOTE — PROGRESS NOTE ADULT - PROBLEM SELECTOR PLAN 1
had KJ/DCCV 9n 1/24  at present in SR, cont, AC with xarelto, cont, BB and amiodarone had KJ/DCCV 1/24  at present in SR, cont, AC with xarelto, cont, BB and amiodarone had KJ/DCCV 1/24  at present in SR, check TTE, cont, AC with xarelto, cont, BB and amiodarone

## 2024-11-14 NOTE — OCCUPATIONAL THERAPY INITIAL EVALUATION ADULT - NSACTIVITYREC_GEN_A_OT
Pt presents with impaired balance, endurance and muscle strength that will benefit from skilled OT to improve independence in ADLs, reduce fall risk and chance for readmission. Pt educated on WBP and impact on ADL/IADL tasks and functional mobility. Pt provided with handout going over precautions and compensatory techniques for ADLs. Pt presents with impaired balance, endurance and muscle strength that will benefit from skilled OT to improve independence in ADLs, reduce fall risk and chance for readmission.

## 2024-11-14 NOTE — OCCUPATIONAL THERAPY INITIAL EVALUATION ADULT - ADDITIONAL COMMENTS
Pt resides in a  alone with 4 RAFAEL and FOS inside to bedroom/full bathroom. Pt has a walk in shower stall with GB's, retractable shower head, non-slip mat, and standard toilet. Pt reports being (I) with all ADL, IADL tasks PTA, walked with SAC. (-) , RHD. Pt reports her niece does her food shopping.

## 2024-11-14 NOTE — OCCUPATIONAL THERAPY INITIAL EVALUATION ADULT - TRANSFER TRAINING, PT EVAL
Pt will perform shower transfer with minimal assist in 3-5 tx sessions. Pt will perform toilet/commode transfer with CGA assist in 3-5 tx sessions.

## 2024-11-14 NOTE — PHYSICAL THERAPY INITIAL EVALUATION ADULT - TRANSFER TRAINING, PT EVAL
GOAL: Pt will perform sit to/from stand transfers independently with RW/Hurry Cane as needed within 4weeks.

## 2024-11-14 NOTE — PHYSICAL THERAPY INITIAL EVALUATION ADULT - PERTINENT HX OF CURRENT PROBLEM, REHAB EVAL
As per chart review, 82 y/o F, BIBEMS after 3 falls at home. Pt discharged from hospital to home 11/12/24-admitted from 11/7-11/12 for diarrhea GI pcr was + for salmonella. Pt able to tolerate regular diet with resolution of symptoms so she was discharged home. Pt poor historian w/ conflicting reports stated she has had diarrhea yesterday but at other times states it has been a few days since she had diarrhea. She denies any LOC and reports only mechanical falls at home. Reports possible head strike but CT head negative in the ED.  H/O atrial fibrillation, non-obstructive CAD with NSTEMI (2020), HFpEF, HTN

## 2024-11-14 NOTE — PROGRESS NOTE ADULT - TIME BILLING
- extensive review of patient's medical chart including prior hospital encounters, current admission progress notes, labs, imaging and other testing, seeing and evaluating patient at bedside, explaining to patient regarding current condition and plan of care, then ordering tests and collaborating with specialty consultants including Cardiology, OT and PT, and finally, documenting today's findings and plan.

## 2024-11-14 NOTE — PHYSICAL THERAPY INITIAL EVALUATION ADULT - LIVES WITH, PROFILE
Pt lives alone, has a Life Alert necklace as per pt, niece comes to assist her daily. Pt has 4 RAFAEL w/ HR, and a FF to her bedroom./alone

## 2024-11-14 NOTE — OCCUPATIONAL THERAPY INITIAL EVALUATION ADULT - BED MOBILITY/TRANSFERS, PREVIOUS LEVEL OF FUNCTION, OT EVAL
Implemented All Universal Safety Interventions:  [place] to call system. Call bell, personal items and telephone within reach. Instruct patient to call for assistance. Room bathroom lighting operational. Non-slip footwear when patient is off stretcher. Physically safe environment: no spills, clutter or unnecessary equipment. Stretcher in lowest position, wheels locked, appropriate side rails in place.
independent/needs device

## 2024-11-14 NOTE — PROGRESS NOTE ADULT - SUBJECTIVE AND OBJECTIVE BOX
REQUESTING PHYSICIAN: Hospitalist     REASON FOR CONSULT: Fall    CHIEF COMPLAINT: Fall     HPI:   83 year old woman with a history of atrial fibrillation, non-obstructive CAD with NSTEMI (2020), HFpEF, HTN BIBEMS after 3 falls at home. Patient recently discharged from hospital to home 11/12/24. Pt was admitted from 11/7-11/12 for diarrhea GI pcr was + for salmonella. Pt able to tolerate regular diet with resolution of symptoms, rehab placement was recommended but declined by patient so she was discharged home. Pt poor historian w/ conflicting reports stated she has had diarrhea yesterday but at other times states it has been a few days since she had diarrhea. She denies any LOC and reports only mechanical falls at home. Reports possible head strike but CT head negative in the ED. Cardiology called to evaluate. Previous admission reviewed. Pt denies chest pain or shortness of breath. Opt chart was reviewed.       MEDICATIONS:  MEDICATIONS  (STANDING):  aMIOdarone    Tablet 200 milliGRAM(s) Oral daily  atorvastatin 20 milliGRAM(s) Oral at bedtime  ciprofloxacin     Tablet 500 milliGRAM(s) Oral every 12 hours  losartan 50 milliGRAM(s) Oral daily  metoprolol tartrate 25 milliGRAM(s) Oral two times a day  rivaroxaban 15 milliGRAM(s) Oral with dinner      MEDICATIONS  (PRN):  acetaminophen     Tablet .. 650 milliGRAM(s) Oral every 6 hours PRN Temp greater or equal to 38C (100.4F), Mild Pain (1 - 3)  aluminum hydroxide/magnesium hydroxide/simethicone Suspension 30 milliLiter(s) Oral every 4 hours PRN Dyspepsia  melatonin 3 milliGRAM(s) Oral at bedtime PRN Insomnia  ondansetron Injectable 4 milliGRAM(s) IV Push every 8 hours PRN Nausea and/or Vomiting    Vital Signs Last 24 Hrs  T(C): 36.6 (14 Nov 2024 09:14), Max: 37 (13 Nov 2024 18:00)  T(F): 97.8 (14 Nov 2024 09:14), Max: 98.6 (13 Nov 2024 18:00)  HR: 63 (14 Nov 2024 09:14) (54 - 63)  BP: 134/74 (14 Nov 2024 09:14) (110/73 - 134/74)  BP(mean): --  RR: 18 (14 Nov 2024 09:14) (16 - 18)  SpO2: 100% (14 Nov 2024 09:14) (98% - 100%)    Parameters below as of 14 Nov 2024 09:14  Patient On (Oxygen Delivery Method): room air    PHYSICAL EXAM:    Constitutional: NAD, awake and alert, well-developed  HEENT: PERR, EOMI,  No oral cyananosis.  Neck:  supple,  No JVD  Respiratory: Breath sounds are clear bilaterally, No wheezing, rales or rhonchi  Cardiovascular: S1 and S2, regular rate and rhythm, no Murmurs, gallops or rubs  Gastrointestinal: Bowel Sounds present, soft, nontender.   Extremities: No peripheral edema. No clubbing or cyanosis.  Vascular: 2+ peripheral pulses  Neurological: A/O x 3, no focal deficits  Musculoskeletal: no calf tenderness.  Skin: No rashes.      LABS: All Labs Reviewed:                                  12.6   7.35  )-----------( 200      ( 14 Nov 2024 06:21 )             38.4     11-14    141  |  114[H]  |  25[H]  ----------------------------<  108[H]  4.0   |  23  |  1.06    Ca    8.0[L]      14 Nov 2024 06:21    TPro  6.8  /  Alb  2.8[L]  /  TBili  0.4  /  DBili  x   /  AST  34  /  ALT  53  /  AlkPhos  68  11-13    - TroponinI hsT: <-307.91, <-579.64, <-8.45    RADIOLOGY/EKG:    < from: 12 Lead ECG (11.13.24 @ 09:05) >  Diagnosis Line Normal sinus rhythm  Left ventricular hypertrophy with repolarization abnormality ( R in aVL , [place] product )  Non specific T flattening  Abnormal ECG  Confirmed by JACOB KOTHARI MD ([id number]) on 11/13/2024 10:08:35 AM    < end of copied text >      < from: KJ Complete w/Spect and Color (01.25.24 @ 09:23) >     Summary     A transesophageal echocardiogram was performed and included probe   placement in the esophagus posterior to the heart by the interpreting   physician, real-time image acquisition and interpretation utilizing 2-D,   color flow Doppler, pulsed wave and/or continuous wave with spectral   display. The patient received IV sedation. The procedure was monitored   with automatic blood pressure monitoring, telemetry tracings, and pulse   oximetry. The patient tolerated the procedure well and there were no   complications.     No left atrial appendage or left atrial thrombus.   Normal left ventricular size, thickness & systolic function. EF=55-60%.     After completion of transesophageal echocardiography procedure, patient   was cardioverted at 200 joules x 1   & successfully converted to normal sinus rhythm.     Signature     ----------------------------------------------------------------   Electronically signed by Randal Kraus MD(Interpreting   physician) on 01/26/2024 02:38 PM   ----------------------------------------------------------------    < end of copied text >   REQUESTING PHYSICIAN: Hospitalist     REASON FOR CONSULT: Fall    CHIEF COMPLAINT: Fall     HPI:   83 year old woman with a history of atrial fibrillation, non-obstructive CAD with NSTEMI (2020), HFpEF, HTN BIBEMS after 3 falls at home. Patient recently discharged from hospital to home 11/12/24. Pt was admitted from 11/7-11/12 for diarrhea GI pcr was + for salmonella. Pt able to tolerate regular diet with resolution of symptoms, rehab placement was recommended but declined by patient so she was discharged home. Pt poor historian w/ conflicting reports stated she has had diarrhea yesterday but at other times states it has been a few days since she had diarrhea. She denies any LOC and reports only mechanical falls at home. Reports possible head strike but CT head negative in the ED. Cardiology called to evaluate. Previous admission reviewed. Pt denies chest pain or shortness of breath. Opt chart was reviewed.     11/14/24 - seen in chair, no cardiac complaints, elevated trops, for pharm stress test in am, npo p midnight, Tele: SR 60s       MEDICATIONS:  MEDICATIONS  (STANDING):  aMIOdarone    Tablet 200 milliGRAM(s) Oral daily  atorvastatin 20 milliGRAM(s) Oral at bedtime  ciprofloxacin     Tablet 500 milliGRAM(s) Oral every 12 hours  losartan 50 milliGRAM(s) Oral daily  metoprolol tartrate 25 milliGRAM(s) Oral two times a day  rivaroxaban 15 milliGRAM(s) Oral with dinner      MEDICATIONS  (PRN):  acetaminophen     Tablet .. 650 milliGRAM(s) Oral every 6 hours PRN Temp greater or equal to 38C (100.4F), Mild Pain (1 - 3)  aluminum hydroxide/magnesium hydroxide/simethicone Suspension 30 milliLiter(s) Oral every 4 hours PRN Dyspepsia  melatonin 3 milliGRAM(s) Oral at bedtime PRN Insomnia  ondansetron Injectable 4 milliGRAM(s) IV Push every 8 hours PRN Nausea and/or Vomiting    Vital Signs Last 24 Hrs  T(C): 36.6 (14 Nov 2024 09:14), Max: 37 (13 Nov 2024 18:00)  T(F): 97.8 (14 Nov 2024 09:14), Max: 98.6 (13 Nov 2024 18:00)  HR: 63 (14 Nov 2024 09:14) (54 - 63)  BP: 134/74 (14 Nov 2024 09:14) (110/73 - 134/74)  BP(mean): --  RR: 18 (14 Nov 2024 09:14) (16 - 18)  SpO2: 100% (14 Nov 2024 09:14) (98% - 100%)    Parameters below as of 14 Nov 2024 09:14  Patient On (Oxygen Delivery Method): room air    PHYSICAL EXAM:    Constitutional: NAD, awake and alert, well-developed  HEENT: PERR, EOMI,  No oral cyananosis.  Neck:  supple,  No JVD  Respiratory: Breath sounds are clear bilaterally, No wheezing, rales or rhonchi  Cardiovascular: S1 and S2, regular rate and rhythm, no Murmurs, gallops or rubs  Gastrointestinal: Bowel Sounds present, soft, nontender.   Extremities: No peripheral edema. No clubbing or cyanosis.  Vascular: 2+ peripheral pulses  Neurological: A/O x 3, no focal deficits  Musculoskeletal: no calf tenderness.  Skin: No rashes.      LABS: All Labs Reviewed:                                  12.6   7.35  )-----------( 200      ( 14 Nov 2024 06:21 )             38.4     11-14    141  |  114[H]  |  25[H]  ----------------------------<  108[H]  4.0   |  23  |  1.06    Ca    8.0[L]      14 Nov 2024 06:21    TPro  6.8  /  Alb  2.8[L]  /  TBili  0.4  /  DBili  x   /  AST  34  /  ALT  53  /  AlkPhos  68  11-13    - TroponinI hsT: <-307.91, <-579.64, <-8.45    RADIOLOGY/EKG:    < from: 12 Lead ECG (11.13.24 @ 09:05) >  Diagnosis Line Normal sinus rhythm  Left ventricular hypertrophy with repolarization abnormality ( R in aVL , [place] product )  Non specific T flattening  Abnormal ECG  Confirmed by JACOB KOTHARI MD ([id number]) on 11/13/2024 10:08:35 AM    < end of copied text >      < from: KJ Complete w/Spect and Color (01.25.24 @ 09:23) >     Summary     A transesophageal echocardiogram was performed and included probe   placement in the esophagus posterior to the heart by the interpreting   physician, real-time image acquisition and interpretation utilizing 2-D,   color flow Doppler, pulsed wave and/or continuous wave with spectral   display. The patient received IV sedation. The procedure was monitored   with automatic blood pressure monitoring, telemetry tracings, and pulse   oximetry. The patient tolerated the procedure well and there were no   complications.     No left atrial appendage or left atrial thrombus.   Normal left ventricular size, thickness & systolic function. EF=55-60%.     After completion of transesophageal echocardiography procedure, patient   was cardioverted at 200 joules x 1   & successfully converted to normal sinus rhythm.     Signature     ----------------------------------------------------------------   Electronically signed by Randal Kraus MD(Interpreting   physician) on 01/26/2024 02:38 PM   ----------------------------------------------------------------    < end of copied text >   REQUESTING PHYSICIAN: Hospitalist     REASON FOR CONSULT: Elev Troponin    HPI:  83 year old woman with a history of atrial fibrillation, non-obstructive CAD with NSTEMI (2020), HFpEF, HTN BIBEMS after 3 falls at home.     Pt was admitted from 11/7-11/12 for diarrhea GI pcr was + for salmonella. Pt able to tolerate regular diet with resolution of symptoms, rehab placement was recommended but declined by patient so she was discharged home. Pt poor historian w/ conflicting reports stated she has had diarrhea yesterday but at other times states it has been a few days since she had diarrhea. She denies any LOC and reports only mechanical falls at home. Reports possible head strike but CT head negative in the ED. Cardiology called to evaluate. Previous admission reviewed. Pt denies chest pain or shortness of breath. Opt chart was reviewed.     11/14/24 - seen in chair, no cardiac complaints, elevated trops, for pharm stress test in am, npo p midnight, Tele: SR 60s     MEDICATIONS  (STANDING):  aMIOdarone    Tablet 200 milliGRAM(s) Oral daily  atorvastatin 20 milliGRAM(s) Oral at bedtime  ciprofloxacin     Tablet 500 milliGRAM(s) Oral every 12 hours  losartan 50 milliGRAM(s) Oral daily  metoprolol tartrate 25 milliGRAM(s) Oral two times a day  rivaroxaban 15 milliGRAM(s) Oral with dinner    MEDICATIONS  (PRN):  acetaminophen     Tablet .. 650 milliGRAM(s) Oral every 6 hours PRN Temp greater or equal to 38C (100.4F), Mild Pain (1 - 3)  aluminum hydroxide/magnesium hydroxide/simethicone Suspension 30 milliLiter(s) Oral every 4 hours PRN Dyspepsia  melatonin 3 milliGRAM(s) Oral at bedtime PRN Insomnia  ondansetron Injectable 4 milliGRAM(s) IV Push every 8 hours PRN Nausea and/or Vomiting    Vital Signs Last 24 Hrs  T(C): 36.6 (14 Nov 2024 09:14), Max: 37 (13 Nov 2024 18:00)  T(F): 97.8 (14 Nov 2024 09:14), Max: 98.6 (13 Nov 2024 18:00)  HR: 63 (14 Nov 2024 09:14) (54 - 63)  BP: 134/74 (14 Nov 2024 09:14) (110/73 - 134/74)  RR: 18 (14 Nov 2024 09:14) (16 - 18)  SpO2: 100% (14 Nov 2024 09:14) (98% - 100%)  Patient On (Oxygen Delivery Method): room air    PHYSICAL EXAM:    Constitutional: NAD, awake and alert, well-developed  HEENT: PERR, EOMI,  No oral cyanosis.  Neck:  supple,  No JVD  Respiratory: Breath sounds are clear bilaterally, No wheezing, rales or rhonchi  Cardiovascular: S1 and S2, regular rate and rhythm  Gastrointestinal: Bowel Sounds present, soft, nontender.   Extremities: No peripheral edema.    LABS:                       12.6   7.35  )-----------( 200      ( 14 Nov 2024 06:21 )             38.4     141  |  114[H]  |  25[H]  ----------------------------<  108[H]  4.0   |  23  |  1.06    Ca    8.0[L]      14 Nov 2024 06:21    TPro  6.8  /  Alb  2.8[L]  /  TBili  0.4  /  DBili  x   /  AST  34  /  ALT  53  /  AlkPhos  68  11-13    - TroponinI hsT: <-307.91, <-579.64, <-8.45    12 Lead ECG (11.13.24 @ 09:05) >  Normal sinus rhythm  Left ventricular hypertrophy with repolarization abnormality ( R in aVL , [place] product )  Non specific T flattening  Abnormal ECG    KJ Complete w/Spect and Color (01.25.24 @ 09:23) >   No left atrial appendage or left atrial thrombus.   Normal left ventricular size, thickness & systolic function. EF=55-60%.   After completion of transesophageal echocardiography procedure, patient was cardioverted at 200 joules x 1 & successfully converted to normal sinus rhythm.

## 2024-11-14 NOTE — PROGRESS NOTE ADULT - ASSESSMENT
82 yo woman with HTN, non obstructive CAD, hx of NSTEMI 2020, HFpEF, paroxysmal afib on Xarelto, just admitted 11/7-11/12 for nausea, vomiting, diarrhea found to have Salmonella infection, treated with ceftriaxone, blood cultures negative, had EILEEN that resolved. She had debility and deconditioning and patient was advised to consider KIM but she preferred to be discharged home. Now returns with falls, continued debility and generalized weakness. CT head and C spine negative for acute findings. CBC WNLL. BUN/Cr 36/1.2. Troponin 579, 308 on repeat. No acute ischemic changes on EKG. UA abnormal with pyuria and bacteriuria but lots of epithelial cells. Patient was started on antibiotics and admitted to Medicine.    Frequent falls, deconditioning and debility  PT consulted.  - OOB to chair daily  - Anticipate need for KIM    Elevated troponin  Appreciate input from Cardiology. No angina, No signs of decompensated CHF. No acute ischemic changes on EKG. Cardiology doubts ACS, suspects myocardial demand ischemia without infarction.   - Continue metoprolol, statin, Xarelto    HTN  BP controlled.  - Continue metoprolol, losartan    HFpEF  No signs of decompensated CHF  - Continue metoprolol, losartan  - Monitor Is and Os, trend weight    Paroxysmal afib  In SR on tele, rate WNL.  - Continue metoprolol and amiodarone  - Continue Xarelto    Abnormal urinalysis  Possibly contaminated specimen with +epi cells. Pyuria and bacteriuria, On cipro. Urine culture pending.     Recent Salmonella infection  Still with some diarrhea, 3 BMs today, semiformed. On cipro. Continue to monitor stool counts, volume status, lytes   84 yo woman with HTN, non obstructive CAD, hx of NSTEMI 2020, HFpEF, paroxysmal afib on Xarelto, just admitted 11/7-11/12 for nausea, vomiting, diarrhea found to have Salmonella infection, treated with ceftriaxone, blood cultures negative, had EILEEN that resolved. She had debility and deconditioning and patient was advised to consider KIM but she preferred to be discharged home. Now returns with falls, continued debility and generalized weakness. CT head and C spine negative for acute findings. CBC WNLL. BUN/Cr 36/1.2. Troponin 579, 308 on repeat. No acute ischemic changes on EKG. UA abnormal with pyuria and bacteriuria but lots of epithelial cells. Patient was started on antibiotics and admitted to Medicine.    Frequent falls, deconditioning and debility  PT consulted.  - OOB to chair daily  - Anticipate need for KIM    Elevated troponin  Appreciate input from Cardiology. No angina, No signs of decompensated CHF. No acute ischemic changes on EKG. Cardiology doubts ACS, suspects myocardial demand ischemia without infarction.   - Continue metoprolol, statin, Xarelto    HTN  BP controlled.  - Continue metoprolol, losartan    HFpEF  No signs of decompensated CHF  - Continue metoprolol, losartan  - Monitor Is and Os, trend weight    Paroxysmal afib  In SR on tele, rate WNL.  - Continue metoprolol and amiodarone  - Continue Xarelto    Abnormal urinalysis  Possibly contaminated specimen with +epi cells. Pyuria and bacteriuria, On cipro. Monitor QTc on cipro. Urine culture pending.     Recent Salmonella infection  Still with some diarrhea, 3 BMs today, semiformed. On cipro. Continue to monitor stool counts, volume status, lytes

## 2024-11-14 NOTE — PROGRESS NOTE ADULT - SUBJECTIVE AND OBJECTIVE BOX
Chief Complaint: Debility, falls    Interval Hx: Patient seen and examined. No acute complaints. Reports generalized weakness and debility. No other active symptoms. No headache. No change in vision or speech. No dizziness. No focal weakness or numbness. No chest pain or tightness. No dyspnea. No fever, chills or cough. No abdominal pain, nausea, vomiting, diarrhea or constipation. No urinary complaints.     ROS: Multi system review is comprehensively negative x 10 systems except as above    Vitals:  T(F): 98.4 (14 Nov 2024 15:32), Max: 98.6 (13 Nov 2024 18:00)  HR: 62 (14 Nov 2024 15:32) (62 - 63)  BP: 116/58 (14 Nov 2024 15:32) (110/73 - 134/74)  RR: 18 (14 Nov 2024 15:32) (18 - 18)  SpO2: 100% (14 Nov 2024 15:32) (98% - 100%) on room air    Exam:  Gen: No acute distress  HEENT: NCAT PERRL EOMI MMM clear oropharynx  Neck: Supple, no JVD, no LAD  CVS: s1 s2 normal, RRR  Chest: Normal resp effort, lungs CTA B/L  Abd: Non distended, +BS, soft, non tender  Ext: No tenderness, intact pulses, normal cap refill  Skin: Warm, dry  Mood: Calm, pleasant  Neuro: Awake and alert, answers questions appropriately, follows commands    Labs:                        12.6   7.35  )---------( 200                  38.4       141  |  114  |  25  ----------------------<  108  4.0   |   23   |  1.06    Ca  8.0    TPro  6.8  /  Alb  2.8  /  TBili  0.4  /  DBili  x   /  AST  34  /  ALT  53  /  AlkPhos  68      Troponin 579 --> 308    ProBNP 664     Lactate 1.2    CRP 69    TSH WNL    UA 11/13: Yellow, turbid, prot 30, gluc-, ket-, bld large, bili-, LE large, N-, WBC 43, RBC 18, +epi cells, +bact    Micro:  Blood culture x 2 11/13: Negative to date    Prior visit testing  Stool culture 11/11: Salmonella sp.   GI PCR 11/11: Salmonella detected    Imaging:  CXR 11/13: No focal infiltrate or congestion. Osteoarthritic changes prominent at both shoulders as before. Heart is within normal limits in its transthoracic diameter.    CT C spine WO 11/13: No evidence of acute osseous fracture or talha dislocation. Degenerative changes of the cervical spine. Atherosclerotic calcification about the bilateral carotid bulbs.     CT head WO 11/13: No evidence of acute intracranial hemorrhage or midline shift. Chronic small vessel disease.    Cardiac Testing:  EKG 11/14: Rate 60. NSR. Nonspecific ST abnormality    EKG 11/13: Rate 70. Sinus rhythm [id number] degree A-V block. Moderate voltage criteria for LVH, may be normal variant. Nonspecific T wave abnormality. Prolonged QT    Meds:  MEDICATIONS  (STANDING):  aMIOdarone    Tablet 200 milliGRAM(s) Oral daily  atorvastatin 20 milliGRAM(s) Oral at bedtime  ciprofloxacin     Tablet 500 milliGRAM(s) Oral every 12 hours  losartan 50 milliGRAM(s) Oral daily  metoprolol tartrate 25 milliGRAM(s) Oral two times a day  rivaroxaban 15 milliGRAM(s) Oral with dinner    MEDICATIONS  (PRN):  acetaminophen     Tablet .. 650 milliGRAM(s) Oral every 6 hours PRN Temp greater or equal to 38C (100.4F), Mild Pain (1 - 3)  aluminum hydroxide/magnesium hydroxide/simethicone Suspension 30 milliLiter(s) Oral every 4 hours PRN Dyspepsia  melatonin 3 milliGRAM(s) Oral at bedtime PRN Insomnia  ondansetron Injectable 4 milliGRAM(s) IV Push every 8 hours PRN Nausea and/or Vomiting

## 2024-11-14 NOTE — PROGRESS NOTE ADULT - PROBLEM SELECTOR PLAN 3
elevated trops, trended down: 579->307 erin demand ischemia in the setting of infection,  Hx of non obstructive CAD with NSTEMI ([facility] 3/2020 - non obstructive CAD), no anginal symptoms,  recommend pharm stress test to r/o ACS  cont. statin, BB and ARB elevated trops, trended down: 579->307 erin demand ischemia in the setting of infection,  Hx of non obstructive CAD with NSTEMI ([facility] 3/2020 - non obstructive CAD), no anginal symptoms,  recommend pharm stress test to r/o ACS, check TTE  cont. statin, BB and ARB No

## 2024-11-14 NOTE — OCCUPATIONAL THERAPY INITIAL EVALUATION ADULT - RANGE OF MOTION EXAMINATION, UPPER EXTREMITY
b/l shoulders WFL but less than full ROM ~ degrees, elbow WFL, L wrist impairments- difficulty with extension past neutral, h/o ORIF

## 2024-11-15 ENCOUNTER — RESULT REVIEW (OUTPATIENT)
Age: 84
End: 2024-11-15

## 2024-11-15 LAB
-  AMPICILLIN/SULBACTAM: SIGNIFICANT CHANGE UP
-  AMPICILLIN: SIGNIFICANT CHANGE UP
-  AZTREONAM: SIGNIFICANT CHANGE UP
-  CEFAZOLIN: SIGNIFICANT CHANGE UP
-  CEFEPIME: SIGNIFICANT CHANGE UP
-  CEFTRIAXONE: SIGNIFICANT CHANGE UP
-  CEFUROXIME: SIGNIFICANT CHANGE UP
-  CIPROFLOXACIN: SIGNIFICANT CHANGE UP
-  ERTAPENEM: SIGNIFICANT CHANGE UP
-  GENTAMICIN: SIGNIFICANT CHANGE UP
-  IMIPENEM: SIGNIFICANT CHANGE UP
-  LEVOFLOXACIN: SIGNIFICANT CHANGE UP
-  MEROPENEM: SIGNIFICANT CHANGE UP
-  NITROFURANTOIN: SIGNIFICANT CHANGE UP
-  PIPERACILLIN/TAZOBACTAM: SIGNIFICANT CHANGE UP
-  TOBRAMYCIN: SIGNIFICANT CHANGE UP
-  TRIMETHOPRIM/SULFAMETHOXAZOLE: SIGNIFICANT CHANGE UP
ANION GAP SERPL CALC-SCNC: 4 MMOL/L — LOW (ref 5–17)
BUN SERPL-MCNC: 22 MG/DL — SIGNIFICANT CHANGE UP (ref 7–23)
CALCIUM SERPL-MCNC: 8.4 MG/DL — LOW (ref 8.5–10.1)
CHLORIDE SERPL-SCNC: 113 MMOL/L — HIGH (ref 96–108)
CO2 SERPL-SCNC: 23 MMOL/L — SIGNIFICANT CHANGE UP (ref 22–31)
CREAT SERPL-MCNC: 0.95 MG/DL — SIGNIFICANT CHANGE UP (ref 0.5–1.3)
CULTURE RESULTS: ABNORMAL
EGFR: 60 ML/MIN/1.73M2 — SIGNIFICANT CHANGE UP
GLUCOSE SERPL-MCNC: 96 MG/DL — SIGNIFICANT CHANGE UP (ref 70–99)
METHOD TYPE: SIGNIFICANT CHANGE UP
ORGANISM # SPEC MICROSCOPIC CNT: ABNORMAL
ORGANISM # SPEC MICROSCOPIC CNT: SIGNIFICANT CHANGE UP
POTASSIUM SERPL-MCNC: 3.9 MMOL/L — SIGNIFICANT CHANGE UP (ref 3.5–5.3)
POTASSIUM SERPL-SCNC: 3.9 MMOL/L — SIGNIFICANT CHANGE UP (ref 3.5–5.3)
SODIUM SERPL-SCNC: 140 MMOL/L — SIGNIFICANT CHANGE UP (ref 135–145)
SPECIMEN SOURCE: SIGNIFICANT CHANGE UP

## 2024-11-15 PROCEDURE — 78452 HT MUSCLE IMAGE SPECT MULT: CPT | Mod: 26

## 2024-11-15 PROCEDURE — 99232 SBSQ HOSP IP/OBS MODERATE 35: CPT | Mod: FS

## 2024-11-15 PROCEDURE — 93010 ELECTROCARDIOGRAM REPORT: CPT

## 2024-11-15 PROCEDURE — 99233 SBSQ HOSP IP/OBS HIGH 50: CPT

## 2024-11-15 PROCEDURE — 93016 CV STRESS TEST SUPVJ ONLY: CPT

## 2024-11-15 PROCEDURE — 93018 CV STRESS TEST I&R ONLY: CPT

## 2024-11-15 RX ADMIN — Medication 500 MILLIGRAM(S): at 11:47

## 2024-11-15 RX ADMIN — RIVAROXABAN 15 MILLIGRAM(S): 10 TABLET, FILM COATED ORAL at 19:09

## 2024-11-15 RX ADMIN — METOPROLOL TARTRATE 25 MILLIGRAM(S): 100 TABLET, FILM COATED ORAL at 11:14

## 2024-11-15 RX ADMIN — Medication 20 MILLIGRAM(S): at 22:41

## 2024-11-15 RX ADMIN — METOPROLOL TARTRATE 25 MILLIGRAM(S): 100 TABLET, FILM COATED ORAL at 22:41

## 2024-11-15 RX ADMIN — LOSARTAN POTASSIUM 50 MILLIGRAM(S): 100 TABLET, FILM COATED ORAL at 11:14

## 2024-11-15 RX ADMIN — Medication 500 MILLIGRAM(S): at 22:41

## 2024-11-15 RX ADMIN — AMIODARONE HYDROCHLORIDE 200 MILLIGRAM(S): 200 TABLET ORAL at 11:14

## 2024-11-15 NOTE — PROGRESS NOTE ADULT - TIME BILLING
extensive review of patient's medical chart including prior hospital encounters, current admission progress notes, labs, imaging and other testing, seeing and evaluating patient at bedside, explaining to patient regarding current condition and plan of care, then ordering tests and collaborating with specialty consultants including Cardiology, OT and PT, and finally, documenting today's findings and plan.

## 2024-11-15 NOTE — PROGRESS NOTE ADULT - PROBLEM SELECTOR PLAN 1
had KJ/DCCV 1/24  at present in SR, TTE with preserved LVEF, no acute findings, cont, AC with xarelto, cont, BB and amiodarone    pt. is stable from cardiac standpoint, will followup op with Dr. Kraus, will sign off

## 2024-11-15 NOTE — PROGRESS NOTE ADULT - SUBJECTIVE AND OBJECTIVE BOX
REQUESTING PHYSICIAN: Hospitalist     REASON FOR CONSULT: Elev Troponin    HPI:  83 year old woman with a history of atrial fibrillation, non-obstructive CAD with NSTEMI (2020), HFpEF, HTN BIBEMS after 3 falls at home.     Pt was admitted from 11/7-11/12 for diarrhea GI pcr was + for salmonella. Pt able to tolerate regular diet with resolution of symptoms, rehab placement was recommended but declined by patient so she was discharged home. Pt poor historian w/ conflicting reports stated she has had diarrhea yesterday but at other times states it has been a few days since she had diarrhea. She denies any LOC and reports only mechanical falls at home. Reports possible head strike but CT head negative in the ED. Cardiology called to evaluate. Previous admission reviewed. Pt denies chest pain or shortness of breath. Opt chart was reviewed.     11/14/24 - seen in chair, no cardiac complaints, elevated trops, for pharm stress test in am, npo p midnight, Tele: SR 60s   11/15/24 - no cardiac complaints. s/p normal  pharm stress test, tele: Sr 60s     MEDICATIONS  (STANDING):  aMIOdarone    Tablet 200 milliGRAM(s) Oral daily  atorvastatin 20 milliGRAM(s) Oral at bedtime  ciprofloxacin     Tablet 500 milliGRAM(s) Oral every 12 hours  losartan 50 milliGRAM(s) Oral daily  metoprolol tartrate 25 milliGRAM(s) Oral two times a day  rivaroxaban 15 milliGRAM(s) Oral with dinner    MEDICATIONS  (PRN):  acetaminophen     Tablet .. 650 milliGRAM(s) Oral every 6 hours PRN Temp greater or equal to 38C (100.4F), Mild Pain (1 - 3)  aluminum hydroxide/magnesium hydroxide/simethicone Suspension 30 milliLiter(s) Oral every 4 hours PRN Dyspepsia  melatonin 3 milliGRAM(s) Oral at bedtime PRN Insomnia  ondansetron Injectable 4 milliGRAM(s) IV Push every 8 hours PRN Nausea and/or Vomiting    Vital Signs Last 24 Hrs  T(C): 36.6 (14 Nov 2024 09:14), Max: 37 (13 Nov 2024 18:00)  T(F): 97.8 (14 Nov 2024 09:14), Max: 98.6 (13 Nov 2024 18:00)  HR: 63 (14 Nov 2024 09:14) (54 - 63)  BP: 134/74 (14 Nov 2024 09:14) (110/73 - 134/74)  RR: 18 (14 Nov 2024 09:14) (16 - 18)  SpO2: 100% (14 Nov 2024 09:14) (98% - 100%)  Patient On (Oxygen Delivery Method): room air    PHYSICAL EXAM:    Constitutional: NAD, awake and alert, well-developed  HEENT: PERR, EOMI,  No oral cyanosis.  Neck:  supple,  No JVD  Respiratory: Breath sounds are clear bilaterally, No wheezing, rales or rhonchi  Cardiovascular: S1 and S2, regular rate and rhythm  Gastrointestinal: Bowel Sounds present, soft, nontender.   Extremities: No peripheral edema.    LABS:                       12.6   7.35  )-----------( 200      ( 14 Nov 2024 06:21 )             38.4     141  |  114[H]  |  25[H]  ----------------------------<  108[H]  4.0   |  23  |  1.06    Ca    8.0[L]      14 Nov 2024 06:21    TPro  6.8  /  Alb  2.8[L]  /  TBili  0.4  /  DBili  x   /  AST  34  /  ALT  53  /  AlkPhos  68  11-13    - TroponinI hsT: <-307.91, <-579.64, <-8.45    Radiology/EKG/TTE/Pharm stress test: reviewed     < from: TTE W or WO Ultrasound Enhancing Agent (11.14.24 @ 14:51) >  _______________     CONCLUSIONS:      1. Left ventricular cavity is normal in size. Left ventricular systolic function is normal with an ejection fraction of 63 % by Staples's method of disks.   2. Normal right ventricular systolic function.   3. Trace mitral regurgitation.   4. Mild tricuspid regurgitation.   5. Trace aortic regurgitation.    __________________________________________________________________________________    < end of copied text >  < from: Nuclear Stress Test-Pharmacologic.. (11.15.24 @ 09:12) >  --------------------------------------------------------------------------------------------------------------------------------------------------------Conclusions:   1. Normal myocardial perfusion scan, with no evidence of infarction or inducible ischemia.   2. Normal left ventricular regional wall motion.   3. Perfusion Findings: TID ratio 1.05 - normal.   4. The left ventricle is normal in function and normal in size. The post stress left ventricular EF is 83 %. The stress end diastolic volume is 40 ml and systolic volume is 7 ml.   5. Stress electrocardiogram: No significant ischemic ST segment changes.    < end of copied text >      12 Lead ECG (11.13.24 @ 09:05) >  Normal sinus rhythm  Left ventricular hypertrophy with repolarization abnormality ( R in aVL , [place] product )  Non specific T flattening  Abnormal ECG    KJ Complete w/Spect and Color (01.25.24 @ 09:23) >   No left atrial appendage or left atrial thrombus.   Normal left ventricular size, thickness & systolic function. EF=55-60%.   After completion of transesophageal echocardiography procedure, patient was cardioverted at 200 joules x 1 & successfully converted to normal sinus rhythm.

## 2024-11-15 NOTE — PROGRESS NOTE ADULT - NS ATTEND AMEND GEN_ALL_CORE FT
Agree with the above  pAfib - remains in NSR  troponin elevation due to demand ischemia, nuclear stress shows no evidence of ischemia  stable from cardiac standpoint
Case discussed with NP and ECGs reviewed.  Elevated troponin (peak 580) but no clear ischemic symptoms; will discuss nuclear stress test with her outpatient cardiologist.

## 2024-11-15 NOTE — PROGRESS NOTE ADULT - SUBJECTIVE AND OBJECTIVE BOX
Chief Complaint: Debility, falls    Interval Hx: Patient seen and examined. No acute complaints. Reports generalized weakness and debility. No other active symptoms. No headache. No change in vision or speech. No dizziness. No focal weakness or numbness. No chest pain or tightness. No dyspnea. No fever, chills or cough. No abdominal pain, nausea, vomiting, diarrhea or constipation. No urinary complaints.     ROS: Multi system review is comprehensively negative x 10 systems except as above    Vitals:  T(F): 98.1 (15 Nov 2024 16:30), Max: 98.1 (15 Nov 2024 11:00)  HR: 62 (15 Nov 2024 16:30) (62 - 71)  BP: 125/40 (15 Nov 2024 16:30) (110/58 - 136/75)  RR: 18 (15 Nov 2024 16:30) (17 - 18)  SpO2: 99% (15 Nov 2024 16:30) (99% - 100%) on room air    Exam:  Gen: No acute distress  HEENT: NCAT PERRL EOMI MMM clear oropharynx  Neck: Supple, no JVD, no LAD  CVS: s1 s2 normal, RRR  Chest: Normal resp effort, lungs CTA B/L  Abd: Non distended, +BS, soft, non tender  Ext: No tenderness, intact pulses, normal cap refill  Skin: Warm, dry  Mood: Calm, pleasant  Neuro: Awake and alert, answers questions appropriately, follows commands    Labs:                        12.6   7.35  )---------( 200                  38.4       140  |  113  |  22  ----------------------<  96  3.9   |   23   |  0.95    Ca  8.4    TPro  6.8  /  Alb  2.8  /  TBili  0.4  /  DBili  x   /  AST  34  /  ALT  53  /  AlkPhos  68      Troponin 579 --> 308    ProBNP 664     Lactate 1.2    CRP 69    TSH WNL    UA 11/13: Yellow, turbid, prot 30, gluc-, ket-, bld large, bili-, LE large, N-, WBC 43, RBC 18, +epi cells, +bact    Micro:  Blood culture x 2 11/13: Negative to date  Urine culture 11/13: >100,000 CFU/mL E.coli, susceptibility in process    Prior visit testing  Stool culture 11/11: Salmonella sp.   GI PCR 11/11: Salmonella detected    Imaging:  CXR 11/13: No focal infiltrate or congestion. Osteoarthritic changes prominent at both shoulders as before. Heart is within normal limits in its transthoracic diameter.    CT C spine WO 11/13: No evidence of acute osseous fracture or talha dislocation. Degenerative changes of the cervical spine. Atherosclerotic calcification about the bilateral carotid bulbs.     CT head WO 11/13: No evidence of acute intracranial hemorrhage or midline shift. Chronic small vessel disease.    Cardiac Testing:  EKG 11/14: Rate 60. NSR. Nonspecific ST abnormality    EKG 11/13: Rate 70. Sinus rhythm [id number] degree A-V block. Moderate voltage criteria for LVH, may be normal variant. Nonspecific T wave abnormality. Prolonged QT    Meds:  MEDICATIONS  (STANDING):  aMIOdarone    Tablet 200 milliGRAM(s) Oral daily  atorvastatin 20 milliGRAM(s) Oral at bedtime  ciprofloxacin     Tablet 500 milliGRAM(s) Oral every 12 hours  losartan 50 milliGRAM(s) Oral daily  metoprolol tartrate 25 milliGRAM(s) Oral two times a day  rivaroxaban 15 milliGRAM(s) Oral with dinner    MEDICATIONS  (PRN):  acetaminophen     Tablet .. 650 milliGRAM(s) Oral every 6 hours PRN Temp greater or equal to 38C (100.4F), Mild Pain (1 - 3)  aluminum hydroxide/magnesium hydroxide/simethicone Suspension 30 milliLiter(s) Oral every 4 hours PRN Dyspepsia  melatonin 3 milliGRAM(s) Oral at bedtime PRN Insomnia  ondansetron Injectable 4 milliGRAM(s) IV Push every 8 hours PRN Nausea and/or Vomiting                 Chief Complaint: Debility, falls    Interval Hx: Patient seen and examined. No acute complaints. Reports generalized weakness and debility. No other active symptoms. No headache. No change in vision or speech. No dizziness. No focal weakness or numbness. No chest pain or tightness. No dyspnea. No fever, chills or cough. No abdominal pain, nausea, vomiting, diarrhea or constipation. No urinary complaints.     ROS: Multi system review is comprehensively negative x 10 systems except as above    Vitals:  T(F): 98.1 (15 Nov 2024 16:30), Max: 98.1 (15 Nov 2024 11:00)  HR: 62 (15 Nov 2024 16:30) (62 - 71)  BP: 125/40 (15 Nov 2024 16:30) (110/58 - 136/75)  RR: 18 (15 Nov 2024 16:30) (17 - 18)  SpO2: 99% (15 Nov 2024 16:30) (99% - 100%) on room air    Exam:  Gen: No acute distress  HEENT: NCAT PERRL EOMI MMM clear oropharynx  Neck: Supple, no JVD, no LAD  CVS: s1 s2 normal, RRR  Chest: Normal resp effort, lungs CTA B/L  Abd: Non distended, +BS, soft, non tender  Ext: No tenderness, intact pulses, normal cap refill  Skin: Warm, dry  Mood: Calm, pleasant  Neuro: Awake and alert, answers questions appropriately, follows commands    Labs:                        12.6   7.35  )---------( 200                  38.4       140  |  113  |  22  ----------------------<  96  3.9   |   23   |  0.95    Ca  8.4    TPro  6.8  /  Alb  2.8  /  TBili  0.4  /  DBili  x   /  AST  34  /  ALT  53  /  AlkPhos  68      Troponin 579 --> 308    ProBNP 664     Lactate 1.2    CRP 69    TSH WNL    UA 11/13: Yellow, turbid, prot 30, gluc-, ket-, bld large, bili-, LE large, N-, WBC 43, RBC 18, +epi cells, +bact    Micro:  Blood culture x 2 11/13: Negative to date  Urine culture 11/13: >100,000 CFU/mL E.coli, susceptibility in process    Prior visit testing  Stool culture 11/11: Salmonella sp.   GI PCR 11/11: Salmonella detected    Imaging:  CXR 11/13: No focal infiltrate or congestion. Osteoarthritic changes prominent at both shoulders as before. Heart is within normal limits in its transthoracic diameter.    CT C spine WO 11/13: No evidence of acute osseous fracture or talha dislocation. Degenerative changes of the cervical spine. Atherosclerotic calcification about the bilateral carotid bulbs.     CT head WO 11/13: No evidence of acute intracranial hemorrhage or midline shift. Chronic small vessel disease.    Cardiac Testing:  NM stress test 11/15: Normal myocardial perfusion scan, with no evidence of infarction or inducible ischemia. Normal left ventricular regional wall motion. Perfusion Findings: TID ratio 1.05 - normal.The left ventricle is normal in function and normal in size. The post stress left ventricular EF is 83 %. The stress end diastolic volume is 40 ml and systolic volume is 7 ml. Stress electrocardiogram: No significant ischemic ST segment changes.    EKG 11/14: Rate 60. NSR. Nonspecific ST abnormality    EKG 11/13: Rate 70. Sinus rhythm [id number] degree A-V block. Moderate voltage criteria for LVH, may be normal variant. Nonspecific T wave abnormality. Prolonged QT    Meds:  MEDICATIONS  (STANDING):  aMIOdarone    Tablet 200 milliGRAM(s) Oral daily  atorvastatin 20 milliGRAM(s) Oral at bedtime  ciprofloxacin     Tablet 500 milliGRAM(s) Oral every 12 hours  losartan 50 milliGRAM(s) Oral daily  metoprolol tartrate 25 milliGRAM(s) Oral two times a day  rivaroxaban 15 milliGRAM(s) Oral with dinner    MEDICATIONS  (PRN):  acetaminophen     Tablet .. 650 milliGRAM(s) Oral every 6 hours PRN Temp greater or equal to 38C (100.4F), Mild Pain (1 - 3)  aluminum hydroxide/magnesium hydroxide/simethicone Suspension 30 milliLiter(s) Oral every 4 hours PRN Dyspepsia  melatonin 3 milliGRAM(s) Oral at bedtime PRN Insomnia  ondansetron Injectable 4 milliGRAM(s) IV Push every 8 hours PRN Nausea and/or Vomiting

## 2024-11-15 NOTE — PROGRESS NOTE ADULT - PROBLEM SELECTOR PLAN 3
elevated trops, trended down: 579->307 likley demand ischemia in the setting of infection,  Hx of non obstructive CAD with NSTEMI ([facility] 3/2020 - non obstructive CAD), no anginal symptoms,  TTE with preserved LVEF, no acute findings, s/p normal Pharm stress test - results as above   cont. statin, BB and ARB

## 2024-11-15 NOTE — PROGRESS NOTE ADULT - ASSESSMENT
84 yo woman with HTN, non obstructive CAD, hx of NSTEMI 2020, HFpEF, paroxysmal afib on Xarelto, just admitted 11/7-11/12 for nausea, vomiting, diarrhea found to have Salmonella infection, treated with ceftriaxone, blood cultures negative, had EILEEN that resolved. She had debility and deconditioning and patient was advised to consider KIM but she preferred to be discharged home. Now returns with falls, continued debility and generalized weakness. CT head and C spine negative for acute findings. CBC WNLL. BUN/Cr 36/1.2. Troponin 579, 308 on repeat. No acute ischemic changes on EKG. UA abnormal with pyuria and bacteriuria but lots of epithelial cells. Patient was started on antibiotics and admitted to Medicine.    Frequent falls, deconditioning and debility  PT consulted. Recommended KIM  - OOB to chair daily  - Continue restorative PT sessions    Elevated troponin  Appreciate input from Cardiology. No angina, No signs of decompensated CHF. No acute ischemic changes on EKG. Cardiology doubts ACS, suspects myocardial demand ischemia without infarction.   - Continue metoprolol, statin, Xarelto    HTN  BP controlled.  - Continue metoprolol, losartan    HFpEF  No signs of decompensated CHF  - Continue metoprolol, losartan  - Monitor Is and Os, trend weight    Paroxysmal afib  In SR on tele, rate WNL.  - Continue metoprolol and amiodarone  - Continue Xarelto    Abnormal urinalysis, unable to rule out UTI  Possibly contaminated specimen with +epi cells. Pyuria and bacteriuria. However, urine culture >100,000 CFU per mL. Treating for now. On cipro. Monitoring QTc on cipro.     Recent Salmonella infection  Still with some diarrhea, 3 BMs today, semiformed. On cipro. Continue to monitor stool counts, volume status, lytes   84 yo woman with HTN, non obstructive CAD, hx of NSTEMI 2020, HFpEF, paroxysmal afib on Xarelto, just admitted 11/7-11/12 for nausea, vomiting, diarrhea found to have Salmonella infection, treated with ceftriaxone, blood cultures negative, had EILEEN that resolved. She had debility and deconditioning and patient was advised to consider KIM but she preferred to be discharged home. Now returns with falls, continued debility and generalized weakness. CT head and C spine negative for acute findings. CBC WNLL. BUN/Cr 36/1.2. Troponin 579, 308 on repeat. No acute ischemic changes on EKG. UA abnormal with pyuria and bacteriuria but lots of epithelial cells. Patient was started on antibiotics and admitted to Medicine.    Frequent falls, deconditioning and debility  PT consulted. Recommended KIM  - OOB to chair daily  - Continue restorative PT sessions    Elevated troponin  Appreciate input from Cardiology. No angina, No signs of decompensated CHF. No acute ischemic changes on EKG. Cardiology doubts ACS, suspects myocardial demand ischemia without infarction. NM stress test performed today. Normal myocardial perfusion scan, with no evidence of infarction or inducible ischemia. Normal left ventricular regional wall motion. Perfusion Findings: TID ratio 1.05 - normal. The left ventricle is normal in function and normal in size. The post stress left ventricular EF is 83 %. The stress end diastolic volume is 40 ml and systolic volume is 7 ml. Stress electrocardiogram with no significant ischemic ST segment changes.  - Continue metoprolol, statin, Xarelto    HTN  BP controlled.  - Continue metoprolol, losartan    HFpEF  No signs of decompensated CHF  - Continue metoprolol, losartan  - Monitor Is and Os, trend weight    Paroxysmal afib  In SR on tele, rate WNL.  - Continue metoprolol and amiodarone  - Continue Xarelto    Abnormal urinalysis, unable to rule out UTI  Possibly contaminated specimen with +epi cells. Pyuria and bacteriuria. However, urine culture >100,000 CFU per mL. Treating for now. On cipro. Monitoring QTc on cipro.  - Continue cipro  - Monitor QTc  - F/u Ucx in process     Recent Salmonella infection  Still with some diarrhea, 3 BMs today, semiformed. On cipro. Continue to monitor stool counts, volume status, lytes    - Continue cipro  - Monitor stool count

## 2024-11-16 PROCEDURE — 99232 SBSQ HOSP IP/OBS MODERATE 35: CPT

## 2024-11-16 PROCEDURE — 93010 ELECTROCARDIOGRAM REPORT: CPT

## 2024-11-16 RX ADMIN — Medication 500 MILLIGRAM(S): at 08:55

## 2024-11-16 RX ADMIN — Medication 500 MILLIGRAM(S): at 21:12

## 2024-11-16 RX ADMIN — METOPROLOL TARTRATE 25 MILLIGRAM(S): 100 TABLET, FILM COATED ORAL at 08:55

## 2024-11-16 RX ADMIN — METOPROLOL TARTRATE 25 MILLIGRAM(S): 100 TABLET, FILM COATED ORAL at 21:12

## 2024-11-16 RX ADMIN — AMIODARONE HYDROCHLORIDE 200 MILLIGRAM(S): 200 TABLET ORAL at 08:55

## 2024-11-16 RX ADMIN — LOSARTAN POTASSIUM 50 MILLIGRAM(S): 100 TABLET, FILM COATED ORAL at 08:55

## 2024-11-16 RX ADMIN — Medication 20 MILLIGRAM(S): at 21:12

## 2024-11-16 RX ADMIN — RIVAROXABAN 15 MILLIGRAM(S): 10 TABLET, FILM COATED ORAL at 17:50

## 2024-11-16 NOTE — PROGRESS NOTE ADULT - ASSESSMENT
84 yo woman with HTN, non obstructive CAD, hx of NSTEMI 2020, HFpEF, paroxysmal afib on Xarelto, just admitted 11/7-11/12 for nausea, vomiting, diarrhea found to have Salmonella infection, treated with ceftriaxone, blood cultures negative, had EILEEN that resolved. She had debility and deconditioning and patient was advised to consider KIM but she preferred to be discharged home. Now returns with falls, continued debility and generalized weakness. CT head and C spine negative for acute findings. CBC WNLL. BUN/Cr 36/1.2. Troponin 579, 308 on repeat. No acute ischemic changes on EKG. UA abnormal with pyuria and bacteriuria but lots of epithelial cells. Patient was started on antibiotics and admitted to Medicine.    Frequent falls, deconditioning and debility  PT consulted. Recommended KIM  - OOB to chair daily  - Continue restorative PT sessions    Elevated troponin  Appreciate input from Cardiology. No angina, No signs of decompensated CHF. No acute ischemic changes on EKG. Cardiology doubts ACS, suspects myocardial demand ischemia without infarction. NM stress test performed today. Normal myocardial perfusion scan, with no evidence of infarction or inducible ischemia. Normal left ventricular regional wall motion. Perfusion Findings: TID ratio 1.05 - normal. The left ventricle is normal in function and normal in size. The post stress left ventricular EF is 83 %. The stress end diastolic volume is 40 ml and systolic volume is 7 ml. Stress electrocardiogram with no significant ischemic ST segment changes.  - Continue metoprolol, statin, Xarelto    HTN  BP controlled.  - Continue metoprolol, losartan    HFpEF  No signs of decompensated CHF  - Continue metoprolol, losartan  - Monitor Is and Os, trend weight    Paroxysmal afib  In SR on tele, rate WNL.  - Continue metoprolol and amiodarone  - Continue Xarelto    Abnormal urinalysis, unable to rule out UTI  Possibly contaminated specimen with +epi cells. Pyuria and bacteriuria. However, urine culture >100,000 CFU per mL E.coli, ESBL, partially susceptible to cipro, which patient has been receiving. She is doing well. Asymptomatic at this point. Consulted ID given the urine culture findings. ID recommends completing cipro tomorrow as planned. No need for antibiotic change. Monitoring QTc, remains mid 400s  - Continue cipro, completes tomorrow  - Monitor QTc    Recent Salmonella infection  Appears to be resolved. Patient with 1BM in past 24 hrs. No sign of dehydration. Normal electrolytes.   - Continue cipro, completes tomorrow

## 2024-11-16 NOTE — CONSULT NOTE ADULT - ASSESSMENT
82 y/o woman with h/o atrial fibrillation, non-obstructive CAD with NSTEMI (2020), HFpEF, HTN was admitted on 11/13 for increased weakness after 3 falls at home. Patient recently discharged from hospital to home 11/12/24. Pt was admitted from 11/7-11/12 for diarrhea and colitis with salmonella. Pt able to tolerate regular diet with resolution of symptoms so she was discharged home. Pt poor historian w/ conflicting reports stated she has had diarrhea yesterday but at other times states it has been a few days since she had diarrhea. She denies any LOC and reports only mechanical falls at home. In ER she was started on cipro PO. Urine culture is reported with a multidrug resistant organism.    #Colitis with salmonella resolving  #UTI with ESBL E.coli  #Allergy to PCN and cephalosporins   -cultures reviewed  -E.coli isolated is intermediate to cipro  -on cipro 500 mg PO q12h  -reason for abx use and side effects reviewed with patient; monitor BMP   - 84 y/o woman with h/o atrial fibrillation, non-obstructive CAD with NSTEMI (2020), HFpEF, HTN was admitted on 11/13 for increased weakness after 3 falls at home. Patient recently discharged from hospital to home 11/12/24. Pt was admitted from 11/7-11/12 for diarrhea and colitis with salmonella. Pt able to tolerate regular diet with resolution of symptoms so she was discharged home. Pt poor historian w/ conflicting reports stated she has had diarrhea yesterday but at other times states it has been a few days since she had diarrhea. She denies any LOC and reports only mechanical falls at home. In ER she was started on cipro PO. Urine culture is reported with a multidrug resistant organism.    #Colitis with salmonella resolving  #UTI with ESBL E.coli  #Allergy to PCN and cephalosporins   -cultures reviewed  -E.coli isolated is intermediate to cipro  -on cipro 500 mg PO q12h  -reason for abx use and side effects reviewed with patient; monitor BMP   -had formed BM today  -no further dysuria --> likely urinary infection is resolving as well  -continue PO cipro until tomorrow, then d/c  -old chart reviewed to assess prior cultures  -monitor temps  -f/u CBMs  -supportive care  2. Other issues:   -care per medicine    d/w Dr. Almeida

## 2024-11-16 NOTE — PROGRESS NOTE ADULT - SUBJECTIVE AND OBJECTIVE BOX
Chief Complaint: Debility, falls    Interval Hx: Patient seen and examined this AM. Resting comfortably. Out of bed, in arm chair. . No acute complaints. No active symptoms. No headache. No change in vision or speech. No dizziness. No focal weakness or numbness. No chest pain or tightness. No dyspnea. No fever, chills or cough. No abdominal pain, nausea, vomiting, diarrhea or constipation. No urinary complaints. She is completing her course of antibiotics and is to be discharged to [facility] tomorrow pending bed availability. Urine culture did return as ESBL E.coli, susceptible to Levaquin, intermediate to cipro. Per ID, given her lack of symptoms, well appearance and overall good clinical state at this point, can just complete her cipro tomorrow and discharge if placement secured.     ROS: Multi system review is comprehensively negative x 10 systems except as above    Vitals:  T(F): 98.2 (16 Nov 2024 17:15), Max: 98.2 (16 Nov 2024 05:16)  HR: 65 (16 Nov 2024 17:15) (63 - 69)  BP: 164/70 (16 Nov 2024 17:15) (138/67 - 164/70)  RR: 18 (16 Nov 2024 17:15) (18 - 18)  SpO2: 100% (16 Nov 2024 17:15) (100% - 100%)    Exam:  Gen: No acute distress  HEENT: NCAT PERRL EOMI MMM clear oropharynx  Neck: Supple, no JVD, no LAD  CVS: s1 s2 normal, RRR  Chest: Normal resp effort, lungs CTA B/L  Abd: Non distended, +BS, soft, non tender  Ext: No tenderness, intact pulses, normal cap refill  Skin: Warm, dry  Mood: Calm, pleasant  Neuro: Awake and alert, answers questions appropriately, follows commands    Labs:                        12.6   7.35  )---------( 200                  38.4       140  |  113  |  22  ----------------------<  96  3.9   |   23   |  0.95    Ca  8.4    TPro  6.8  /  Alb  2.8  /  TBili  0.4  /  DBili  x   /  AST  34  /  ALT  53  /  AlkPhos  68      Troponin 579 --> 308    ProBNP 664     Lactate 1.2    CRP 69    TSH WNL    UA 11/13: Yellow, turbid, prot 30, gluc-, ket-, bld large, bili-, LE large, N-, WBC 43, RBC 18, +epi cells, +bact    Micro:  Blood culture x 2 11/13: Negative to date  Urine culture 11/13: >100,000 CFU/mL E.coli, ESBL, partial susceptibility to cipro    Prior visit testing  Stool culture 11/11: Salmonella sp.   GI PCR 11/11: Salmonella detected    Imaging:  CXR 11/13: No focal infiltrate or congestion. Osteoarthritic changes prominent at both shoulders as before. Heart is within normal limits in its transthoracic diameter.    CT C spine WO 11/13: No evidence of acute osseous fracture or talha dislocation. Degenerative changes of the cervical spine. Atherosclerotic calcification about the bilateral carotid bulbs.     CT head WO 11/13: No evidence of acute intracranial hemorrhage or midline shift. Chronic small vessel disease.    Cardiac Testing:  Tele 11/16: Personally reviewed. SR/SB, 50s-70s, 1st degree AV block. Tele Dc'd.     NM stress test 11/15: Normal myocardial perfusion scan, with no evidence of infarction or inducible ischemia. Normal left ventricular regional wall motion. Perfusion Findings: TID ratio 1.05 - normal.The left ventricle is normal in function and normal in size. The post stress left ventricular EF is 83 %. The stress end diastolic volume is 40 ml and systolic volume is 7 ml. Stress electrocardiogram: No significant ischemic ST segment changes.    EKG 11/14: Rate 60. NSR. Nonspecific ST abnormality    EKG 11/13: Rate 70. Sinus rhythm [id number] degree A-V block. Moderate voltage criteria for LVH, may be normal variant. Nonspecific T wave abnormality. Prolonged QT    Meds:  MEDICATIONS  (STANDING):  aMIOdarone    Tablet 200 milliGRAM(s) Oral daily  atorvastatin 20 milliGRAM(s) Oral at bedtime  ciprofloxacin     Tablet 500 milliGRAM(s) Oral every 12 hours  losartan 50 milliGRAM(s) Oral daily  metoprolol tartrate 25 milliGRAM(s) Oral two times a day  rivaroxaban 15 milliGRAM(s) Oral with dinner    MEDICATIONS  (PRN):  acetaminophen     Tablet .. 650 milliGRAM(s) Oral every 6 hours PRN Temp greater or equal to 38C (100.4F), Mild Pain (1 - 3)  aluminum hydroxide/magnesium hydroxide/simethicone Suspension 30 milliLiter(s) Oral every 4 hours PRN Dyspepsia  melatonin 3 milliGRAM(s) Oral at bedtime PRN Insomnia  ondansetron Injectable 4 milliGRAM(s) IV Push every 8 hours PRN Nausea and/or Vomiting

## 2024-11-16 NOTE — CONSULT NOTE ADULT - SUBJECTIVE AND OBJECTIVE BOX
Patient is a 83y old  Female who presents with a chief complaint of Debility, falls     HPI:  84 y/o woman with h/o atrial fibrillation, non-obstructive CAD with NSTEMI (), HFpEF, HTN was admitted on  for increased weakness after 3 falls at home. Patient recently discharged from hospital to home 24. Pt was admitted from - for diarrhea and colitis with salmonella. Pt able to tolerate regular diet with resolution of symptoms so she was discharged home. Pt poor historian w/ conflicting reports stated she has had diarrhea yesterday but at other times states it has been a few days since she had diarrhea. She denies any LOC and reports only mechanical falls at home. In ER she was started on cipro PO. Urine culture is reported with a multidrug resistant organism.    PAST MEDICAL & SURGICAL HISTORY:  HTN (hypertension)  Glaucoma  OA (osteoarthritis)  Arteriosclerotic heart disease (ASHD)  Hyperlipidemia  Cecal lesion  Afib  Status post glaucoma surgery  and cataract  S/P knee surgery  H/O wrist surgery    Meds: per reconciliation sheet, noted below  MEDICATIONS  (STANDING):  aMIOdarone    Tablet 200 milliGRAM(s) Oral daily  atorvastatin 20 milliGRAM(s) Oral at bedtime  ciprofloxacin     Tablet 500 milliGRAM(s) Oral every 12 hours  losartan 50 milliGRAM(s) Oral daily  metoprolol tartrate 25 milliGRAM(s) Oral two times a day  rivaroxaban 15 milliGRAM(s) Oral with dinner    MEDICATIONS  (PRN):  acetaminophen     Tablet .. 650 milliGRAM(s) Oral every 6 hours PRN Temp greater or equal to 38C (100.4F), Mild Pain (1 - 3)  aluminum hydroxide/magnesium hydroxide/simethicone Suspension 30 milliLiter(s) Oral every 4 hours PRN Dyspepsia  melatonin 3 milliGRAM(s) Oral at bedtime PRN Insomnia  ondansetron Injectable 4 milliGRAM(s) IV Push every 8 hours PRN Nausea and/or Vomiting    Allergies    penicillin (Rash (Severe))    Intolerances      Social: no smoking, no alcohol, no illegal drugs; no recent travel, no exposure to TB  FAMILY HISTORY:  FHx: colon cancer  in brother,     FH: pancreatic cancer  in sister,     FH: CAD (coronary artery disease)  in mother and father, both       no history of premature cardiovascular disease in first degree relatives    ROS: the patient denies fever, no chills, no HA, no seizures, no dizziness, no sore throat, no nasal congestion, no blurry vision, no CP, no palpitations, no SOB, no cough, no abdominal pain, had diarrhea, no N/V, no dysuria, no leg pain, no claudication, no rash, no joint aches, no rectal pain or bleeding, no night sweats  All other systems reviewed and are negative    Vital Signs Last 24 Hrs  T(C): 36.6 (2024 08:52), Max: 36.8 (2024 05:16)  T(F): 97.8 (2024 08:52), Max: 98.2 (2024 05:16)  HR: 63 (:52) (62 - 69)  BP: 138/67 (:52) (125/40 - 154/71)  BP(mean): 64 (15 Nov 2024 16:30) (64 - 64)  RR: 18 (:16) (18 - 18)  SpO2: 100% (:52) (99% - 100%)    Parameters below as of 2024 08:52  Patient On (Oxygen Delivery Method): room air    PE:    Constitutional:  No acute distress  HEENT: NC/AT, EOMI, PERRLA, conjunctivae clear; ears and nose atraumatic; pharynx benign  Neck: supple; thyroid not palpable  Back: no tenderness  Respiratory: respiratory effort normal; clear to auscultation  Cardiovascular: S1S2 regular, no murmurs  Abdomen: soft, not tender, not distended, positive BS; no liver or spleen organomegaly  Genitourinary: no suprapubic tenderness  Lymphatic: no LN palpable  Musculoskeletal: no muscle tenderness, no joint swelling or tenderness  Extremities: no pedal edema  Neurological/ Psychiatric: AxOx3, judgement and insight normal; moving all extremities  Skin: no rashes; no palpable lesions    Labs: all available labs reviewed    11-15    140  |  113[H]  |  22  ----------------------------<  96  3.9   |  23  |  0.95    Ca    8.4[L]      15 Nov 2024 06:22    Culture - Urine (collected 2024 14:15)  Source: Clean Catch None  Final Report (15 Nov 2024 21:48):    >100,000 CFU/ml Escherichia coli ESBL    <10,000 CFU/ml Normal Urogenital rhonda present  Organism: Escherichia coli ESBL (15 Nov 2024 21:48)  Organism: Escherichia coli ESBL (15 Nov 2024 21:48)      Method Type: BRANDEN      -  Ampicillin: R >16 These ampicillin results predict results for amoxicillin      -  Ampicillin/Sulbactam: S 8/4      -  Aztreonam: R >16      -  Cefazolin: R >16 For uncomplicated UTI with K. pneumoniae, E. coli, or P. mirablis: BRANDEN <=16 is sensitive and BRANDEN >=32 is resistant. This also predicts results for oral agents cefaclor, cefdinir, cefpodoxime, cefprozil, cefuroxime axetil, cephalexin and locarbef for uncomplicated UTI. Note that some isolates may be susceptible to these agents while testing resistant to cefazolin.      -  Cefepime: R >16      -  Ceftriaxone: R >32      -  Cefuroxime: R >16      -  Ciprofloxacin: I 0.5      -  Ertapenem: S <=0.5      -  Gentamicin: S <=2      -  Imipenem: S <=1      -  Levofloxacin: S <=0.5      -  Meropenem: S <=1      -  Nitrofurantoin: S <=32 Should not be used to treat pyelonephritis      -  Piperacillin/Tazobactam: S <=8      -  Tobramycin: S <=2      -  Trimethoprim/Sulfamethoxazole: R >2/38    Urinalysis with Rflx Culture (collected 2024 14:15)    Culture - Blood (collected 2024 09:04)  Source: .Blood BLOOD  Preliminary Report (2024 13:01):    No growth at 72 Hours    Culture - Blood (collected 2024 09:04)  Source: .Blood BLOOD  Preliminary Report (2024 13:01):    No growth at 72 Hours    Culture - Reflex Stool (collected 2024 11:00)  Source: .Stool  Final Report (2024 17:21):    Salmonella species negative by culture    Culture - Blood (collected 2024 11:18)  Source: .Blood BLOOD  Final Report (2024 16:01):    No growth at 5 days    Culture - Blood (collected 2024 11:16)  Source: .Blood BLOOD  Final Report (2024 16:01):    No growth at 5 days    Urinalysis with Rflx Culture (collected 2024 14:26)    Culture - Urine (collected 2024 14:26)  Source: .Urine None  Final Report (2024 04:49):    >=3 organisms. Probable collection contamination.    Radiology: all available radiological tests reviewed    Advanced directives addressed: full resuscitation
PCP:    REQUESTING PHYSICIAN:    REASON FOR CONSULT:    CHIEF COMPLAINT:    HPI:    HPI:   83 year old woman with a history of atrial fibrillation, non-obstructive CAD with NSTEMI (2020), HFpEF, HTN BIBEMS after 3 falls at home. Patient recently discharged from hospital to home 24. Pt was admitted from - for diarrhea GI pcr was + for salmonella. Pt able to tolerate regular diet with resolution of symptoms, rehab placement was recommended but declined by patient so she was discharged home. Pt poor historian w/ conflicting reports stated she has had diarrhea yesterday but at other times states it has been a few days since she had diarrhea. She denies any LOC and reports only mechanical falls at home. Reports possible head strike but CT head negative in the ED. Cardiology called to evaluate. Previous admission reviewed. Pt denies chest pain or shortness of breath. Opt chart was reviewed.         PAST MEDICAL & SURGICAL HISTORY:  HTN (hypertension)  Glaucoma  OA (osteoarthritis)  Arteriosclerotic heart disease (ASHD)  Hyperlipidemia  Cecal lesion  Afib  Status post glaucoma surgery  and cataract  S/P knee surgery  H/O wrist surgery        PAST MEDICAL & SURGICAL HISTORY:  HTN (hypertension)      Glaucoma      OA (osteoarthritis)      Arteriosclerotic heart disease (ASHD)      Hyperlipidemia      Cecal lesion      Afib      Status post glaucoma surgery  and cataract      S/P knee surgery      H/O wrist surgery          Allergies    penicillin (Rash (Severe))    Intolerances        SOCIAL HISTORY:    FAMILY HISTORY:  FHx: colon cancer  in brother,     FH: pancreatic cancer  in sister,     FH: CAD (coronary artery disease)  in mother and father, both         MEDICATIONS:  MEDICATIONS  (STANDING):  aMIOdarone    Tablet 200 milliGRAM(s) Oral daily  atorvastatin 20 milliGRAM(s) Oral at bedtime  metoprolol tartrate 25 milliGRAM(s) Oral two times a day    MEDICATIONS  (PRN):  acetaminophen     Tablet .. 650 milliGRAM(s) Oral every 6 hours PRN Temp greater or equal to 38C (100.4F), Mild Pain (1 - 3)  aluminum hydroxide/magnesium hydroxide/simethicone Suspension 30 milliLiter(s) Oral every 4 hours PRN Dyspepsia  melatonin 3 milliGRAM(s) Oral at bedtime PRN Insomnia  ondansetron Injectable 4 milliGRAM(s) IV Push every 8 hours PRN Nausea and/or Vomiting      REVIEW OF SYSTEMS:    CONSTITUTIONAL: No weakness, fevers or chills  EYES/ENT: No visual changes;  No vertigo or throat pain   NECK: No pain or stiffness  RESPIRATORY: No cough, wheezing, hemoptysis; No shortness of breath  CARDIOVASCULAR: No chest pain or palpitations  GASTROINTESTINAL: No abdominal or epigastric pain. No nausea, vomiting, or hematemesis; No diarrhea or constipation. No melena or hematochezia.  GENITOURINARY: No dysuria, frequency or hematuria  NEUROLOGICAL: No numbness or weakness  SKIN: No itching, burning, rashes, or lesions   All other review of systems is negative unless indicated above    Vital Signs Last 24 Hrs  T(C): 36.6 (2024 08:20), Max: 36.6 (2024 08:20)  T(F): 97.9 (2024 08:20), Max: 97.9 (2024 08:20)  HR: 76 (2024 11:05) (63 - 79)  BP: 167/81 (2024 11:05) (91/60 - 167/81)  BP(mean): --  RR: 16 (2024 11:05) (16 - 18)  SpO2: 96% (2024 11:05) (96% - 98%)    Parameters below as of 2024 11:05  Patient On (Oxygen Delivery Method): room air        I&O's Summary      PHYSICAL EXAM:    Constitutional: NAD, awake and alert, well-developed  HEENT: PERR, EOMI,  No oral cyananosis.  Neck:  supple,  No JVD  Respiratory: Breath sounds are clear bilaterally, No wheezing, rales or rhonchi  Cardiovascular: S1 and S2, regular rate and rhythm, no Murmurs, gallops or rubs  Gastrointestinal: Bowel Sounds present, soft, nontender.   Extremities: No peripheral edema. No clubbing or cyanosis.  Vascular: 2+ peripheral pulses  Neurological: A/O x 3, no focal deficits  Musculoskeletal: no calf tenderness.  Skin: No rashes.      LABS: All Labs Reviewed:                        12.   8.02  )-----------( 183      ( 2024 09:04 )             38.8                         12.4   6.29  )-----------( 175      ( 2024 06:33 )             37.3                         13.3   5.58  )-----------( 197      ( 2024 07:28 )             41.2     2024 09:04    137    |  109    |  36     ----------------------------<  100    3.5     |  24     |  1.21   2024 06:33    135    |  107    |  35     ----------------------------<  95     4.1     |  24     |  1.17   2024 07:28    137    |  109    |  36     ----------------------------<  98     4.0     |  22     |  1.18     Ca    8.7        2024 09:04  Ca    8.1        2024 06:33  Ca    8.8        2024 07:28    TPro  6.8    /  Alb  2.8    /  TBili  0.4    /  DBili  x      /  AST  34     /  ALT  53     /  AlkPhos  68     2024 09:04          Blood Culture: Organism --  Gram Stain Blood -- Gram Stain --  Specimen Source .Stool  Culture-Blood --            RADIOLOGY/EKG:< from: 12 Lead ECG (24 @ 09:05) >  Diagnosis Line Normal sinus rhythm  Left ventricular hypertrophy with repolarization abnormality ( R in aVL , Walt product )  Non specific T flattening  Abnormal ECG  Confirmed by JACOB KOTHARI MD ([id number]) on 2024 10:08:35 AM    < end of copied text >

## 2024-11-17 VITALS
RESPIRATION RATE: 18 BRPM | HEART RATE: 65 BPM | TEMPERATURE: 98 F | SYSTOLIC BLOOD PRESSURE: 134 MMHG | DIASTOLIC BLOOD PRESSURE: 60 MMHG | OXYGEN SATURATION: 98 %

## 2024-11-17 LAB — GLUCOSE BLDC GLUCOMTR-MCNC: 92 MG/DL — SIGNIFICANT CHANGE UP (ref 70–99)

## 2024-11-17 PROCEDURE — 99239 HOSP IP/OBS DSCHRG MGMT >30: CPT

## 2024-11-17 PROCEDURE — 93880 EXTRACRANIAL BILAT STUDY: CPT | Mod: 26

## 2024-11-17 RX ORDER — ACETAMINOPHEN 500MG 500 MG/1
2 TABLET, COATED ORAL
Qty: 0 | Refills: 0 | DISCHARGE
Start: 2024-11-17

## 2024-11-17 RX ORDER — METOPROLOL TARTRATE 50 MG
1 TABLET ORAL
Refills: 0 | DISCHARGE

## 2024-11-17 RX ORDER — METOPROLOL TARTRATE 100 MG/1
1 TABLET, FILM COATED ORAL
Qty: 0 | Refills: 0 | DISCHARGE
Start: 2024-11-17

## 2024-11-17 RX ADMIN — RIVAROXABAN 15 MILLIGRAM(S): 10 TABLET, FILM COATED ORAL at 16:30

## 2024-11-17 RX ADMIN — Medication 500 MILLIGRAM(S): at 09:25

## 2024-11-17 RX ADMIN — LOSARTAN POTASSIUM 50 MILLIGRAM(S): 100 TABLET, FILM COATED ORAL at 09:25

## 2024-11-17 RX ADMIN — AMIODARONE HYDROCHLORIDE 200 MILLIGRAM(S): 200 TABLET ORAL at 09:25

## 2024-11-17 RX ADMIN — METOPROLOL TARTRATE 25 MILLIGRAM(S): 100 TABLET, FILM COATED ORAL at 09:24

## 2024-11-17 NOTE — PROGRESS NOTE ADULT - ASSESSMENT
84 yo woman with HTN, non obstructive CAD, hx of NSTEMI 2020, HFpEF, paroxysmal afib on Xarelto, just admitted 11/7-11/12 for nausea, vomiting, diarrhea found to have Salmonella infection, treated with ceftriaxone, blood cultures negative, had EILEEN that resolved. She had debility and deconditioning and patient was advised to consider KIM but she preferred to be discharged home. Now returns with falls, continued debility and generalized weakness. CT head and C spine negative for acute findings. CBC WNLL. BUN/Cr 36/1.2. Troponin 579, 308 on repeat. No acute ischemic changes on EKG. UA abnormal with pyuria and bacteriuria but lots of epithelial cells. Patient was started on antibiotics and admitted to Medicine.    Frequent falls, deconditioning and debility  PT consulted. Recommended KIM  - OOB to chair daily  - Continue restorative PT sessions    Elevated troponin  Appreciate input from Cardiology. No angina, No signs of decompensated CHF. No acute ischemic changes on EKG. Cardiology doubts ACS, suspects myocardial demand ischemia without infarction. NM stress test performed today. Normal myocardial perfusion scan, with no evidence of infarction or inducible ischemia. Normal left ventricular regional wall motion. Perfusion Findings: TID ratio 1.05 - normal. The left ventricle is normal in function and normal in size. The post stress left ventricular EF is 83 %. The stress end diastolic volume is 40 ml and systolic volume is 7 ml. Stress electrocardiogram with no significant ischemic ST segment changes.  - Continue metoprolol, statin, Xarelto    HTN  BP controlled.  - Continue metoprolol, losartan    HFpEF  No signs of decompensated CHF  - Continue metoprolol, losartan  - Monitor Is and Os, trend weight    Paroxysmal afib  In SR on tele, rate WNL.  - Continue metoprolol and amiodarone  - Continue Xarelto    Abnormal urinalysis, unable to rule out UTI  Possibly contaminated specimen with +epi cells. Pyuria and bacteriuria. However, urine culture >100,000 CFU per mL E.coli, ESBL, partially susceptible to cipro, which patient has been receiving. She is doing well. Asymptomatic at this point. Consulted ID given the urine culture findings. ID recommends completing cipro tomorrow as planned. No need for antibiotic change. Monitoring QTc, remains mid 400s  - Continue cipro, completes today    Recent Salmonella infection  Appears to be resolved. Patient with 1BM in past 24 hrs. No sign of dehydration. Normal electrolytes.   - Continue cipro, completes today

## 2024-11-17 NOTE — DISCHARGE NOTE NURSING/CASE MANAGEMENT/SOCIAL WORK - PATIENT PORTAL LINK FT
You can access the FollowMyHealth Patient Portal offered by [facility] by registering at the following website: [email or web address]. By joining [employer]’s FollowMyHealth portal, you will also be able to view your health information using other applications (apps) compatible with our system.

## 2024-11-17 NOTE — DISCHARGE NOTE PROVIDER - NSDCFUSCHEDAPPT_GEN_ALL_CORE_FT
Randal Kraus Physician [facility]  CARDIOLOGY [address]  Scheduled Appointment: 11/26/2024

## 2024-11-17 NOTE — DISCHARGE NOTE PROVIDER - NSDCMRMEDTOKEN_GEN_ALL_CORE_FT
acetaminophen 325 mg oral tablet: 2 tab(s) orally every 6 hours As needed Temp greater or equal to 38C (100.4F), Mild Pain (1 - 3)  amiodarone 200 mg oral tablet: 1 tab(s) orally once a day  atorvastatin 20 mg oral tablet: 1 tab(s) orally once a day (at bedtime)  losartan 50 mg oral tablet: 1 tab(s) orally once a day  metoprolol tartrate 25 mg oral tablet: 1 tab(s) orally 2 times a day  PreserVision AREDS 2 oral capsule: 1 cap(s) orally 2 times a day  rivaroxaban 15 mg oral tablet: 1 tab(s) orally once a day

## 2024-11-17 NOTE — DISCHARGE NOTE PROVIDER - HOSPITAL COURSE
84 yo woman with HTN, non obstructive CAD, hx of NSTEMI 2020, HFpEF, paroxysmal afib on Xarelto, just admitted 11/7-11/12 for nausea, vomiting, diarrhea found to have Salmonella infection, treated with ceftriaxone, blood cultures negative, had EILEEN that resolved. She had debility and deconditioning and patient was advised to consider KIM but she preferred to be discharged home. Now returns with falls, continued debility and generalized weakness. CT head and C spine negative for acute findings. CBC WNLL. BUN/Cr 36/1.2. Troponin 579, 308 on repeat. No acute ischemic changes on EKG. UA abnormal with pyuria and bacteriuria but lots of epithelial cells. Patient was started on antibiotics and admitted to Medicine.    Frequent falls, deconditioning and debility  PT consulted. Recommended KIM. OOB to chair daily. Continue restorative PT sessions    Elevated troponin  Appreciate input from Cardiology. No angina, No signs of decompensated CHF. No acute ischemic changes on EKG. Cardiology doubts ACS, suspects myocardial demand ischemia without infarction. NM stress test performed today. Normal myocardial perfusion scan, with no evidence of infarction or inducible ischemia. Normal left ventricular regional wall motion. Perfusion Findings: TID ratio 1.05 - normal. The left ventricle is normal in function and normal in size. The post stress left ventricular EF is 83 %. The stress end diastolic volume is 40 ml and systolic volume is 7 ml. Stress electrocardiogram with no significant ischemic ST segment changes. Continue metoprolol, statin, Xarelto. Outpatient follow up with Dr Kraus on 11/26.     HTN  BP controlled. Continue metoprolol, losartan    HFpEF  No signs of decompensated CHF. Continue metoprolol, losartan. Monitor weight. Outpatient Cardiology follow up with Dr Kraus on 11/26.     Paroxysmal afib  In SR on tele, rate WNL. Continue metoprolol and amiodarone. Continue Xarelto    Abnormal urinalysis, unable to rule out UTI  Possibly contaminated specimen with +epi cells. Pyuria and bacteriuria. However, urine culture >100,000 CFU per mL E.coli, ESBL, partially susceptible to cipro, which patient has been receiving. Consulted ID for guidance. As per ID, patient is doing well, asymptomatic at this point. ID recommended completing cipro course, finished today 11/17.  No need for antibiotic change.     Recent Salmonella infection  Resolved. Received ceftriaxone on recent admission, completed course of cipro this admission. Patient with 1BM in past 24 hrs. No sign of dehydration. Normal electrolytes.

## 2024-11-17 NOTE — DISCHARGE NOTE PROVIDER - NSDCCAREPROVSEEN_GEN_ALL_CORE_FT
Michael Ordonez (Cardiology)  Inez Fernandez (Cardiology)  Dyreyes, Victor M (Urology)  Era Capps (Medicine)  Randal Kraus (Cardiology)  Juaquin Zazueta (Cardiology)  Colt Souza (Infectious Disease)  Yuan Almeida (Medicine)

## 2024-11-17 NOTE — DISCHARGE NOTE PROVIDER - NSDCPNSUBOBJ_GEN_ALL_CORE
Chief Complaint: Debility, falls    Interval Hx: Patient seen and examined this AM. Resting comfortably. Out of bed, in arm chair. No acute complaints. No active symptoms. No headache. No change in vision or speech. No dizziness. No focal weakness or numbness. No chest pain or tightness. No dyspnea. No fever, chills or cough. No abdominal pain, nausea, vomiting, diarrhea or constipation. No urinary complaints. She is completed her course of antibiotics today. Deconditioning and debility. Recommended for [facility]. Stable for discharge to [facility].     ROS: Multi system review is comprehensively negative x 10 systems except as above    Vitals:  T(F): 97.8 (17 Nov 2024 07:57), Max: 98.2 (16 Nov 2024 17:15)  HR: 63 (17 Nov 2024 07:57) (63 - 68)  BP: 134/57 (17 Nov 2024 07:57) (134/57 - 164/70)  RR: 19 (17 Nov 2024 07:57) (18 - 19)  SpO2: 98% (17 Nov 2024 07:57) (98% - 100%) on room air    Exam:  Gen: No acute distress  HEENT: NCAT PERRL EOMI MMM clear oropharynx  Neck: Supple, no JVD, no LAD  CVS: s1 s2 normal, RRR  Chest: Normal resp effort, lungs CTA B/L  Abd: Non distended, +BS, soft, non tender  Ext: No tenderness, intact pulses, normal cap refill  Skin: Warm, dry  Mood: Calm, pleasant  Neuro: Awake and alert, answers questions appropriately, follows commands    Labs:                        12.6   7.35  )---------( 200                  38.4       140  |  113  |  22  ----------------------<  96  3.9   |   23   |  0.95    Ca  8.4    TPro  6.8  /  Alb  2.8  /  TBili  0.4  /  DBili  x   /  AST  34  /  ALT  53  /  AlkPhos  68      Troponin 579 --> 308    ProBNP 664     Lactate 1.2    CRP 69    TSH WNL    UA 11/13: Yellow, turbid, prot 30, gluc-, ket-, bld large, bili-, LE large, N-, WBC 43, RBC 18, +epi cells, +bact    Micro:  Blood culture x 2 11/13: Negative to date  Urine culture 11/13: >100,000 CFU/mL E.coli, ESBL, partial susceptibility to cipro    Prior visit testing  Stool culture 11/11: Salmonella sp.   GI PCR 11/11: Salmonella detected    Imaging:  CXR 11/13: No focal infiltrate or congestion. Osteoarthritic changes prominent at both shoulders as before. Heart is within normal limits in its transthoracic diameter.    CT C spine WO 11/13: No evidence of acute osseous fracture or talha dislocation. Degenerative changes of the cervical spine. Atherosclerotic calcification about the bilateral carotid bulbs.     CT head WO 11/13: No evidence of acute intracranial hemorrhage or midline shift. Chronic small vessel disease.    Cardiac Testing:  Tele 11/16: Personally reviewed. SR/SB, 50s-70s, 1st degree AV block. Tele Dc'd.     NM stress test 11/15: Normal myocardial perfusion scan, with no evidence of infarction or inducible ischemia. Normal left ventricular regional wall motion. Perfusion Findings: TID ratio 1.05 - normal.The left ventricle is normal in function and normal in size. The post stress left ventricular EF is 83 %. The stress end diastolic volume is 40 ml and systolic volume is 7 ml. Stress electrocardiogram: No significant ischemic ST segment changes.    EKG 11/14: Rate 60. NSR. Nonspecific ST abnormality    EKG 11/13: Rate 70. Sinus rhythm [id number] degree A-V block. Moderate voltage criteria for LVH, may be normal variant. Nonspecific T wave abnormality. Prolonged QT

## 2024-11-17 NOTE — DISCHARGE NOTE NURSING/CASE MANAGEMENT/SOCIAL WORK - FINANCIAL ASSISTANCE
[facility] provides services at a reduced cost to those who are determined to be eligible through [facility]’s financial assistance program. Information regarding [facility]’s financial assistance program can be found by going to [email or web address] or by calling [phone].

## 2024-11-17 NOTE — DISCHARGE NOTE PROVIDER - CARE PROVIDER_API CALL
Randal Kraus)  Cardiology  [address], [address]  [place] [address]  Phone: [phone]  Fax: [phone]  Scheduled Appointment: 11/26/2024

## 2024-11-17 NOTE — DISCHARGE NOTE PROVIDER - NSDCCPCAREPLAN_GEN_ALL_CORE_FT
PRINCIPAL DISCHARGE DIAGNOSIS  Diagnosis: Physical deconditioning  Assessment and Plan of Treatment: You were admitted to the hospital for further evaluation of frequent falls, found to be due to physical deconditioning and debility. No acute neurological event. You did have elevated cardiac enzyme suggesting heart strain but no acute cardiac condition was found (no heart attack, no decompensated congestive heart failure, no exacerbated symptomatic arrhythmia, you do have known paroxymsal afib). You were found to have an abnormal urinalysis, unable to rule out urinary tract infection, and this may have contributed to your weakness. Infection since treated this admission. Additionally, you recently were diagnosed with Salmonella gastroenteritis and that may also have weakned your body. You received additional antibiotics for that as well and bowel movements have improved. You are now stable. Doing well. Seen by Physical Therapy Team, recommended subacute rehab, stable for discharge there. Out of bed to chair daily. Continue restorative physical therapy sessions.      SECONDARY DISCHARGE DIAGNOSES  Diagnosis: Elevated troponin  Assessment and Plan of Treatment: Elevated troponin noted upon presentation, in setting of mechanical fall. Appreciate input from Cardiology. No angina, no signs of decompensated congestive heart failure. No acute ischemic changes on EKG. Cardiology doubted myocardial infarction (heart attack), suspected myocardial demand ischemia without infarction. Stress test was performed today and you had normal myocardial perfusion imaging with no evidence of infarction or inducible ischemia. Normal left ventricular regional wall motion, normal left ventricle size and function. Stress electrocardiogram showed no significant ischemic changes. Continue metoprolol, statin, Xarelto. Outpatient follow up with Dr Kraus on 11/26/24.    Diagnosis: HTN (hypertension)  Assessment and Plan of Treatment: Blood pressure controlled. Continue metoprolol, losartan    Diagnosis: Chronic diastolic congestive heart failure  Assessment and Plan of Treatment: No signs of decompensated congestive heart failure. Continue metoprolol, losartan. Monitor weight. Outpatient Cardiology follow up with Dr Kraus on 11/26 as mentioned above.    Diagnosis: Paroxysmal atrial fibrillation  Assessment and Plan of Treatment: Here you remained in normal sinus rhythm. No events on cardiac telemtry monitoring. Continue metoprolol and amiodarone. Continue Xarelto for stroke risk reduction.    Diagnosis: Abnormal urinalysis  Assessment and Plan of Treatment: Possibly contaminated specimen as it had significant epithelial cells on analysis.  Pyuria and bacteriuria. However, urine culture >100,000 CFU per mL E.coli, ESBL, partially susceptible to cipro, which patient has been receiving. Consulted ID for guidance. As per ID, patient is doing well, asymptomatic at this point. ID recommended completing cipro course, finished today 11/17.  No need for antibiotic change.    Diagnosis: H/O Salmonella gastroenteritis  Assessment and Plan of Treatment: Recent Salmonella infection. Resolved. Received ceftriaxone on recent admission, completed course of cipro this admission. Youve had 1 bowel movement in past 24 hrs. No sign of dehydration. Normal electrolytes.

## 2024-11-17 NOTE — PROGRESS NOTE ADULT - SUBJECTIVE AND OBJECTIVE BOX
Chief Complaint: Debility, falls    Interval Hx: Patient seen and examined this AM. Resting comfortably. Out of bed, in arm chair. No acute complaints. No active symptoms. No headache. No change in vision or speech. No dizziness. No focal weakness or numbness. No chest pain or tightness. No dyspnea. No fever, chills or cough. No abdominal pain, nausea, vomiting, diarrhea or constipation. No urinary complaints. She is completing her course of antibiotics today. Stable for discharge but awaiting placement.     ROS: Multi system review is comprehensively negative x 10 systems except as above    Vitals:  T(F): 97.8 (17 Nov 2024 07:57), Max: 98.2 (16 Nov 2024 17:15)  HR: 63 (17 Nov 2024 07:57) (63 - 68)  BP: 134/57 (17 Nov 2024 07:57) (134/57 - 164/70)  RR: 19 (17 Nov 2024 07:57) (18 - 19)  SpO2: 98% (17 Nov 2024 07:57) (98% - 100%) on room air    Exam:  Gen: No acute distress  HEENT: NCAT PERRL EOMI MMM clear oropharynx  Neck: Supple, no JVD, no LAD  CVS: s1 s2 normal, RRR  Chest: Normal resp effort, lungs CTA B/L  Abd: Non distended, +BS, soft, non tender  Ext: No tenderness, intact pulses, normal cap refill  Skin: Warm, dry  Mood: Calm, pleasant  Neuro: Awake and alert, answers questions appropriately, follows commands    Labs:                        12.6   7.35  )---------( 200                  38.4       140  |  113  |  22  ----------------------<  96  3.9   |   23   |  0.95    Ca  8.4    TPro  6.8  /  Alb  2.8  /  TBili  0.4  /  DBili  x   /  AST  34  /  ALT  53  /  AlkPhos  68      Troponin 579 --> 308    ProBNP 664     Lactate 1.2    CRP 69    TSH WNL    UA 11/13: Yellow, turbid, prot 30, gluc-, ket-, bld large, bili-, LE large, N-, WBC 43, RBC 18, +epi cells, +bact    Micro:  Blood culture x 2 11/13: Negative to date  Urine culture 11/13: >100,000 CFU/mL E.coli, ESBL, partial susceptibility to cipro    Prior visit testing  Stool culture 11/11: Salmonella sp.   GI PCR 11/11: Salmonella detected    Imaging:  CXR 11/13: No focal infiltrate or congestion. Osteoarthritic changes prominent at both shoulders as before. Heart is within normal limits in its transthoracic diameter.    CT C spine WO 11/13: No evidence of acute osseous fracture or talha dislocation. Degenerative changes of the cervical spine. Atherosclerotic calcification about the bilateral carotid bulbs.     CT head WO 11/13: No evidence of acute intracranial hemorrhage or midline shift. Chronic small vessel disease.    Cardiac Testing:  Tele 11/16: Personally reviewed. SR/SB, 50s-70s, 1st degree AV block. Tele Dc'd.     NM stress test 11/15: Normal myocardial perfusion scan, with no evidence of infarction or inducible ischemia. Normal left ventricular regional wall motion. Perfusion Findings: TID ratio 1.05 - normal.The left ventricle is normal in function and normal in size. The post stress left ventricular EF is 83 %. The stress end diastolic volume is 40 ml and systolic volume is 7 ml. Stress electrocardiogram: No significant ischemic ST segment changes.    EKG 11/14: Rate 60. NSR. Nonspecific ST abnormality    EKG 11/13: Rate 70. Sinus rhythm alwx1nl degree A-V block. Moderate voltage criteria for LVH, may be normal variant. Nonspecific T wave abnormality. Prolonged QT    Meds:  MEDICATIONS  (STANDING):  aMIOdarone    Tablet 200 milliGRAM(s) Oral daily  atorvastatin 20 milliGRAM(s) Oral at bedtime  ciprofloxacin     Tablet 500 milliGRAM(s) Oral every 12 hours  losartan 50 milliGRAM(s) Oral daily  metoprolol tartrate 25 milliGRAM(s) Oral two times a day  rivaroxaban 15 milliGRAM(s) Oral with dinner    MEDICATIONS  (PRN):  acetaminophen     Tablet .. 650 milliGRAM(s) Oral every 6 hours PRN Temp greater or equal to 38C (100.4F), Mild Pain (1 - 3)  aluminum hydroxide/magnesium hydroxide/simethicone Suspension 30 milliLiter(s) Oral every 4 hours PRN Dyspepsia  melatonin 3 milliGRAM(s) Oral at bedtime PRN Insomnia  ondansetron Injectable 4 milliGRAM(s) IV Push every 8 hours PRN Nausea and/or Vomiting

## 2024-11-17 NOTE — PROGRESS NOTE ADULT - REASON FOR ADMISSION
Debility, falls
Debility, falls  Abnormal urinalysis
Debility, falls  Abnormal urinalysis
falls
Debility, falls
falls

## 2024-11-18 LAB
CULTURE RESULTS: SIGNIFICANT CHANGE UP
CULTURE RESULTS: SIGNIFICANT CHANGE UP
SPECIMEN SOURCE: SIGNIFICANT CHANGE UP
SPECIMEN SOURCE: SIGNIFICANT CHANGE UP

## 2024-11-22 DIAGNOSIS — I48.20 CHRONIC ATRIAL FIBRILLATION, UNSPECIFIED: ICD-10-CM

## 2024-11-22 DIAGNOSIS — Z88.0 ALLERGY STATUS TO PENICILLIN: ICD-10-CM

## 2024-11-22 DIAGNOSIS — I25.2 OLD MYOCARDIAL INFARCTION: ICD-10-CM

## 2024-11-22 DIAGNOSIS — R53.1 WEAKNESS: ICD-10-CM

## 2024-11-22 DIAGNOSIS — E78.5 HYPERLIPIDEMIA, UNSPECIFIED: ICD-10-CM

## 2024-11-22 DIAGNOSIS — I50.32 CHRONIC DIASTOLIC (CONGESTIVE) HEART FAILURE: ICD-10-CM

## 2024-11-22 DIAGNOSIS — A02.0 SALMONELLA ENTERITIS: ICD-10-CM

## 2024-11-22 DIAGNOSIS — G47.00 INSOMNIA, UNSPECIFIED: ICD-10-CM

## 2024-11-22 DIAGNOSIS — I11.0 HYPERTENSIVE HEART DISEASE WITH HEART FAILURE: ICD-10-CM

## 2024-11-22 DIAGNOSIS — N39.0 URINARY TRACT INFECTION, SITE NOT SPECIFIED: ICD-10-CM

## 2024-11-22 DIAGNOSIS — R53.81 OTHER MALAISE: ICD-10-CM

## 2024-11-22 DIAGNOSIS — R26.81 UNSTEADINESS ON FEET: ICD-10-CM

## 2024-11-22 DIAGNOSIS — R29.6 REPEATED FALLS: ICD-10-CM

## 2024-11-22 DIAGNOSIS — B96.20 UNSPECIFIED ESCHERICHIA COLI [E. COLI] AS THE CAUSE OF DISEASES CLASSIFIED ELSEWHERE: ICD-10-CM

## 2024-11-22 NOTE — CDI QUERY NOTE - NSCDIOTHERTXTBX_GEN_ALL_CORE_HH
Although encephalopathy  is documented in the medical record, it lacks the specific indicators to clinically substantiate and support the diagnosis.     In order to ensure accurate coding and accuracy of the clinical record, the documentation in this patient’s medical record requires additional clarification of the diagnosis.      For reference, please see the [facility] Clinical Paper for the clinical definition of encephalopathy:  [facility] Provider Encephalopathy Clinical Paper located on the [facility] Intranet - Clinical documentation improvement resources.    Please clarify the status of encephalopathy in your progress note:     •	Metabolic encephalopathy is ruled out after study  •	Metabolic encephalopathy confirmed (please document the additional information that supports this diagnosis)  •	Other (specify)    Supporting documentation and/or clinical evidence:    HP Adult 11/7/2024   Psychiatric: Oriented X3, intact recent and remote memory, judgement and insight,     Adult-Hospitalist progress note 11/8/2024   poor historian  Alert & Oriented X2,    Adult-Hospitalist progress note 11/9/2024   Alert & Oriented X2,    Infectious disease consult 11/9/2024   Neurological/ Psychiatric: AxOx3, judgement and insight normal;   metabolic encephalopathy     Adult-Hospitalist progress note 11/10/2024   AAOX3, no focal neurological deficits, follows all commands,
Clinical documentation and/or evidence in the medical record indicates that this patient has atrial fibrillation.  In order to ensure accurate coding and accuracy of the clinical record, the documentation in this patient’s medical record requires additional clarification.      Please include more specific documentation as to the type of atrial fibrillation in your progress note and/or discharge summary.      Please clarify if the patient was found to have one of the following:      •	Paroxysmal atrial fibrillation  •	Other (specify)    Supporting documentation and/or clinical evidence:     12 Lead ECG (11.07.24 @ 10:16) Sinus bradycardia    12 Lead ECG (01.25.24 @ 07:55) Atrial fibrillation    12 Lead ECG (08.01.23 @ 15:09) Normal sinus rhythm    12 Lead ECG (04.01.20 @ 20:41) Atrial fibrillation with rapid ventricular response    Discharge provider note 11/12/2024   Afib  with bradycardia to 54   - cw home amiodarone   - cw home rivaroxaban 15 mg   - cw home metoprolol 50 mg bid --> 25 bid due to bradycardia    For reference, please see the [facility] Provider Atrial Fibrillation Clinical Paper located on the [facility] Intranet - Clinical documentation improvement resources.

## 2024-11-26 ENCOUNTER — APPOINTMENT (OUTPATIENT)
Dept: CARDIOLOGY | Facility: CLINIC | Age: 84
End: 2024-11-26

## 2024-11-26 DIAGNOSIS — I48.91 UNSPECIFIED ATRIAL FIBRILLATION: ICD-10-CM

## 2024-11-26 DIAGNOSIS — I25.10 ATHEROSCLEROTIC HEART DISEASE OF NATIVE CORONARY ARTERY WITHOUT ANGINA PECTORIS: ICD-10-CM

## 2024-11-26 DIAGNOSIS — K63.2 FISTULA OF INTESTINE: ICD-10-CM

## 2024-11-26 DIAGNOSIS — N39.0 URINARY TRACT INFECTION, SITE NOT SPECIFIED: ICD-10-CM

## 2024-11-26 DIAGNOSIS — I11.0 HYPERTENSIVE HEART DISEASE WITH HEART FAILURE: ICD-10-CM

## 2024-11-26 DIAGNOSIS — N17.9 ACUTE KIDNEY FAILURE, UNSPECIFIED: ICD-10-CM

## 2024-11-26 DIAGNOSIS — I25.2 OLD MYOCARDIAL INFARCTION: ICD-10-CM

## 2024-11-26 DIAGNOSIS — H61.21 IMPACTED CERUMEN, RIGHT EAR: ICD-10-CM

## 2024-11-26 DIAGNOSIS — E87.20 ACIDOSIS, UNSPECIFIED: ICD-10-CM

## 2024-11-26 DIAGNOSIS — E55.9 VITAMIN D DEFICIENCY, UNSPECIFIED: ICD-10-CM

## 2024-11-26 DIAGNOSIS — M19.90 UNSPECIFIED OSTEOARTHRITIS, UNSPECIFIED SITE: ICD-10-CM

## 2024-11-26 DIAGNOSIS — A02.0 SALMONELLA ENTERITIS: ICD-10-CM

## 2024-11-26 DIAGNOSIS — E87.1 HYPO-OSMOLALITY AND HYPONATREMIA: ICD-10-CM

## 2024-11-26 DIAGNOSIS — E44.0 MODERATE PROTEIN-CALORIE MALNUTRITION: ICD-10-CM

## 2024-11-26 DIAGNOSIS — E78.5 HYPERLIPIDEMIA, UNSPECIFIED: ICD-10-CM

## 2024-11-26 DIAGNOSIS — G93.41 METABOLIC ENCEPHALOPATHY: ICD-10-CM

## 2024-11-26 DIAGNOSIS — H40.9 UNSPECIFIED GLAUCOMA: ICD-10-CM

## 2024-11-26 DIAGNOSIS — I50.32 CHRONIC DIASTOLIC (CONGESTIVE) HEART FAILURE: ICD-10-CM

## 2025-01-27 ENCOUNTER — RX RENEWAL (OUTPATIENT)
Age: 85
End: 2025-01-27

## 2025-02-17 NOTE — ED ADULT NURSE NOTE - BOWEL SOUNDS LUQ
FREE:[LAST:[your primary care doctor],PHONE:[(   )    -],FAX:[(   )    -],FOLLOWUP:[1-3 Days]] present

## 2025-07-20 NOTE — DISCHARGE NOTE PROVIDER - HOSPITAL COURSE
[No Acute Distress] : no acute distress [Well Nourished] : well nourished [Well Developed] : well developed [Well-Appearing] : well-appearing [Normal Sclera/Conjunctiva] : normal sclera/conjunctiva [Normal Outer Ear/Nose] : the outer ears and nose were normal in appearance [Normal Oropharynx] : the oropharynx was normal [No JVD] : no jugular venous distention [No Lymphadenopathy] : no lymphadenopathy [Supple] : supple [No Respiratory Distress] : no respiratory distress  [No Accessory Muscle Use] : no accessory muscle use [Clear to Auscultation] : lungs were clear to auscultation bilaterally [Normal Rate] : normal rate  [Regular Rhythm] : with a regular rhythm [Normal S1, S2] : normal S1 and S2 [Pedal Pulses Present] : the pedal pulses are present [No Edema] : there was no peripheral edema [No Extremity Clubbing/Cyanosis] : no extremity clubbing/cyanosis [Soft] : abdomen soft [Non Tender] : non-tender [Non-distended] : non-distended [Normal Posterior Cervical Nodes] : no posterior cervical lymphadenopathy [Normal Anterior Cervical Nodes] : no anterior cervical lymphadenopathy [No CVA Tenderness] : no CVA  tenderness 81yo/F with PMH parox afib on xarelto, HTN, hyperlipidemia presented with low HH. Patient sustained mechanical fall 7 days ago and went for preop clearance that showed low HH and she referred to ED for blood transfusion. Poor historian, states she never had colonoscopy, denies black tarry stools or seeing blood in stool.   She was admitted to medical floor. She was seen by ortho and went for ORIF left wrist, tolerated the procedure. She was found to have low HGB 6.6 in ED. She was transfused 2 units of PRBCs. GI consult was called. No obvious GI bleed. She went for EGD yesterday-reflux esophagitis. Colonoscopy-tortuous colon.   Her HGB remains stable. Discussed with Dr Pearce, cleared for discharge.     Physical Exam:    · Constitutional no distress  · Eyes EOMI  · ENMT no gross abnormalities  · Respiratory clear to auscultation bilaterally  · Cardiovascular regular rate and rhythm  · Gastrointestinal soft; nontender; nondistended; normal active bowel sounds  · Neurological cranial nerves II-XII intact; sensation intact  · Skin warm and dry  · Musculoskeletal Comments LT wrist in splint      Assessment:    #Anemia\  S/P EGD: reflux esophagitis  Colonoscopy: tortuous colon, could not pass prob beyond sigmoid colon  Check CT abd/pelvis prior to discharge  No source identified   Iron-deficiency. No acute bleeding  S/p 2U PRBC transfusion with good HH response  Never had colonoscopy- GI eval DR Pearce appreciated      #LT wrist fracture S/P ORIF POD#6  Ortho follow up appreciated  Pain meds  Continue PT    # Urinary retention-resolved    #Parox afib  On xarelto  Cont BB and cardizem    #HTN/hyperlipidemia- cont home meds.    Disposition: KIM today  Follow CT abd/pelvis report prior to discharge  Spent more than 30 min to prepare the discharge     [No Spinal Tenderness] : no spinal tenderness [No Joint Swelling] : no joint swelling [Grossly Normal Strength/Tone] : grossly normal strength/tone [No Rash] : no rash [Coordination Grossly Intact] : coordination grossly intact 81yo/F with PMH parox afib on xarelto, HTN, hyperlipidemia presented with low HH. Patient sustained mechanical fall 7 days ago and went for preop clearance that showed low HH and she referred to ED for blood transfusion. Poor historian, states she never had colonoscopy, denies black tarry stools or seeing blood in stool.   She was admitted to medical floor. She was seen by ortho and went for ORIF left wrist, tolerated the procedure. She was found to have low HGB 6.6 in ED. She was transfused 2 units of PRBCs. GI consult was called. No obvious GI bleed. She went for EGD yesterday-reflux esophagitis. Colonoscopy-tortuous colon.   Her HGB remains stable. Discussed with Dr Pearce, cleared for discharge. She got diagnosed with COVID-19 yesterday, asymptomatic.     Physical Exam:    · Constitutional no distress  · Eyes EOMI  · ENMT no gross abnormalities  · Respiratory clear to auscultation bilaterally  · Cardiovascular regular rate and rhythm  · Gastrointestinal soft; nontender; nondistended; normal active bowel sounds  · Neurological cranial nerves II-XII intact; sensation intact  · Skin warm and dry  · Musculoskeletal Comments LT wrist in splint      Assessment:    #Anemia\  S/P EGD: reflux esophagitis  Colonoscopy: tortuous colon, could not pass prob beyond sigmoid colon  Colorectal eval appreciated  No source identified   Iron-deficiency. No acute bleeding  S/p 2U PRBC transfusion with good HH response  Never had colonoscopy- GI eval DR Pearce appreciated      #LT wrist fracture S/P ORIF POD#6  Ortho follow up appreciated  Pain meds  Continue PT    # Urinary retention-resolved    # Asymptomatic COVID-19 infection    #Parox afib  On xarelto  Cont BB and cardizem    #HTN/hyperlipidemia- cont home meds.    Disposition: KIM today  Spent more than 30 min to prepare the discharge     [No Focal Deficits] : no focal deficits [Normal Gait] : normal gait [Normal Affect] : the affect was normal [Normal Insight/Judgement] : insight and judgment were intact [de-identified] : + bilateral + breast implant drains

## 2025-07-30 ENCOUNTER — APPOINTMENT (OUTPATIENT)
Age: 85
End: 2025-07-30
Payer: MEDICARE

## 2025-07-30 ENCOUNTER — NON-APPOINTMENT (OUTPATIENT)
Age: 85
End: 2025-07-30

## 2025-07-30 VITALS
BODY MASS INDEX: 33.99 KG/M2 | DIASTOLIC BLOOD PRESSURE: 80 MMHG | WEIGHT: 180 LBS | HEART RATE: 62 BPM | HEIGHT: 61 IN | SYSTOLIC BLOOD PRESSURE: 135 MMHG

## 2025-07-30 DIAGNOSIS — N18.32 CHRONIC KIDNEY DISEASE, STAGE 3B: ICD-10-CM

## 2025-07-30 DIAGNOSIS — I48.91 UNSPECIFIED ATRIAL FIBRILLATION: ICD-10-CM

## 2025-07-30 DIAGNOSIS — I25.2 OLD MYOCARDIAL INFARCTION: ICD-10-CM

## 2025-07-30 DIAGNOSIS — I10 ESSENTIAL (PRIMARY) HYPERTENSION: ICD-10-CM

## 2025-07-30 PROCEDURE — 99204 OFFICE O/P NEW MOD 45 MIN: CPT

## 2025-07-30 RX ORDER — FUROSEMIDE 20 MG/1
20 TABLET ORAL
Qty: 30 | Refills: 3 | Status: ACTIVE | COMMUNITY
Start: 2025-07-30 | End: 1900-01-01

## 2025-07-30 RX ORDER — ASPIRIN 81 MG/1
81 TABLET, COATED ORAL
Refills: 0 | Status: ACTIVE | COMMUNITY

## 2025-07-30 RX ORDER — METOPROLOL SUCCINATE 25 MG/1
25 TABLET, EXTENDED RELEASE ORAL
Refills: 0 | Status: ACTIVE | COMMUNITY

## 2025-07-30 RX ORDER — FAMOTIDINE 20 MG/1
20 TABLET, FILM COATED ORAL
Refills: 0 | Status: ACTIVE | COMMUNITY

## 2025-07-30 RX ORDER — MULTIVIT-MIN/FOLIC/VIT K/LYCOP 400-300MCG
50 MCG TABLET ORAL
Refills: 0 | Status: ACTIVE | COMMUNITY

## 2025-09-03 ENCOUNTER — LABORATORY RESULT (OUTPATIENT)
Age: 85
End: 2025-09-03

## 2025-09-04 ENCOUNTER — LABORATORY RESULT (OUTPATIENT)
Age: 85
End: 2025-09-04

## 2025-09-10 ENCOUNTER — LABORATORY RESULT (OUTPATIENT)
Age: 85
End: 2025-09-10

## 2025-09-10 ENCOUNTER — APPOINTMENT (OUTPATIENT)
Age: 85
End: 2025-09-10
Payer: MEDICARE

## 2025-09-10 VITALS — BODY MASS INDEX: 33.99 KG/M2 | HEIGHT: 61 IN | WEIGHT: 180 LBS

## 2025-09-10 DIAGNOSIS — I10 ESSENTIAL (PRIMARY) HYPERTENSION: ICD-10-CM

## 2025-09-10 DIAGNOSIS — N18.32 CHRONIC KIDNEY DISEASE, STAGE 3B: ICD-10-CM

## 2025-09-10 PROCEDURE — 99214 OFFICE O/P EST MOD 30 MIN: CPT
